# Patient Record
Sex: FEMALE | Race: BLACK OR AFRICAN AMERICAN | NOT HISPANIC OR LATINO
[De-identification: names, ages, dates, MRNs, and addresses within clinical notes are randomized per-mention and may not be internally consistent; named-entity substitution may affect disease eponyms.]

---

## 2017-01-17 ENCOUNTER — MEDICATION RENEWAL (OUTPATIENT)
Age: 82
End: 2017-01-17

## 2017-02-13 ENCOUNTER — RX RENEWAL (OUTPATIENT)
Age: 82
End: 2017-02-13

## 2017-03-24 ENCOUNTER — APPOINTMENT (OUTPATIENT)
Dept: CARDIOLOGY | Facility: CLINIC | Age: 82
End: 2017-03-24

## 2017-03-24 ENCOUNTER — NON-APPOINTMENT (OUTPATIENT)
Age: 82
End: 2017-03-24

## 2017-03-24 VITALS
BODY MASS INDEX: 31.6 KG/M2 | SYSTOLIC BLOOD PRESSURE: 119 MMHG | HEIGHT: 61 IN | HEART RATE: 74 BPM | DIASTOLIC BLOOD PRESSURE: 72 MMHG | OXYGEN SATURATION: 94 % | WEIGHT: 167.38 LBS

## 2017-03-24 DIAGNOSIS — I20.8 OTHER FORMS OF ANGINA PECTORIS: ICD-10-CM

## 2017-03-24 DIAGNOSIS — Z09 ENCOUNTER FOR FOLLOW-UP EXAMINATION AFTER COMPLETED TREATMENT FOR CONDITIONS OTHER THAN MALIGNANT NEOPLASM: ICD-10-CM

## 2017-03-24 DIAGNOSIS — I50.23 ACUTE ON CHRONIC SYSTOLIC (CONGESTIVE) HEART FAILURE: ICD-10-CM

## 2017-03-30 ENCOUNTER — OUTPATIENT (OUTPATIENT)
Dept: OUTPATIENT SERVICES | Facility: HOSPITAL | Age: 82
LOS: 1 days | End: 2017-03-30
Payer: MEDICARE

## 2017-03-30 VITALS
HEART RATE: 92 BPM | WEIGHT: 167.99 LBS | TEMPERATURE: 97 F | SYSTOLIC BLOOD PRESSURE: 157 MMHG | OXYGEN SATURATION: 92 % | HEIGHT: 60 IN | DIASTOLIC BLOOD PRESSURE: 92 MMHG

## 2017-03-30 VITALS
SYSTOLIC BLOOD PRESSURE: 157 MMHG | HEART RATE: 92 BPM | WEIGHT: 167.99 LBS | DIASTOLIC BLOOD PRESSURE: 92 MMHG | OXYGEN SATURATION: 92 % | HEIGHT: 60 IN | RESPIRATION RATE: 18 BRPM | TEMPERATURE: 97 F

## 2017-03-30 DIAGNOSIS — Z01.810 ENCOUNTER FOR PREPROCEDURAL CARDIOVASCULAR EXAMINATION: ICD-10-CM

## 2017-03-30 DIAGNOSIS — I34.0 NONRHEUMATIC MITRAL (VALVE) INSUFFICIENCY: ICD-10-CM

## 2017-03-30 LAB
ANION GAP SERPL CALC-SCNC: 14 MMOL/L — SIGNIFICANT CHANGE UP (ref 5–17)
ANISOCYTOSIS BLD QL: SLIGHT — SIGNIFICANT CHANGE UP
APTT BLD: 28.3 SEC — SIGNIFICANT CHANGE UP (ref 27.5–37.4)
BUN SERPL-MCNC: 21 MG/DL — HIGH (ref 8–20)
CALCIUM SERPL-MCNC: 9.3 MG/DL — SIGNIFICANT CHANGE UP (ref 8.6–10.2)
CHLORIDE SERPL-SCNC: 99 MMOL/L — SIGNIFICANT CHANGE UP (ref 98–107)
CO2 SERPL-SCNC: 33 MMOL/L — HIGH (ref 22–29)
CREAT SERPL-MCNC: 1.28 MG/DL — SIGNIFICANT CHANGE UP (ref 0.5–1.3)
EOSINOPHIL NFR BLD AUTO: 2 % — SIGNIFICANT CHANGE UP (ref 0–5)
GLUCOSE SERPL-MCNC: 96 MG/DL — SIGNIFICANT CHANGE UP (ref 70–115)
HCT VFR BLD CALC: 29.3 % — LOW (ref 37–47)
HGB BLD-MCNC: 9 G/DL — LOW (ref 12–16)
INR BLD: 1.08 RATIO — SIGNIFICANT CHANGE UP (ref 0.88–1.16)
LYMPHOCYTES # BLD AUTO: 23 % — SIGNIFICANT CHANGE UP (ref 20–55)
MACROCYTES BLD QL: SLIGHT — SIGNIFICANT CHANGE UP
MCHC RBC-ENTMCNC: 20.7 PG — LOW (ref 27–31)
MCHC RBC-ENTMCNC: 30.7 G/DL — LOW (ref 32–36)
MCV RBC AUTO: 67.5 FL — LOW (ref 81–99)
MONOCYTES NFR BLD AUTO: 9 % — SIGNIFICANT CHANGE UP (ref 3–10)
NEUTROPHILS NFR BLD AUTO: 60 % — SIGNIFICANT CHANGE UP (ref 37–73)
PLAT MORPH BLD: NORMAL — SIGNIFICANT CHANGE UP
PLATELET # BLD AUTO: 248 K/UL — SIGNIFICANT CHANGE UP (ref 150–400)
POIKILOCYTOSIS BLD QL AUTO: SLIGHT — SIGNIFICANT CHANGE UP
POTASSIUM SERPL-MCNC: 3.7 MMOL/L — SIGNIFICANT CHANGE UP (ref 3.5–5.3)
POTASSIUM SERPL-SCNC: 3.7 MMOL/L — SIGNIFICANT CHANGE UP (ref 3.5–5.3)
PROTHROM AB SERPL-ACNC: 11.9 SEC — SIGNIFICANT CHANGE UP (ref 9.8–12.7)
RBC # BLD: 4.34 M/UL — LOW (ref 4.4–5.2)
RBC # FLD: 19 % — HIGH (ref 11–15.6)
RBC BLD AUTO: ABNORMAL
SODIUM SERPL-SCNC: 146 MMOL/L — HIGH (ref 135–145)
VARIANT LYMPHS # BLD: 6 % — SIGNIFICANT CHANGE UP (ref 0–6)
WBC # BLD: 5.4 K/UL — SIGNIFICANT CHANGE UP (ref 4.8–10.8)
WBC # FLD AUTO: 5.4 K/UL — SIGNIFICANT CHANGE UP (ref 4.8–10.8)

## 2017-03-30 PROCEDURE — 85730 THROMBOPLASTIN TIME PARTIAL: CPT

## 2017-03-30 PROCEDURE — 80048 BASIC METABOLIC PNL TOTAL CA: CPT

## 2017-03-30 PROCEDURE — 93005 ELECTROCARDIOGRAM TRACING: CPT

## 2017-03-30 PROCEDURE — 85610 PROTHROMBIN TIME: CPT

## 2017-03-30 PROCEDURE — 93010 ELECTROCARDIOGRAM REPORT: CPT

## 2017-03-30 PROCEDURE — G0463: CPT

## 2017-03-30 PROCEDURE — 85027 COMPLETE CBC AUTOMATED: CPT

## 2017-03-30 NOTE — ASU PATIENT PROFILE, ADULT - PMH
Acid reflux    CAD (coronary artery disease)    Cardiomyopathy    Dyslipidemia    Heart failure    HTN (hypertension)    Hx of CABG  2008  Iron deficiency anemia    MI, old  6/2008  MR (mitral regurgitation)  severe  Obesity    On home oxygen therapy  not at this time  3-30-17  Osteoarthritis    Tracheal stenosis  bronch done   8-20-13   r/o  out  stenosis

## 2017-03-30 NOTE — H&P PST ADULT - ASSESSMENT
84 year old female with CAD, prior CABG, CHF with recent hospitalization and known MR that has been getting worse. For LENNY to assess mitral valve

## 2017-03-30 NOTE — H&P PST ADULT - HISTORY OF PRESENT ILLNESS
84 year old female with history of CAD s/p CABG 2008, HTN, acute on chronic CHF, episode of mental status changes with intubation and tracheostomy (now reversed) and know MR which has been getting worse.  She is pre procedure for LENNY to assess Mitral valve.

## 2017-03-31 ENCOUNTER — OUTPATIENT (OUTPATIENT)
Dept: OUTPATIENT SERVICES | Facility: HOSPITAL | Age: 82
LOS: 1 days | End: 2017-03-31
Payer: MEDICARE

## 2017-03-31 ENCOUNTER — TRANSCRIPTION ENCOUNTER (OUTPATIENT)
Age: 82
End: 2017-03-31

## 2017-03-31 DIAGNOSIS — I34.0 NONRHEUMATIC MITRAL (VALVE) INSUFFICIENCY: ICD-10-CM

## 2017-03-31 PROCEDURE — 93312 ECHO TRANSESOPHAGEAL: CPT | Mod: 26

## 2017-03-31 PROCEDURE — 93325 DOPPLER ECHO COLOR FLOW MAPG: CPT

## 2017-03-31 PROCEDURE — 93325 DOPPLER ECHO COLOR FLOW MAPG: CPT | Mod: 26

## 2017-03-31 PROCEDURE — 76376 3D RENDER W/INTRP POSTPROCES: CPT | Mod: 26

## 2017-03-31 PROCEDURE — 93320 DOPPLER ECHO COMPLETE: CPT | Mod: 26

## 2017-03-31 PROCEDURE — 93320 DOPPLER ECHO COMPLETE: CPT

## 2017-03-31 PROCEDURE — 93312 ECHO TRANSESOPHAGEAL: CPT

## 2017-03-31 NOTE — DISCHARGE NOTE ADULT - CARE PLAN
Principal Discharge DX:	MR (mitral regurgitation)  Goal:	OPTIMIZE CARDIAC HEALTH  Instructions for follow-up, activity and diet:	DO NOT EAT FOR 2 HOURS   FOLLOW UP WITH DR BERG 1 WEEK

## 2017-03-31 NOTE — DISCHARGE NOTE ADULT - PATIENT PORTAL LINK FT
“You can access the Follow[facility] Patient Portal, offered by [facility], by registering with the following website: [email or web address]”

## 2017-03-31 NOTE — DISCHARGE NOTE ADULT - MEDICATION SUMMARY - MEDICATIONS TO TAKE
I will START or STAY ON the medications listed below when I get home from the hospital:    Imdur  -- 90 milligram(s) by mouth once a day  -- Indication: For angina    nitroglycerin 0.4 mg sublingual tablet  -- 1 tab(s) under tongue every 5 minutes  -- Indication: For angina    Zocor 40 mg oral tablet  -- 1 tab(s) by mouth once a day (at bedtime)  -- Indication: For cholesterol    Plavix 75 mg oral tablet  -- 1 tab(s) by mouth once a day  -- Indication: For antiplatELETS    torsemide 20 mg oral tablet  -- 1 tab(s) by mouth once a day  -- Indication: For WATER PILL    Slow Fe (as elemental iron) 45 mg oral tablet, extended release  -- 1 tab(s) by mouth once a day  -- Indication: For IRON    latanoprost 0.005% ophthalmic solution  -- 2 drop(s) to each affected eye once a day (in the evening)  -- Indication: For GLAUCOMA    Protonix 40 mg oral delayed release tablet  -- 1 tab(s) by mouth once a day  -- Indication: For STOMACH    hydrALAZINE 25 mg oral tablet  -- 1 tab(s) by mouth 2 times a day  -- Indication: For BP    multivitamin  -- 1 tab(s) by mouth once a day  -- Indication: For VIT

## 2017-03-31 NOTE — DISCHARGE NOTE ADULT - CARE PROVIDERS DIRECT ADDRESSES
[email or web address]

## 2017-03-31 NOTE — DISCHARGE NOTE ADULT - CARE PROVIDER_API CALL
Aki Massey), Cardiovascular Disease  [address] [place] [address]  Phone: [phone]  Fax: [phone]

## 2017-03-31 NOTE — DISCHARGE NOTE ADULT - NS AS DC STROKE ED MATERIALS
Risk Factors for Stroke/Prescribed Medications/Stroke Education Booklet/Call 911 for Stroke/Stroke Warning Signs and Symptoms/Need for Followup After Discharge

## 2017-03-31 NOTE — DISCHARGE NOTE ADULT - CONDITIONS AT DISCHARGE
vital signs  stable, lungs  cta  ,  denies  cp  or  sob,   ,  mild sore throatt,  denies dysphagia,  passed  swallow eval  drank  h20 with no problems.   dangled at bedside,  ambulated  slow  with assist  which is her prior function,  all discharge instructions to dtr and pt , taught back with good understanding, s/l removed ascepticaslly  dsd  in place,  no  s/s  infection,  pt  lives  with dtr  ,  dc  home with dtr under care of  md

## 2017-04-04 DIAGNOSIS — I34.0 NONRHEUMATIC MITRAL (VALVE) INSUFFICIENCY: ICD-10-CM

## 2017-04-04 DIAGNOSIS — I25.10 ATHEROSCLEROTIC HEART DISEASE OF NATIVE CORONARY ARTERY WITHOUT ANGINA PECTORIS: ICD-10-CM

## 2017-04-04 DIAGNOSIS — Z01.810 ENCOUNTER FOR PREPROCEDURAL CARDIOVASCULAR EXAMINATION: ICD-10-CM

## 2017-04-20 ENCOUNTER — APPOINTMENT (OUTPATIENT)
Dept: CARDIOTHORACIC SURGERY | Facility: CLINIC | Age: 82
End: 2017-04-20

## 2017-04-20 VITALS
BODY MASS INDEX: 31.53 KG/M2 | RESPIRATION RATE: 16 BRPM | HEART RATE: 76 BPM | WEIGHT: 167 LBS | SYSTOLIC BLOOD PRESSURE: 126 MMHG | OXYGEN SATURATION: 90 % | DIASTOLIC BLOOD PRESSURE: 70 MMHG | HEIGHT: 61 IN

## 2017-05-05 ENCOUNTER — OUTPATIENT (OUTPATIENT)
Dept: OUTPATIENT SERVICES | Facility: HOSPITAL | Age: 82
LOS: 1 days | Discharge: HOME | End: 2017-05-05
Payer: MEDICARE

## 2017-05-05 ENCOUNTER — TRANSCRIPTION ENCOUNTER (OUTPATIENT)
Age: 82
End: 2017-05-05

## 2017-05-05 VITALS — DIASTOLIC BLOOD PRESSURE: 69 MMHG | TEMPERATURE: 98 F | SYSTOLIC BLOOD PRESSURE: 143 MMHG | HEART RATE: 75 BPM

## 2017-05-05 VITALS
DIASTOLIC BLOOD PRESSURE: 68 MMHG | OXYGEN SATURATION: 98 % | RESPIRATION RATE: 16 BRPM | HEART RATE: 74 BPM | SYSTOLIC BLOOD PRESSURE: 122 MMHG

## 2017-05-05 DIAGNOSIS — I34.0 NONRHEUMATIC MITRAL (VALVE) INSUFFICIENCY: ICD-10-CM

## 2017-05-05 LAB
ALBUMIN SERPL ELPH-MCNC: 4.1 G/DL — SIGNIFICANT CHANGE UP (ref 3.3–5.2)
ALP SERPL-CCNC: 68 U/L — SIGNIFICANT CHANGE UP (ref 40–120)
ALT FLD-CCNC: 7 U/L — SIGNIFICANT CHANGE UP
ANION GAP SERPL CALC-SCNC: 12 MMOL/L — SIGNIFICANT CHANGE UP (ref 5–17)
APTT BLD: 28.6 SEC — SIGNIFICANT CHANGE UP (ref 27.5–37.4)
AST SERPL-CCNC: 12 U/L — SIGNIFICANT CHANGE UP
BILIRUB SERPL-MCNC: 0.3 MG/DL — LOW (ref 0.4–2)
BUN SERPL-MCNC: 24 MG/DL — HIGH (ref 8–20)
CALCIUM SERPL-MCNC: 9.5 MG/DL — SIGNIFICANT CHANGE UP (ref 8.6–10.2)
CHLORIDE SERPL-SCNC: 99 MMOL/L — SIGNIFICANT CHANGE UP (ref 98–107)
CO2 SERPL-SCNC: 36 MMOL/L — HIGH (ref 22–29)
CREAT SERPL-MCNC: 1.21 MG/DL — SIGNIFICANT CHANGE UP (ref 0.5–1.3)
GLUCOSE SERPL-MCNC: 95 MG/DL — SIGNIFICANT CHANGE UP (ref 70–115)
HCT VFR BLD CALC: 30.7 % — LOW (ref 37–47)
HGB BLD-MCNC: 9.4 G/DL — LOW (ref 12–16)
INR BLD: 1.14 RATIO — SIGNIFICANT CHANGE UP (ref 0.88–1.16)
MCHC RBC-ENTMCNC: 20.6 PG — LOW (ref 27–31)
MCHC RBC-ENTMCNC: 30.6 G/DL — LOW (ref 32–36)
MCV RBC AUTO: 67.3 FL — LOW (ref 81–99)
PLATELET # BLD AUTO: 245 K/UL — SIGNIFICANT CHANGE UP (ref 150–400)
POTASSIUM SERPL-MCNC: 4.2 MMOL/L — SIGNIFICANT CHANGE UP (ref 3.5–5.3)
POTASSIUM SERPL-SCNC: 4.2 MMOL/L — SIGNIFICANT CHANGE UP (ref 3.5–5.3)
PROT SERPL-MCNC: 7.6 G/DL — SIGNIFICANT CHANGE UP (ref 6.6–8.7)
PROTHROM AB SERPL-ACNC: 12.6 SEC — SIGNIFICANT CHANGE UP (ref 9.8–12.7)
RBC # BLD: 4.56 M/UL — SIGNIFICANT CHANGE UP (ref 4.4–5.2)
RBC # FLD: 19.8 % — HIGH (ref 11–15.6)
SODIUM SERPL-SCNC: 147 MMOL/L — HIGH (ref 135–145)
WBC # BLD: 4.1 K/UL — LOW (ref 4.8–10.8)
WBC # FLD AUTO: 4.1 K/UL — LOW (ref 4.8–10.8)

## 2017-05-05 PROCEDURE — 93010 ELECTROCARDIOGRAM REPORT: CPT

## 2017-05-05 PROCEDURE — C1889: CPT

## 2017-05-05 PROCEDURE — 85027 COMPLETE CBC AUTOMATED: CPT

## 2017-05-05 PROCEDURE — 85610 PROTHROMBIN TIME: CPT

## 2017-05-05 PROCEDURE — 85730 THROMBOPLASTIN TIME PARTIAL: CPT

## 2017-05-05 PROCEDURE — 93005 ELECTROCARDIOGRAM TRACING: CPT

## 2017-05-05 PROCEDURE — 93461 R&L HRT ART/VENTRICLE ANGIO: CPT | Mod: 26

## 2017-05-05 PROCEDURE — 93461 R&L HRT ART/VENTRICLE ANGIO: CPT

## 2017-05-05 PROCEDURE — C1894: CPT

## 2017-05-05 PROCEDURE — 80053 COMPREHEN METABOLIC PANEL: CPT

## 2017-05-05 PROCEDURE — C1887: CPT

## 2017-05-05 RX ORDER — CLOPIDOGREL BISULFATE 75 MG/1
1 TABLET, FILM COATED ORAL
Qty: 0 | Refills: 0 | COMMUNITY

## 2017-05-05 RX ORDER — ACETAMINOPHEN 500 MG
650 TABLET ORAL ONCE
Qty: 0 | Refills: 0 | Status: COMPLETED | OUTPATIENT
Start: 2017-05-05 | End: 2017-05-05

## 2017-05-05 RX ORDER — SODIUM CHLORIDE 9 MG/ML
500 INJECTION INTRAMUSCULAR; INTRAVENOUS; SUBCUTANEOUS
Qty: 0 | Refills: 0 | Status: DISCONTINUED | OUTPATIENT
Start: 2017-05-05 | End: 2017-05-20

## 2017-05-05 RX ORDER — PANTOPRAZOLE SODIUM 20 MG/1
1 TABLET, DELAYED RELEASE ORAL
Qty: 0 | Refills: 0 | COMMUNITY

## 2017-05-05 RX ADMIN — Medication 650 MILLIGRAM(S): at 12:27

## 2017-05-05 NOTE — DISCHARGE NOTE ADULT - NS AS ACTIVITY OBS
Showering allowed/No Heavy lifting/straining/Do not drive or operate machinery/Do not make important decisions/Walking-Outdoors allowed/Walking-Indoors allowed

## 2017-05-05 NOTE — DISCHARGE NOTE ADULT - CARE PLAN
Principal Discharge DX:	MR (mitral regurgitation)  Goal:	optimal cardiac function  Instructions for follow-up, activity and diet:	No heavy lifting, driving, sex, tub baths, swimming, or any activity that submerges the lower half of the body in water for 48 hours.  Limited walking and stairs for 48 hours.    Change the bandaid after 24 hours and every 24 hours after that.  Keep the puncture site dry and covered with a bandaid until a scab forms.    Observe the site frequently.  If bleeding or a large lump (the size of a golf ball or bigger) occurs lie flat, apply continuous direct pressure just above the puncture site for at least 10 minutes, and notify your physician immediately.  If the bleeding cannot be controlled, call 911 immediately for assistance.  Notify your physician of pain, swelling or any drainage.    Notify your physician immediately if coldness, numbness, discoloration or pain in your foot occurs.

## 2017-05-05 NOTE — DISCHARGE NOTE ADULT - PLAN OF CARE
optimal cardiac function No heavy lifting, driving, sex, tub baths, swimming, or any activity that submerges the lower half of the body in water for 48 hours.  Limited walking and stairs for 48 hours.    Change the bandaid after 24 hours and every 24 hours after that.  Keep the puncture site dry and covered with a bandaid until a scab forms.    Observe the site frequently.  If bleeding or a large lump (the size of a golf ball or bigger) occurs lie flat, apply continuous direct pressure just above the puncture site for at least 10 minutes, and notify your physician immediately.  If the bleeding cannot be controlled, call 911 immediately for assistance.  Notify your physician of pain, swelling or any drainage.    Notify your physician immediately if coldness, numbness, discoloration or pain in your foot occurs.

## 2017-05-05 NOTE — DISCHARGE NOTE ADULT - HOSPITAL COURSE
85 y/o female with symptomatic mitral regurgitation being worked up for mitral clip. PMH includes CABG 2008 with post op IWMI (residual memory loss), respiratory failure requiring tracheostomy in 2013 (for suspected tracheal stenosis later found to have short airway - now reversed), CRI, anemia, HTN, HLD.   Pt now s/p LHC to evaluate coronaries...  RFA and RFV sheaths in place    Vital Signs Last 24 Hrs  T(C): 36.5, Max: 36.5 (05-05 @ 07:30)  T(F): 97.7, Max: 97.7 (05-05 @ 07:30)  HR: 75 (75 - 76)  BP: 120/58 (120/58 - 143/69)  BP(mean): --  RR: --  SpO2: --    P: d/c femoral sheaths as per CCL protocol  continue current medications  outpatient follow up with Dr Barney   d/c home today when patient stable 83 y/o female with symptomatic mitral regurgitation being worked up for mitral clip. PMH includes CABG 2008 with post op IWMI (residual memory loss), respiratory failure requiring tracheostomy in 2013 (for suspected tracheal stenosis later found to have short airway - now reversed), CRI, anemia, HTN, HLD.   Pt now s/p LHC to evaluate coronaries; pending official report  RFA and RFV sheaths in place    Vital Signs Last 24 Hrs  T(C): 36.5, Max: 36.5 (05-05 @ 07:30)  T(F): 97.7, Max: 97.7 (05-05 @ 07:30)  HR: 75 (75 - 76)  BP: 120/58 (120/58 - 143/69)  BP(mean): --  RR: --  SpO2: --    P: d/c femoral sheaths as per CCL protocol  continue current medications  outpatient follow up with Dr Barney   d/c home today when patient stable

## 2017-05-05 NOTE — DISCHARGE NOTE ADULT - CARE PROVIDER_API CALL
Brigido Barney (MD), Cardiovascular Disease; Nuclear Cardiology  [address] [place] [id number]  Phone: [phone]  Fax: [phone]

## 2017-05-05 NOTE — DISCHARGE NOTE ADULT - MEDICATION SUMMARY - MEDICATIONS TO TAKE
I will START or STAY ON the medications listed below when I get home from the hospital:    Imdur  -- 90 milligram(s) by mouth once a day  -- Indication: For angina    nitroglycerin 0.4 mg sublingual tablet  -- 1 tab(s) under tongue every 5 minutes  -- Indication: For angina    Zocor 40 mg oral tablet  -- 1 tab(s) by mouth once a day (at bedtime)  -- Indication: For HLD     Plavix 75 mg oral tablet  -- 1 tab(s) by mouth once a day  -- Indication: For CAD    Toprol-XL 50 mg oral tablet, extended release  -- 1 tab(s) by mouth once a day  -- Indication: For CAD    torsemide 20 mg oral tablet  -- 1 tab(s) by mouth once a day  -- Indication: For Heart failure    Slow Fe (as elemental iron) 45 mg oral tablet, extended release  -- 1 tab(s) by mouth once a day  -- Indication: For Supplement    latanoprost 0.005% ophthalmic solution  -- 2 drop(s) to each affected eye once a day (in the evening)  -- Indication: For eye care    Protonix 40 mg oral delayed release tablet  -- 1 tab(s) by mouth once a day  -- Indication: For reflux    hydrALAZINE 25 mg oral tablet  -- 1 tab(s) by mouth 2 times a day  -- Indication: For HTN    multivitamin  -- 1 tab(s) by mouth once a day  -- Indication: For Supplement

## 2017-05-05 NOTE — H&P PST ADULT - HISTORY OF PRESENT ILLNESS
85 y/o female with symptomatic mitral regurgitation being worked up for mitral clip. PMH includes CABG 2008 with post op IWMI (residual memory loss), respiratory failure requiring tracheostomy in 2013 (for suspected tracheal stenosis later found to have short airway - now reversed), CRI, anemia, HTN, HLD.

## 2017-05-12 ENCOUNTER — APPOINTMENT (OUTPATIENT)
Dept: CARDIOLOGY | Facility: CLINIC | Age: 82
End: 2017-05-12

## 2017-05-24 ENCOUNTER — OUTPATIENT (OUTPATIENT)
Dept: OUTPATIENT SERVICES | Facility: HOSPITAL | Age: 82
LOS: 1 days | End: 2017-05-24
Payer: MEDICARE

## 2017-05-24 VITALS
SYSTOLIC BLOOD PRESSURE: 125 MMHG | HEART RATE: 83 BPM | RESPIRATION RATE: 16 BRPM | DIASTOLIC BLOOD PRESSURE: 74 MMHG | TEMPERATURE: 97 F | WEIGHT: 167.55 LBS | HEIGHT: 60 IN

## 2017-05-24 DIAGNOSIS — Z98.890 OTHER SPECIFIED POSTPROCEDURAL STATES: Chronic | ICD-10-CM

## 2017-05-24 DIAGNOSIS — Z01.818 ENCOUNTER FOR OTHER PREPROCEDURAL EXAMINATION: ICD-10-CM

## 2017-05-24 DIAGNOSIS — I34.0 NONRHEUMATIC MITRAL (VALVE) INSUFFICIENCY: ICD-10-CM

## 2017-05-24 LAB
ALBUMIN SERPL ELPH-MCNC: 4 G/DL — SIGNIFICANT CHANGE UP (ref 3.3–5.2)
ALP SERPL-CCNC: 67 U/L — SIGNIFICANT CHANGE UP (ref 40–120)
ALT FLD-CCNC: 9 U/L — SIGNIFICANT CHANGE UP
ANION GAP SERPL CALC-SCNC: 11 MMOL/L — SIGNIFICANT CHANGE UP (ref 5–17)
ANISOCYTOSIS BLD QL: SLIGHT — SIGNIFICANT CHANGE UP
APPEARANCE UR: CLEAR — SIGNIFICANT CHANGE UP
APTT BLD: 29.1 SEC — SIGNIFICANT CHANGE UP (ref 27.5–37.4)
AST SERPL-CCNC: 15 U/L — SIGNIFICANT CHANGE UP
BACTERIA # UR AUTO: ABNORMAL
BASOPHILS NFR BLD AUTO: 2 % — SIGNIFICANT CHANGE UP (ref 0–2)
BILIRUB SERPL-MCNC: 0.5 MG/DL — SIGNIFICANT CHANGE UP (ref 0.4–2)
BILIRUB UR-MCNC: NEGATIVE — SIGNIFICANT CHANGE UP
BLD GP AB SCN SERPL QL: SIGNIFICANT CHANGE UP
BUN SERPL-MCNC: 26 MG/DL — HIGH (ref 8–20)
CALCIUM SERPL-MCNC: 9.5 MG/DL — SIGNIFICANT CHANGE UP (ref 8.6–10.2)
CHLORIDE SERPL-SCNC: 94 MMOL/L — LOW (ref 98–107)
CO2 SERPL-SCNC: 38 MMOL/L — HIGH (ref 22–29)
COLOR SPEC: YELLOW — SIGNIFICANT CHANGE UP
CREAT SERPL-MCNC: 1.18 MG/DL — SIGNIFICANT CHANGE UP (ref 0.5–1.3)
DIFF PNL FLD: NEGATIVE — SIGNIFICANT CHANGE UP
ELLIPTOCYTES BLD QL SMEAR: SLIGHT — SIGNIFICANT CHANGE UP
EOSINOPHIL NFR BLD AUTO: 3 % — SIGNIFICANT CHANGE UP (ref 0–5)
EPI CELLS # UR: SIGNIFICANT CHANGE UP
GLUCOSE SERPL-MCNC: 92 MG/DL — SIGNIFICANT CHANGE UP (ref 70–115)
GLUCOSE UR QL: NEGATIVE MG/DL — SIGNIFICANT CHANGE UP
HBA1C BLD-MCNC: 5.5 % — SIGNIFICANT CHANGE UP (ref 4–5.6)
HCT VFR BLD CALC: 31.6 % — LOW (ref 37–47)
HGB BLD-MCNC: 9.4 G/DL — LOW (ref 12–16)
HYPOCHROMIA BLD QL: SLIGHT — SIGNIFICANT CHANGE UP
INR BLD: 1.19 RATIO — HIGH (ref 0.88–1.16)
KETONES UR-MCNC: NEGATIVE — SIGNIFICANT CHANGE UP
LEUKOCYTE ESTERASE UR-ACNC: NEGATIVE — SIGNIFICANT CHANGE UP
LYMPHOCYTES # BLD AUTO: 29 % — SIGNIFICANT CHANGE UP (ref 20–55)
MACROCYTES BLD QL: SLIGHT — SIGNIFICANT CHANGE UP
MCHC RBC-ENTMCNC: 20.2 PG — LOW (ref 27–31)
MCHC RBC-ENTMCNC: 29.7 G/DL — LOW (ref 32–36)
MCV RBC AUTO: 67.8 FL — LOW (ref 81–99)
MONOCYTES NFR BLD AUTO: 8 % — SIGNIFICANT CHANGE UP (ref 3–10)
MRSA PCR RESULT.: SIGNIFICANT CHANGE UP
NEUTROPHILS NFR BLD AUTO: 58 % — SIGNIFICANT CHANGE UP (ref 37–73)
NITRITE UR-MCNC: NEGATIVE — SIGNIFICANT CHANGE UP
NT-PROBNP SERPL-SCNC: 3010 PG/ML — HIGH (ref 0–300)
OVALOCYTES BLD QL SMEAR: SLIGHT — SIGNIFICANT CHANGE UP
PH UR: 8 — SIGNIFICANT CHANGE UP (ref 5–8)
PLAT MORPH BLD: NORMAL — SIGNIFICANT CHANGE UP
PLATELET # BLD AUTO: 280 K/UL — SIGNIFICANT CHANGE UP (ref 150–400)
POIKILOCYTOSIS BLD QL AUTO: SLIGHT — SIGNIFICANT CHANGE UP
POTASSIUM SERPL-MCNC: 4.1 MMOL/L — SIGNIFICANT CHANGE UP (ref 3.5–5.3)
POTASSIUM SERPL-SCNC: 4.1 MMOL/L — SIGNIFICANT CHANGE UP (ref 3.5–5.3)
PREALB SERPL-MCNC: 18 MG/DL — SIGNIFICANT CHANGE UP (ref 18–38)
PROT SERPL-MCNC: 7.7 G/DL — SIGNIFICANT CHANGE UP (ref 6.6–8.7)
PROT UR-MCNC: 15 MG/DL
PROTHROM AB SERPL-ACNC: 13.1 SEC — HIGH (ref 9.8–12.7)
RBC # BLD: 4.66 M/UL — SIGNIFICANT CHANGE UP (ref 4.4–5.2)
RBC # FLD: 19.6 % — HIGH (ref 11–15.6)
RBC BLD AUTO: ABNORMAL
RBC CASTS # UR COMP ASSIST: SIGNIFICANT CHANGE UP /HPF (ref 0–4)
S AUREUS DNA NOSE QL NAA+PROBE: SIGNIFICANT CHANGE UP
SODIUM SERPL-SCNC: 143 MMOL/L — SIGNIFICANT CHANGE UP (ref 135–145)
SP GR SPEC: 1.01 — SIGNIFICANT CHANGE UP (ref 1.01–1.02)
T3 SERPL-MCNC: 100 NG/DL — SIGNIFICANT CHANGE UP (ref 80–200)
T4 AB SER-ACNC: 7.8 UG/DL — SIGNIFICANT CHANGE UP (ref 4.5–12)
TSH SERPL-MCNC: 2.27 UIU/ML — SIGNIFICANT CHANGE UP (ref 0.27–4.2)
TYPE + AB SCN PNL BLD: SIGNIFICANT CHANGE UP
UROBILINOGEN FLD QL: NEGATIVE MG/DL — SIGNIFICANT CHANGE UP
WBC # BLD: 4.5 K/UL — LOW (ref 4.8–10.8)
WBC # FLD AUTO: 4.5 K/UL — LOW (ref 4.8–10.8)
WBC UR QL: SIGNIFICANT CHANGE UP

## 2017-05-24 PROCEDURE — 93010 ELECTROCARDIOGRAM REPORT: CPT

## 2017-05-24 PROCEDURE — 71020: CPT | Mod: 26

## 2017-05-24 RX ORDER — CEFUROXIME AXETIL 250 MG
1500 TABLET ORAL ONCE
Qty: 0 | Refills: 0 | Status: COMPLETED | OUTPATIENT
Start: 2017-06-07 | End: 2017-06-07

## 2017-05-24 RX ORDER — SODIUM CHLORIDE 9 MG/ML
3 INJECTION INTRAMUSCULAR; INTRAVENOUS; SUBCUTANEOUS EVERY 8 HOURS
Qty: 0 | Refills: 0 | Status: DISCONTINUED | OUTPATIENT
Start: 2017-06-07 | End: 2017-06-09

## 2017-05-24 NOTE — H&P PST ADULT - ASSESSMENT
Pleasant 84 year old female with history of  Dyspnea, HTN, MI, Trach ,  CHF , And mitral regurgitation presents for mitral clip with Dr. Owen.

## 2017-05-24 NOTE — H&P PST ADULT - PMH
Acid reflux    CAD (coronary artery disease)    Cardiomyopathy    Dyslipidemia    Heart failure    HTN (hypertension)    Hx of CABG  2008  Iron deficiency anemia    MI, old  6/2008  MR (mitral regurgitation)  severe  Obesity    On home oxygen therapy  not at this time  3-30-17  Osteoarthritis    Tracheal stenosis  bronch done   8-20-13   r/o  out  stenosis Acid reflux    CAD (coronary artery disease)    Cardiomyopathy    Dyslipidemia    Heart failure    HTN (hypertension)    Hx of CABG  2008  Iron deficiency anemia    MI, old  6/2008  MR (mitral regurgitation)  severe  Obesity    On home oxygen therapy  not at this time  3-30-17  Osteoarthritis    Renal insufficiency    Tracheal stenosis  bronch done   8-20-13   r/o  out  stenosis

## 2017-05-24 NOTE — PATIENT PROFILE ADULT. - LEARNING ASSESSMENT (PATIENT) ADDITIONAL COMMENTS
Instructed pt on pre-op instructions, tips for safer surgery, pain management scale, pre-surgical infection prevention instructions, and MRSA/MSSA instructions given to pt and verbalize understanding of all.  Ebola screening: Negative.

## 2017-05-24 NOTE — H&P PST ADULT - EKG AND INTERPRETATION
Sinus Rhythm 77 with premature atrial complexes with aberrant conduction  possible inferior infarct  ST & T wave abnormality, consider lateral ischemia  pending final interpretation

## 2017-05-24 NOTE — PATIENT PROFILE ADULT. - PMH
Acid reflux    CAD (coronary artery disease)    Cardiomyopathy    Dyslipidemia    Heart failure    HTN (hypertension)    Hx of CABG  2008  Iron deficiency anemia    MI, old  6/2008  MR (mitral regurgitation)  severe  Obesity    On home oxygen therapy  not at this time  3-30-17  Osteoarthritis    Renal insufficiency    Tracheal stenosis  bronch done   8-20-13   r/o  out  stenosis

## 2017-05-24 NOTE — H&P PST ADULT - HISTORY OF PRESENT ILLNESS
84 year old female presents to PST with daughter Mini. History obtained from daughter who is nurse .PT. has several year history of mitral regurgitation  with dyspnea .Ambulates with walker but activity limited due to dyspnea, also c/o occasional chest discomfort. Sleeps with head elevated 4 pillows. MI and  CHF 2/18.  Over past few years dyspnea getting worse . Echo done in March 2017 with Cardiac cath done in may 2017. Now pt is scheduled for mitral clip with DR. Owen.

## 2017-05-24 NOTE — H&P PST ADULT - LAB RESULTS AND INTERPRETATION
5/26/17 1pm L/M for Marquis Flynn regarding Urine culture. 50,000- 99,000 corynebacterium species. K D'Amico NP

## 2017-05-25 LAB
CULTURE RESULTS: SIGNIFICANT CHANGE UP
SPECIMEN SOURCE: SIGNIFICANT CHANGE UP

## 2017-06-06 PROCEDURE — 93005 ELECTROCARDIOGRAM TRACING: CPT

## 2017-06-06 PROCEDURE — 86900 BLOOD TYPING SEROLOGIC ABO: CPT

## 2017-06-06 PROCEDURE — 84480 ASSAY TRIIODOTHYRONINE (T3): CPT

## 2017-06-06 PROCEDURE — 87640 STAPH A DNA AMP PROBE: CPT

## 2017-06-06 PROCEDURE — 85730 THROMBOPLASTIN TIME PARTIAL: CPT

## 2017-06-06 PROCEDURE — 84443 ASSAY THYROID STIM HORMONE: CPT

## 2017-06-06 PROCEDURE — 85027 COMPLETE CBC AUTOMATED: CPT

## 2017-06-06 PROCEDURE — 71046 X-RAY EXAM CHEST 2 VIEWS: CPT

## 2017-06-06 PROCEDURE — 84436 ASSAY OF TOTAL THYROXINE: CPT

## 2017-06-06 PROCEDURE — 86920 COMPATIBILITY TEST SPIN: CPT

## 2017-06-06 PROCEDURE — 81001 URINALYSIS AUTO W/SCOPE: CPT

## 2017-06-06 PROCEDURE — G0463: CPT

## 2017-06-06 PROCEDURE — 86850 RBC ANTIBODY SCREEN: CPT

## 2017-06-06 PROCEDURE — 83880 ASSAY OF NATRIURETIC PEPTIDE: CPT

## 2017-06-06 PROCEDURE — 86901 BLOOD TYPING SEROLOGIC RH(D): CPT

## 2017-06-06 PROCEDURE — 83036 HEMOGLOBIN GLYCOSYLATED A1C: CPT

## 2017-06-06 PROCEDURE — 85610 PROTHROMBIN TIME: CPT

## 2017-06-06 PROCEDURE — 80053 COMPREHEN METABOLIC PANEL: CPT

## 2017-06-06 PROCEDURE — 84134 ASSAY OF PREALBUMIN: CPT

## 2017-06-06 PROCEDURE — 87086 URINE CULTURE/COLONY COUNT: CPT

## 2017-06-06 PROCEDURE — 87641 MR-STAPH DNA AMP PROBE: CPT

## 2017-06-07 ENCOUNTER — APPOINTMENT (OUTPATIENT)
Dept: CARDIOTHORACIC SURGERY | Facility: HOSPITAL | Age: 82
End: 2017-06-07
Payer: MEDICARE

## 2017-06-07 ENCOUNTER — INPATIENT (INPATIENT)
Facility: HOSPITAL | Age: 82
LOS: 1 days | Discharge: HOME | DRG: 229 | End: 2017-06-09
Attending: THORACIC SURGERY (CARDIOTHORACIC VASCULAR SURGERY) | Admitting: THORACIC SURGERY (CARDIOTHORACIC VASCULAR SURGERY)
Payer: MEDICARE

## 2017-06-07 VITALS
OXYGEN SATURATION: 92 % | TEMPERATURE: 98 F | RESPIRATION RATE: 16 BRPM | HEART RATE: 68 BPM | WEIGHT: 166.89 LBS | HEIGHT: 60 IN | SYSTOLIC BLOOD PRESSURE: 119 MMHG | DIASTOLIC BLOOD PRESSURE: 52 MMHG

## 2017-06-07 DIAGNOSIS — I34.0 NONRHEUMATIC MITRAL (VALVE) INSUFFICIENCY: ICD-10-CM

## 2017-06-07 DIAGNOSIS — Z98.890 OTHER SPECIFIED POSTPROCEDURAL STATES: Chronic | ICD-10-CM

## 2017-06-07 LAB
ALBUMIN SERPL ELPH-MCNC: 3.4 G/DL — SIGNIFICANT CHANGE UP (ref 3.3–5.2)
ALP SERPL-CCNC: 77 U/L — SIGNIFICANT CHANGE UP (ref 40–120)
ALT FLD-CCNC: 29 U/L — SIGNIFICANT CHANGE UP
ANION GAP SERPL CALC-SCNC: 13 MMOL/L — SIGNIFICANT CHANGE UP (ref 5–17)
APTT BLD: 25 SEC — LOW (ref 27.5–37.4)
AST SERPL-CCNC: 27 U/L — SIGNIFICANT CHANGE UP
BILIRUB SERPL-MCNC: 0.7 MG/DL — SIGNIFICANT CHANGE UP (ref 0.4–2)
BLD GP AB SCN SERPL QL: SIGNIFICANT CHANGE UP
BUN SERPL-MCNC: 25 MG/DL — HIGH (ref 8–20)
CALCIUM SERPL-MCNC: 8.7 MG/DL — SIGNIFICANT CHANGE UP (ref 8.6–10.2)
CHLORIDE SERPL-SCNC: 102 MMOL/L — SIGNIFICANT CHANGE UP (ref 98–107)
CK SERPL-CCNC: 104 U/L — SIGNIFICANT CHANGE UP (ref 25–170)
CO2 SERPL-SCNC: 30 MMOL/L — HIGH (ref 22–29)
CREAT SERPL-MCNC: 1.11 MG/DL — SIGNIFICANT CHANGE UP (ref 0.5–1.3)
GAS PNL BLDA: SIGNIFICANT CHANGE UP
GLUCOSE SERPL-MCNC: 133 MG/DL — HIGH (ref 70–115)
HCT VFR BLD CALC: 29.1 % — LOW (ref 37–47)
HGB BLD-MCNC: 8.6 G/DL — LOW (ref 12–16)
INR BLD: 1.42 RATIO — HIGH (ref 0.88–1.16)
MAGNESIUM SERPL-MCNC: 2.2 MG/DL — SIGNIFICANT CHANGE UP (ref 1.6–2.6)
MCHC RBC-ENTMCNC: 20.4 PG — LOW (ref 27–31)
MCHC RBC-ENTMCNC: 29.6 G/DL — LOW (ref 32–36)
MCV RBC AUTO: 69 FL — LOW (ref 81–99)
PLATELET # BLD AUTO: 198 K/UL — SIGNIFICANT CHANGE UP (ref 150–400)
POTASSIUM SERPL-MCNC: 3.7 MMOL/L — SIGNIFICANT CHANGE UP (ref 3.5–5.3)
POTASSIUM SERPL-SCNC: 3.7 MMOL/L — SIGNIFICANT CHANGE UP (ref 3.5–5.3)
PROT SERPL-MCNC: 6.3 G/DL — LOW (ref 6.6–8.7)
PROTHROM AB SERPL-ACNC: 15.7 SEC — HIGH (ref 9.8–12.7)
RBC # BLD: 4.22 M/UL — LOW (ref 4.4–5.2)
RBC # FLD: 20.6 % — HIGH (ref 11–15.6)
SODIUM SERPL-SCNC: 145 MMOL/L — SIGNIFICANT CHANGE UP (ref 135–145)
TROPONIN T SERPL-MCNC: 0.14 NG/ML — HIGH (ref 0–0.06)
TYPE + AB SCN PNL BLD: SIGNIFICANT CHANGE UP
WBC # BLD: 6.2 K/UL — SIGNIFICANT CHANGE UP (ref 4.8–10.8)
WBC # FLD AUTO: 6.2 K/UL — SIGNIFICANT CHANGE UP (ref 4.8–10.8)

## 2017-06-07 PROCEDURE — 33419 REPAIR TCAT MITRAL VALVE: CPT | Mod: 62,Q0

## 2017-06-07 PROCEDURE — 93010 ELECTROCARDIOGRAM REPORT: CPT

## 2017-06-07 PROCEDURE — 33419 REPAIR TCAT MITRAL VALVE: CPT | Mod: AS,Q0

## 2017-06-07 PROCEDURE — 33418 REPAIR TCAT MITRAL VALVE: CPT | Mod: AS,Q0

## 2017-06-07 PROCEDURE — 33418 REPAIR TCAT MITRAL VALVE: CPT | Mod: 62,Q0

## 2017-06-07 PROCEDURE — 93312 ECHO TRANSESOPHAGEAL: CPT | Mod: 26

## 2017-06-07 PROCEDURE — 93325 DOPPLER ECHO COLOR FLOW MAPG: CPT | Mod: 26

## 2017-06-07 PROCEDURE — 76376 3D RENDER W/INTRP POSTPROCES: CPT | Mod: 26

## 2017-06-07 PROCEDURE — 71010: CPT | Mod: 26

## 2017-06-07 PROCEDURE — 93320 DOPPLER ECHO COMPLETE: CPT | Mod: 26

## 2017-06-07 RX ORDER — POTASSIUM CHLORIDE 20 MEQ
10 PACKET (EA) ORAL ONCE
Qty: 0 | Refills: 0 | Status: COMPLETED | OUTPATIENT
Start: 2017-06-07 | End: 2017-06-07

## 2017-06-07 RX ORDER — LATANOPROST 0.05 MG/ML
2 SOLUTION/ DROPS OPHTHALMIC; TOPICAL
Qty: 0 | Refills: 0 | COMMUNITY

## 2017-06-07 RX ORDER — DEXMEDETOMIDINE HYDROCHLORIDE IN 0.9% SODIUM CHLORIDE 4 UG/ML
0.2 INJECTION INTRAVENOUS
Qty: 200 | Refills: 0 | Status: DISCONTINUED | OUTPATIENT
Start: 2017-06-07 | End: 2017-06-08

## 2017-06-07 RX ORDER — FENTANYL CITRATE 50 UG/ML
25 INJECTION INTRAVENOUS ONCE
Qty: 0 | Refills: 0 | Status: DISCONTINUED | OUTPATIENT
Start: 2017-06-07 | End: 2017-06-07

## 2017-06-07 RX ORDER — POTASSIUM CHLORIDE 20 MEQ
10 PACKET (EA) ORAL
Qty: 0 | Refills: 0 | Status: DISCONTINUED | OUTPATIENT
Start: 2017-06-07 | End: 2017-06-07

## 2017-06-07 RX ORDER — NOREPINEPHRINE BITARTRATE/D5W 8 MG/250ML
0.02 PLASTIC BAG, INJECTION (ML) INTRAVENOUS
Qty: 8 | Refills: 0 | Status: DISCONTINUED | OUTPATIENT
Start: 2017-06-07 | End: 2017-06-08

## 2017-06-07 RX ORDER — INSULIN HUMAN 100 [IU]/ML
1 INJECTION, SOLUTION SUBCUTANEOUS
Qty: 250 | Refills: 0 | Status: DISCONTINUED | OUTPATIENT
Start: 2017-06-07 | End: 2017-06-07

## 2017-06-07 RX ORDER — DEXAMETHASONE 0.5 MG/5ML
10 ELIXIR ORAL ONCE
Qty: 0 | Refills: 0 | Status: COMPLETED | OUTPATIENT
Start: 2017-06-07 | End: 2017-06-07

## 2017-06-07 RX ORDER — CEFUROXIME AXETIL 250 MG
1500 TABLET ORAL EVERY 12 HOURS
Qty: 0 | Refills: 0 | Status: COMPLETED | OUTPATIENT
Start: 2017-06-08 | End: 2017-06-09

## 2017-06-07 RX ORDER — PANTOPRAZOLE SODIUM 20 MG/1
40 TABLET, DELAYED RELEASE ORAL
Qty: 0 | Refills: 0 | Status: DISCONTINUED | OUTPATIENT
Start: 2017-06-07 | End: 2017-06-09

## 2017-06-07 RX ORDER — CLOPIDOGREL BISULFATE 75 MG/1
75 TABLET, FILM COATED ORAL ONCE
Qty: 0 | Refills: 0 | Status: COMPLETED | OUTPATIENT
Start: 2017-06-07 | End: 2017-06-07

## 2017-06-07 RX ORDER — FENTANYL CITRATE 50 UG/ML
25 INJECTION INTRAVENOUS
Qty: 0 | Refills: 0 | Status: DISCONTINUED | OUTPATIENT
Start: 2017-06-07 | End: 2017-06-07

## 2017-06-07 RX ORDER — LATANOPROST 0.05 MG/ML
2 SOLUTION/ DROPS OPHTHALMIC; TOPICAL AT BEDTIME
Qty: 0 | Refills: 0 | Status: DISCONTINUED | OUTPATIENT
Start: 2017-06-07 | End: 2017-06-09

## 2017-06-07 RX ORDER — PANTOPRAZOLE SODIUM 20 MG/1
1 TABLET, DELAYED RELEASE ORAL
Qty: 0 | Refills: 0 | COMMUNITY

## 2017-06-07 RX ORDER — DOCUSATE SODIUM 100 MG
100 CAPSULE ORAL THREE TIMES A DAY
Qty: 0 | Refills: 0 | Status: DISCONTINUED | OUTPATIENT
Start: 2017-06-07 | End: 2017-06-09

## 2017-06-07 RX ORDER — CLOPIDOGREL BISULFATE 75 MG/1
75 TABLET, FILM COATED ORAL DAILY
Qty: 0 | Refills: 0 | Status: DISCONTINUED | OUTPATIENT
Start: 2017-06-08 | End: 2017-06-09

## 2017-06-07 RX ORDER — PANTOPRAZOLE SODIUM 20 MG/1
40 TABLET, DELAYED RELEASE ORAL DAILY
Qty: 0 | Refills: 0 | Status: DISCONTINUED | OUTPATIENT
Start: 2017-06-07 | End: 2017-06-07

## 2017-06-07 RX ORDER — SODIUM CHLORIDE 9 MG/ML
1000 INJECTION INTRAMUSCULAR; INTRAVENOUS; SUBCUTANEOUS
Qty: 0 | Refills: 0 | Status: DISCONTINUED | OUTPATIENT
Start: 2017-06-07 | End: 2017-06-09

## 2017-06-07 RX ORDER — SIMVASTATIN 20 MG/1
40 TABLET, FILM COATED ORAL AT BEDTIME
Qty: 0 | Refills: 0 | Status: DISCONTINUED | OUTPATIENT
Start: 2017-06-07 | End: 2017-06-09

## 2017-06-07 RX ORDER — FERROUS SULFATE 325(65) MG
1 TABLET ORAL
Qty: 0 | Refills: 0 | COMMUNITY

## 2017-06-07 RX ORDER — CEFUROXIME AXETIL 250 MG
1500 TABLET ORAL EVERY 8 HOURS
Qty: 0 | Refills: 0 | Status: DISCONTINUED | OUTPATIENT
Start: 2017-06-07 | End: 2017-06-07

## 2017-06-07 RX ORDER — DEXAMETHASONE 0.5 MG/5ML
10 ELIXIR ORAL ONCE
Qty: 0 | Refills: 0 | Status: DISCONTINUED | OUTPATIENT
Start: 2017-06-07 | End: 2017-06-07

## 2017-06-07 RX ORDER — DOBUTAMINE HCL 250MG/20ML
5 VIAL (ML) INTRAVENOUS
Qty: 500 | Refills: 0 | Status: DISCONTINUED | OUTPATIENT
Start: 2017-06-07 | End: 2017-06-08

## 2017-06-07 RX ORDER — FUROSEMIDE 40 MG
20 TABLET ORAL ONCE
Qty: 0 | Refills: 0 | Status: COMPLETED | OUTPATIENT
Start: 2017-06-07 | End: 2017-06-07

## 2017-06-07 RX ORDER — SIMVASTATIN 20 MG/1
1 TABLET, FILM COATED ORAL
Qty: 0 | Refills: 0 | COMMUNITY

## 2017-06-07 RX ORDER — POTASSIUM CHLORIDE 20 MEQ
10 PACKET (EA) ORAL ONCE
Qty: 0 | Refills: 0 | Status: DISCONTINUED | OUTPATIENT
Start: 2017-06-07 | End: 2017-06-07

## 2017-06-07 RX ADMIN — FENTANYL CITRATE 25 MICROGRAM(S): 50 INJECTION INTRAVENOUS at 18:30

## 2017-06-07 RX ADMIN — Medication 100 MILLIGRAM(S): at 15:00

## 2017-06-07 RX ADMIN — SODIUM CHLORIDE 3 MILLILITER(S): 9 INJECTION INTRAMUSCULAR; INTRAVENOUS; SUBCUTANEOUS at 21:43

## 2017-06-07 RX ADMIN — FENTANYL CITRATE 25 MICROGRAM(S): 50 INJECTION INTRAVENOUS at 15:52

## 2017-06-07 RX ADMIN — Medication 10 MILLIGRAM(S): at 21:14

## 2017-06-07 RX ADMIN — LATANOPROST 2 DROP(S): 0.05 SOLUTION/ DROPS OPHTHALMIC; TOPICAL at 21:44

## 2017-06-07 RX ADMIN — Medication 50 MILLIEQUIVALENT(S): at 22:13

## 2017-06-07 RX ADMIN — Medication 20 MILLIGRAM(S): at 23:49

## 2017-06-07 RX ADMIN — FENTANYL CITRATE 25 MICROGRAM(S): 50 INJECTION INTRAVENOUS at 18:45

## 2017-06-07 NOTE — BRIEF OPERATIVE NOTE - PROCEDURE
Percutaneous mitral valve repair with leaflet clip implant  06/07/2017  Percutaneous Mitral valve Repair Utilizing the Mitral Clip via Right Femoral Vein (NCT# [id number]) (STS/ACC TVT Registry ID# [id number]  Active  MICHEL

## 2017-06-07 NOTE — BRIEF OPERATIVE NOTE - POST-OP DX
Chronic systolic CHF (congestive heart failure), NYHA class 4  06/07/2017    Active  Hank Salas  Severe mitral regurgitation  06/07/2017    Active  Hank Salas

## 2017-06-07 NOTE — BRIEF OPERATIVE NOTE - COMMENTS
Invasive Lines: Right Internal Jugular Introducer & Left Radial Arterial Line  IV Medication Infusions: Dobutamine, Levophed

## 2017-06-07 NOTE — PROGRESS NOTE ADULT - SUBJECTIVE AND OBJECTIVE BOX
Commercial Percutaneous Mitral Valve Repair Utilizing the Mitral Clip via Right Femoral Vein.  NCT# [id number], STS/ACC TVT Registry ID# [id number].

## 2017-06-08 DIAGNOSIS — I34.0 NONRHEUMATIC MITRAL (VALVE) INSUFFICIENCY: ICD-10-CM

## 2017-06-08 DIAGNOSIS — I50.22 CHRONIC SYSTOLIC (CONGESTIVE) HEART FAILURE: ICD-10-CM

## 2017-06-08 DIAGNOSIS — J41.0 SIMPLE CHRONIC BRONCHITIS: ICD-10-CM

## 2017-06-08 DIAGNOSIS — I25.10 ATHEROSCLEROTIC HEART DISEASE OF NATIVE CORONARY ARTERY WITHOUT ANGINA PECTORIS: ICD-10-CM

## 2017-06-08 DIAGNOSIS — I10 ESSENTIAL (PRIMARY) HYPERTENSION: ICD-10-CM

## 2017-06-08 DIAGNOSIS — K21.9 GASTRO-ESOPHAGEAL REFLUX DISEASE WITHOUT ESOPHAGITIS: ICD-10-CM

## 2017-06-08 LAB
ALBUMIN SERPL ELPH-MCNC: 3.5 G/DL — SIGNIFICANT CHANGE UP (ref 3.3–5.2)
ALP SERPL-CCNC: 74 U/L — SIGNIFICANT CHANGE UP (ref 40–120)
ALT FLD-CCNC: 25 U/L — SIGNIFICANT CHANGE UP
ANION GAP SERPL CALC-SCNC: 14 MMOL/L — SIGNIFICANT CHANGE UP (ref 5–17)
APTT BLD: 24.8 SEC — LOW (ref 27.5–37.4)
AST SERPL-CCNC: 24 U/L — SIGNIFICANT CHANGE UP
BILIRUB SERPL-MCNC: 0.5 MG/DL — SIGNIFICANT CHANGE UP (ref 0.4–2)
BUN SERPL-MCNC: 25 MG/DL — HIGH (ref 8–20)
CALCIUM SERPL-MCNC: 8.6 MG/DL — SIGNIFICANT CHANGE UP (ref 8.6–10.2)
CHLORIDE SERPL-SCNC: 100 MMOL/L — SIGNIFICANT CHANGE UP (ref 98–107)
CK SERPL-CCNC: 100 U/L — SIGNIFICANT CHANGE UP (ref 25–170)
CO2 SERPL-SCNC: 31 MMOL/L — HIGH (ref 22–29)
CREAT SERPL-MCNC: 1.28 MG/DL — SIGNIFICANT CHANGE UP (ref 0.5–1.3)
GAS PNL BLDA: SIGNIFICANT CHANGE UP
GLUCOSE SERPL-MCNC: 139 MG/DL — HIGH (ref 70–115)
HCT VFR BLD CALC: 28.4 % — LOW (ref 37–47)
HGB BLD-MCNC: 8.4 G/DL — LOW (ref 12–16)
INR BLD: 1.27 RATIO — HIGH (ref 0.88–1.16)
MAGNESIUM SERPL-MCNC: 2.2 MG/DL — SIGNIFICANT CHANGE UP (ref 1.6–2.6)
MCHC RBC-ENTMCNC: 20.5 PG — LOW (ref 27–31)
MCHC RBC-ENTMCNC: 29.6 G/DL — LOW (ref 32–36)
MCV RBC AUTO: 69.4 FL — LOW (ref 81–99)
PHOSPHATE SERPL-MCNC: 4.7 MG/DL — SIGNIFICANT CHANGE UP (ref 2.4–4.7)
PLATELET # BLD AUTO: 222 K/UL — SIGNIFICANT CHANGE UP (ref 150–400)
POTASSIUM SERPL-MCNC: 4.2 MMOL/L — SIGNIFICANT CHANGE UP (ref 3.5–5.3)
POTASSIUM SERPL-SCNC: 4.2 MMOL/L — SIGNIFICANT CHANGE UP (ref 3.5–5.3)
PROT SERPL-MCNC: 6.3 G/DL — LOW (ref 6.6–8.7)
PROTHROM AB SERPL-ACNC: 14 SEC — HIGH (ref 9.8–12.7)
RBC # BLD: 4.09 M/UL — LOW (ref 4.4–5.2)
RBC # FLD: 20.9 % — HIGH (ref 11–15.6)
SODIUM SERPL-SCNC: 145 MMOL/L — SIGNIFICANT CHANGE UP (ref 135–145)
TROPONIN T SERPL-MCNC: 0.1 NG/ML — HIGH (ref 0–0.06)
WBC # BLD: 5.7 K/UL — SIGNIFICANT CHANGE UP (ref 4.8–10.8)
WBC # FLD AUTO: 5.7 K/UL — SIGNIFICANT CHANGE UP (ref 4.8–10.8)

## 2017-06-08 PROCEDURE — 93010 ELECTROCARDIOGRAM REPORT: CPT

## 2017-06-08 PROCEDURE — 93306 TTE W/DOPPLER COMPLETE: CPT | Mod: 26

## 2017-06-08 PROCEDURE — 71010: CPT | Mod: 26

## 2017-06-08 RX ORDER — DEXTROSE 50 % IN WATER 50 %
25 SYRINGE (ML) INTRAVENOUS ONCE
Qty: 0 | Refills: 0 | Status: DISCONTINUED | OUTPATIENT
Start: 2017-06-08 | End: 2017-06-09

## 2017-06-08 RX ORDER — ENOXAPARIN SODIUM 100 MG/ML
40 INJECTION SUBCUTANEOUS DAILY
Qty: 0 | Refills: 0 | Status: DISCONTINUED | OUTPATIENT
Start: 2017-06-08 | End: 2017-06-09

## 2017-06-08 RX ORDER — SODIUM CHLORIDE 9 MG/ML
1000 INJECTION, SOLUTION INTRAVENOUS
Qty: 0 | Refills: 0 | Status: DISCONTINUED | OUTPATIENT
Start: 2017-06-08 | End: 2017-06-09

## 2017-06-08 RX ORDER — DEXTROSE 50 % IN WATER 50 %
1 SYRINGE (ML) INTRAVENOUS ONCE
Qty: 0 | Refills: 0 | Status: DISCONTINUED | OUTPATIENT
Start: 2017-06-08 | End: 2017-06-09

## 2017-06-08 RX ORDER — INSULIN LISPRO 100/ML
VIAL (ML) SUBCUTANEOUS
Qty: 0 | Refills: 0 | Status: DISCONTINUED | OUTPATIENT
Start: 2017-06-08 | End: 2017-06-09

## 2017-06-08 RX ORDER — HYDRALAZINE HCL 50 MG
25 TABLET ORAL
Qty: 0 | Refills: 0 | Status: DISCONTINUED | OUTPATIENT
Start: 2017-06-08 | End: 2017-06-09

## 2017-06-08 RX ORDER — GLUCAGON INJECTION, SOLUTION 0.5 MG/.1ML
1 INJECTION, SOLUTION SUBCUTANEOUS ONCE
Qty: 0 | Refills: 0 | Status: DISCONTINUED | OUTPATIENT
Start: 2017-06-08 | End: 2017-06-09

## 2017-06-08 RX ORDER — DEXTROSE 50 % IN WATER 50 %
12.5 SYRINGE (ML) INTRAVENOUS ONCE
Qty: 0 | Refills: 0 | Status: DISCONTINUED | OUTPATIENT
Start: 2017-06-08 | End: 2017-06-09

## 2017-06-08 RX ADMIN — Medication 20 MILLIGRAM(S): at 08:46

## 2017-06-08 RX ADMIN — LATANOPROST 2 DROP(S): 0.05 SOLUTION/ DROPS OPHTHALMIC; TOPICAL at 21:56

## 2017-06-08 RX ADMIN — Medication 1 TABLET(S): at 12:16

## 2017-06-08 RX ADMIN — SODIUM CHLORIDE 3 MILLILITER(S): 9 INJECTION INTRAMUSCULAR; INTRAVENOUS; SUBCUTANEOUS at 21:09

## 2017-06-08 RX ADMIN — ENOXAPARIN SODIUM 40 MILLIGRAM(S): 100 INJECTION SUBCUTANEOUS at 22:16

## 2017-06-08 RX ADMIN — PANTOPRAZOLE SODIUM 40 MILLIGRAM(S): 20 TABLET, DELAYED RELEASE ORAL at 08:46

## 2017-06-08 RX ADMIN — SODIUM CHLORIDE 3 MILLILITER(S): 9 INJECTION INTRAMUSCULAR; INTRAVENOUS; SUBCUTANEOUS at 05:54

## 2017-06-08 RX ADMIN — Medication 100 MILLIGRAM(S): at 03:26

## 2017-06-08 RX ADMIN — Medication 100 MILLIGRAM(S): at 21:56

## 2017-06-08 RX ADMIN — Medication 100 MILLIGRAM(S): at 15:41

## 2017-06-08 RX ADMIN — SIMVASTATIN 40 MILLIGRAM(S): 20 TABLET, FILM COATED ORAL at 21:56

## 2017-06-08 RX ADMIN — CLOPIDOGREL BISULFATE 75 MILLIGRAM(S): 75 TABLET, FILM COATED ORAL at 12:16

## 2017-06-08 RX ADMIN — SODIUM CHLORIDE 3 MILLILITER(S): 9 INJECTION INTRAMUSCULAR; INTRAVENOUS; SUBCUTANEOUS at 14:14

## 2017-06-08 NOTE — PROGRESS NOTE ADULT - ASSESSMENT
Patient is now POD#1 Mitral clip x 2 secondary to mitral regurgitation. Post-operative period significant for patient self-extubation requiring immediate transition to BIPAP with adequate ABGs subsequently. Remains on Dobutamine and Levophed IV gtt. Stable vital signs and appropriate mental status. Plan to wean BIPAP as tolerated. Further discuss plan with Dr. Owen and CTU team.

## 2017-06-08 NOTE — PHYSICAL THERAPY INITIAL EVALUATION ADULT - ADDITIONAL COMMENTS
Pt. unable to report. Information received from PST H&P: Ambulates with walker but activity limited due to dyspnea.     Pt. reports she lives with family in a house with stairs, but is unable to elaborate due to confusion.

## 2017-06-08 NOTE — PHYSICAL THERAPY INITIAL EVALUATION ADULT - CRITERIA FOR SKILLED THERAPEUTIC INTERVENTIONS
functional limitations in following categories/risk reduction/prevention/impairments found/anticipated equipment needs at discharge/anticipated discharge recommendation/rehab potential/therapy frequency/predicted duration of therapy intervention

## 2017-06-08 NOTE — PROGRESS NOTE ADULT - SUBJECTIVE AND OBJECTIVE BOX
Subjective:  84 year old female with PMH CAD s/p CABG x 3L (2008), prior MI, ischemic heart disease, HTN, HLD, asthma, COPD (PaCO2 50s) with prior use of home O2, respiratory failure wit prior tracheostomy (2013 now decannulated), mild-moderate chronic renal insufficiency, GERD, iron deficiency anemia, uterine fibroids, bilateral cataract surgery. Patient is now POD#1 Mitral clip x 2 secondary to mitral regurgitation. Post-operative period significant for patient self-extubation requiring immediate transition to BIPAP with adequate ABGs subsequently. Remains on Dobutamine and Levophed IV gtt. Stable vital signs and appropriate mental status.     Past Medical History:  Nonrheumatic mitral valve regurgitation  H/o or current diagnosis of HF- no contraindication to ACEI/ARBs  H/o or current diagnosis of HF- ACEI/ARB contraindication unknown  Family history of hypertension (Mother)  Family history of heart disease (Mother)  Renal insufficiency  MR (mitral regurgitation)  Mitral valve stenosis, severe  On home oxygen therapy  Obesity  Tracheal stenosis  Cardiomyopathy  Osteoarthritis  Iron deficiency anemia  MI, old  Dyslipidemia  Acid reflux  CAD (coronary artery disease)  Heart failure  HTN (hypertension)  Chronic systolic CHF (congestive heart failure), NYHA class 4  Severe mitral regurgitation  Percutaneous mitral valve repair with leaflet clip implant  H/O tracheostomy  Cataract  S/P CABG x 3      docusate sodium 100milliGRAM(s) Oral three times a day  clopidogrel Tablet 75milliGRAM(s) Oral daily  cefuroxime  IVPB 1500milliGRAM(s) IV Intermittent every 12 hours  DOBUTamine Infusion 5MICROgram(s)/kG/Min IV Continuous <Continuous>  pantoprazole    Tablet 40milliGRAM(s) Oral before breakfast  simvastatin 40milliGRAM(s) Oral at bedtime  latanoprost 0.005% Ophthalmic Solution 2Drop(s) Both EYES at bedtime  multivitamin 1Tablet(s) Oral daily  norepinephrine Infusion 0.02MICROgram(s)/kG/Min IV Continuous <Continuous>      Height (cm): 152.4 (06-07 @ 08:38)  Weight (kg): 75.7 (06-07 @ 08:38)  BMI (kg/m2): 32.6 (06-07 @ 08:38)  BSA (m2): 1.73 (06-07 @ 08:38)Mode: standby,Pt self extubated  Daily Height in cm: 152.4 (07 Jun 2017 08:32)    Daily       ABG - ( 08 Jun 2017 01:06 )  pH: 7.35  /  pCO2: 63    /  pO2: 70    / HCO3: 31    / Base Excess: 7.6   /  SaO2: 94                                  8.6    6.2   )-----------( 198      ( 07 Jun 2017 18:27 )             29.1   06-07    145  |  102  |  25.0<H>  ----------------------------<  133<H>  3.7   |  30.0<H>  |  1.11    Ca    8.7      07 Jun 2017 18:27  Mg     2.2     06-07    TPro  6.3<L>  /  Alb  3.4  /  TBili  0.7  /  DBili  x   /  AST  27  /  ALT  29  /  AlkPhos  77  06-07    CARDIAC MARKERS ( 07 Jun 2017 18:27 )  x     / 0.14 ng/mL / 104 U/L / x     / x        PT/INR - ( 07 Jun 2017 18:27 )   PT: 15.7 sec;   INR: 1.42 ratio         PTT - ( 07 Jun 2017 18:27 )  PTT:25.0 sec      Objective:  T(C): 36.5, Max: 36.7 (06-07 @ 08:32)  HR: 71 (68 - 84)  BP: 119/52 (119/52 - 119/52)  RR: 20 (10 - 30)  SpO2: 96% (92% - 100%)  Wt(kg): --CAPILLARY BLOOD GLUCOSE  142 (08 Jun 2017 01:00)  136 (07 Jun 2017 23:00)  134 (07 Jun 2017 22:00)  134 (07 Jun 2017 21:00)  129 (07 Jun 2017 20:00)  133 (07 Jun 2017 19:00)  I&O's Summary    I & Os for current day (as of 08 Jun 2017 02:09)  =============================================  IN: 256.7 ml / OUT: 495 ml / NET: -238.3 ml      Physical Exam:  Neuro: Awake, alert, oriented x 3  Pulm: wheezing b/l lung bases, no rales  CV: S1S2, no murmurs, NSR  Abd: Soft, NT, ND, normoactive bowel sounds  Ext: +DP pulses b/l  Inc: right groin access site clean, dry, intact, no hematoma or active bleeding

## 2017-06-08 NOTE — PHYSICAL THERAPY INITIAL EVALUATION ADULT - ACTIVE RANGE OF MOTION EXAMINATION, REHAB EVAL
Unable to formally assess: Pt. responding "no" to a number of questions and directions. Appears to be WFL during observation

## 2017-06-08 NOTE — PHYSICAL THERAPY INITIAL EVALUATION ADULT - GENERAL OBSERVATIONS, REHAB EVAL
Pt. greeted resting in bed (+) central line, (+) A line, (+) cardiac monitor,  (+) pulse ox, (+) O2 via NC

## 2017-06-09 VITALS — RESPIRATION RATE: 58 BRPM | HEART RATE: 87 BPM

## 2017-06-09 LAB
ANION GAP SERPL CALC-SCNC: 10 MMOL/L — SIGNIFICANT CHANGE UP (ref 5–17)
ANION GAP SERPL CALC-SCNC: 9 MMOL/L — SIGNIFICANT CHANGE UP (ref 5–17)
BUN SERPL-MCNC: 37 MG/DL — HIGH (ref 8–20)
BUN SERPL-MCNC: 38 MG/DL — HIGH (ref 8–20)
CALCIUM SERPL-MCNC: 8.6 MG/DL — SIGNIFICANT CHANGE UP (ref 8.6–10.2)
CALCIUM SERPL-MCNC: 9 MG/DL — SIGNIFICANT CHANGE UP (ref 8.6–10.2)
CHLORIDE SERPL-SCNC: 98 MMOL/L — SIGNIFICANT CHANGE UP (ref 98–107)
CHLORIDE SERPL-SCNC: 99 MMOL/L — SIGNIFICANT CHANGE UP (ref 98–107)
CO2 SERPL-SCNC: 35 MMOL/L — HIGH (ref 22–29)
CO2 SERPL-SCNC: 35 MMOL/L — HIGH (ref 22–29)
CREAT SERPL-MCNC: 1.5 MG/DL — HIGH (ref 0.5–1.3)
CREAT SERPL-MCNC: 1.69 MG/DL — HIGH (ref 0.5–1.3)
GLUCOSE SERPL-MCNC: 93 MG/DL — SIGNIFICANT CHANGE UP (ref 70–115)
GLUCOSE SERPL-MCNC: 98 MG/DL — SIGNIFICANT CHANGE UP (ref 70–115)
HCT VFR BLD CALC: 27.9 % — LOW (ref 37–47)
HGB BLD-MCNC: 8.2 G/DL — LOW (ref 12–16)
MAGNESIUM SERPL-MCNC: 2.6 MG/DL — SIGNIFICANT CHANGE UP (ref 1.6–2.6)
MAGNESIUM SERPL-MCNC: 2.6 MG/DL — SIGNIFICANT CHANGE UP (ref 1.6–2.6)
MCHC RBC-ENTMCNC: 20.3 PG — LOW (ref 27–31)
MCHC RBC-ENTMCNC: 29.4 G/DL — LOW (ref 32–36)
MCV RBC AUTO: 69.1 FL — LOW (ref 81–99)
PLATELET # BLD AUTO: 209 K/UL — SIGNIFICANT CHANGE UP (ref 150–400)
POTASSIUM SERPL-MCNC: 4.2 MMOL/L — SIGNIFICANT CHANGE UP (ref 3.5–5.3)
POTASSIUM SERPL-MCNC: 4.6 MMOL/L — SIGNIFICANT CHANGE UP (ref 3.5–5.3)
POTASSIUM SERPL-SCNC: 4.2 MMOL/L — SIGNIFICANT CHANGE UP (ref 3.5–5.3)
POTASSIUM SERPL-SCNC: 4.6 MMOL/L — SIGNIFICANT CHANGE UP (ref 3.5–5.3)
RBC # BLD: 4.04 M/UL — LOW (ref 4.4–5.2)
RBC # FLD: 20.9 % — HIGH (ref 11–15.6)
SODIUM SERPL-SCNC: 143 MMOL/L — SIGNIFICANT CHANGE UP (ref 135–145)
SODIUM SERPL-SCNC: 143 MMOL/L — SIGNIFICANT CHANGE UP (ref 135–145)
WBC # BLD: 7.1 K/UL — SIGNIFICANT CHANGE UP (ref 4.8–10.8)
WBC # FLD AUTO: 7.1 K/UL — SIGNIFICANT CHANGE UP (ref 4.8–10.8)

## 2017-06-09 PROCEDURE — 71010: CPT | Mod: 26

## 2017-06-09 RX ORDER — DOCUSATE SODIUM 100 MG
1 CAPSULE ORAL
Qty: 0 | Refills: 0 | COMMUNITY
Start: 2017-06-09

## 2017-06-09 RX ORDER — ISOSORBIDE MONONITRATE 60 MG/1
90 TABLET, EXTENDED RELEASE ORAL
Qty: 0 | Refills: 0 | COMMUNITY

## 2017-06-09 RX ORDER — METOPROLOL TARTRATE 50 MG
1 TABLET ORAL
Qty: 0 | Refills: 0 | COMMUNITY

## 2017-06-09 RX ORDER — NITROGLYCERIN 6.5 MG
1 CAPSULE, EXTENDED RELEASE ORAL
Qty: 0 | Refills: 0 | COMMUNITY

## 2017-06-09 RX ADMIN — Medication 100 MILLIGRAM(S): at 02:00

## 2017-06-09 RX ADMIN — ENOXAPARIN SODIUM 40 MILLIGRAM(S): 100 INJECTION SUBCUTANEOUS at 12:38

## 2017-06-09 RX ADMIN — Medication 1 TABLET(S): at 14:14

## 2017-06-09 RX ADMIN — Medication 100 MILLIGRAM(S): at 06:03

## 2017-06-09 RX ADMIN — PANTOPRAZOLE SODIUM 40 MILLIGRAM(S): 20 TABLET, DELAYED RELEASE ORAL at 06:03

## 2017-06-09 RX ADMIN — SODIUM CHLORIDE 3 MILLILITER(S): 9 INJECTION INTRAMUSCULAR; INTRAVENOUS; SUBCUTANEOUS at 05:21

## 2017-06-09 RX ADMIN — Medication 100 MILLIGRAM(S): at 14:14

## 2017-06-09 RX ADMIN — Medication 100 MILLIGRAM(S): at 12:39

## 2017-06-09 RX ADMIN — Medication 25 MILLIGRAM(S): at 06:03

## 2017-06-09 RX ADMIN — CLOPIDOGREL BISULFATE 75 MILLIGRAM(S): 75 TABLET, FILM COATED ORAL at 12:38

## 2017-06-09 RX ADMIN — SODIUM CHLORIDE 3 MILLILITER(S): 9 INJECTION INTRAMUSCULAR; INTRAVENOUS; SUBCUTANEOUS at 14:15

## 2017-06-09 NOTE — PROGRESS NOTE ADULT - ASSESSMENT
Patient is now POD#2 Mitral clip x 2 secondary to mitral regurgitation. Post-operative period significant for patient self-extubation requiring immediate transition to BIPAP with adequate ABGs subsequently. Patient is on no drips and remains hemodynamically stable. Tolerating nasal cannula with SpO2 >92%. Deintensify today. Further discuss plan with Dr. Owen and CTU team.

## 2017-06-09 NOTE — DISCHARGE NOTE ADULT - ADDITIONAL INSTRUCTIONS
1.) Please follow up with Dr. Owen on 7/5/17 at 1:15 PM.  2.) Please follow up with Cardiology in 1 - 2 weeks, as scheduled by patient  3.) Please follow up with Primary Care, as scheduled by patient

## 2017-06-09 NOTE — DISCHARGE NOTE ADULT - HOSPITAL COURSE
Mitral Clip Performed2 clips deployed,  Primacor bolus in OR and to CTICU on  & Levo;   Patient extubated in ICU and palced on BIPAP; stable ABGs  TTE: Mild-mod MR, no pericardial effusion  Patient noted to have elevated creatinine of 1.69, one day later on  creatinine level is 1.5.  Lab values discussed with Dr. Owen and patient cleared for discharge.    Currently holding imdur and lopressor for relative hypotension, will continue Hydralazine per Dr. Owen

## 2017-06-09 NOTE — DISCHARGE NOTE ADULT - NS AS DC FU [facility] CONTRAINDICATIONS ACEI/ARB MEDS
other reasons documented by MD/NP/PA or Pharmacist/Hypotension/worsening renal function/renal disease/dysfunction

## 2017-06-09 NOTE — DISCHARGE NOTE ADULT - PLAN OF CARE
Recovery Following Mitral Clip Follow up with Dr. Owen on 7/5/17 at 1:15 PM in the Cardiac surgery office.  Please continue a heart healthy Diet.  Please come to ED or Call Cardio thoracic office at [phone] if Chest pain, Shortness of Breath, persistant Nausea & vomiting, oozing from wounds,palpitations or pain not relieved with medication  Weigh yourself daily>notify surgeons office if  2 lb increase in weight in 24 hours. Kidney Recovery Please follow up with your primary care doctor to have important lab work checked for your kidney function.  (as scheduled by patient.)  Please monitor your urine output, and ensure adequate fluid and nutrition intake to avoid dehydration.   Please continue torsemide as written. Freedom from chest pain take nitroglycerine as prescribed for chest pain.  please notify cardiac surgery office of chest pain / return to [facility] ER for cases of chest pain that is not controlled with pain medication. Heart Failure symptom control continue hydralazine and torsemide.  please follow up with cardiologist regarding cardiac medications (currently holding imdur and metoprolol) cholesterol control Continue zocor BP control Your blood pressure is too low to restart all of your home medications, we are holding your metoprolol and imdur because your bloodpressure is too low for them to be restarted.  Per Dr. Owen, please continue your hydralazine. GERD Control Please continue protonix

## 2017-06-09 NOTE — DISCHARGE NOTE ADULT - CARE PLAN
Principal Discharge DX:	Non-rheumatic mitral regurgitation  Goal:	Recovery Following Mitral Clip  Instructions for follow-up, activity and diet:	Follow up with Dr. Owen on 7/5/17 at 1:15 PM in the Cardiac surgery office.  Please continue a heart healthy Diet.  Please come to ED or Call Cardio thoracic office at [phone] if Chest pain, Shortness of Breath, persistant Nausea & vomiting, oozing from wounds,palpitations or pain not relieved with medication  Weigh yourself daily>notify surgeons office if  2 lb increase in weight in 24 hours.  Secondary Diagnosis:	Renal insufficiency  Goal:	Kidney Recovery  Instructions for follow-up, activity and diet:	Please follow up with your primary care doctor to have important lab work checked for your kidney function.  (as scheduled by patient.)  Please monitor your urine output, and ensure adequate fluid and nutrition intake to avoid dehydration.   Please continue torsemide as written.  Secondary Diagnosis:	CAD (coronary artery disease)  Goal:	Freedom from chest pain  Instructions for follow-up, activity and diet:	take nitroglycerine as prescribed for chest pain.  please notify cardiac surgery office of chest pain / return to [facility] ER for cases of chest pain that is not controlled with pain medication.  Secondary Diagnosis:	Chronic systolic heart failure  Goal:	Heart Failure symptom control  Instructions for follow-up, activity and diet:	continue hydralazine and torsemide.  please follow up with cardiologist regarding cardiac medications (currently holding imdur and metoprolol)  Secondary Diagnosis:	Dyslipidemia  Goal:	cholesterol control  Instructions for follow-up, activity and diet:	Continue zocor  Secondary Diagnosis:	Essential hypertension  Goal:	BP control  Instructions for follow-up, activity and diet:	Your blood pressure is too low to restart all of your home medications, we are holding your metoprolol and imdur because your bloodpressure is too low for them to be restarted.  Per Dr. Owen, please continue your hydralazine.  Secondary Diagnosis:	GERD without esophagitis  Goal:	GERD Control  Instructions for follow-up, activity and diet:	Please continue protonix Principal Discharge DX:	Non-rheumatic mitral regurgitation  Goal:	Recovery Following Mitral Clip  Instructions for follow-up, activity and diet:	Follow up with Dr. Owen on 7/5/17 at 1:15 PM in the Cardiac surgery office.  Please continue a heart healthy Diet.  Please come to ED or Call Cardio thoracic office at [phone] if Chest pain, Shortness of Breath, persistant Nausea & vomiting, oozing from wounds,palpitations or pain not relieved with medication  Weigh yourself daily>notify surgeons office if  2 lb increase in weight in 24 hours.  Secondary Diagnosis:	Renal insufficiency  Goal:	Kidney Recovery  Instructions for follow-up, activity and diet:	Please follow up with your primary care doctor to have important lab work checked for your kidney function.  (as scheduled by patient.)  Please monitor your urine output, and ensure adequate fluid and nutrition intake to avoid dehydration.   Please continue torsemide as written.  Secondary Diagnosis:	CAD (coronary artery disease)  Goal:	Freedom from chest pain  Instructions for follow-up, activity and diet:	take nitroglycerine as prescribed for chest pain.  please notify cardiac surgery office of chest pain / return to [facility] ER for cases of chest pain that is not controlled with pain medication.  Secondary Diagnosis:	Chronic systolic heart failure  Goal:	Heart Failure symptom control  Instructions for follow-up, activity and diet:	continue hydralazine and torsemide.  please follow up with cardiologist regarding cardiac medications (currently holding imdur and metoprolol)  Secondary Diagnosis:	Dyslipidemia  Goal:	cholesterol control  Instructions for follow-up, activity and diet:	Continue zocor  Secondary Diagnosis:	Essential hypertension  Goal:	BP control  Instructions for follow-up, activity and diet:	Your blood pressure is too low to restart all of your home medications, we are holding your metoprolol and imdur because your bloodpressure is too low for them to be restarted.  Per Dr. Owen, please continue your hydralazine.  Secondary Diagnosis:	GERD without esophagitis  Goal:	GERD Control  Instructions for follow-up, activity and diet:	Please continue protonix

## 2017-06-09 NOTE — PROGRESS NOTE ADULT - PROBLEM SELECTOR PLAN 5
Carrillo PRN  Transition to nasal cannula
Duonebs PRN  Wean BIPAP as tolerated, PRN ABGs  Transition to nasal cannula

## 2017-06-09 NOTE — DISCHARGE NOTE ADULT - PATIENT PORTAL LINK FT
“You can access the Follow[facility] Patient Portal, offered by [facility] [employer], by registering with the following website: [email or web address]”

## 2017-06-09 NOTE — DISCHARGE NOTE ADULT - MEDICATION SUMMARY - MEDICATIONS TO TAKE
I will START or STAY ON the medications listed below when I get home from the hospital:    nitroglycerin 0.4 mg sublingual tablet  -- 1 tab(s) under tongue every 5 minutes  -- As Needed for Ischemic Chest Pain  -- Indication: For Coronary artery disease involving native coronary artery of native heart without angina pectoris    Zocor 40 mg oral tablet  -- 1 tab(s) by mouth once a day (at bedtime)  -- Indication: For Cholesterol    Plavix 75 mg oral tablet  -- 1 tab(s) by mouth once a day  -- Indication: For Antiplatelet    torsemide 20 mg oral tablet  -- 1 tab(s) by mouth once a day  -- Indication: For Diuretic    Slow Fe (as elemental iron) 45 mg oral tablet, extended release  -- 1 tab(s) by mouth once a day  -- Indication: For Supplement    docusate sodium 100 mg oral capsule  -- 1 cap(s) by mouth 3 times a day  -- Indication: For Stool Softener    latanoprost 0.005% ophthalmic solution  -- 2 drop(s) to each affected eye once a day (in the evening)  -- Indication: For Glaucoma    Protonix 40 mg oral delayed release tablet  -- 1 tab(s) by mouth once a day  -- Indication: For Gastric reflux    hydrALAZINE 25 mg oral tablet  -- 1 tab(s) by mouth 2 times a day  -- Indication: For Blood Pressure Control    multivitamin  -- 1 tab(s) by mouth once a day  -- Indication: For Supplement

## 2017-06-09 NOTE — PROGRESS NOTE ADULT - PROBLEM SELECTOR PLAN 4
Start beta blocker when tolerated
Wean Levophed as tolerated   Titrate for MAP 70-90  Resume beta blocker when tolerated

## 2017-06-09 NOTE — PROGRESS NOTE ADULT - SUBJECTIVE AND OBJECTIVE BOX
CT Surgery Event Note:    Lab results reviewed with Dr. Owen at bedside.  Per Dr. Owen, patient may be discharged home today and may resume torsemide tomrrow.    K 4.2, BUN 37, Creatinine: 1.5

## 2017-06-09 NOTE — DISCHARGE NOTE ADULT - CARE PROVIDERS DIRECT ADDRESSES
[email or web address]

## 2017-06-09 NOTE — DISCHARGE NOTE ADULT - CONDITIONS AT DISCHARGE
Patient alert and orientated to place and situation, patient and daughter discharge instructions given, IJ removed, invasive lines and surgical sites clean dry and intact, meds reviewed with patient and family, safety maintained

## 2017-06-09 NOTE — PROGRESS NOTE ADULT - PROBLEM SELECTOR PLAN 1
POD #2 Mitral Clip x 2  Continue Plavix  hold ASA- patient is allergic to aspirin  predischarge TTE completed  Daily CXR and labs
POD #1 Mitral Clip x 2  Continue Plavix  hold ASA- patient is allergic to aspirin  predischarge TTE ordered for today  Daily CXR and labs

## 2017-06-09 NOTE — DISCHARGE NOTE ADULT - CARE PROVIDER_API CALL
Khai Owen), Surgery; Thoracic and Cardiac Surgery  [address] [place] [address]  Phone: [phone]  Fax: [phone]    Brigido Barney), Cardiovascular Disease; Nuclear Cardiology  [address] [place] [address]  Phone: [phone]  Fax: [phone]

## 2017-06-09 NOTE — DISCHARGE NOTE ADULT - SECONDARY DIAGNOSIS.
Renal insufficiency CAD (coronary artery disease) Chronic systolic heart failure Dyslipidemia Essential hypertension GERD without esophagitis

## 2017-06-09 NOTE — PROGRESS NOTE ADULT - SUBJECTIVE AND OBJECTIVE BOX
Subjective:  84 year old female with PMH CAD s/p CABG x 3L (), prior MI, ischemic heart disease, HTN, HLD, asthma, COPD (PaCO2 50s) with prior use of home O2, respiratory failure wit prior tracheostomy ( now decannulated), mild-moderate chronic renal insufficiency, GERD, iron deficiency anemia, uterine fibroids, bilateral cataract surgery. Patient is now POD#2 Mitral clip x 2 secondary to mitral regurgitation. Post-operative period significant for patient self-extubation requiring immediate transition to BIPAP with adequate ABGs subsequently. Dobutamine weaned to off today, stable vital signs, SpO2 >94% on nasal cannula. Patient ambulated and feels like she did well.       Past Medical History:  Nonrheumatic mitral valve regurgitation  H/o or current diagnosis of HF- no contraindication to ACEI/ARBs  H/o or current diagnosis of HF- ACEI/ARB contraindication unknown  H/o or current diagnosis of HF- no contraindication to ACEI/ARBs  H/o or current diagnosis of HF- Contraindication to ACEI/ARBs  H/o or current diagnosis of HF- ACEI/ARB contraindication unknown  H/o or current diagnosis of HF- ACEI/ARB contraindication unknown  Family history of hypertension (Mother)  Family history of heart disease (Mother)  Handoff  Renal insufficiency  MR (mitral regurgitation)  Mitral valve stenosis, severe  On home oxygen therapy  Obesity  Tracheal stenosis  Cardiomyopathy  Osteoarthritis  Iron deficiency anemia  MI, old  Dyslipidemia  Acid reflux  Hx of CABG  CAD (coronary artery disease)  Heart failure  HTN (hypertension)  Chronic systolic CHF (congestive heart failure), NYHA class 4  Severe mitral regurgitation  Chronic systolic CHF (congestive heart failure), NYHA class 4  Severe mitral regurgitation  GERD without esophagitis  Simple chronic bronchitis  Essential hypertension  Chronic systolic heart failure  Coronary artery disease involving native coronary artery of native heart without angina pectoris  Non-rheumatic mitral regurgitation  MR (mitral regurgitation)  Percutaneous mitral valve repair with leaflet clip implant  H/O tracheostomy  Cataract  S/P CABG x 3  NONRHEUMATIC MITRAL (VALVE) IN        sodium chloride 0.9% lock flush 3milliLiter(s) IV Push every 8 hours  sodium chloride 0.9%. 1000milliLiter(s) IV Continuous <Continuous>  sodium chloride 0.9%. 1000milliLiter(s) IV Continuous <Continuous>  docusate sodium 100milliGRAM(s) Oral three times a day  clopidogrel Tablet 75milliGRAM(s) Oral daily  cefuroxime  IVPB 1500milliGRAM(s) IV Intermittent every 12 hours  pantoprazole    Tablet 40milliGRAM(s) Oral before breakfast  simvastatin 40milliGRAM(s) Oral at bedtime  latanoprost 0.005% Ophthalmic Solution 2Drop(s) Both EYES at bedtime  multivitamin 1Tablet(s) Oral daily  hydrALAZINE 25milliGRAM(s) Oral two times a day  torsemide 20milliGRAM(s) Oral daily  insulin lispro (HumaLOG) corrective regimen sliding scale  SubCutaneous Before meals and at bedtime  dextrose 5%. 1000milliLiter(s) IV Continuous <Continuous>  dextrose Gel 1Dose(s) Oral once PRN  dextrose 50% Injectable 12.5Gram(s) IV Push once  dextrose 50% Injectable 25Gram(s) IV Push once  dextrose 50% Injectable 25Gram(s) IV Push once  glucagon  Injectable 1milliGRAM(s) IntraMuscular once PRN  enoxaparin Injectable 40milliGRAM(s) SubCutaneous daily  MEDICATIONS  (PRN):  dextrose Gel 1Dose(s) Oral once PRN Blood Glucose LESS THAN 70 milliGRAM(s)/deciliter  glucagon  Injectable 1milliGRAM(s) IntraMuscular once PRN Glucose LESS THAN 70 milligrams/deciliter      Daily     Daily Weight in k.1 (2017 07:00)      ABG - ( 2017 12:49 )  pH: 7.30  /  pCO2: 72    /  pO2: 135   / HCO3: 31    / Base Excess: 7.1   /  SaO2: 99                                      8.4    5.7   )-----------( 222      ( 2017 04:21 )             28.4   06-08    145  |  100  |  25.0<H>  ----------------------------<  139<H>  4.2   |  31.0<H>  |  1.28    Ca    8.6      2017 04:21  Phos  4.7     06-08  Mg     2.2     06-08    TPro  6.3<L>  /  Alb  3.5  /  TBili  0.5  /  DBili  x   /  AST  24  /  ALT  25  /  AlkPhos  74  06-08    CARDIAC MARKERS ( 2017 04:21 )  x     / 0.10 ng/mL / 100 U/L / x     / x      CARDIAC MARKERS ( 2017 18:27 )  x     / 0.14 ng/mL / 104 U/L / x     / x        PT/INR - ( 2017 04:21 )   PT: 14.0 sec;   INR: 1.27 ratio         PTT - ( 2017 04:21 )  PTT:24.8 sec      Objective:  T(C): 36.4, Max: 36.9 (-08 @ 07:00)  HR: 68 (58 - 75)  BP: 114/53 (92/54 - 119/55)  RR: 16 (12 - 30)  SpO2: 92% (59% - 100%)  Wt(kg): --CAPILLARY BLOOD GLUCOSE  113 (2017 22:00)  128 (2017 11:00)  139 (2017 06:00)  139 (2017 03:00)  I&O's Summary  I & Os for 24h ending 2017 07:00  =============================================  IN: 465.1 ml / OUT: 1045 ml / NET: -579.9 ml    I & Os for current day (as of 2017 01:39)  =============================================  IN: 580 ml / OUT: 100 ml / NET: 480 ml      Physical Exam:  Neuro: AAO x3  Pulm: CTA b/l, no wheezing or rales  CV: S1S2, no murmurs, no JVD  Abd: Soft, NT, ND, normoactive bowel sounds  Ext: +DP pulses b/l, no pedal edema  Inc: right groin clean, dry, intact, no hematoma, no active bleeding

## 2017-06-09 NOTE — PROGRESS NOTE ADULT - PROBLEM SELECTOR PLAN 2
s/p CABG x 3L  Continue Zocor from home medications
s/p CABG x 3L  Continue Zocor from home medications

## 2017-06-09 NOTE — DISCHARGE NOTE ADULT - MEDICATION SUMMARY - MEDICATIONS TO STOP TAKING
I will STOP taking the medications listed below when I get home from the hospital:    Imdur  -- 90 milligram(s) by mouth once a day    Toprol-XL 50 mg oral tablet, extended release  -- 1 tab(s) by mouth once a day

## 2017-06-10 ENCOUNTER — INPATIENT (INPATIENT)
Facility: HOSPITAL | Age: 82
LOS: 39 days | Discharge: HOME | DRG: 280 | End: 2017-07-20
Attending: FAMILY MEDICINE | Admitting: THORACIC SURGERY (CARDIOTHORACIC VASCULAR SURGERY)
Payer: MEDICARE

## 2017-06-10 VITALS
HEART RATE: 96 BPM | RESPIRATION RATE: 18 BRPM | SYSTOLIC BLOOD PRESSURE: 149 MMHG | OXYGEN SATURATION: 94 % | HEIGHT: 60 IN | DIASTOLIC BLOOD PRESSURE: 50 MMHG | TEMPERATURE: 98 F | WEIGHT: 166.89 LBS

## 2017-06-10 DIAGNOSIS — R10.32 LEFT LOWER QUADRANT PAIN: ICD-10-CM

## 2017-06-10 DIAGNOSIS — G93.40 ENCEPHALOPATHY, UNSPECIFIED: ICD-10-CM

## 2017-06-10 DIAGNOSIS — Z98.890 OTHER SPECIFIED POSTPROCEDURAL STATES: Chronic | ICD-10-CM

## 2017-06-10 DIAGNOSIS — N17.9 ACUTE KIDNEY FAILURE, UNSPECIFIED: ICD-10-CM

## 2017-06-10 DIAGNOSIS — I50.9 HEART FAILURE, UNSPECIFIED: ICD-10-CM

## 2017-06-10 DIAGNOSIS — G47.00 INSOMNIA, UNSPECIFIED: ICD-10-CM

## 2017-06-10 DIAGNOSIS — N18.3 CHRONIC KIDNEY DISEASE, STAGE 3 (MODERATE): ICD-10-CM

## 2017-06-10 DIAGNOSIS — J96.21 ACUTE AND CHRONIC RESPIRATORY FAILURE WITH HYPOXIA: ICD-10-CM

## 2017-06-10 DIAGNOSIS — R10.13 EPIGASTRIC PAIN: ICD-10-CM

## 2017-06-10 DIAGNOSIS — I21.4 NON-ST ELEVATION (NSTEMI) MYOCARDIAL INFARCTION: ICD-10-CM

## 2017-06-10 DIAGNOSIS — Z66 DO NOT RESUSCITATE: ICD-10-CM

## 2017-06-10 DIAGNOSIS — J44.9 CHRONIC OBSTRUCTIVE PULMONARY DISEASE, UNSPECIFIED: ICD-10-CM

## 2017-06-10 DIAGNOSIS — I42.9 CARDIOMYOPATHY, UNSPECIFIED: ICD-10-CM

## 2017-06-10 DIAGNOSIS — I34.0 NONRHEUMATIC MITRAL (VALVE) INSUFFICIENCY: ICD-10-CM

## 2017-06-10 DIAGNOSIS — E66.9 OBESITY, UNSPECIFIED: ICD-10-CM

## 2017-06-10 DIAGNOSIS — E78.5 HYPERLIPIDEMIA, UNSPECIFIED: ICD-10-CM

## 2017-06-10 DIAGNOSIS — I48.0 PAROXYSMAL ATRIAL FIBRILLATION: ICD-10-CM

## 2017-06-10 DIAGNOSIS — K21.9 GASTRO-ESOPHAGEAL REFLUX DISEASE WITHOUT ESOPHAGITIS: ICD-10-CM

## 2017-06-10 DIAGNOSIS — K92.2 GASTROINTESTINAL HEMORRHAGE, UNSPECIFIED: ICD-10-CM

## 2017-06-10 DIAGNOSIS — I13.0 HYPERTENSIVE HEART AND CHRONIC KIDNEY DISEASE WITH HEART FAILURE AND STAGE 1 THROUGH STAGE 4 CHRONIC KIDNEY DISEASE, OR UNSPECIFIED CHRONIC KIDNEY DISEASE: ICD-10-CM

## 2017-06-10 DIAGNOSIS — M19.90 UNSPECIFIED OSTEOARTHRITIS, UNSPECIFIED SITE: ICD-10-CM

## 2017-06-10 DIAGNOSIS — I50.23 ACUTE ON CHRONIC SYSTOLIC (CONGESTIVE) HEART FAILURE: ICD-10-CM

## 2017-06-10 DIAGNOSIS — Z51.5 ENCOUNTER FOR PALLIATIVE CARE: ICD-10-CM

## 2017-06-10 DIAGNOSIS — E87.0 HYPEROSMOLALITY AND HYPERNATREMIA: ICD-10-CM

## 2017-06-10 DIAGNOSIS — Z95.1 PRESENCE OF AORTOCORONARY BYPASS GRAFT: ICD-10-CM

## 2017-06-10 DIAGNOSIS — Z88.6 ALLERGY STATUS TO ANALGESIC AGENT: ICD-10-CM

## 2017-06-10 DIAGNOSIS — I25.10 ATHEROSCLEROTIC HEART DISEASE OF NATIVE CORONARY ARTERY WITHOUT ANGINA PECTORIS: ICD-10-CM

## 2017-06-10 DIAGNOSIS — E87.6 HYPOKALEMIA: ICD-10-CM

## 2017-06-10 DIAGNOSIS — Z82.49 FAMILY HISTORY OF ISCHEMIC HEART DISEASE AND OTHER DISEASES OF THE CIRCULATORY SYSTEM: ICD-10-CM

## 2017-06-10 DIAGNOSIS — I25.2 OLD MYOCARDIAL INFARCTION: ICD-10-CM

## 2017-06-10 DIAGNOSIS — K81.9 CHOLECYSTITIS, UNSPECIFIED: ICD-10-CM

## 2017-06-10 DIAGNOSIS — D62 ACUTE POSTHEMORRHAGIC ANEMIA: ICD-10-CM

## 2017-06-10 DIAGNOSIS — Z99.81 DEPENDENCE ON SUPPLEMENTAL OXYGEN: ICD-10-CM

## 2017-06-10 DIAGNOSIS — H40.9 UNSPECIFIED GLAUCOMA: ICD-10-CM

## 2017-06-10 DIAGNOSIS — J96.22 ACUTE AND CHRONIC RESPIRATORY FAILURE WITH HYPERCAPNIA: ICD-10-CM

## 2017-06-10 DIAGNOSIS — E87.2 ACIDOSIS: ICD-10-CM

## 2017-06-10 LAB
ALBUMIN SERPL ELPH-MCNC: 3.7 G/DL — SIGNIFICANT CHANGE UP (ref 3.3–5.2)
ALP SERPL-CCNC: 105 U/L — SIGNIFICANT CHANGE UP (ref 40–120)
ALT FLD-CCNC: 49 U/L — HIGH
ANION GAP SERPL CALC-SCNC: 13 MMOL/L — SIGNIFICANT CHANGE UP (ref 5–17)
APTT BLD: 27.3 SEC — LOW (ref 27.5–37.4)
AST SERPL-CCNC: 65 U/L — HIGH
BILIRUB SERPL-MCNC: 0.5 MG/DL — SIGNIFICANT CHANGE UP (ref 0.4–2)
BUN SERPL-MCNC: 39 MG/DL — HIGH (ref 8–20)
CALCIUM SERPL-MCNC: 9 MG/DL — SIGNIFICANT CHANGE UP (ref 8.6–10.2)
CHLORIDE SERPL-SCNC: 97 MMOL/L — LOW (ref 98–107)
CO2 SERPL-SCNC: 30 MMOL/L — HIGH (ref 22–29)
CREAT SERPL-MCNC: 1.43 MG/DL — HIGH (ref 0.5–1.3)
GLUCOSE SERPL-MCNC: 106 MG/DL — SIGNIFICANT CHANGE UP (ref 70–115)
HCT VFR BLD CALC: 29.6 % — LOW (ref 37–47)
HGB BLD-MCNC: 8.8 G/DL — LOW (ref 12–16)
INR BLD: 1.18 RATIO — HIGH (ref 0.88–1.16)
LACTATE BLDV-MCNC: 2 MMOL/L — SIGNIFICANT CHANGE UP (ref 0.5–2)
LIDOCAIN IGE QN: 31 U/L — SIGNIFICANT CHANGE UP (ref 22–51)
MCHC RBC-ENTMCNC: 20.1 PG — LOW (ref 27–31)
MCHC RBC-ENTMCNC: 29.7 G/DL — LOW (ref 32–36)
MCV RBC AUTO: 67.6 FL — LOW (ref 81–99)
NT-PROBNP SERPL-SCNC: 9038 PG/ML — HIGH (ref 0–300)
PLATELET # BLD AUTO: 187 K/UL — SIGNIFICANT CHANGE UP (ref 150–400)
POTASSIUM SERPL-MCNC: 5.1 MMOL/L — SIGNIFICANT CHANGE UP (ref 3.5–5.3)
POTASSIUM SERPL-SCNC: 5.1 MMOL/L — SIGNIFICANT CHANGE UP (ref 3.5–5.3)
PROT SERPL-MCNC: 6.8 G/DL — SIGNIFICANT CHANGE UP (ref 6.6–8.7)
PROTHROM AB SERPL-ACNC: 13 SEC — HIGH (ref 9.8–12.7)
RBC # BLD: 4.38 M/UL — LOW (ref 4.4–5.2)
RBC # FLD: 20.8 % — HIGH (ref 11–15.6)
SODIUM SERPL-SCNC: 140 MMOL/L — SIGNIFICANT CHANGE UP (ref 135–145)
TROPONIN T SERPL-MCNC: 0.3 NG/ML — HIGH (ref 0–0.06)
WBC # BLD: 5 K/UL — SIGNIFICANT CHANGE UP (ref 4.8–10.8)
WBC # FLD AUTO: 5 K/UL — SIGNIFICANT CHANGE UP (ref 4.8–10.8)

## 2017-06-10 PROCEDURE — 93010 ELECTROCARDIOGRAM REPORT: CPT

## 2017-06-10 PROCEDURE — 93306 TTE W/DOPPLER COMPLETE: CPT | Mod: 26

## 2017-06-10 PROCEDURE — 71010: CPT | Mod: 26

## 2017-06-10 PROCEDURE — 74177 CT ABD & PELVIS W/CONTRAST: CPT | Mod: 26

## 2017-06-10 PROCEDURE — 99285 EMERGENCY DEPT VISIT HI MDM: CPT

## 2017-06-10 RX ORDER — HYDRALAZINE HCL 50 MG
25 TABLET ORAL
Qty: 0 | Refills: 0 | Status: DISCONTINUED | OUTPATIENT
Start: 2017-06-10 | End: 2017-06-10

## 2017-06-10 RX ORDER — SODIUM CHLORIDE 9 MG/ML
3 INJECTION INTRAMUSCULAR; INTRAVENOUS; SUBCUTANEOUS EVERY 8 HOURS
Qty: 0 | Refills: 0 | Status: DISCONTINUED | OUTPATIENT
Start: 2017-06-10 | End: 2017-07-20

## 2017-06-10 RX ORDER — SODIUM CHLORIDE 9 MG/ML
3 INJECTION INTRAMUSCULAR; INTRAVENOUS; SUBCUTANEOUS EVERY 8 HOURS
Qty: 0 | Refills: 0 | Status: DISCONTINUED | OUTPATIENT
Start: 2017-06-10 | End: 2017-06-20

## 2017-06-10 RX ORDER — DOCUSATE SODIUM 100 MG
100 CAPSULE ORAL THREE TIMES A DAY
Qty: 0 | Refills: 0 | Status: DISCONTINUED | OUTPATIENT
Start: 2017-06-10 | End: 2017-06-14

## 2017-06-10 RX ORDER — AMIODARONE HYDROCHLORIDE 400 MG/1
150 TABLET ORAL ONCE
Qty: 0 | Refills: 0 | Status: COMPLETED | OUTPATIENT
Start: 2017-06-10 | End: 2017-06-10

## 2017-06-10 RX ORDER — BUMETANIDE 0.25 MG/ML
1 INJECTION INTRAMUSCULAR; INTRAVENOUS ONCE
Qty: 0 | Refills: 0 | Status: COMPLETED | OUTPATIENT
Start: 2017-06-10 | End: 2017-06-10

## 2017-06-10 RX ORDER — METOPROLOL TARTRATE 50 MG
2.5 TABLET ORAL ONCE
Qty: 0 | Refills: 0 | Status: COMPLETED | OUTPATIENT
Start: 2017-06-10 | End: 2017-06-10

## 2017-06-10 RX ORDER — SIMVASTATIN 20 MG/1
40 TABLET, FILM COATED ORAL AT BEDTIME
Qty: 0 | Refills: 0 | Status: DISCONTINUED | OUTPATIENT
Start: 2017-06-10 | End: 2017-06-18

## 2017-06-10 RX ORDER — METOPROLOL TARTRATE 50 MG
50 TABLET ORAL DAILY
Qty: 0 | Refills: 0 | Status: DISCONTINUED | OUTPATIENT
Start: 2017-06-11 | End: 2017-06-11

## 2017-06-10 RX ORDER — LATANOPROST 0.05 MG/ML
1 SOLUTION/ DROPS OPHTHALMIC; TOPICAL AT BEDTIME
Qty: 0 | Refills: 0 | Status: DISCONTINUED | OUTPATIENT
Start: 2017-06-10 | End: 2017-07-20

## 2017-06-10 RX ORDER — FERROUS SULFATE 325(65) MG
325 TABLET ORAL DAILY
Qty: 0 | Refills: 0 | Status: DISCONTINUED | OUTPATIENT
Start: 2017-06-10 | End: 2017-06-21

## 2017-06-10 RX ORDER — CLOPIDOGREL BISULFATE 75 MG/1
75 TABLET, FILM COATED ORAL DAILY
Qty: 0 | Refills: 0 | Status: DISCONTINUED | OUTPATIENT
Start: 2017-06-10 | End: 2017-06-17

## 2017-06-10 RX ORDER — PANTOPRAZOLE SODIUM 20 MG/1
40 TABLET, DELAYED RELEASE ORAL
Qty: 0 | Refills: 0 | Status: DISCONTINUED | OUTPATIENT
Start: 2017-06-10 | End: 2017-07-14

## 2017-06-10 RX ADMIN — Medication 2.5 MILLIGRAM(S): at 21:05

## 2017-06-10 RX ADMIN — Medication 25 MILLIGRAM(S): at 18:17

## 2017-06-10 RX ADMIN — SODIUM CHLORIDE 3 MILLILITER(S): 9 INJECTION INTRAMUSCULAR; INTRAVENOUS; SUBCUTANEOUS at 10:37

## 2017-06-10 RX ADMIN — BUMETANIDE 1 MILLIGRAM(S): 0.25 INJECTION INTRAMUSCULAR; INTRAVENOUS at 21:53

## 2017-06-10 RX ADMIN — LATANOPROST 1 DROP(S): 0.05 SOLUTION/ DROPS OPHTHALMIC; TOPICAL at 21:54

## 2017-06-10 RX ADMIN — AMIODARONE HYDROCHLORIDE 618 MILLIGRAM(S): 400 TABLET ORAL at 21:00

## 2017-06-10 RX ADMIN — SODIUM CHLORIDE 3 MILLILITER(S): 9 INJECTION INTRAMUSCULAR; INTRAVENOUS; SUBCUTANEOUS at 21:02

## 2017-06-10 RX ADMIN — Medication 20 MILLIGRAM(S): at 17:22

## 2017-06-10 RX ADMIN — SIMVASTATIN 40 MILLIGRAM(S): 20 TABLET, FILM COATED ORAL at 21:53

## 2017-06-10 NOTE — ED ADULT NURSE REASSESSMENT NOTE - NS ED NURSE REASSESS COMMENT FT1
cardiothoracic team at the bedside assessing patient. pt resting comfortably remains awake and alert with periods of confusion. family at bedside. pt on cardiac monitor.
patient at CT, pending lab redraw, ab pain, moves all extremities. continue to monitor patient

## 2017-06-10 NOTE — H&P ADULT - NSHPREVIEWOFSYSTEMS_GEN_ALL_CORE
N: Hasn't slept since discharge on 6/9, feels lethargic  CV: s/p mitral clipping, has history of CHF, and elevated BNP  R: history of trache in past for failure to wean from vent.  Self extubated in CTICU. Mildly short of breath, did not take diuretic this morning.  GI: Moving her bowels, complains of left lower quadrant pain since last night  : no change in urination  Heme: is on plavix at home  ID: denies fever

## 2017-06-10 NOTE — H&P ADULT - HISTORY OF PRESENT ILLNESS
84 year old with multiple medical problems presents to the ED with complaints of LLQ pain since yesterday. She had 3 BMs today. No fever, vomiting or change in urination. Patient is s/p  mitral valve clipping done on 6/7 with Dr. Owen.  On plavix. They stopped her IMDUR and Beta blocker due to relative hypotension, but continued on hydralazine to reduce afterload. Daughter states she has stage III heart failure. She states her mother is always somewhat short of breath.    Patient was discharged on 6/9 s/p mitral clip.

## 2017-06-10 NOTE — ED ADULT NURSE NOTE - OBJECTIVE STATEMENT
84 yof c/o abd pain since 10 pm 6/9/17 patient daughter states that patient was discharged yesterday from [facility] s/p CT surgical procedure. ( PIC) patient currently baseline mental status alert disoriented and inappropriate speech. abd soft distended periumbillical area denies n/v c/o 3 BM which is more than normal as per daughter. denies numbness tingling. moves all extremities. denies fevers denies blood in stool/ urine.

## 2017-06-10 NOTE — H&P ADULT - ASSESSMENT
Patient is an 84 year old female with CHF copd and history as above, s/p recent mitral clip on 6/7 with Dr. Owen, course complicated by mild, resolving CHRISTOPHER.  Patient presents to ER with abdominal pain and mild shortness of breath, BNP and troponin found to be elevated.  Discussed with Dr. Smith, patient to be admitted to CTICU

## 2017-06-10 NOTE — H&P ADULT - PROBLEM SELECTOR PLAN 3
1. obtain GI consult for colon polyp can likely be followed up as outpatient.   2. continue GI bowel regimen, continue diet.    3. monitor patient with serial abdominal exam.

## 2017-06-10 NOTE — H&P ADULT - NSHPLABSRESULTS_GEN_ALL_CORE
8.8    5.0   )-----------( 187      ( 10 Murphy 2017 13:05 )             29.6         06-10    140  |  97<L>  |  39.0<H>  ----------------------------<  106  5.1   |  30.0<H>  |  1.43<H>    Ca    9.0      10 Murphy 2017 10:13  Mg     2.6     06-09    TPro  6.8  /  Alb  3.7  /  TBili  0.5  /  DBili  x   /  AST  65<H>  /  ALT  49<H>  /  AlkPhos  105  06-10    No acute abdominal pathology.  Suspicion for large ascending colon polyp. Recommend GI consultation.    BNP 9000  Troponin 0.3

## 2017-06-10 NOTE — H&P ADULT - NSHPPHYSICALEXAM_GEN_ALL_CORE
N: awake, alert, states that she wants to be left alone and to sleep  R: CTA bilaterally, breathing mildly labored  Cv: S1 S2 irregularly irregular  Gi: Soft, nontender, nondistended  Gu/Rectal: Patient refused exam  Extremities: 1+ peripheral edema

## 2017-06-10 NOTE — ED PROVIDER NOTE - CHPI ED SYMPTOMS NEG
no hematuria/no burning urination/no abdominal distension/no blood in stool/no nausea/no fever/no vomiting

## 2017-06-10 NOTE — ED ADULT TRIAGE NOTE - CHIEF COMPLAINT QUOTE
pt alert and awake, normal baseline as per daughter, daughter states that pt is having sever abd pain

## 2017-06-10 NOTE — ED PROVIDER NOTE - NS ED ROS FT
Review of Systems:  	•	CONSTITUTIONAL : no fever or weight change  	•	SKIN : no rash  	•	HEMATOLOGIC : no petechia, no bruising  	•	EYES : no eye pain, no blurred vision  	•	ENT : no change in hearing, no sore throat  	•	RESPIRATORY : no cough  	•	CARDIAC : no chest pain but chronically sob  	•	GI : + abdominal pain  	•	MUSCULOSKELETAL : no joint paint, no swelling, no redness  	•	NEUROLOGIC : no weakness, no headache, no anesthesia, no paresthesias

## 2017-06-10 NOTE — ED STATDOCS - PROGRESS NOTE DETAILS
83 y/o F pt w/ PMHx of HTN and CABG presents to the ED c/o diffuse abd pain onset yesterday night at 09:00. Pt was seen at [facility] for a Mitral valve clip, D/C last night, then had gas like pain. Pt is on Plavix. Pt denies N/V/D, fever, chills, or any other complaints. Focused evaluation, orders entered, placed in main for further evaluation by another provider.

## 2017-06-11 DIAGNOSIS — G93.40 ENCEPHALOPATHY, UNSPECIFIED: ICD-10-CM

## 2017-06-11 LAB
ALBUMIN SERPL ELPH-MCNC: 3.7 G/DL — SIGNIFICANT CHANGE UP (ref 3.3–5.2)
ALP SERPL-CCNC: 114 U/L — SIGNIFICANT CHANGE UP (ref 40–120)
ALT FLD-CCNC: 52 U/L — HIGH
ANION GAP SERPL CALC-SCNC: 10 MMOL/L — SIGNIFICANT CHANGE UP (ref 5–17)
AST SERPL-CCNC: 52 U/L — HIGH
BASOPHILS # BLD AUTO: 0 K/UL — SIGNIFICANT CHANGE UP (ref 0–0.2)
BASOPHILS NFR BLD AUTO: 0.2 % — SIGNIFICANT CHANGE UP (ref 0–2)
BILIRUB SERPL-MCNC: 0.4 MG/DL — SIGNIFICANT CHANGE UP (ref 0.4–2)
BUN SERPL-MCNC: 37 MG/DL — HIGH (ref 8–20)
CALCIUM SERPL-MCNC: 8.8 MG/DL — SIGNIFICANT CHANGE UP (ref 8.6–10.2)
CHLORIDE SERPL-SCNC: 96 MMOL/L — LOW (ref 98–107)
CK SERPL-CCNC: 138 U/L — SIGNIFICANT CHANGE UP (ref 25–170)
CO2 SERPL-SCNC: 35 MMOL/L — HIGH (ref 22–29)
CREAT SERPL-MCNC: 1.54 MG/DL — HIGH (ref 0.5–1.3)
EOSINOPHIL # BLD AUTO: 0.1 K/UL — SIGNIFICANT CHANGE UP (ref 0–0.5)
EOSINOPHIL NFR BLD AUTO: 1.3 % — SIGNIFICANT CHANGE UP (ref 0–6)
GLUCOSE SERPL-MCNC: 104 MG/DL — SIGNIFICANT CHANGE UP (ref 70–115)
HCT VFR BLD CALC: 28.4 % — LOW (ref 37–47)
HGB BLD-MCNC: 8.4 G/DL — LOW (ref 12–16)
LYMPHOCYTES # BLD AUTO: 0.9 K/UL — LOW (ref 1–4.8)
LYMPHOCYTES # BLD AUTO: 17.4 % — LOW (ref 20–55)
MAGNESIUM SERPL-MCNC: 2.3 MG/DL — SIGNIFICANT CHANGE UP (ref 1.6–2.6)
MCHC RBC-ENTMCNC: 20.5 PG — LOW (ref 27–31)
MCHC RBC-ENTMCNC: 29.6 G/DL — LOW (ref 32–36)
MCV RBC AUTO: 69.3 FL — LOW (ref 81–99)
MONOCYTES # BLD AUTO: 0.7 K/UL — SIGNIFICANT CHANGE UP (ref 0–0.8)
MONOCYTES NFR BLD AUTO: 13.6 % — HIGH (ref 3–10)
NEUTROPHILS # BLD AUTO: 3.5 K/UL — SIGNIFICANT CHANGE UP (ref 1.8–8)
NEUTROPHILS NFR BLD AUTO: 66.6 % — SIGNIFICANT CHANGE UP (ref 37–73)
NT-PROBNP SERPL-SCNC: HIGH PG/ML (ref 0–300)
PLATELET # BLD AUTO: 199 K/UL — SIGNIFICANT CHANGE UP (ref 150–400)
POTASSIUM SERPL-MCNC: 4.1 MMOL/L — SIGNIFICANT CHANGE UP (ref 3.5–5.3)
POTASSIUM SERPL-SCNC: 4.1 MMOL/L — SIGNIFICANT CHANGE UP (ref 3.5–5.3)
PROT SERPL-MCNC: 6.6 G/DL — SIGNIFICANT CHANGE UP (ref 6.6–8.7)
RBC # BLD: 4.1 M/UL — LOW (ref 4.4–5.2)
RBC # FLD: 21 % — HIGH (ref 11–15.6)
SODIUM SERPL-SCNC: 141 MMOL/L — SIGNIFICANT CHANGE UP (ref 135–145)
TROPONIN T SERPL-MCNC: 0.42 NG/ML — HIGH (ref 0–0.06)
WBC # BLD: 5.3 K/UL — SIGNIFICANT CHANGE UP (ref 4.8–10.8)
WBC # FLD AUTO: 5.3 K/UL — SIGNIFICANT CHANGE UP (ref 4.8–10.8)

## 2017-06-11 PROCEDURE — 71010: CPT | Mod: 26

## 2017-06-11 PROCEDURE — 93010 ELECTROCARDIOGRAM REPORT: CPT

## 2017-06-11 RX ORDER — DOBUTAMINE HCL 250MG/20ML
2.5 VIAL (ML) INTRAVENOUS
Qty: 500 | Refills: 0 | Status: DISCONTINUED | OUTPATIENT
Start: 2017-06-11 | End: 2017-06-12

## 2017-06-11 RX ORDER — AMIODARONE HYDROCHLORIDE 400 MG/1
200 TABLET ORAL DAILY
Qty: 0 | Refills: 0 | Status: DISCONTINUED | OUTPATIENT
Start: 2017-06-16 | End: 2017-06-24

## 2017-06-11 RX ORDER — ISOSORBIDE MONONITRATE 60 MG/1
90 TABLET, EXTENDED RELEASE ORAL DAILY
Qty: 0 | Refills: 0 | Status: DISCONTINUED | OUTPATIENT
Start: 2017-06-11 | End: 2017-06-12

## 2017-06-11 RX ORDER — ENOXAPARIN SODIUM 100 MG/ML
40 INJECTION SUBCUTANEOUS DAILY
Qty: 0 | Refills: 0 | Status: DISCONTINUED | OUTPATIENT
Start: 2017-06-11 | End: 2017-06-17

## 2017-06-11 RX ORDER — AMIODARONE HYDROCHLORIDE 400 MG/1
400 TABLET ORAL EVERY 8 HOURS
Qty: 0 | Refills: 0 | Status: COMPLETED | OUTPATIENT
Start: 2017-06-11 | End: 2017-06-15

## 2017-06-11 RX ORDER — HYDRALAZINE HCL 50 MG
25 TABLET ORAL
Qty: 0 | Refills: 0 | Status: DISCONTINUED | OUTPATIENT
Start: 2017-06-11 | End: 2017-06-12

## 2017-06-11 RX ORDER — AMIODARONE HYDROCHLORIDE 400 MG/1
150 TABLET ORAL ONCE
Qty: 0 | Refills: 0 | Status: COMPLETED | OUTPATIENT
Start: 2017-06-11 | End: 2017-06-11

## 2017-06-11 RX ADMIN — Medication 11.36 MICROGRAM(S)/KG/MIN: at 17:13

## 2017-06-11 RX ADMIN — ISOSORBIDE MONONITRATE 90 MILLIGRAM(S): 60 TABLET, EXTENDED RELEASE ORAL at 11:16

## 2017-06-11 RX ADMIN — Medication 100 MILLIGRAM(S): at 11:17

## 2017-06-11 RX ADMIN — ENOXAPARIN SODIUM 40 MILLIGRAM(S): 100 INJECTION SUBCUTANEOUS at 11:16

## 2017-06-11 RX ADMIN — Medication 20 MILLIGRAM(S): at 05:19

## 2017-06-11 RX ADMIN — SODIUM CHLORIDE 3 MILLILITER(S): 9 INJECTION INTRAMUSCULAR; INTRAVENOUS; SUBCUTANEOUS at 14:36

## 2017-06-11 RX ADMIN — PANTOPRAZOLE SODIUM 40 MILLIGRAM(S): 20 TABLET, DELAYED RELEASE ORAL at 07:09

## 2017-06-11 RX ADMIN — Medication 25 MILLIGRAM(S): at 11:16

## 2017-06-11 RX ADMIN — Medication 50 MILLIGRAM(S): at 05:19

## 2017-06-11 RX ADMIN — Medication 25 MILLIGRAM(S): at 17:13

## 2017-06-11 RX ADMIN — LATANOPROST 1 DROP(S): 0.05 SOLUTION/ DROPS OPHTHALMIC; TOPICAL at 23:33

## 2017-06-11 RX ADMIN — Medication 325 MILLIGRAM(S): at 11:16

## 2017-06-11 RX ADMIN — CLOPIDOGREL BISULFATE 75 MILLIGRAM(S): 75 TABLET, FILM COATED ORAL at 11:16

## 2017-06-11 RX ADMIN — AMIODARONE HYDROCHLORIDE 618 MILLIGRAM(S): 400 TABLET ORAL at 20:40

## 2017-06-11 RX ADMIN — SODIUM CHLORIDE 3 MILLILITER(S): 9 INJECTION INTRAMUSCULAR; INTRAVENOUS; SUBCUTANEOUS at 05:18

## 2017-06-11 RX ADMIN — SODIUM CHLORIDE 3 MILLILITER(S): 9 INJECTION INTRAMUSCULAR; INTRAVENOUS; SUBCUTANEOUS at 22:49

## 2017-06-11 RX ADMIN — Medication 1 TABLET(S): at 11:16

## 2017-06-11 NOTE — PROGRESS NOTE ADULT - SUBJECTIVE AND OBJECTIVE BOX
84y Female s/p return to [facility] 1 day post discharge following mitral clip on 6/7 for abdominal pain     Subjective:  Patient is agitated and asking for her daughter     T(C): 36.8, Max: 37.1 (06-10 @ 13:49)  HR: 83 (77 - 135)  BP: 150/55 (98/63 - 153/73)  RR: 17 (12 - 36)  SpO2: 100% (92% - 100%)      06-10    140  |  97<L>  |  39.0<H>  ----------------------------<  106  5.1   |  30.0<H>  |  1.43<H>    Ca    9.0      10 Murphy 2017 10:13  Mg     2.6     06-09    TPro  6.8  /  Alb  3.7  /  TBili  0.5  /  DBili  x   /  AST  65<H>  /  ALT  49<H>  /  AlkPhos  105  06-10                               8.8    5.0   )-----------( 187      ( 10 Murphy 2017 13:05 )             29.6        PT/INR - ( 10 Murphy 2017 13:04 )   PT: 13.0 sec;   INR: 1.18 ratio         PTT - ( 10 Murphy 2017 13:04 )  PTT:27.3 sec       CARDIAC MARKERS ( 10 Murphy 2017 10:13 )  CKx     / CKMBx     / Troponin T0.30 ng/mL /        CAPILLARY BLOOD GLUCOSE           CXR:  No acute cardiopulmonary disease process. Cardiomegaly.    TTE: discussed with Dr. Hart, EF globally preserved, increased filling pressures c/w CHF and moderate to severe MR.     I&O's Detail    I & Os for current day (as of 11 Jun 2017 03:03)  =============================================  IN:    Total IN: 0 ml  ---------------------------------------------  OUT:    Voided: 400 ml    Total OUT: 400 ml  ---------------------------------------------  Total NET: -400 ml      MEDICATIONS  (STANDING):  sodium chloride 0.9% lock flush 3milliLiter(s) IV Push every 8 hours  simvastatin 40milliGRAM(s) Oral at bedtime  clopidogrel Tablet 75milliGRAM(s) Oral daily  docusate sodium 100milliGRAM(s) Oral three times a day  sodium chloride 0.9% lock flush 3milliLiter(s) IV Push every 8 hours  multivitamin 1Tablet(s) Oral daily  latanoprost 0.005% Ophthalmic Solution 1Drop(s) Both EYES at bedtime  pantoprazole    Tablet 40milliGRAM(s) Oral before breakfast  ferrous    sulfate 325milliGRAM(s) Oral daily  metoprolol succinate ER 50milliGRAM(s) Oral daily  torsemide 20milliGRAM(s) Oral daily  enoxaparin Injectable 40milliGRAM(s) SubCutaneous daily    MEDICATIONS  (PRN):      Physical Exam  Neuro: Awake, agittated at times, attempting to climb out of beds.  Redirectable and answers questions at times, asks to call her daughter  Pulm: CTA, equal bilaterally  CV: RRR, sinus on monitor  Abd: soft, NT, ND  Ext: MARTI x 4, mild pretibial edema  Inc: puncture site (right groin) without appreciable hematoma

## 2017-06-11 NOTE — PROGRESS NOTE ADULT - PROBLEM SELECTOR PLAN 1
1. repeat echocardiogram: EF preserved, increased filling pressure and worse MR c/w CHF exacerbation, treated with IV bumex and restarting home dose of torsemide  2. Continue diuretic  3. Monitor vital signs in CT ICU

## 2017-06-11 NOTE — PATIENT PROFILE ADULT. - VISION (WITH CORRECTIVE LENSES IF THE PATIENT USUALLY WEARS THEM):
Wears reading glasses/Partially impaired: cannot see medication labels or newsprint, but can see obstacles in path, and the surrounding layout; can count fingers at arm's length

## 2017-06-11 NOTE — PROGRESS NOTE ADULT - ASSESSMENT
Patient is a 84y Female s/p return to [facility] 1 day post discharge following mitral clip on 6/7 for abdominal pain.  Breathing mildly labored, and increased troponin and BNP found on routine labs.  Patient Agitated in CT-ICU which daughter attributes to lack of sleep.  Patient is presently without abdominal complaints, is tolerating her diet and passing flatus and is without abdominal tenderness.  Patient was transiently in a-fib, but was treated with amiodarone and lopressor, and is presently in sinus rhythm.

## 2017-06-12 DIAGNOSIS — I21.4 NON-ST ELEVATION (NSTEMI) MYOCARDIAL INFARCTION: ICD-10-CM

## 2017-06-12 DIAGNOSIS — Z51.5 ENCOUNTER FOR PALLIATIVE CARE: ICD-10-CM

## 2017-06-12 DIAGNOSIS — I48.91 UNSPECIFIED ATRIAL FIBRILLATION: ICD-10-CM

## 2017-06-12 DIAGNOSIS — Z29.9 ENCOUNTER FOR PROPHYLACTIC MEASURES, UNSPECIFIED: ICD-10-CM

## 2017-06-12 DIAGNOSIS — Z99.81 DEPENDENCE ON SUPPLEMENTAL OXYGEN: ICD-10-CM

## 2017-06-12 DIAGNOSIS — R41.0 DISORIENTATION, UNSPECIFIED: ICD-10-CM

## 2017-06-12 DIAGNOSIS — J96.12 CHRONIC RESPIRATORY FAILURE WITH HYPERCAPNIA: ICD-10-CM

## 2017-06-12 DIAGNOSIS — D12.2 BENIGN NEOPLASM OF ASCENDING COLON: ICD-10-CM

## 2017-06-12 DIAGNOSIS — N18.3 CHRONIC KIDNEY DISEASE, STAGE 3 (MODERATE): ICD-10-CM

## 2017-06-12 DIAGNOSIS — I34.0 NONRHEUMATIC MITRAL (VALVE) INSUFFICIENCY: ICD-10-CM

## 2017-06-12 DIAGNOSIS — E66.9 OBESITY, UNSPECIFIED: ICD-10-CM

## 2017-06-12 DIAGNOSIS — D50.9 IRON DEFICIENCY ANEMIA, UNSPECIFIED: ICD-10-CM

## 2017-06-12 LAB
ANION GAP SERPL CALC-SCNC: 9 MMOL/L — SIGNIFICANT CHANGE UP (ref 5–17)
ANION GAP SERPL CALC-SCNC: 9 MMOL/L — SIGNIFICANT CHANGE UP (ref 5–17)
BLD GP AB SCN SERPL QL: SIGNIFICANT CHANGE UP
BUN SERPL-MCNC: 36 MG/DL — HIGH (ref 8–20)
BUN SERPL-MCNC: 36 MG/DL — HIGH (ref 8–20)
CALCIUM SERPL-MCNC: 8.5 MG/DL — LOW (ref 8.6–10.2)
CALCIUM SERPL-MCNC: 9 MG/DL — SIGNIFICANT CHANGE UP (ref 8.6–10.2)
CHLORIDE SERPL-SCNC: 96 MMOL/L — LOW (ref 98–107)
CHLORIDE SERPL-SCNC: 99 MMOL/L — SIGNIFICANT CHANGE UP (ref 98–107)
CO2 SERPL-SCNC: 37 MMOL/L — HIGH (ref 22–29)
CO2 SERPL-SCNC: 38 MMOL/L — HIGH (ref 22–29)
CREAT SERPL-MCNC: 1.36 MG/DL — HIGH (ref 0.5–1.3)
CREAT SERPL-MCNC: 1.5 MG/DL — HIGH (ref 0.5–1.3)
GAS PNL BLDA: SIGNIFICANT CHANGE UP
GLUCOSE SERPL-MCNC: 101 MG/DL — SIGNIFICANT CHANGE UP (ref 70–115)
GLUCOSE SERPL-MCNC: 116 MG/DL — HIGH (ref 70–115)
HCT VFR BLD CALC: 26.3 % — LOW (ref 37–47)
HCT VFR BLD CALC: 27.2 % — LOW (ref 37–47)
HGB BLD-MCNC: 7.7 G/DL — LOW (ref 12–16)
HGB BLD-MCNC: 8 G/DL — LOW (ref 12–16)
INR BLD: 1.25 RATIO — HIGH (ref 0.88–1.16)
MAGNESIUM SERPL-MCNC: 2.2 MG/DL — SIGNIFICANT CHANGE UP (ref 1.6–2.6)
MAGNESIUM SERPL-MCNC: 2.2 MG/DL — SIGNIFICANT CHANGE UP (ref 1.6–2.6)
MCHC RBC-ENTMCNC: 20.4 PG — LOW (ref 27–31)
MCHC RBC-ENTMCNC: 20.5 PG — LOW (ref 27–31)
MCHC RBC-ENTMCNC: 29.3 G/DL — LOW (ref 32–36)
MCHC RBC-ENTMCNC: 29.4 G/DL — LOW (ref 32–36)
MCV RBC AUTO: 69.6 FL — LOW (ref 81–99)
MCV RBC AUTO: 69.8 FL — LOW (ref 81–99)
PHOSPHATE SERPL-MCNC: 4.1 MG/DL — SIGNIFICANT CHANGE UP (ref 2.4–4.7)
PLATELET # BLD AUTO: 149 K/UL — LOW (ref 150–400)
PLATELET # BLD AUTO: 164 K/UL — SIGNIFICANT CHANGE UP (ref 150–400)
POTASSIUM SERPL-MCNC: 4.3 MMOL/L — SIGNIFICANT CHANGE UP (ref 3.5–5.3)
POTASSIUM SERPL-MCNC: 4.6 MMOL/L — SIGNIFICANT CHANGE UP (ref 3.5–5.3)
POTASSIUM SERPL-SCNC: 4.3 MMOL/L — SIGNIFICANT CHANGE UP (ref 3.5–5.3)
POTASSIUM SERPL-SCNC: 4.6 MMOL/L — SIGNIFICANT CHANGE UP (ref 3.5–5.3)
PROTHROM AB SERPL-ACNC: 13.8 SEC — HIGH (ref 9.8–12.7)
RBC # BLD: 3.77 M/UL — LOW (ref 4.4–5.2)
RBC # BLD: 3.91 M/UL — LOW (ref 4.4–5.2)
RBC # FLD: 20.7 % — HIGH (ref 11–15.6)
RBC # FLD: 20.9 % — HIGH (ref 11–15.6)
SODIUM SERPL-SCNC: 143 MMOL/L — SIGNIFICANT CHANGE UP (ref 135–145)
SODIUM SERPL-SCNC: 145 MMOL/L — SIGNIFICANT CHANGE UP (ref 135–145)
TYPE + AB SCN PNL BLD: SIGNIFICANT CHANGE UP
WBC # BLD: 4 K/UL — LOW (ref 4.8–10.8)
WBC # BLD: 4.6 K/UL — LOW (ref 4.8–10.8)
WBC # FLD AUTO: 4 K/UL — LOW (ref 4.8–10.8)
WBC # FLD AUTO: 4.6 K/UL — LOW (ref 4.8–10.8)

## 2017-06-12 PROCEDURE — 71010: CPT | Mod: 26

## 2017-06-12 PROCEDURE — 99233 SBSQ HOSP IP/OBS HIGH 50: CPT

## 2017-06-12 RX ORDER — METOPROLOL TARTRATE 50 MG
50 TABLET ORAL DAILY
Qty: 0 | Refills: 0 | Status: DISCONTINUED | OUTPATIENT
Start: 2017-06-12 | End: 2017-07-20

## 2017-06-12 RX ORDER — HYDRALAZINE HCL 50 MG
25 TABLET ORAL
Qty: 0 | Refills: 0 | Status: DISCONTINUED | OUTPATIENT
Start: 2017-06-13 | End: 2017-06-16

## 2017-06-12 RX ORDER — ISOSORBIDE MONONITRATE 60 MG/1
30 TABLET, EXTENDED RELEASE ORAL DAILY
Qty: 0 | Refills: 0 | Status: DISCONTINUED | OUTPATIENT
Start: 2017-06-13 | End: 2017-06-16

## 2017-06-12 RX ADMIN — LATANOPROST 1 DROP(S): 0.05 SOLUTION/ DROPS OPHTHALMIC; TOPICAL at 22:16

## 2017-06-12 RX ADMIN — Medication 100 MILLIGRAM(S): at 22:19

## 2017-06-12 RX ADMIN — SODIUM CHLORIDE 3 MILLILITER(S): 9 INJECTION INTRAMUSCULAR; INTRAVENOUS; SUBCUTANEOUS at 22:31

## 2017-06-12 RX ADMIN — CLOPIDOGREL BISULFATE 75 MILLIGRAM(S): 75 TABLET, FILM COATED ORAL at 12:05

## 2017-06-12 RX ADMIN — ISOSORBIDE MONONITRATE 90 MILLIGRAM(S): 60 TABLET, EXTENDED RELEASE ORAL at 12:04

## 2017-06-12 RX ADMIN — Medication 20 MILLIGRAM(S): at 05:11

## 2017-06-12 RX ADMIN — ENOXAPARIN SODIUM 40 MILLIGRAM(S): 100 INJECTION SUBCUTANEOUS at 12:04

## 2017-06-12 RX ADMIN — Medication 325 MILLIGRAM(S): at 12:04

## 2017-06-12 RX ADMIN — Medication 25 MILLIGRAM(S): at 05:11

## 2017-06-12 RX ADMIN — PANTOPRAZOLE SODIUM 40 MILLIGRAM(S): 20 TABLET, DELAYED RELEASE ORAL at 05:11

## 2017-06-12 RX ADMIN — Medication 1 TABLET(S): at 12:04

## 2017-06-12 RX ADMIN — AMIODARONE HYDROCHLORIDE 400 MILLIGRAM(S): 400 TABLET ORAL at 22:19

## 2017-06-12 RX ADMIN — SODIUM CHLORIDE 3 MILLILITER(S): 9 INJECTION INTRAMUSCULAR; INTRAVENOUS; SUBCUTANEOUS at 11:51

## 2017-06-12 RX ADMIN — SODIUM CHLORIDE 3 MILLILITER(S): 9 INJECTION INTRAMUSCULAR; INTRAVENOUS; SUBCUTANEOUS at 05:08

## 2017-06-12 RX ADMIN — AMIODARONE HYDROCHLORIDE 400 MILLIGRAM(S): 400 TABLET ORAL at 05:07

## 2017-06-12 RX ADMIN — Medication 100 MILLIGRAM(S): at 05:11

## 2017-06-12 RX ADMIN — Medication 50 MILLIGRAM(S): at 12:05

## 2017-06-12 RX ADMIN — SIMVASTATIN 40 MILLIGRAM(S): 20 TABLET, FILM COATED ORAL at 22:19

## 2017-06-12 RX ADMIN — Medication 100 MILLIGRAM(S): at 12:05

## 2017-06-12 RX ADMIN — AMIODARONE HYDROCHLORIDE 400 MILLIGRAM(S): 400 TABLET ORAL at 12:05

## 2017-06-12 NOTE — PROGRESS NOTE ADULT - SUBJECTIVE AND OBJECTIVE BOX
Subjective: patient disoriented and refusing to answer questions    T(C): 36.8, Max: 36.9 (06-11 @ 07:00)  HR: 91 (68 - 129), a-fib with sinus beats intersperced  BP: 132/58 (93/54 - 132/58)  RR: 17 (12 - 25)  SpO2: 97% (93% - 100%) on 3L      I&O's Detail  I & Os for current day (as of 12 Jun 2017 04:53)  =============================================  IN:    Oral Fluid: 720 ml    DOBUTamine Infusion: 176.7 ml    Total IN: 896.7 ml  ---------------------------------------------  OUT:    Total OUT: incontinent  ---------------------------------------------  Total NET: unable to assess to due urinary incontinence        06-12    145  |  99  |  36.0<H>  ----------------------------<  101  4.6   |  37.0<H>  |  1.50<H>    Ca    8.5<L>      12 Jun 2017 03:57  Mg     2.2     06-12                          7.7    4.6   )-----------( 149      ( 12 Jun 2017 03:57 )             26.3     PT/INR - ( 12 Jun 2017 03:57 )   PT: 13.8 sec;   INR: 1.25 ratio        CARDIAC MARKERS ( 11 Jun 2017 08:08 )   U/L / CKMBx     / Troponin T0.42 ng/mL /    6/12/17 cardiac enzymes pending      6/11/17: IMPRESSION: Tiny right-sided pleural effusion. Cardiomegaly.  6/12/17 CXR: pending    EXAM:  CT ABDOMEN AND PELVIS OC IC                        PROCEDURE DATE:  06/10/2017    FINDINGS:  LOWER CHEST: Cardiomegaly. Sternotomy. Right pleural effusion. Extensive native coronary artery calcification.  LIVER: Within normal limits.  SPLEEN: Within normal limits.  PANCREAS: Within normal limits.  GALLBLADDER: Within normal limits.  BILE DUCTS: Normal caliber.  ADRENALS: Within normal limits.  KIDNEYS/URETERS: Bilateral renal cysts. No hydronephrosis.  RETROPERITONEUM: No lymphadenopathy.    VESSELS:  Severe diffuse atherosclerotic calcification of the aorta and major branch vessels. Normal caliber aorta.  BOWEL: No bowel obstruction.Appendix is normal. 2.6 cm intraluminal polypoid density in the ascending colon, just proximal to the ileocecal junction.  PERITONEUM: Trace ascites. No free intraperitoneal air.  REPRODUCTIVE ORGANS: Multiple uterine fibroids.  BLADDER: Withinnormal limits.  ABDOMINAL WALL: Small amount of hemorrhage in the right groin. Please correlate for recent procedure at this site. Tiny fat-containing umbilical hernia.  BONES: No acute bony abnormality.  IMPRESSION:   No acute abdominal pathology.  Suspicion for large ascending colon polyp. Recommend GI consultation.      EXAM:  ECHO TRANSTHORACIC COMP W DOPP    PROCEDURE DATE:  Murphy 10 2017   Summary:  1. Technically limited study.  2. Left ventricular ejection fraction, by visual estimation, is 40 to 45%.  3. Moderately decreased global left ventricular systolic function.  4. Moderately reduced RV systolic function.  5. S/P Mitral Clips, with severe mitral regurgitation.  6. Severe tricuspid regurgitation.  7. Estimated pulmonary artery systolic pressure is 68.3 mmHg assuming a right atrial pressure of 15 mmHg, which is consistent with severe pulmonary hypertension.  8. There is no evidence of pericardial effusion.  9. ** A prior echo done on 6/8/2017 is available for comparison purposes. Mitral regurgitation is now severe and central venous pressure is now elevated. Findings discussed with CTS ICU PA soon after study was completed on 6/10/17.        MEDICATIONS  (STANDING):  sodium chloride 0.9% lock flush 3milliLiter(s) IV Push every 8 hours  simvastatin 40milliGRAM(s) Oral at bedtime  clopidogrel Tablet 75milliGRAM(s) Oral daily  docusate sodium 100milliGRAM(s) Oral three times a day  sodium chloride 0.9% lock flush 3milliLiter(s) IV Push every 8 hours  multivitamin 1Tablet(s) Oral daily  latanoprost 0.005% Ophthalmic Solution 1Drop(s) Both EYES at bedtime  pantoprazole    Tablet 40milliGRAM(s) Oral before breakfast  ferrous    sulfate 325milliGRAM(s) Oral daily  torsemide 20milliGRAM(s) Oral daily  enoxaparin Injectable 40milliGRAM(s) SubCutaneous daily  isosorbide   mononitrate ER Tablet (IMDUR) 90milliGRAM(s) Oral daily  hydrALAZINE 25milliGRAM(s) Oral two times a day  DOBUTamine Infusion 2.5MICROgram(s)/kG/Min IV Continuous   amiodarone    Tablet 400milliGRAM(s) Oral every 8 hours        Physical Exam  Neuro: awake and alert but disoriented, following some commands but largely refusing to speak with medical team (when daughter is present pt is significantly more oriented and cooperative)  Pulm: bibasilar rales otherwise CTA, equal bilaterally  CV: irregularly irregular, +S1S2  Abd: soft, NT, ND, +BS, obese  Ext: MARTI x 4, trace edema

## 2017-06-12 NOTE — PROGRESS NOTE ADULT - ASSESSMENT
Patient is an 84 year old female with PMH s/p mitral clip x 2 on 6/7/17 with residual severe MR, CAD, s/p CABG, cardiomyopathy, chronic HFrEF, HLD, HTN, BRIJESH, MI, obesity, OA, CRI, s/p trach, GERD. 6/10 readmitted 1 day post discharge d/t c/o abdominal pain. Breathing mildly labored, and increased troponin and BNP found on routine labs.  Patient Agitated in CT-ICU which daughter attributes to lack of sleep. A/w acute on chronic systolic heart failure and type 2 MI. Current hospitalization complicated by PAF and delirium.

## 2017-06-12 NOTE — PROGRESS NOTE ADULT - PROBLEM SELECTOR PLAN 2
paroxsymal  amiodarone bolus IV x 1 and PO load started last night  no beta-blockers while on dobutamine  will discuss with CT surgery team and cardiology but suspect pt is too high risk for anticoagulation  monitor electrolytes (keep potassium >4 and magnesium >2)

## 2017-06-12 NOTE — PROGRESS NOTE ADULT - PROBLEM SELECTOR PLAN 1
dobutamine started yesterday at 5 mcg/kg/min but decreased to 2.5 mcg/kg/min last night due to a-fib with RVR, may benefit from home  infusion as discussed with Dr Barney  continue home meds as discussed with Dr Barney including torsemide, hydralazine and imdur  no ACEI or ARB with hx of renal insufficiency

## 2017-06-12 NOTE — CONSULT NOTE ADULT - SUBJECTIVE AND OBJECTIVE BOX
PULMONARY CONSULT NOTE      ANN FRANCESLEE [id number]    Patient is a 84y old  Female who presents with a chief complaint of Abdominal pain, insomnia, and increased shortness of breath (10 Murphy 2017 16:40)  84 year old with multiple medical problems presents to the ED with complaints of LLQ pain since yesterday. She had 3 BMs today. No fever, vomiting or change in urination. Patient is s/p  mitral valve clipping done on  with Dr. Owen.  On plavix. They stopped her IMDUR and Beta blocker due to relative hypotension, but continued on hydralazine to reduce afterload. Daughter states she has stage III heart failure. She states her mother is always somewhat short of breath.    Patient was discharged on  s/p mitral clip.   abg with hypercapnia, pulmonary consult called      HISTORY OF PRESENT ILLNESS:  sitting in chair, NAD  denies any smoking hx, no hx asthma  had trach in past, saw t surgery [facility] per hx, told not sig stenosis  MEDICATIONS  (STANDING):  sodium chloride 0.9% lock flush 3milliLiter(s) IV Push every 8 hours  simvastatin 40milliGRAM(s) Oral at bedtime  clopidogrel Tablet 75milliGRAM(s) Oral daily  docusate sodium 100milliGRAM(s) Oral three times a day  sodium chloride 0.9% lock flush 3milliLiter(s) IV Push every 8 hours  multivitamin 1Tablet(s) Oral daily  latanoprost 0.005% Ophthalmic Solution 1Drop(s) Both EYES at bedtime  pantoprazole    Tablet 40milliGRAM(s) Oral before breakfast  ferrous    sulfate 325milliGRAM(s) Oral daily  torsemide 20milliGRAM(s) Oral daily  enoxaparin Injectable 40milliGRAM(s) SubCutaneous daily  isosorbide   mononitrate ER Tablet (IMDUR) 90milliGRAM(s) Oral daily  hydrALAZINE 25milliGRAM(s) Oral two times a day  amiodarone    Tablet 400milliGRAM(s) Oral every 8 hours  metoprolol succinate ER 50milliGRAM(s) Oral daily      MEDICATIONS  (PRN):      Allergies    aspirin (Anaphylaxis)  NSAIDs (Other)    Intolerances    Lasix (Other)  [facility] PT (Unknown)      PAST MEDICAL & SURGICAL HISTORY:  Renal insufficiency  MR (mitral regurgitation): severe  Mitral valve stenosis, severe  On home oxygen therapy: not at this time  3-30-17  Obesity  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   r/o  out  stenosis  Cardiomyopathy  Osteoarthritis  Iron deficiency anemia  MI, old: 2008  Dyslipidemia  Acid reflux  Hx of CAB  CAD (coronary artery disease)  Heart failure  HTN (hypertension)  H/O tracheostomy: 2013 may  Cataract: b/l  2013  S/P CABG x 3:   [place]      FAMILY HISTORY:  Family history of hypertension  Family history of heart disease      SOCIAL HISTORY  Smoking History:     REVIEW OF SYSTEMS:    CONSTITUTIONAL:  No fevers, chills, sweats    HEENT:  Eyes:  No diplopia or blurred vision. ENT:  No earache, sore throat or runny nose.    CARDIOVASCULAR:  No pressure, squeezing, tightness, or heaviness about the chest; no palpitations.    RESPIRATORY: see hpi    GASTROINTESTINAL:  No abdominal pain, nausea, vomiting or diarrhea.    GENITOURINARY:  No dysuria, frequency or urgency.    NEUROLOGIC:  No paresthesias, fasciculations, seizures or weakness.    PSYCHIATRIC:  No disorder of thought or mood.    Vital Signs Last 24 Hrs  T(C): 36.4, Max: 36.8 ( @ 20:00)  T(F): 97.5, Max: 98.3 ( @ 06:00)  HR: 69 (64 - 129)  BP: 92/54 (83/765 - 132/58)  BP(mean): 68 (68 - 83)  RR: 20 (12 - 35)  SpO2: 97% (75% - 100%)    PHYSICAL EXAMINATION:    GENERAL: The patient is a well-developed, well-nourished in no apparent distress.     HEENT: Head is normocephalic and atraumatic. Extraocular muscles are intact. Mucous membranes are moist.     NECK: Supple.     LUNGS: moderate air entry bilat. Respirations unlabored    HEART: Regular rate and rhythm without murmur.    ABDOMEN: Soft, nontender, and nondistended.    EXTREMITIES: Without any cyanosis, clubbing, rash, lesions or edema.    NEUROLOGIC: Grossly intact.      LABS:                        8.0    4.0   )-----------( 164      ( 2017 13:56 )             27.2     06-12    143  |  96<L>  |  36.0<H>  ----------------------------<  116<H>  4.3   |  38.0<H>  |  1.36<H>    Ca    9.0      2017 13:56  Phos  4.1       Mg     2.2         TPro  6.6  /  Alb  3.7  /  TBili  0.4  /  DBili  x   /  AST  52<H>  /  ALT  52<H>  /  AlkPhos  114      PT/INR - ( 2017 03:57 )   PT: 13.8 sec;   INR: 1.25 ratio             ABG - ( 2017 10:20 )  pH: 7.29  /  pCO2: 85    /  pO2: 91    / HCO3: 36    / Base Excess: 11.9  /  SaO2: 99                CARDIAC MARKERS ( 2017 08:08 )  x     / 0.42 ng/mL / 138 U/L / x     / x            Serum Pro-Brain Natriuretic Peptide: 79136 pg/mL ( @ 08:08)  Serum Pro-Brain Natriuretic Peptide: 9038 pg/mL (06-10 @ 13:04)          MICROBIOLOGY:    RADIOLOGY & ADDITIONAL STUDIES:  CXR and previous CT reviewed

## 2017-06-12 NOTE — PROGRESS NOTE ADULT - PROBLEM SELECTOR PLAN 10
discussed with pt's daughter Mini recommendation to have goals of care discussion regarding mom's wishes with her mom and family  also recommended speaking with palliative care team to discuss treatment options including symptom management and possibly avoiding hospitalization if that is in line with patient wishes

## 2017-06-12 NOTE — CONSULT NOTE ADULT - ASSESSMENT
Acute on chronic hypercapnia  post mitral clip  chronic CHF, obesity, abd distension, all contributing  previous T surg eval no significant tracheal stenosis  TSH  normal 5/17  given co morbidity consider short stay in rehab  nocturnal NIV, bipap/trilogy and prn  diuresis  prn nebs  pulmonary follow up as out pt  prognosis guarded  discussed with CT team and family  critical Acute on chronic hypercapnia  post mitral clip  chronic CHF, obesity, abd distension, diuresis, all contributing  previous T surg eval no significant tracheal stenosis  TSH  normal 5/17  given co morbidity consider short stay in rehab  nocturnal NIV, bipap/trilogy and prn  diuresis  prn nebs  pulmonary follow up as out pt  prognosis guarded  discussed with CT team and family  critical

## 2017-06-13 DIAGNOSIS — J96.22 ACUTE AND CHRONIC RESPIRATORY FAILURE WITH HYPERCAPNIA: ICD-10-CM

## 2017-06-13 DIAGNOSIS — I25.10 ATHEROSCLEROTIC HEART DISEASE OF NATIVE CORONARY ARTERY WITHOUT ANGINA PECTORIS: ICD-10-CM

## 2017-06-13 LAB
ANION GAP SERPL CALC-SCNC: 11 MMOL/L — SIGNIFICANT CHANGE UP (ref 5–17)
BUN SERPL-MCNC: 37 MG/DL — HIGH (ref 8–20)
CALCIUM SERPL-MCNC: 9 MG/DL — SIGNIFICANT CHANGE UP (ref 8.6–10.2)
CHLORIDE SERPL-SCNC: 94 MMOL/L — LOW (ref 98–107)
CO2 SERPL-SCNC: 36 MMOL/L — HIGH (ref 22–29)
CREAT SERPL-MCNC: 1.58 MG/DL — HIGH (ref 0.5–1.3)
GAS PNL BLDA: SIGNIFICANT CHANGE UP
GLUCOSE SERPL-MCNC: 102 MG/DL — SIGNIFICANT CHANGE UP (ref 70–115)
HCT VFR BLD CALC: 30.3 % — LOW (ref 37–47)
HGB BLD-MCNC: 8.9 G/DL — LOW (ref 12–16)
MAGNESIUM SERPL-MCNC: 2.3 MG/DL — SIGNIFICANT CHANGE UP (ref 1.6–2.6)
MCHC RBC-ENTMCNC: 20.3 PG — LOW (ref 27–31)
MCHC RBC-ENTMCNC: 29.4 G/DL — LOW (ref 32–36)
MCV RBC AUTO: 69 FL — LOW (ref 81–99)
PHOSPHATE SERPL-MCNC: 4.3 MG/DL — SIGNIFICANT CHANGE UP (ref 2.4–4.7)
PLATELET # BLD AUTO: 184 K/UL — SIGNIFICANT CHANGE UP (ref 150–400)
POTASSIUM SERPL-MCNC: 4.2 MMOL/L — SIGNIFICANT CHANGE UP (ref 3.5–5.3)
POTASSIUM SERPL-SCNC: 4.2 MMOL/L — SIGNIFICANT CHANGE UP (ref 3.5–5.3)
RBC # BLD: 4.39 M/UL — LOW (ref 4.4–5.2)
RBC # FLD: 20.7 % — HIGH (ref 11–15.6)
SODIUM SERPL-SCNC: 141 MMOL/L — SIGNIFICANT CHANGE UP (ref 135–145)
WBC # BLD: 3.6 K/UL — LOW (ref 4.8–10.8)
WBC # FLD AUTO: 3.6 K/UL — LOW (ref 4.8–10.8)

## 2017-06-13 PROCEDURE — 71010: CPT | Mod: 26

## 2017-06-13 RX ORDER — DOCUSATE SODIUM 100 MG
100 CAPSULE ORAL THREE TIMES A DAY
Qty: 0 | Refills: 0 | Status: DISCONTINUED | OUTPATIENT
Start: 2017-06-13 | End: 2017-06-20

## 2017-06-13 RX ORDER — SODIUM CHLORIDE 9 MG/ML
3 INJECTION INTRAMUSCULAR; INTRAVENOUS; SUBCUTANEOUS EVERY 8 HOURS
Qty: 0 | Refills: 0 | Status: DISCONTINUED | OUTPATIENT
Start: 2017-06-13 | End: 2017-06-16

## 2017-06-13 RX ADMIN — ISOSORBIDE MONONITRATE 30 MILLIGRAM(S): 60 TABLET, EXTENDED RELEASE ORAL at 12:17

## 2017-06-13 RX ADMIN — PANTOPRAZOLE SODIUM 40 MILLIGRAM(S): 20 TABLET, DELAYED RELEASE ORAL at 06:11

## 2017-06-13 RX ADMIN — ENOXAPARIN SODIUM 40 MILLIGRAM(S): 100 INJECTION SUBCUTANEOUS at 12:17

## 2017-06-13 RX ADMIN — AMIODARONE HYDROCHLORIDE 400 MILLIGRAM(S): 400 TABLET ORAL at 14:10

## 2017-06-13 RX ADMIN — SIMVASTATIN 40 MILLIGRAM(S): 20 TABLET, FILM COATED ORAL at 21:49

## 2017-06-13 RX ADMIN — SODIUM CHLORIDE 3 MILLILITER(S): 9 INJECTION INTRAMUSCULAR; INTRAVENOUS; SUBCUTANEOUS at 21:56

## 2017-06-13 RX ADMIN — Medication 1 TABLET(S): at 12:18

## 2017-06-13 RX ADMIN — AMIODARONE HYDROCHLORIDE 400 MILLIGRAM(S): 400 TABLET ORAL at 21:55

## 2017-06-13 RX ADMIN — SODIUM CHLORIDE 3 MILLILITER(S): 9 INJECTION INTRAMUSCULAR; INTRAVENOUS; SUBCUTANEOUS at 06:17

## 2017-06-13 RX ADMIN — Medication 100 MILLIGRAM(S): at 06:11

## 2017-06-13 RX ADMIN — SODIUM CHLORIDE 3 MILLILITER(S): 9 INJECTION INTRAMUSCULAR; INTRAVENOUS; SUBCUTANEOUS at 13:11

## 2017-06-13 RX ADMIN — Medication 325 MILLIGRAM(S): at 12:17

## 2017-06-13 RX ADMIN — LATANOPROST 1 DROP(S): 0.05 SOLUTION/ DROPS OPHTHALMIC; TOPICAL at 21:56

## 2017-06-13 RX ADMIN — Medication 100 MILLIGRAM(S): at 14:10

## 2017-06-13 RX ADMIN — CLOPIDOGREL BISULFATE 75 MILLIGRAM(S): 75 TABLET, FILM COATED ORAL at 12:17

## 2017-06-13 RX ADMIN — Medication 20 MILLIGRAM(S): at 06:11

## 2017-06-13 RX ADMIN — Medication 25 MILLIGRAM(S): at 18:15

## 2017-06-13 RX ADMIN — AMIODARONE HYDROCHLORIDE 400 MILLIGRAM(S): 400 TABLET ORAL at 06:11

## 2017-06-13 NOTE — PROGRESS NOTE ADULT - SUBJECTIVE AND OBJECTIVE BOX
CC: without acute complaints overnight     Subjective:  84F w/ hx MR s/p mitral clip () CAD s/p CABG, CHF, HTN, HLD, anemia, GERD, CHF, obesity, OA, CRI, s/p trach, cardiomypathy presenting to ED with abdominal pain, CT abdomen pelvis without acute pathology, +sigmoid polyps, TTE w/ CHF elevated filling pressures, moderate to severe MR, admitted for management      afib w/ RVR  decreased imdur for hypotension decreased output    hypercapnia --> CPAP     T(C): 36.5, Max: 36.6 (- @ 05:00)  HR: 65 (63 - 75)  BP: 110/57 (83/765 - 119/56)  RR: 25 (15 - 35)  SpO2: 99% (75% - 100%)     Physical exanimation  Neuro: alert, without focal defects, intermittent confusion throughout the night   Pulm: coarse throughout   CV: +S1S2   Abd: soft nontender without guarding   Extrem/MS: +pulses, +edema           141  |  94<L>  |  37.0<H>  ----------------------------<  102  4.2   |  36.0<H>  |  1.58<H>    Ca    9.0      2017 04:10  Phos  4.3     06-13  Mg     2.3     -13                          8.9    3.6   )-----------( 184      ( 2017 04:00 )             30.3     PT/INR - ( 2017 03:57 )   PT: 13.8 sec;   INR: 1.25 ratio      CAPILLARY BLOOD GLUCOSE            ABG - ( 2017 06:04 )  pH: 7.36  /  pCO2: 69    /  pO2: 122   / HCO3: 36    / Base Excess: 11.7  /  SaO2: 99        CXR:    Single frontal view of the chest demonstrates mild CHF, unchanged.   Mediastinal sternotomy wires. The cardiomediastinal silhouette is   enlarged. No acute osseous abnormalities. Overlying EKG leads and wires   are noted.    IMPRESSION: No interval change.    I&O's Detail    I & Os for current day (as of 2017 08:29)  =============================================  IN:    Oral Fluid: 720 ml    Total IN: 720 ml  ---------------------------------------------  OUT:    Total OUT: 0 ml  ---------------------------------------------  Total NET: 720 ml    Drug Dosing Weight  Height (cm): 152.4 (10 Murphy 2017 08:55)  Weight (kg): 75.7 (10 Murphy 2017 08:55)  BMI (kg/m2): 32.6 (10 Murphy 2017 08:55)  BSA (m2): 1.73 (10 Murphy 2017 08:55)    MEDICATIONS  (STANDING):  sodium chloride 0.9% lock flush 3milliLiter(s) IV Push every 8 hours  simvastatin 40milliGRAM(s) Oral at bedtime  clopidogrel Tablet 75milliGRAM(s) Oral daily  docusate sodium 100milliGRAM(s) Oral three times a day  sodium chloride 0.9% lock flush 3milliLiter(s) IV Push every 8 hours  multivitamin 1Tablet(s) Oral daily  latanoprost 0.005% Ophthalmic Solution 1Drop(s) Both EYES at bedtime  pantoprazole    Tablet 40milliGRAM(s) Oral before breakfast  ferrous    sulfate 325milliGRAM(s) Oral daily  torsemide 20milliGRAM(s) Oral daily  enoxaparin Injectable 40milliGRAM(s) SubCutaneous daily  amiodarone    Tablet 400milliGRAM(s) Oral every 8 hours  metoprolol succinate ER 50milliGRAM(s) Oral daily  isosorbide   mononitrate ER Tablet (IMDUR) 30milliGRAM(s) Oral daily  hydrALAZINE 25milliGRAM(s) Oral two times a day    MEDICATIONS  (PRN):        Assessment:  84yFemale with PmHx:    Renal insufficiency  MR (mitral regurgitation): severe  Mitral valve stenosis, severe  On home oxygen therapy: not at this time  3-30-17  Obesity  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    p/w   brittany s/p   post op course complicated by   brittany stable on       Plan:  -ambulate as tolerated, PT consult pending, pain control PRN   -continue cardiac monitoring, continue ASA, Lipitor & Lorpessor,   -wean NC as tolerated, encourage deep breathing, coughign & deep breathing   -diet as toelrated, continue GI proph, continue FS ACHS with cliding coverage   -maintain atkinson in the citically ill patinet, monitor BUN/Crt, & lytes, replete as necc   -maintain SCD boots at this time, ambualtion as permited,   -monitor CBC & coags   -continue post op ABX  maintain tubes/lines & drains at this time   -follow up   -continue ICU care at this time   -d/w team in AM

## 2017-06-13 NOTE — PROGRESS NOTE ADULT - ASSESSMENT
84 year old female with PMH s/p mitral clip x 2 on 6/7/17, CAD, s/p CABG, cardiomyopathy, acute on chronic heart failure, HLD, HTN, MI, obesity, OA, CRI, s/p trach, GERD. 6/10 readmitted 1 day post discharge c/o abdominal pain. Breathing mildly labored, and increased troponin and BNP found on routine labs, current hospitalization significant for acute on chronic heart failure, and hypercapnia

## 2017-06-13 NOTE — PROGRESS NOTE ADULT - PROBLEM SELECTOR PLAN 1
Pulm consult appreciated  nocturnal Bipap 12/6 30% Fi02 last night; gas this am significant for compensated metabolic acidosis; correct metabolic components to decrease respiratory compromise   continue diuresis   home 02   case management   patient and family education

## 2017-06-13 NOTE — PROGRESS NOTE ADULT - SUBJECTIVE AND OBJECTIVE BOX
PULMONARY PROGRESS NOTE      ANN FRANCESLEE ANN-[id number]    Patient is a 84y old  Female who presents with a chief complaint of Abdominal pain, insomnia, and increased shortness of breath (10 Murphy 2017 16:40)      INTERVAL HPI/OVERNIGHT EVENTS: She says she is feeling better. Awake and alert. No resp distress. Used BPAP last pm. On NCO2.     MEDICATIONS  (STANDING):  sodium chloride 0.9% lock flush 3milliLiter(s) IV Push every 8 hours  simvastatin 40milliGRAM(s) Oral at bedtime  clopidogrel Tablet 75milliGRAM(s) Oral daily  docusate sodium 100milliGRAM(s) Oral three times a day  sodium chloride 0.9% lock flush 3milliLiter(s) IV Push every 8 hours  multivitamin 1Tablet(s) Oral daily  latanoprost 0.005% Ophthalmic Solution 1Drop(s) Both EYES at bedtime  pantoprazole    Tablet 40milliGRAM(s) Oral before breakfast  ferrous    sulfate 325milliGRAM(s) Oral daily  torsemide 20milliGRAM(s) Oral daily  enoxaparin Injectable 40milliGRAM(s) SubCutaneous daily  amiodarone    Tablet 400milliGRAM(s) Oral every 8 hours  metoprolol succinate ER 50milliGRAM(s) Oral daily  isosorbide   mononitrate ER Tablet (IMDUR) 30milliGRAM(s) Oral daily  hydrALAZINE 25milliGRAM(s) Oral two times a day      MEDICATIONS  (PRN):      Allergies    aspirin (Anaphylaxis)  NSAIDs (Other)    Intolerances    Lasix (Other)  [facility] PT (Unknown)      PAST MEDICAL & SURGICAL HISTORY:  Renal insufficiency  MR (mitral regurgitation): severe  Mitral valve stenosis, severe  On home oxygen therapy: not at this time  3-30-17  Obesity  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     HEENT: Head is normocephalic and atraumatic. Extraocular muscles are intact. Mucous membranes are moist.    NECK: Supple.    LUNGS: decreased at bases b/l, crackles, respirations unlabored    HEART: Regular rate and rhythm without murmur.    ABDOMEN: Soft, nontender, obese      EXTREMITIES: Without any cyanosis, clubbing, rash, lesions      NEUROLOGIC: Grossly intact.    LABS:                        8.9    3.6   )-----------( 184      ( 2017 04:00 )             30.3     06-13    141  |  94<L>  |  37.0<H>  ----------------------------<  102  4.2   |  36.0<H>  |  1.58<H>    Ca    9.0      2017 04:10  Phos  4.3     -  Mg     2.3           PT/INR - ( 2017 03:57 )   PT: 13.8 sec;   INR: 1.25 ratio             ABG - ( 2017 06:04 )  pH: 7.36  /  pCO2: 69    /  pO2: 122   / HCO3: 36    / Base Excess: 11.7  /  SaO2: 99              Serum Pro-Brain Natriuretic Peptide: 26363 pg/mL ( @ 08:08)         RADIOLOGY & ADDITIONAL STUDIES:     EXAM:  CHEST SINGLE VIEW FRONTAL                          PROCEDURE DATE:  2017        INTERPRETATION:  TECHNIQUE: Single portable view of the chest.    COMPARISON: 2017    CLINICAL HISTORY: Mitral valve clip.     FINDINGS:    Single frontal view of the chest demonstrates mild CHF. Status post CABG   procedure. The cardiomediastinal silhouette is enlarged. No acute osseous   abnormalities. Overlying EKG leads and wires are noted.    IMPRESSION: Mild CHF, unchanged.                HOSSEIN MANCIA M.D., ATTENDING RADIOLOGIST

## 2017-06-13 NOTE — PROGRESS NOTE ADULT - ASSESSMENT
-Hypercarbic resp failure, acute on chronic  -Hypoxic resp failure  -MO  -CHF, still with some pulm edema    RECC:  Continue noct bpap. O2. Diurese as able. Nebs. Mgmt per CTS.

## 2017-06-14 LAB
ANION GAP SERPL CALC-SCNC: 12 MMOL/L — SIGNIFICANT CHANGE UP (ref 5–17)
ANISOCYTOSIS BLD QL: SLIGHT — SIGNIFICANT CHANGE UP
BASOPHILS # BLD AUTO: 0 K/UL — SIGNIFICANT CHANGE UP (ref 0–0.2)
BASOPHILS NFR BLD AUTO: 0.3 % — SIGNIFICANT CHANGE UP (ref 0–2)
BUN SERPL-MCNC: 47 MG/DL — HIGH (ref 8–20)
CALCIUM SERPL-MCNC: 9.2 MG/DL — SIGNIFICANT CHANGE UP (ref 8.6–10.2)
CHLORIDE SERPL-SCNC: 96 MMOL/L — LOW (ref 98–107)
CO2 SERPL-SCNC: 35 MMOL/L — HIGH (ref 22–29)
CREAT SERPL-MCNC: 1.59 MG/DL — HIGH (ref 0.5–1.3)
EOSINOPHIL # BLD AUTO: 0.1 K/UL — SIGNIFICANT CHANGE UP (ref 0–0.5)
EOSINOPHIL NFR BLD AUTO: 3.6 % — SIGNIFICANT CHANGE UP (ref 0–6)
GLUCOSE SERPL-MCNC: 108 MG/DL — SIGNIFICANT CHANGE UP (ref 70–115)
HCT VFR BLD CALC: 27.2 % — LOW (ref 37–47)
HGB BLD-MCNC: 8.2 G/DL — LOW (ref 12–16)
HYPOCHROMIA BLD QL: SLIGHT — SIGNIFICANT CHANGE UP
LYMPHOCYTES # BLD AUTO: 0.9 K/UL — LOW (ref 1–4.8)
LYMPHOCYTES # BLD AUTO: 23.9 % — SIGNIFICANT CHANGE UP (ref 20–55)
MACROCYTES BLD QL: SLIGHT — SIGNIFICANT CHANGE UP
MCHC RBC-ENTMCNC: 20.7 PG — LOW (ref 27–31)
MCHC RBC-ENTMCNC: 30.1 G/DL — LOW (ref 32–36)
MCV RBC AUTO: 68.7 FL — LOW (ref 81–99)
MICROCYTES BLD QL: SLIGHT — SIGNIFICANT CHANGE UP
MONOCYTES # BLD AUTO: 0.6 K/UL — SIGNIFICANT CHANGE UP (ref 0–0.8)
MONOCYTES NFR BLD AUTO: 18.1 % — HIGH (ref 3–10)
NEUTROPHILS # BLD AUTO: 1.9 K/UL — SIGNIFICANT CHANGE UP (ref 1.8–8)
NEUTROPHILS NFR BLD AUTO: 53.5 % — SIGNIFICANT CHANGE UP (ref 37–73)
OVALOCYTES BLD QL SMEAR: SLIGHT — SIGNIFICANT CHANGE UP
PLAT MORPH BLD: NORMAL — SIGNIFICANT CHANGE UP
PLATELET # BLD AUTO: 190 K/UL — SIGNIFICANT CHANGE UP (ref 150–400)
POIKILOCYTOSIS BLD QL AUTO: SLIGHT — SIGNIFICANT CHANGE UP
POTASSIUM SERPL-MCNC: 4.1 MMOL/L — SIGNIFICANT CHANGE UP (ref 3.5–5.3)
POTASSIUM SERPL-SCNC: 4.1 MMOL/L — SIGNIFICANT CHANGE UP (ref 3.5–5.3)
RBC # BLD: 3.96 M/UL — LOW (ref 4.4–5.2)
RBC # FLD: 20.6 % — HIGH (ref 11–15.6)
RBC BLD AUTO: ABNORMAL
SCHISTOCYTES BLD QL AUTO: SLIGHT — SIGNIFICANT CHANGE UP
SODIUM SERPL-SCNC: 143 MMOL/L — SIGNIFICANT CHANGE UP (ref 135–145)
SPHEROCYTES BLD QL SMEAR: SLIGHT — SIGNIFICANT CHANGE UP
TARGETS BLD QL SMEAR: SLIGHT — SIGNIFICANT CHANGE UP
WBC # BLD: 3.6 K/UL — LOW (ref 4.8–10.8)
WBC # FLD AUTO: 3.6 K/UL — LOW (ref 4.8–10.8)

## 2017-06-14 PROCEDURE — 93010 ELECTROCARDIOGRAM REPORT: CPT

## 2017-06-14 RX ORDER — ACETAMINOPHEN 500 MG
650 TABLET ORAL EVERY 6 HOURS
Qty: 0 | Refills: 0 | Status: DISCONTINUED | OUTPATIENT
Start: 2017-06-14 | End: 2017-06-22

## 2017-06-14 RX ADMIN — Medication 50 MILLIGRAM(S): at 06:59

## 2017-06-14 RX ADMIN — PANTOPRAZOLE SODIUM 40 MILLIGRAM(S): 20 TABLET, DELAYED RELEASE ORAL at 05:46

## 2017-06-14 RX ADMIN — SODIUM CHLORIDE 3 MILLILITER(S): 9 INJECTION INTRAMUSCULAR; INTRAVENOUS; SUBCUTANEOUS at 05:42

## 2017-06-14 RX ADMIN — CLOPIDOGREL BISULFATE 75 MILLIGRAM(S): 75 TABLET, FILM COATED ORAL at 11:17

## 2017-06-14 RX ADMIN — Medication 650 MILLIGRAM(S): at 14:17

## 2017-06-14 RX ADMIN — Medication 650 MILLIGRAM(S): at 23:33

## 2017-06-14 RX ADMIN — Medication 650 MILLIGRAM(S): at 22:33

## 2017-06-14 RX ADMIN — AMIODARONE HYDROCHLORIDE 400 MILLIGRAM(S): 400 TABLET ORAL at 13:18

## 2017-06-14 RX ADMIN — ISOSORBIDE MONONITRATE 30 MILLIGRAM(S): 60 TABLET, EXTENDED RELEASE ORAL at 11:18

## 2017-06-14 RX ADMIN — SODIUM CHLORIDE 3 MILLILITER(S): 9 INJECTION INTRAMUSCULAR; INTRAVENOUS; SUBCUTANEOUS at 21:17

## 2017-06-14 RX ADMIN — ENOXAPARIN SODIUM 40 MILLIGRAM(S): 100 INJECTION SUBCUTANEOUS at 11:17

## 2017-06-14 RX ADMIN — Medication 100 MILLIGRAM(S): at 13:18

## 2017-06-14 RX ADMIN — Medication 650 MILLIGRAM(S): at 13:17

## 2017-06-14 RX ADMIN — Medication 325 MILLIGRAM(S): at 11:17

## 2017-06-14 RX ADMIN — SIMVASTATIN 40 MILLIGRAM(S): 20 TABLET, FILM COATED ORAL at 22:34

## 2017-06-14 RX ADMIN — AMIODARONE HYDROCHLORIDE 400 MILLIGRAM(S): 400 TABLET ORAL at 22:34

## 2017-06-14 RX ADMIN — LATANOPROST 1 DROP(S): 0.05 SOLUTION/ DROPS OPHTHALMIC; TOPICAL at 22:33

## 2017-06-14 RX ADMIN — SODIUM CHLORIDE 3 MILLILITER(S): 9 INJECTION INTRAMUSCULAR; INTRAVENOUS; SUBCUTANEOUS at 13:09

## 2017-06-14 RX ADMIN — AMIODARONE HYDROCHLORIDE 400 MILLIGRAM(S): 400 TABLET ORAL at 05:46

## 2017-06-14 RX ADMIN — Medication 1 TABLET(S): at 11:17

## 2017-06-14 RX ADMIN — Medication 100 MILLIGRAM(S): at 22:34

## 2017-06-14 RX ADMIN — Medication 20 MILLIGRAM(S): at 05:46

## 2017-06-14 NOTE — PROGRESS NOTE ADULT - PROBLEM SELECTOR PLAN 8
continue protonix and colace  encourage coughing and deep breathing   encourage ambulation   continue lovenox  Acute rehab consult Pending

## 2017-06-14 NOTE — PROGRESS NOTE ADULT - SUBJECTIVE AND OBJECTIVE BOX
PULMONARY PROGRESS NOTE      ANN FRANCESLEE [id number]    Patient is a 84y old  Female who presents with a chief complaint of Abdominal pain, insomnia, and increased shortness of breath (10 Murphy 2017 16:40)      INTERVAL HPI/OVERNIGHT EVENTS:  Used  BIPAP  C/o RUQ pain    MEDICATIONS  (STANDING):  sodium chloride 0.9% lock flush 3milliLiter(s) IV Push every 8 hours  simvastatin 40milliGRAM(s) Oral at bedtime  clopidogrel Tablet 75milliGRAM(s) Oral daily  sodium chloride 0.9% lock flush 3milliLiter(s) IV Push every 8 hours  multivitamin 1Tablet(s) Oral daily  latanoprost 0.005% Ophthalmic Solution 1Drop(s) Both EYES at bedtime  pantoprazole    Tablet 40milliGRAM(s) Oral before breakfast  ferrous    sulfate 325milliGRAM(s) Oral daily  torsemide 20milliGRAM(s) Oral daily  enoxaparin Injectable 40milliGRAM(s) SubCutaneous daily  amiodarone    Tablet 400milliGRAM(s) Oral every 8 hours  metoprolol succinate ER 50milliGRAM(s) Oral daily  isosorbide   mononitrate ER Tablet (IMDUR) 30milliGRAM(s) Oral daily  hydrALAZINE 25milliGRAM(s) Oral two times a day  sodium chloride 0.9% lock flush 3milliLiter(s) IV Push every 8 hours  docusate sodium 100milliGRAM(s) Oral three times a day      MEDICATIONS  (PRN):  acetaminophen   Tablet. 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 ENT:  No earache, sore throat or runny nose.    CARDIOVASCULAR:  No pressure, squeezing, tightness, heaviness or aching about the chest; no palpitations.    RESPIRATORY:  Mild dyspnea    GASTROINTESTINAL: Per HPI    GENITOURINARY:  No dysuria, frequency or urgency.    MUSCULOSKELETAL:  No joint pain    SKIN:  No new lesions.    NEUROLOGIC:  No paresthesias, fasciculations, seizures or weakness.    PSYCHIATRIC:  No disorder of thought or mood.    ENDOCRINE:  No heat or cold intolerance, polyuria or polydipsia.    HEMATOLOGICAL:  No easy bruising or bleeding.     Vital Signs Last 24 Hrs  T(C): 36.4, Max: 36.4 ( @ 21:23)  T(F): 97.5, Max: 97.6 ( @ 05:31)  HR: 63 (63 - 127)  BP: 100/58 (100/42 - 128/54)  BP(mean): --  RR: 16 (16 - 26)  SpO2: 100% (93% - 100%)    PHYSICAL EXAMINATION:    GENERAL: The patient is awake and alert in no apparent distress.     HEENT: Head is normocephalic and atraumatic. Extraocular muscles are intact. Mucous membranes are moist.    NECK: Supple.    LUNGS: Clear to auscultation without wheezing, rales or rhonchi; respirations unlabored    HEART: Regular rate and rhythm without murmur.    ABDOMEN: Soft, nontender, and nondistended. No guarding nor rebound     EXTREMITIES: Without any cyanosis, clubbing, rash, lesions or edema.    NEUROLOGIC: Grossly intact.    SKIN: No ulceration or induration present.      LABS:                        8.2    3.6   )-----------( 190      ( 2017 05:45 )             27.2     06-14    143  |  96<L>  |  47.0<H>  ----------------------------<  108  4.1   |  35.0<H>  |  1.59<H>    Ca    9.2      2017 05:45  Phos  4.3     -  Mg     2.3     -          ABG - ( 2017 06:04 )  pH: 7.36  /  pCO2: 69    /  pO2: 122   / HCO3: 36    / Base Excess: 11.7  /  SaO2: 99                              MICROBIOLOGY:    RADIOLOGY & ADDITIONAL STUDIES:

## 2017-06-14 NOTE — PROGRESS NOTE ADULT - SUBJECTIVE AND OBJECTIVE BOX
Subjective: "  I feel OK.  My stomach still hurts"  Pt in bed NAD    VITAL SIGNS  T(C): 36.4, Max: 36.4 (06-13 @ 21:23)  HR: 63 (63 - 127)  BP: 100/58 (100/42 - 128/54)  RR: 16 (16 - 26)  SpO2: 100% (93% - 100%) RA           Daily     Daily Weight in k.8 (2017 02:21)  Admit Wt: Drug Dosing Weight  Height (cm): 152.4 (10 Murphy 2017 08:55)  Weight (kg): 75.7 (10 Murphy 2017 08:55)    Telemetry: SR 1st degree   LVEF:  25%    MEDICATIONS  sodium chloride 0.9% lock flush 3milliLiter(s) IV Push every 8 hours  simvastatin 40milliGRAM(s) Oral at bedtime  clopidogrel Tablet 75milliGRAM(s) Oral daily  sodium chloride 0.9% lock flush 3milliLiter(s) IV Push every 8 hours  multivitamin 1Tablet(s) Oral daily  latanoprost 0.005% Ophthalmic Solution 1Drop(s) Both EYES at bedtime  pantoprazole    Tablet 40milliGRAM(s) Oral before breakfast  ferrous    sulfate 325milliGRAM(s) Oral daily  torsemide 20milliGRAM(s) Oral daily  enoxaparin Injectable 40milliGRAM(s) SubCutaneous daily  amiodarone    Tablet 400milliGRAM(s) Oral every 8 hours  metoprolol succinate ER 50milliGRAM(s) Oral daily  isosorbide   mononitrate ER Tablet (IMDUR) 30milliGRAM(s) Oral daily  hydrALAZINE 25milliGRAM(s) Oral two times a day  sodium chloride 0.9% lock flush 3milliLiter(s) IV Push every 8 hours  docusate sodium 100milliGRAM(s) Oral three times a day  acetaminophen   Tablet. 650milliGRAM(s) Oral every 6 hours PRN    MEDICATIONS  (PRN):  acetaminophen   Tablet. 650milliGRAM(s) Oral every 6 hours PRN Moderate Pain (4 - 6)        PHYSICAL EXAM  General: alert awake   Respiratory:  clear b/l decreased BS @ base   CV: RRR S1 S2   Abdomen: soft mild tenderness  ND + BS + BM today   Extremities: trace edema b/l LE           I&O's Detail  I & Os for 24h ending 2017 07:00  =============================================  IN:    Oral Fluid: 180 ml    Total IN: 180 ml  ---------------------------------------------  OUT:    Voided: 200 ml    Total OUT: 200 ml  ---------------------------------------------  Total NET: -20 ml    I & Os for current day (as of 2017 14:44)  =============================================  IN:    Oral Fluid: 360 ml    Total IN: 360 ml  ---------------------------------------------  OUT:    Voided: 300 ml    Total OUT: 300 ml  ---------------------------------------------  Total NET: 60 ml      LABS      143  |  96<L>  |  47.0<H>  ----------------------------<  108  4.1   |  35.0<H>  |  1.59<H>    Ca    9.2      2017 05:45  Phos  4.3     06-13  Mg     2.3     -                                   8.2    3.6   )-----------( 190      ( 2017 05:45 )             27.2                     CAPILLARY BLOOD GLUCOSE           Today's CXR: clear b/l     PAST MEDICAL & SURGICAL HISTORY:  Renal insufficiency  MR (mitral regurgitation): severe  Mitral valve stenosis, severe  On home oxygen therapy: not at this time  3-30-17  Obesity  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

## 2017-06-14 NOTE — PROGRESS NOTE ADULT - PROBLEM SELECTOR PLAN 1
Pulm consult appreciated  nocturnal Bipap 12/6 30% Fi02 last night;   continue diuresis   home 02   case management   patient and family education

## 2017-06-14 NOTE — PROGRESS NOTE ADULT - ASSESSMENT
Acute on chronic respiratory failure  Obesity  Abdominal pain without physical findings    Plan:  Continue nocturnal BIPAP for now  Will need to reevaluate ABG tomorrow.

## 2017-06-14 NOTE — CONSULT NOTE ADULT - SUBJECTIVE AND OBJECTIVE BOX
Patient is a 84y old  Female who presents for rehab evaluation s/p MV clip    HPI:  84 year old with multiple medical problems admitted 6/10 after presenting to the ED with LLQ pain. She is s/p Mitral clip x 2 on 6/7/17 by Dr. Owen and discharge home on 6/09/17. Asked to see patient for rehab recommendations.      REVIEW OF SYSTEMS  Constitutional - No fever, No weight loss, No fatigue  HEENT - No eye pain, No visual disturbances, No difficulty hearing, No tinnitus, No vertigo, No neck pain  Respiratory - No cough, No wheezing, No shortness of breath  Cardiovascular - No chest pain, No palpitations  Gastrointestinal - No abdominal pain, No nausea, No vomiting, No diarrhea, No constipation  Genitourinary - No dysuria, No frequency, No hematuria, No incontinence  Neurological - No headaches, No memory loss, No loss of strength, No numbness, No tremors  Skin - No itching, No rashes, No lesions   Endocrine - No temperature intolerance  Musculoskeletal - No joint pain, No joint swelling, No muscle pain  Psychiatric - No depression, No anxiety    PAST MEDICAL & SURGICAL HISTORY  Renal insufficiency  MR (mitral regurgitation)  Mitral valve stenosis, severe  On home oxygen therapy  Obesity  Tracheal stenosis  Cardiomyopathy  Osteoarthritis  Iron deficiency anemia  MI, old  Dyslipidemia  Acid reflux  Hx of CABG  CAD (coronary artery disease)  Heart failure  HTN (hypertension)  H/O tracheostomy  Cataract  S/P CABG x 3      SOCIAL HISTORY  Smoking - Denied  EtOH - Denied   Drugs - Denied    FUNCTIONAL HISTORY  Lives in a house with daughter and her family. There are 3 steps to enter with a HR. Per daughter; someone is always home with her. Pt prefers a cane.      CURRENT FUNCTIONAL STATUS PT Eval 6/13  Therapeutic Interventions    Sit-Stand Transfer Training  Sit-to-Stand Transfer Training Rehab Potential : good, to achieve stated therapy goals  Sit-Stand Transfer Training  Transfer Training Sit-to-Stand Transfer : minimum assist (75% patient effort);  1 person assist;  rolling walker  Sit-Stand Transfer Training  Transfer Training Stand-to-Sit Transfer : minimum assist (75% patient effort);  1 person assist;  rolling walker  Sit-Stand Transfer Training  Sit-to-Stand Transfer Training Transfer Safety Analysis : decreased weight-shifting ability;  decreased strength;  impaired balance  Gait Training  Gait Training Rehab Potential : good, to achieve stated therapy goals  Gait Training   Gait Training : minimum assist (75% patient effort);  1 person assist;  weight-bearing as tolerated   rolling walker;  65ft  Gait Training   Gait Analysis : decreased stride length;  decreased step length;  decreased strength;  impaired balance      FAMILY HISTORY   Family history of hypertension  Family history of heart disease      RECENT LABS/IMAGING  CBC Full  -  ( 14 Jun 2017 05:45 )  WBC Count : 3.6 K/uL  Hemoglobin : 8.2 g/dL  Hematocrit : 27.2 %  Platelet Count - Automated : 190 K/uL  Mean Cell Volume : 68.7 fl  Mean Cell Hemoglobin : 20.7 pg  Mean Cell Hemoglobin Concentration : 30.1 g/dL  Auto Neutrophil # : 1.9 K/uL  Auto Lymphocyte # : 0.9 K/uL  Auto Monocyte # : 0.6 K/uL  Auto Eosinophil # : 0.1 K/uL  Auto Basophil # : 0.0 K/uL  Auto Neutrophil % : 53.5 %  Auto Lymphocyte % : 23.9 %  Auto Monocyte % : 18.1 %  Auto Eosinophil % : 3.6 %  Auto Basophil % : 0.3 %    06-14    143  |  96<L>  |  47.0<H>  ----------------------------<  108  4.1   |  35.0<H>  |  1.59<H>    Ca    9.2      14 Jun 2017 05:45  Phos  4.3     06-13  Mg     2.3     06-13      ALLERGIES  aspirin (Anaphylaxis)  Lasix (Other)  NSAIDs (Other)  [facility] PT (Unknown)      MEDICATIONS   sodium chloride 0.9% lock flush 3milliLiter(s) IV Push every 8 hours  simvastatin 40milliGRAM(s) Oral at bedtime  clopidogrel Tablet 75milliGRAM(s) Oral daily  sodium chloride 0.9% lock flush 3milliLiter(s) IV Push every 8 hours  multivitamin 1Tablet(s) Oral daily  latanoprost 0.005% Ophthalmic Solution 1Drop(s) Both EYES at bedtime  pantoprazole    Tablet 40milliGRAM(s) Oral before breakfast  ferrous    sulfate 325milliGRAM(s) Oral daily  torsemide 20milliGRAM(s) Oral daily  enoxaparin Injectable 40milliGRAM(s) SubCutaneous daily  amiodarone    Tablet 400milliGRAM(s) Oral every 8 hours  metoprolol succinate ER 50milliGRAM(s) Oral daily  isosorbide   mononitrate ER Tablet (IMDUR) 30milliGRAM(s) Oral daily  hydrALAZINE 25milliGRAM(s) Oral two times a day  sodium chloride 0.9% lock flush 3milliLiter(s) IV Push every 8 hours  docusate sodium 100milliGRAM(s) Oral three times a day  acetaminophen   Tablet. 650milliGRAM(s) Oral every 6 hours PRN    VITALS  T(C): 36.4, Max: 36.4 (06-13 @ 21:23)  HR: 63 (63 - 127)  BP: 100/58 (100/42 - 128/54)  RR: 16 (16 - 26)  SpO2: 100% (93% - 100%) Patient is a 84y old  Female who presents for rehab evaluation s/p MV clip    HPI:  84 year old with multiple medical problems admitted 6/10 after presenting to the ED with LLQ pain. She is s/p Mitral clip x 2 on 6/7/17 by Dr. Owen and discharge home on 6/09/17. Found to have acute on chronic CHF and respiratory hypercapnia requiring BiPAP. Asked to see patient for rehab recommendations.      REVIEW OF SYSTEMS  Constitutional - No fever, No weight loss, No fatigue  HEENT - No eye pain, No visual disturbances, No difficulty hearing, No tinnitus, No vertigo, No neck pain  Respiratory - No cough, No wheezing, + shortness of breath  Cardiovascular - No chest pain, No palpitations  Gastrointestinal - No abdominal pain, No nausea, No vomiting, No diarrhea, No constipation  Genitourinary - No dysuria, No frequency, No hematuria, No incontinence  Neurological - No headaches, No memory loss, No loss of strength, No numbness, No tremors  Skin - No itching, No rashes, No lesions   Endocrine - No temperature intolerance  Musculoskeletal - No joint pain, No joint swelling, No muscle pain  Psychiatric - No depression, No anxiety    PAST MEDICAL & SURGICAL HISTORY  Renal insufficiency  MR (mitral regurgitation)  Mitral valve stenosis, severe  On home oxygen therapy  Obesity  Tracheal stenosis  Cardiomyopathy  Osteoarthritis  Iron deficiency anemia  MI, old  Dyslipidemia  Acid reflux  Hx of CABG  CAD (coronary artery disease)  Heart failure  HTN (hypertension)  H/O tracheostomy  Cataract  S/P CABG x 3      SOCIAL HISTORY  Smoking - Denied  EtOH - Denied   Drugs - Denied    FUNCTIONAL HISTORY  Lives in a house with daughter and her family. There are 3 steps to enter with a HR. Per daughter; someone is always home with her. Pt prefers a cane.      CURRENT FUNCTIONAL STATUS PT Eval 6/13  Therapeutic Interventions    Sit-Stand Transfer Training  Sit-to-Stand Transfer Training Rehab Potential : good, to achieve stated therapy goals  Sit-Stand Transfer Training  Transfer Training Sit-to-Stand Transfer : minimum assist (75% patient effort);  1 person assist;  rolling walker  Sit-Stand Transfer Training  Transfer Training Stand-to-Sit Transfer : minimum assist (75% patient effort);  1 person assist;  rolling walker  Sit-Stand Transfer Training  Sit-to-Stand Transfer Training Transfer Safety Analysis : decreased weight-shifting ability;  decreased strength;  impaired balance  Gait Training  Gait Training Rehab Potential : good, to achieve stated therapy goals  Gait Training   Gait Training : minimum assist (75% patient effort);  1 person assist;  weight-bearing as tolerated   rolling walker;  65ft  Gait Training   Gait Analysis : decreased stride length;  decreased step length;  decreased strength;  impaired balance      FAMILY HISTORY   Family history of hypertension  Family history of heart disease      RECENT LABS/IMAGING  CBC Full  -  ( 14 Jun 2017 05:45 )  WBC Count : 3.6 K/uL  Hemoglobin : 8.2 g/dL  Hematocrit : 27.2 %  Platelet Count - Automated : 190 K/uL  Mean Cell Volume : 68.7 fl  Mean Cell Hemoglobin : 20.7 pg  Mean Cell Hemoglobin Concentration : 30.1 g/dL  Auto Neutrophil # : 1.9 K/uL  Auto Lymphocyte # : 0.9 K/uL  Auto Monocyte # : 0.6 K/uL  Auto Eosinophil # : 0.1 K/uL  Auto Basophil # : 0.0 K/uL  Auto Neutrophil % : 53.5 %  Auto Lymphocyte % : 23.9 %  Auto Monocyte % : 18.1 %  Auto Eosinophil % : 3.6 %  Auto Basophil % : 0.3 %    06-14    143  |  96<L>  |  47.0<H>  ----------------------------<  108  4.1   |  35.0<H>  |  1.59<H>    Ca    9.2      14 Jun 2017 05:45  Phos  4.3     06-13  Mg     2.3     06-13      ALLERGIES  aspirin (Anaphylaxis)  Lasix (Other)  NSAIDs (Other)  [facility] PT (Unknown)      MEDICATIONS   sodium chloride 0.9% lock flush 3milliLiter(s) IV Push every 8 hours  simvastatin 40milliGRAM(s) Oral at bedtime  clopidogrel Tablet 75milliGRAM(s) Oral daily  sodium chloride 0.9% lock flush 3milliLiter(s) IV Push every 8 hours  multivitamin 1Tablet(s) Oral daily  latanoprost 0.005% Ophthalmic Solution 1Drop(s) Both EYES at bedtime  pantoprazole    Tablet 40milliGRAM(s) Oral before breakfast  ferrous    sulfate 325milliGRAM(s) Oral daily  torsemide 20milliGRAM(s) Oral daily  enoxaparin Injectable 40milliGRAM(s) SubCutaneous daily  amiodarone    Tablet 400milliGRAM(s) Oral every 8 hours  metoprolol succinate ER 50milliGRAM(s) Oral daily  isosorbide   mononitrate ER Tablet (IMDUR) 30milliGRAM(s) Oral daily  hydrALAZINE 25milliGRAM(s) Oral two times a day  sodium chloride 0.9% lock flush 3milliLiter(s) IV Push every 8 hours  docusate sodium 100milliGRAM(s) Oral three times a day  acetaminophen   Tablet. 650milliGRAM(s) Oral every 6 hours PRN    VITALS  T(C): 36.4, Max: 36.4 (06-13 @ 21:23)  HR: 63 (63 - 127)  BP: 100/58 (100/42 - 128/54)  RR: 16 (16 - 26)  SpO2: 100% (93% - 100%) Patient is a 84y old  Female who presents for rehab evaluation s/p MV clip    HPI:  84 year old with multiple medical problems admitted 6/10 after presenting to the ED with LLQ pain. She is s/p Mitral clip x 2 on 6/7/17 by Dr. Owen and discharge home on 6/09/17. Found to have acute on chronic CHF and respiratory hypercapnia requiring BiPAP. Asked to see patient for rehab recommendations.      REVIEW OF SYSTEMS  Constitutional - No fever, No weight loss, No fatigue  HEENT - No eye pain, No visual disturbances, No difficulty hearing, No tinnitus, No vertigo, No neck pain  Respiratory - No cough, No wheezing, + shortness of breath  Cardiovascular - No chest pain, No palpitations  Gastrointestinal - + abdominal pain, No nausea, No vomiting, No diarrhea, No constipation  Genitourinary - No dysuria, No frequency, No hematuria, No incontinence  Neurological - No headaches, No memory loss, No loss of strength, No numbness, No tremors  Skin - No itching, No rashes, No lesions   Endocrine - No temperature intolerance  Musculoskeletal - No joint pain, No joint swelling, No muscle pain  Psychiatric - No depression, No anxiety    PAST MEDICAL & SURGICAL HISTORY  Renal insufficiency  MR (mitral regurgitation)  Mitral valve stenosis, severe  On home oxygen therapy  Obesity  Tracheal stenosis  Cardiomyopathy  Osteoarthritis  Iron deficiency anemia  MI, old  Dyslipidemia  Acid reflux  Hx of CABG  CAD (coronary artery disease)  Heart failure  HTN (hypertension)  H/O tracheostomy  Cataract  S/P CABG x 3      SOCIAL HISTORY  Smoking - Denied  EtOH - Denied   Drugs - Denied    FUNCTIONAL HISTORY  Lives in a house with daughter and her family. There are 3 steps to enter with a HR. Per daughter; someone is always home with her. Pt prefers a cane.      CURRENT FUNCTIONAL STATUS PT Eval 6/13  Therapeutic Interventions    Sit-Stand Transfer Training  Sit-to-Stand Transfer Training Rehab Potential : good, to achieve stated therapy goals  Sit-Stand Transfer Training  Transfer Training Sit-to-Stand Transfer : minimum assist (75% patient effort);  1 person assist;  rolling walker  Sit-Stand Transfer Training  Transfer Training Stand-to-Sit Transfer : minimum assist (75% patient effort);  1 person assist;  rolling walker  Sit-Stand Transfer Training  Sit-to-Stand Transfer Training Transfer Safety Analysis : decreased weight-shifting ability;  decreased strength;  impaired balance  Gait Training  Gait Training Rehab Potential : good, to achieve stated therapy goals  Gait Training   Gait Training : minimum assist (75% patient effort);  1 person assist;  weight-bearing as tolerated   rolling walker;  65ft  Gait Training   Gait Analysis : decreased stride length;  decreased step length;  decreased strength;  impaired balance      FAMILY HISTORY   Family history of hypertension  Family history of heart disease      RECENT LABS/IMAGING  CBC Full  -  ( 14 Jun 2017 05:45 )  WBC Count : 3.6 K/uL  Hemoglobin : 8.2 g/dL  Hematocrit : 27.2 %  Platelet Count - Automated : 190 K/uL  Mean Cell Volume : 68.7 fl  Mean Cell Hemoglobin : 20.7 pg  Mean Cell Hemoglobin Concentration : 30.1 g/dL  Auto Neutrophil # : 1.9 K/uL  Auto Lymphocyte # : 0.9 K/uL  Auto Monocyte # : 0.6 K/uL  Auto Eosinophil # : 0.1 K/uL  Auto Basophil # : 0.0 K/uL  Auto Neutrophil % : 53.5 %  Auto Lymphocyte % : 23.9 %  Auto Monocyte % : 18.1 %  Auto Eosinophil % : 3.6 %  Auto Basophil % : 0.3 %    06-14    143  |  96<L>  |  47.0<H>  ----------------------------<  108  4.1   |  35.0<H>  |  1.59<H>    Ca    9.2      14 Jun 2017 05:45  Phos  4.3     06-13  Mg     2.3     06-13      ALLERGIES  aspirin (Anaphylaxis)  Lasix (Other)  NSAIDs (Other)  [facility] PT (Unknown)      MEDICATIONS   sodium chloride 0.9% lock flush 3milliLiter(s) IV Push every 8 hours  simvastatin 40milliGRAM(s) Oral at bedtime  clopidogrel Tablet 75milliGRAM(s) Oral daily  sodium chloride 0.9% lock flush 3milliLiter(s) IV Push every 8 hours  multivitamin 1Tablet(s) Oral daily  latanoprost 0.005% Ophthalmic Solution 1Drop(s) Both EYES at bedtime  pantoprazole    Tablet 40milliGRAM(s) Oral before breakfast  ferrous    sulfate 325milliGRAM(s) Oral daily  torsemide 20milliGRAM(s) Oral daily  enoxaparin Injectable 40milliGRAM(s) SubCutaneous daily  amiodarone    Tablet 400milliGRAM(s) Oral every 8 hours  metoprolol succinate ER 50milliGRAM(s) Oral daily  isosorbide   mononitrate ER Tablet (IMDUR) 30milliGRAM(s) Oral daily  hydrALAZINE 25milliGRAM(s) Oral two times a day  sodium chloride 0.9% lock flush 3milliLiter(s) IV Push every 8 hours  docusate sodium 100milliGRAM(s) Oral three times a day  acetaminophen   Tablet. 650milliGRAM(s) Oral every 6 hours PRN    VITALS  T(C): 36.4, Max: 36.4 (06-13 @ 21:23)  HR: 63 (63 - 127)  BP: 100/58 (100/42 - 128/54)  RR: 16 (16 - 26)  SpO2: 100% (93% - 100%)  ----------------------------------------------------------------------------------------  PHYSICAL EXAM  Constitutional - NAD, Comfortable  HEENT - NCAT, EOMI  Neck - Supple, No limited ROM  Chest - diminished breath sounds throughout, on O2 NC  Cardiovascular - RRR, S1S2, No murmurs  Abdomen - BS+, Soft, +tenderness LLQ , mildly distended  Extremities - No C/C/E, No calf tenderness   Neurologic Exam -                    Cognitive - Awake, Alert,Confused and oriented to person and hospital     Communication - Fluent, No dysarthria     Motor - Moves all extremities with at least 4/5 strength     Sensory - Intact to LT  Psychiatric - Mood stable, Affect WNL  ----------------------------------------------------------------------------------------  ASSESSMENT/PLAN    85 yo female with recent MV clipping , readmitted with acute on chronic CHF and respiratory distress now with persistent functional deficits.    DVT PPx- Lovenox  Precautions- Cardiac, Fall    - Recommend LISSY, patient DOES NOT meet acute inpatient rehabilitation criteria Patient is a 84y old  Female who presents for rehab evaluation s/p recent MV clip and admission for abdominal pain and increased shortness of breath    HPI:  84 year old with multiple medical problems admitted 6/10 after presenting to the ED with LLQ pain. She is s/p Mitral clip x 2 on 6/7/17 by Dr. Owen and discharge home on 6/09/17. Found to have acute on chronic CHF and respiratory hypercapnia requiring BiPAP. Asked to see patient for rehab recommendations.      REVIEW OF SYSTEMS  Constitutional - No fever,   HEENT - No eye pain,   Respiratory -  + shortness of breath  Cardiovascular - No chest pain, No palpitations  Gastrointestinal - + abdominal pain, No nausea, No vomiting, No diarrhea, No constipation  Genitourinary - No dysuria,   Neurological - No headaches,   Skin - No itching,   Musculoskeletal - No joint pain,  Psychiatric - No depression, No anxiety    PAST MEDICAL & SURGICAL HISTORY  Renal insufficiency  MR (mitral regurgitation)  Mitral valve stenosis, severe  On home oxygen therapy  Obesity  Tracheal stenosis  Cardiomyopathy  Osteoarthritis  Iron deficiency anemia  MI, old  Dyslipidemia  Acid reflux  Hx of CABG  CAD (coronary artery disease)  Heart failure  HTN (hypertension)  H/O tracheostomy  Cataract  S/P CABG x 3      SOCIAL HISTORY  Smoking - Denied  EtOH - Denied   Drugs - Denied    FUNCTIONAL HISTORY  Lives in a house with daughter and her family. There are 3 steps to enter with a HR. Per daughter; someone is always home with her. Pt prefers a cane.      CURRENT FUNCTIONAL STATUS PT Eval 6/13  Therapeutic Interventions    Sit-Stand Transfer Training  Transfer Training Sit-to-Stand Transfer : minimum assist (75% patient effort);  1 person assist;  rolling walker  Sit-Stand Transfer Training  Transfer Training Stand-to-Sit Transfer : minimum assist (75% patient effort);  1 person assist;  rolling walker  Gait Training   Gait Training : minimum assist (75% patient effort);  1 person assist;  weight-bearing as tolerated   rolling walker;  65ft  Gait Training   Gait Analysis : decreased stride length;  decreased step length;  decreased strength;  impaired balance      FAMILY HISTORY   Family history of hypertension  Family history of heart disease      RECENT LABS/IMAGING  CBC Full  -  ( 14 Jun 2017 05:45 )  WBC Count : 3.6 K/uL  Hemoglobin : 8.2 g/dL  Hematocrit : 27.2 %  Platelet Count - Automated : 190 K/uL  Mean Cell Volume : 68.7 fl  Mean Cell Hemoglobin : 20.7 pg  Mean Cell Hemoglobin Concentration : 30.1 g/dL  Auto Neutrophil # : 1.9 K/uL  Auto Lymphocyte # : 0.9 K/uL  Auto Monocyte # : 0.6 K/uL  Auto Eosinophil # : 0.1 K/uL  Auto Basophil # : 0.0 K/uL  Auto Neutrophil % : 53.5 %  Auto Lymphocyte % : 23.9 %  Auto Monocyte % : 18.1 %  Auto Eosinophil % : 3.6 %  Auto Basophil % : 0.3 %    06-14    143  |  96<L>  |  47.0<H>  ----------------------------<  108  4.1   |  35.0<H>  |  1.59<H>    Ca    9.2      14 Jun 2017 05:45  Phos  4.3     06-13  Mg     2.3     06-13      ALLERGIES  aspirin (Anaphylaxis)  Lasix (Other)  NSAIDs (Other)  [facility] PT (Unknown)      MEDICATIONS   sodium chloride 0.9% lock flush 3milliLiter(s) IV Push every 8 hours  simvastatin 40milliGRAM(s) Oral at bedtime  clopidogrel Tablet 75milliGRAM(s) Oral daily  sodium chloride 0.9% lock flush 3milliLiter(s) IV Push every 8 hours  multivitamin 1Tablet(s) Oral daily  latanoprost 0.005% Ophthalmic Solution 1Drop(s) Both EYES at bedtime  pantoprazole    Tablet 40milliGRAM(s) Oral before breakfast  ferrous    sulfate 325milliGRAM(s) Oral daily  torsemide 20milliGRAM(s) Oral daily  enoxaparin Injectable 40milliGRAM(s) SubCutaneous daily  amiodarone    Tablet 400milliGRAM(s) Oral every 8 hours  metoprolol succinate ER 50milliGRAM(s) Oral daily  isosorbide   mononitrate ER Tablet (IMDUR) 30milliGRAM(s) Oral daily  hydrALAZINE 25milliGRAM(s) Oral two times a day  sodium chloride 0.9% lock flush 3milliLiter(s) IV Push every 8 hours  docusate sodium 100milliGRAM(s) Oral three times a day  acetaminophen   Tablet. 650milliGRAM(s) Oral every 6 hours PRN    VITALS  T(C): 36.4, Max: 36.4 (06-13 @ 21:23)  HR: 63 (63 - 127)  BP: 100/58 (100/42 - 128/54)  RR: 16 (16 - 26)  SpO2: 100% (93% - 100%)  ----------------------------------------------------------------------------------------  PHYSICAL EXAM  Constitutional - NAD,  HEENT - NCAT, EOMI  Chest - diminished breath sounds throughout, on O2 NC  Cardiovascular - RRR, S1S2, No murmurs  Abdomen - BS+, Soft, +tenderness LLQ , mildly distended, obese  Extremities - No C/C/E, No calf tenderness   Neurologic Exam -                    Cognitive - Awake, Alert,Confused and oriented to person and hospital     Communication - Fluent, No dysarthria     Motor - Moves all extremities with at least 4/5 strength     Sensory - Intact to LT  Psychiatric - Mood stable, Affect WNL  ----------------------------------------------------------------------------------------  ASSESSMENT/PLAN    85 yo female with recent MV clipping , admitted with acute on chronic CHF and respiratory distress now with persistent functional deficits.    DVT PPx- Lovenox  Precautions- Cardiac, Fall    - Recommend LISSY, patient DOES NOT meet acute inpatient rehabilitation criteria

## 2017-06-15 LAB
ALBUMIN SERPL ELPH-MCNC: 3.5 G/DL — SIGNIFICANT CHANGE UP (ref 3.3–5.2)
ALP SERPL-CCNC: 104 U/L — SIGNIFICANT CHANGE UP (ref 40–120)
ALT FLD-CCNC: 40 U/L — HIGH
ANION GAP SERPL CALC-SCNC: 12 MMOL/L — SIGNIFICANT CHANGE UP (ref 5–17)
AST SERPL-CCNC: 27 U/L — SIGNIFICANT CHANGE UP
BASE EXCESS BLDA CALC-SCNC: 12.9 MMOL/L — HIGH (ref -3–3)
BILIRUB DIRECT SERPL-MCNC: 0.1 MG/DL — SIGNIFICANT CHANGE UP (ref 0–0.3)
BILIRUB INDIRECT FLD-MCNC: 0.3 MG/DL — SIGNIFICANT CHANGE UP (ref 0.2–1)
BILIRUB SERPL-MCNC: 0.4 MG/DL — SIGNIFICANT CHANGE UP (ref 0.4–2)
BLOOD GAS COMMENTS ARTERIAL: SIGNIFICANT CHANGE UP
BUN SERPL-MCNC: 56 MG/DL — HIGH (ref 8–20)
CALCIUM SERPL-MCNC: 9.3 MG/DL — SIGNIFICANT CHANGE UP (ref 8.6–10.2)
CHLORIDE SERPL-SCNC: 95 MMOL/L — LOW (ref 98–107)
CO2 SERPL-SCNC: 37 MMOL/L — HIGH (ref 22–29)
CREAT SERPL-MCNC: 1.73 MG/DL — HIGH (ref 0.5–1.3)
GAS PNL BLDA: SIGNIFICANT CHANGE UP
GLUCOSE SERPL-MCNC: 105 MG/DL — SIGNIFICANT CHANGE UP (ref 70–115)
HCO3 BLDA-SCNC: 37 MMOL/L — HIGH (ref 20–26)
HCT VFR BLD CALC: 26.9 % — LOW (ref 37–47)
HGB BLD-MCNC: 8 G/DL — LOW (ref 12–16)
HOROWITZ INDEX BLDA+IHG-RTO: SIGNIFICANT CHANGE UP
MAGNESIUM SERPL-MCNC: 2.3 MG/DL — SIGNIFICANT CHANGE UP (ref 1.8–2.6)
MCHC RBC-ENTMCNC: 20.5 PG — LOW (ref 27–31)
MCHC RBC-ENTMCNC: 29.7 G/DL — LOW (ref 32–36)
MCV RBC AUTO: 68.8 FL — LOW (ref 81–99)
PCO2 BLDA: 71 MMHG — CRITICAL HIGH (ref 35–45)
PH BLDA: 7.36 — SIGNIFICANT CHANGE UP (ref 7.35–7.45)
PHOSPHATE SERPL-MCNC: 3.6 MG/DL — SIGNIFICANT CHANGE UP (ref 2.4–4.7)
PLATELET # BLD AUTO: 204 K/UL — SIGNIFICANT CHANGE UP (ref 150–400)
PO2 BLDA: 92 MMHG — SIGNIFICANT CHANGE UP (ref 83–108)
POTASSIUM SERPL-MCNC: 4.2 MMOL/L — SIGNIFICANT CHANGE UP (ref 3.5–5.3)
POTASSIUM SERPL-SCNC: 4.2 MMOL/L — SIGNIFICANT CHANGE UP (ref 3.5–5.3)
PROT SERPL-MCNC: 6.3 G/DL — LOW (ref 6.6–8.7)
RBC # BLD: 3.91 M/UL — LOW (ref 4.4–5.2)
RBC # FLD: 20.7 % — HIGH (ref 11–15.6)
SAO2 % BLDA: 97 % — SIGNIFICANT CHANGE UP (ref 95–99)
SODIUM SERPL-SCNC: 144 MMOL/L — SIGNIFICANT CHANGE UP (ref 135–145)
WBC # BLD: 3.8 K/UL — LOW (ref 4.8–10.8)
WBC # FLD AUTO: 3.8 K/UL — LOW (ref 4.8–10.8)

## 2017-06-15 PROCEDURE — 71010: CPT | Mod: 26

## 2017-06-15 PROCEDURE — 99222 1ST HOSP IP/OBS MODERATE 55: CPT | Mod: GC

## 2017-06-15 PROCEDURE — 93010 ELECTROCARDIOGRAM REPORT: CPT

## 2017-06-15 RX ORDER — BUDESONIDE, MICRONIZED 100 %
0.5 POWDER (GRAM) MISCELLANEOUS
Qty: 0 | Refills: 0 | Status: DISCONTINUED | OUTPATIENT
Start: 2017-06-15 | End: 2017-07-20

## 2017-06-15 RX ORDER — IPRATROPIUM/ALBUTEROL SULFATE 18-103MCG
3 AEROSOL WITH ADAPTER (GRAM) INHALATION EVERY 6 HOURS
Qty: 0 | Refills: 0 | Status: DISCONTINUED | OUTPATIENT
Start: 2017-06-15 | End: 2017-07-05

## 2017-06-15 RX ADMIN — ISOSORBIDE MONONITRATE 30 MILLIGRAM(S): 60 TABLET, EXTENDED RELEASE ORAL at 11:34

## 2017-06-15 RX ADMIN — Medication 650 MILLIGRAM(S): at 22:03

## 2017-06-15 RX ADMIN — SODIUM CHLORIDE 3 MILLILITER(S): 9 INJECTION INTRAMUSCULAR; INTRAVENOUS; SUBCUTANEOUS at 13:24

## 2017-06-15 RX ADMIN — Medication 3 MILLILITER(S): at 16:06

## 2017-06-15 RX ADMIN — ENOXAPARIN SODIUM 40 MILLIGRAM(S): 100 INJECTION SUBCUTANEOUS at 11:34

## 2017-06-15 RX ADMIN — Medication 650 MILLIGRAM(S): at 14:25

## 2017-06-15 RX ADMIN — SIMVASTATIN 40 MILLIGRAM(S): 20 TABLET, FILM COATED ORAL at 21:01

## 2017-06-15 RX ADMIN — Medication 1 TABLET(S): at 11:33

## 2017-06-15 RX ADMIN — Medication 100 MILLIGRAM(S): at 13:30

## 2017-06-15 RX ADMIN — AMIODARONE HYDROCHLORIDE 400 MILLIGRAM(S): 400 TABLET ORAL at 21:01

## 2017-06-15 RX ADMIN — CLOPIDOGREL BISULFATE 75 MILLIGRAM(S): 75 TABLET, FILM COATED ORAL at 11:34

## 2017-06-15 RX ADMIN — Medication 100 MILLIGRAM(S): at 21:01

## 2017-06-15 RX ADMIN — Medication 20 MILLIGRAM(S): at 05:51

## 2017-06-15 RX ADMIN — Medication 50 MILLIGRAM(S): at 05:51

## 2017-06-15 RX ADMIN — Medication 25 MILLIGRAM(S): at 05:50

## 2017-06-15 RX ADMIN — Medication 325 MILLIGRAM(S): at 11:33

## 2017-06-15 RX ADMIN — Medication 0.5 MILLIGRAM(S): at 20:43

## 2017-06-15 RX ADMIN — Medication 650 MILLIGRAM(S): at 21:03

## 2017-06-15 RX ADMIN — LATANOPROST 1 DROP(S): 0.05 SOLUTION/ DROPS OPHTHALMIC; TOPICAL at 21:02

## 2017-06-15 RX ADMIN — Medication 100 MILLIGRAM(S): at 05:51

## 2017-06-15 RX ADMIN — SODIUM CHLORIDE 3 MILLILITER(S): 9 INJECTION INTRAMUSCULAR; INTRAVENOUS; SUBCUTANEOUS at 05:04

## 2017-06-15 RX ADMIN — SODIUM CHLORIDE 3 MILLILITER(S): 9 INJECTION INTRAMUSCULAR; INTRAVENOUS; SUBCUTANEOUS at 22:46

## 2017-06-15 RX ADMIN — PANTOPRAZOLE SODIUM 40 MILLIGRAM(S): 20 TABLET, DELAYED RELEASE ORAL at 05:51

## 2017-06-15 RX ADMIN — Medication 650 MILLIGRAM(S): at 13:25

## 2017-06-15 RX ADMIN — AMIODARONE HYDROCHLORIDE 400 MILLIGRAM(S): 400 TABLET ORAL at 13:30

## 2017-06-15 RX ADMIN — Medication 3 MILLILITER(S): at 20:43

## 2017-06-15 RX ADMIN — AMIODARONE HYDROCHLORIDE 400 MILLIGRAM(S): 400 TABLET ORAL at 05:51

## 2017-06-15 NOTE — PROGRESS NOTE ADULT - SUBJECTIVE AND OBJECTIVE BOX
PULMONARY PROGRESS NOTE      ANN FRANCESLEE ANN-[id number]    Patient is a 84y old  Female who presents with a chief complaint of Abdominal pain, insomnia, and increased shortness of breath (10 Murphy 2017 16:40)      INTERVAL HPI/OVERNIGHT EVENTS:  No significant change  ABG done>remains with marked hypercarbia but compensated    MEDICATIONS  (STANDING):  sodium chloride 0.9% lock flush 3milliLiter(s) IV Push every 8 hours  simvastatin 40milliGRAM(s) Oral at bedtime  clopidogrel Tablet 75milliGRAM(s) Oral daily  sodium chloride 0.9% lock flush 3milliLiter(s) IV Push every 8 hours  multivitamin 1Tablet(s) Oral daily  latanoprost 0.005% Ophthalmic Solution 1Drop(s) Both EYES at bedtime  pantoprazole    Tablet 40milliGRAM(s) Oral before breakfast  ferrous    sulfate 325milliGRAM(s) Oral daily  torsemide 20milliGRAM(s) Oral daily  enoxaparin Injectable 40milliGRAM(s) SubCutaneous daily  amiodarone    Tablet 400milliGRAM(s) Oral every 8 hours  metoprolol succinate ER 50milliGRAM(s) Oral daily  isosorbide   mononitrate ER Tablet (IMDUR) 30milliGRAM(s) Oral daily  hydrALAZINE 25milliGRAM(s) Oral two times a day  sodium chloride 0.9% lock flush 3milliLiter(s) IV Push every 8 hours  docusate sodium 100milliGRAM(s) Oral three times a day  ALBUTerol/ipratropium for Nebulization 3milliLiter(s) Nebulizer every 6 hours      MEDICATIONS  (PRN):  acetaminophen   Tablet. 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 ENT:  No earache, sore throat or runny nose.    CARDIOVASCULAR:  No pressure, squeezing, tightness, heaviness or aching about the chest; no palpitations.    RESPIRATORY:  + dyspnea    GASTROINTESTINAL:  C/O pain    GENITOURINARY:  No dysuria, frequency or urgency.    MUSCULOSKELETAL:  No joint pain    SKIN:  No new lesions.    NEUROLOGIC:  No paresthesias, fasciculations, seizures or weakness.    PSYCHIATRIC:  No disorder of thought or mood.    ENDOCRINE:  No heat or cold intolerance, polyuria or polydipsia.    HEMATOLOGICAL:  No easy bruising or bleeding.     Vital Signs Last 24 Hrs  T(C): 36.5, Max: 37.1 ( @ 21:46)  T(F): 97.7, Max: 98.7 ( @ 21:46)  HR: 59 (59 - 64)  BP: 112/58 (98/54 - 122/68)  BP(mean): --  RR: 16 (16 - 18)  SpO2: 100% (98% - 100%)    PHYSICAL EXAMINATION:    GENERAL: The patient is awake and alert in no apparent distress.     HEENT: Head is normocephalic and atraumatic. Extraocular muscles are intact. Mucous membranes are moist.    NECK: Supple.    LUNGS: Diminished breath sounds but poor inspiratory force/effort; suggestion of increased E/I    HEART: Regular rate and rhythm without murmur.    ABDOMEN: Soft, nontender, and nondistended.      EXTREMITIES: Without any cyanosis, clubbing, rash, lesions or edema.    NEUROLOGIC: Grossly intact.    SKIN: No ulceration or induration present.      LABS:                        8.0    3.8   )-----------( 204      ( 15 Murphy 2017 05:46 )             26.9     06-15    144  |  95<L>  |  56.0<H>  ----------------------------<  105  4.2   |  37.0<H>  |  1.73<H>    Ca    9.3      15 Murphy 2017 05:46  Phos  3.6     06-15  Mg     2.3     06-15    TPro  6.3<L>  /  Alb  3.5  /  TBili  0.4  /  DBili  0.1  /  AST  27  /  ALT  40<H>  /  AlkPhos  104  06-15        ABG - ( 15 Murphy 2017 09:04 )  pH: 7.36  /  pCO2: 71    /  pO2: 92    / HCO3: 37    / Base Excess: 12.9  /  SaO2: 97                              MICROBIOLOGY:    RADIOLOGY & ADDITIONAL STUDIES:

## 2017-06-15 NOTE — PROGRESS NOTE ADULT - ASSESSMENT
84 year old female with PMH s/p mitral clip x 2 on 6/7/17, CAD, s/p CABG, cardiomyopathy, acute on chronic heart failure, HLD, HTN, MI, obesity, OA, CRI, s/p trach, GERD. 6/10 readmitted 1 day post discharge c/o abdominal pain. Breathing mildly labored, and increased troponin and BNP found on routine labs, current hospitalization significant for acute on chronic heart failure, AF with RVR (currently NSR) and hypercapnia, started on nocturnal bipap.    Plan  Acute rehab consult - denied. Accepted to [facility] for today.  D/W Dr Owen in AM rounds and at the bedside.

## 2017-06-15 NOTE — PROGRESS NOTE ADULT - PROBLEM SELECTOR PLAN 8
continue protonix and colace  encourage coughing and deep breathing   encourage ambulation   continue lovenox

## 2017-06-15 NOTE — PROGRESS NOTE ADULT - SUBJECTIVE AND OBJECTIVE BOX
Subjective: "I'm ok" pt oob to chair, nad    VITAL SIGNS  Vital Signs Last 24 Hrs  T(C): 36.4, Max: 37.1 ( @ 21:46)  T(F): 97.6, Max: 98.7 ( @ 21:46)  HR: 64 (61 - 64)  BP: 122/68 (98/54 - 122/68)  RR: 18 (16 - 18)  SpO2: 99% (98% - 100%) RA            Telemetry/Alarms:  SR 60s  LVEF: 25%    MEDICATIONS  sodium chloride 0.9% lock flush 3milliLiter(s) IV Push every 8 hours  simvastatin 40milliGRAM(s) Oral at bedtime  clopidogrel Tablet 75milliGRAM(s) Oral daily  sodium chloride 0.9% lock flush 3milliLiter(s) IV Push every 8 hours  multivitamin 1Tablet(s) Oral daily  latanoprost 0.005% Ophthalmic Solution 1Drop(s) Both EYES at bedtime  pantoprazole    Tablet 40milliGRAM(s) Oral before breakfast  ferrous    sulfate 325milliGRAM(s) Oral daily  torsemide 20milliGRAM(s) Oral daily  enoxaparin Injectable 40milliGRAM(s) SubCutaneous daily  amiodarone    Tablet 400milliGRAM(s) Oral every 8 hours  metoprolol succinate ER 50milliGRAM(s) Oral daily  isosorbide   mononitrate ER Tablet (IMDUR) 30milliGRAM(s) Oral daily  hydrALAZINE 25milliGRAM(s) Oral two times a day  sodium chloride 0.9% lock flush 3milliLiter(s) IV Push every 8 hours  docusate sodium 100milliGRAM(s) Oral three times a day  acetaminophen   Tablet. 650milliGRAM(s) Oral every 6 hours PRN      PHYSICAL EXAM  General: well nourished, well developed, no acute distress  Neurology: alert and oriented x 3, nonfocal, no gross deficits  Respiratory: clear to auscultation bilaterally  CV: regular rate and rhythm, normal S1, S2  Abdomen: soft, nontender, nondistended, positive bowel sounds, last bowel movement 17  Extremities: warm, well perfused. tr edema. + DP pulses      I & Os for current day (as of 06-15 @ 10:34)  =============================================  IN: 600 ml / OUT: 555 ml / NET: 45 ml      Weights:  Daily     Daily Weight in k (2017 23:37)  Admit Wt: Drug Dosing Weight  Height (cm): 152.4 (10 Murphy 2017 08:55)  Weight (kg): 75.7 (10 Murphy 2017 08:55)  BMI (kg/m2): 32.6 (10 Murphy 2017 08:55)  BSA (m2): 1.73 (10 Murphy 2017 08:55)    LABS  06-15    144  |  95<L>  |  56.0<H>  ----------------------------<  105  4.2   |  37.0<H>  |  1.73<H>    Ca    9.3      15 Murphy 2017 05:46  Phos  3.6     06-15  Mg     2.3     06-15    TPro  6.3<L>  /  Alb  3.5  /  TBili  0.4  /  DBili  0.1  /  AST  27  /  ALT  40<H>  /  AlkPhos  104  06-15                                 8.0    3.8   )-----------( 204      ( 15 Murphy 2017 05:46 )             26.9            Bilirubin Total, Serum: 0.4 mg/dL (06-15 @ 05:46)  Bilirubin Direct, Serum: 0.1 mg/dL (06-15 @ 05:46)          Today's CXR: rotated NAD    PAST MEDICAL & SURGICAL HISTORY:  Renal insufficiency  MR (mitral regurgitation): severe  Mitral valve stenosis, severe  On home oxygen therapy: not at this time  3-30-17  Obesity  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

## 2017-06-15 NOTE — PROGRESS NOTE ADULT - ASSESSMENT
Chronic hypercarbic respiratory failure.  Obesity, element of abdominal pain with hypoventilation.  On nocturnal BIPAP.  ?element of obstructive airways disease>not on any bronchodilators.  Chest xray suggests some element of hyperinflation.     Plan:  1.Discussed with CT surgery>will add bronchodilators inhaled steroids  2.Mobilazation important  3.Continue nocturnal BIPAP

## 2017-06-15 NOTE — PROGRESS NOTE ADULT - PROBLEM SELECTOR PLAN 1
Pulm consult appreciated  nocturnal Bipap 12/6 30% Fi02 nightly  continue diuresis   home 02   case management   patient and family education

## 2017-06-16 LAB
ALBUMIN SERPL ELPH-MCNC: 3.7 G/DL — SIGNIFICANT CHANGE UP (ref 3.3–5.2)
ALP SERPL-CCNC: 97 U/L — SIGNIFICANT CHANGE UP (ref 40–120)
ALT FLD-CCNC: 35 U/L — HIGH
AMMONIA BLD-MCNC: 58 UMOL/L — HIGH (ref 11–55)
ANION GAP SERPL CALC-SCNC: 8 MMOL/L — SIGNIFICANT CHANGE UP (ref 5–17)
ANION GAP SERPL CALC-SCNC: 9 MMOL/L — SIGNIFICANT CHANGE UP (ref 5–17)
APPEARANCE UR: CLEAR — SIGNIFICANT CHANGE UP
AST SERPL-CCNC: 19 U/L — SIGNIFICANT CHANGE UP
BASE EXCESS BLDA CALC-SCNC: 16 MMOL/L — HIGH (ref -3–3)
BILIRUB SERPL-MCNC: 0.4 MG/DL — SIGNIFICANT CHANGE UP (ref 0.4–2)
BILIRUB UR-MCNC: NEGATIVE — SIGNIFICANT CHANGE UP
BLD GP AB SCN SERPL QL: SIGNIFICANT CHANGE UP
BLOOD GAS COMMENTS ARTERIAL: SIGNIFICANT CHANGE UP
BUN SERPL-MCNC: 59 MG/DL — HIGH (ref 8–20)
BUN SERPL-MCNC: 60 MG/DL — HIGH (ref 8–20)
CALCIUM SERPL-MCNC: 9.4 MG/DL — SIGNIFICANT CHANGE UP (ref 8.6–10.2)
CALCIUM SERPL-MCNC: 9.4 MG/DL — SIGNIFICANT CHANGE UP (ref 8.6–10.2)
CHLORIDE SERPL-SCNC: 95 MMOL/L — LOW (ref 98–107)
CHLORIDE SERPL-SCNC: 95 MMOL/L — LOW (ref 98–107)
CO2 SERPL-SCNC: 40 MMOL/L — HIGH (ref 22–29)
CO2 SERPL-SCNC: 42 MMOL/L — HIGH (ref 22–29)
COLOR SPEC: YELLOW — SIGNIFICANT CHANGE UP
CREAT SERPL-MCNC: 1.63 MG/DL — HIGH (ref 0.5–1.3)
CREAT SERPL-MCNC: 1.65 MG/DL — HIGH (ref 0.5–1.3)
DIFF PNL FLD: NEGATIVE — SIGNIFICANT CHANGE UP
EPI CELLS # UR: SIGNIFICANT CHANGE UP
GAS PNL BLDA: SIGNIFICANT CHANGE UP
GLUCOSE SERPL-MCNC: 106 MG/DL — SIGNIFICANT CHANGE UP (ref 70–115)
GLUCOSE SERPL-MCNC: 107 MG/DL — SIGNIFICANT CHANGE UP (ref 70–115)
GLUCOSE UR QL: NEGATIVE MG/DL — SIGNIFICANT CHANGE UP
HCO3 BLDA-SCNC: 40 MMOL/L — HIGH (ref 20–26)
HCT VFR BLD CALC: 26.6 % — LOW (ref 37–47)
HCT VFR BLD CALC: 27.9 % — LOW (ref 37–47)
HGB BLD-MCNC: 7.9 G/DL — LOW (ref 12–16)
HGB BLD-MCNC: 8.3 G/DL — LOW (ref 12–16)
HOROWITZ INDEX BLDA+IHG-RTO: SIGNIFICANT CHANGE UP
KETONES UR-MCNC: NEGATIVE — SIGNIFICANT CHANGE UP
LEUKOCYTE ESTERASE UR-ACNC: NEGATIVE — SIGNIFICANT CHANGE UP
MAGNESIUM SERPL-MCNC: 2.6 MG/DL — SIGNIFICANT CHANGE UP (ref 1.6–2.6)
MCHC RBC-ENTMCNC: 20.4 PG — LOW (ref 27–31)
MCHC RBC-ENTMCNC: 20.6 PG — LOW (ref 27–31)
MCHC RBC-ENTMCNC: 29.7 G/DL — LOW (ref 32–36)
MCHC RBC-ENTMCNC: 29.7 G/DL — LOW (ref 32–36)
MCV RBC AUTO: 68.6 FL — LOW (ref 81–99)
MCV RBC AUTO: 69.3 FL — LOW (ref 81–99)
NITRITE UR-MCNC: NEGATIVE — SIGNIFICANT CHANGE UP
NT-PROBNP SERPL-SCNC: 4121 PG/ML — HIGH (ref 0–300)
OSMOLALITY UR: 347 MOSM/KG — SIGNIFICANT CHANGE UP (ref 300–1000)
PCO2 BLDA: 58 MMHG — HIGH (ref 35–45)
PH BLDA: 7.47 — HIGH (ref 7.35–7.45)
PH UR: 6.5 — SIGNIFICANT CHANGE UP (ref 5–8)
PLATELET # BLD AUTO: 210 K/UL — SIGNIFICANT CHANGE UP (ref 150–400)
PLATELET # BLD AUTO: 213 K/UL — SIGNIFICANT CHANGE UP (ref 150–400)
PO2 BLDA: 109 MMHG — HIGH (ref 83–108)
POTASSIUM SERPL-MCNC: 4.4 MMOL/L — SIGNIFICANT CHANGE UP (ref 3.5–5.3)
POTASSIUM SERPL-MCNC: 4.6 MMOL/L — SIGNIFICANT CHANGE UP (ref 3.5–5.3)
POTASSIUM SERPL-SCNC: 4.4 MMOL/L — SIGNIFICANT CHANGE UP (ref 3.5–5.3)
POTASSIUM SERPL-SCNC: 4.6 MMOL/L — SIGNIFICANT CHANGE UP (ref 3.5–5.3)
POTASSIUM UR-SCNC: 31 MMOL/L — SIGNIFICANT CHANGE UP
PROT SERPL-MCNC: 6.5 G/DL — LOW (ref 6.6–8.7)
PROT UR-MCNC: 15 MG/DL
RBC # BLD: 3.84 M/UL — LOW (ref 4.4–5.2)
RBC # BLD: 4.07 M/UL — LOW (ref 4.4–5.2)
RBC # FLD: 20.8 % — HIGH (ref 11–15.6)
RBC # FLD: 20.8 % — HIGH (ref 11–15.6)
SAO2 % BLDA: 100 % — HIGH (ref 95–99)
SODIUM SERPL-SCNC: 144 MMOL/L — SIGNIFICANT CHANGE UP (ref 135–145)
SODIUM SERPL-SCNC: 145 MMOL/L — SIGNIFICANT CHANGE UP (ref 135–145)
SODIUM UR-SCNC: 40 MMOL/L — SIGNIFICANT CHANGE UP
SP GR SPEC: 1.01 — SIGNIFICANT CHANGE UP (ref 1.01–1.02)
TYPE + AB SCN PNL BLD: SIGNIFICANT CHANGE UP
UROBILINOGEN FLD QL: NEGATIVE MG/DL — SIGNIFICANT CHANGE UP
WBC # BLD: 3.3 K/UL — LOW (ref 4.8–10.8)
WBC # BLD: 3.8 K/UL — LOW (ref 4.8–10.8)
WBC # FLD AUTO: 3.3 K/UL — LOW (ref 4.8–10.8)
WBC # FLD AUTO: 3.8 K/UL — LOW (ref 4.8–10.8)
WBC UR QL: SIGNIFICANT CHANGE UP

## 2017-06-16 PROCEDURE — 71010: CPT | Mod: 26

## 2017-06-16 PROCEDURE — 70450 CT HEAD/BRAIN W/O DYE: CPT | Mod: 26

## 2017-06-16 PROCEDURE — 93010 ELECTROCARDIOGRAM REPORT: CPT

## 2017-06-16 PROCEDURE — 76700 US EXAM ABDOM COMPLETE: CPT | Mod: 26

## 2017-06-16 RX ORDER — MIDODRINE HYDROCHLORIDE 2.5 MG/1
10 TABLET ORAL THREE TIMES A DAY
Qty: 0 | Refills: 0 | Status: DISCONTINUED | OUTPATIENT
Start: 2017-06-16 | End: 2017-06-24

## 2017-06-16 RX ORDER — NOREPINEPHRINE BITARTRATE/D5W 8 MG/250ML
0.07 PLASTIC BAG, INJECTION (ML) INTRAVENOUS
Qty: 8 | Refills: 0 | Status: DISCONTINUED | OUTPATIENT
Start: 2017-06-16 | End: 2017-06-16

## 2017-06-16 RX ORDER — CALCIUM GLUCONATE 100 MG/ML
2 VIAL (ML) INTRAVENOUS ONCE
Qty: 0 | Refills: 0 | Status: DISCONTINUED | OUTPATIENT
Start: 2017-06-16 | End: 2017-06-16

## 2017-06-16 RX ORDER — LACTULOSE 10 G/15ML
20 SOLUTION ORAL ONCE
Qty: 0 | Refills: 0 | Status: COMPLETED | OUTPATIENT
Start: 2017-06-16 | End: 2017-06-16

## 2017-06-16 RX ORDER — LACTULOSE 10 G/15ML
2 SOLUTION ORAL
Qty: 0 | Refills: 0 | Status: DISCONTINUED | OUTPATIENT
Start: 2017-06-16 | End: 2017-06-16

## 2017-06-16 RX ORDER — PHENYLEPHRINE HYDROCHLORIDE 10 MG/ML
0.35 INJECTION INTRAVENOUS
Qty: 80 | Refills: 0 | Status: DISCONTINUED | OUTPATIENT
Start: 2017-06-16 | End: 2017-06-16

## 2017-06-16 RX ORDER — ONDANSETRON 8 MG/1
4 TABLET, FILM COATED ORAL ONCE
Qty: 0 | Refills: 0 | Status: COMPLETED | OUTPATIENT
Start: 2017-06-16 | End: 2017-06-16

## 2017-06-16 RX ORDER — SODIUM CHLORIDE 9 MG/ML
1000 INJECTION, SOLUTION INTRAVENOUS
Qty: 0 | Refills: 0 | Status: DISCONTINUED | OUTPATIENT
Start: 2017-06-16 | End: 2017-06-16

## 2017-06-16 RX ORDER — SODIUM CHLORIDE 9 MG/ML
1000 INJECTION, SOLUTION INTRAVENOUS
Qty: 0 | Refills: 0 | Status: COMPLETED | OUTPATIENT
Start: 2017-06-16 | End: 2017-06-16

## 2017-06-16 RX ADMIN — PANTOPRAZOLE SODIUM 40 MILLIGRAM(S): 20 TABLET, DELAYED RELEASE ORAL at 05:56

## 2017-06-16 RX ADMIN — CLOPIDOGREL BISULFATE 75 MILLIGRAM(S): 75 TABLET, FILM COATED ORAL at 16:44

## 2017-06-16 RX ADMIN — SODIUM CHLORIDE 3 MILLILITER(S): 9 INJECTION INTRAMUSCULAR; INTRAVENOUS; SUBCUTANEOUS at 22:01

## 2017-06-16 RX ADMIN — Medication 100 MILLIGRAM(S): at 05:55

## 2017-06-16 RX ADMIN — ENOXAPARIN SODIUM 40 MILLIGRAM(S): 100 INJECTION SUBCUTANEOUS at 16:41

## 2017-06-16 RX ADMIN — MIDODRINE HYDROCHLORIDE 10 MILLIGRAM(S): 2.5 TABLET ORAL at 13:09

## 2017-06-16 RX ADMIN — Medication 3 MILLILITER(S): at 15:24

## 2017-06-16 RX ADMIN — Medication 3 MILLILITER(S): at 09:51

## 2017-06-16 RX ADMIN — LACTULOSE 20 GRAM(S): 10 SOLUTION ORAL at 16:34

## 2017-06-16 RX ADMIN — SODIUM CHLORIDE 3 MILLILITER(S): 9 INJECTION INTRAMUSCULAR; INTRAVENOUS; SUBCUTANEOUS at 06:00

## 2017-06-16 RX ADMIN — MIDODRINE HYDROCHLORIDE 10 MILLIGRAM(S): 2.5 TABLET ORAL at 16:12

## 2017-06-16 RX ADMIN — SIMVASTATIN 40 MILLIGRAM(S): 20 TABLET, FILM COATED ORAL at 21:37

## 2017-06-16 RX ADMIN — LATANOPROST 1 DROP(S): 0.05 SOLUTION/ DROPS OPHTHALMIC; TOPICAL at 21:37

## 2017-06-16 RX ADMIN — Medication 0.5 MILLIGRAM(S): at 20:15

## 2017-06-16 RX ADMIN — Medication 3 MILLILITER(S): at 03:13

## 2017-06-16 RX ADMIN — Medication 3 MILLILITER(S): at 20:15

## 2017-06-16 RX ADMIN — Medication 25 MILLIGRAM(S): at 05:55

## 2017-06-16 RX ADMIN — ONDANSETRON 4 MILLIGRAM(S): 8 TABLET, FILM COATED ORAL at 22:31

## 2017-06-16 RX ADMIN — Medication 100 MILLIGRAM(S): at 16:44

## 2017-06-16 RX ADMIN — SODIUM CHLORIDE 3 MILLILITER(S): 9 INJECTION INTRAMUSCULAR; INTRAVENOUS; SUBCUTANEOUS at 16:35

## 2017-06-16 RX ADMIN — Medication 325 MILLIGRAM(S): at 16:44

## 2017-06-16 RX ADMIN — SODIUM CHLORIDE 50 MILLILITER(S): 9 INJECTION, SOLUTION INTRAVENOUS at 16:47

## 2017-06-16 RX ADMIN — AMIODARONE HYDROCHLORIDE 200 MILLIGRAM(S): 400 TABLET ORAL at 05:55

## 2017-06-16 RX ADMIN — Medication 50 MILLIGRAM(S): at 05:56

## 2017-06-16 RX ADMIN — SODIUM CHLORIDE 3 MILLILITER(S): 9 INJECTION INTRAMUSCULAR; INTRAVENOUS; SUBCUTANEOUS at 13:06

## 2017-06-16 RX ADMIN — Medication 20 MILLIGRAM(S): at 05:55

## 2017-06-16 RX ADMIN — MIDODRINE HYDROCHLORIDE 10 MILLIGRAM(S): 2.5 TABLET ORAL at 21:37

## 2017-06-16 RX ADMIN — Medication 0.5 MILLIGRAM(S): at 09:51

## 2017-06-16 RX ADMIN — Medication 100 MILLIGRAM(S): at 21:37

## 2017-06-16 NOTE — PROGRESS NOTE ADULT - SUBJECTIVE AND OBJECTIVE BOX
PULMONARY PROGRESS NOTE      ANN FRANCESLEE ANN-[id number]    Patient is a 84y old  Female who presents with a chief complaint of Abdominal pain, insomnia, and increased shortness of breath (10 Murphy 2017 16:40)      INTERVAL HPI/OVERNIGHT EVENTS:Events of this AM noted.  Pt arousable on NCO2.  Transient AMS.  No resp distress.    MEDICATIONS  (STANDING):  sodium chloride 0.9% lock flush 3milliLiter(s) IV Push every 8 hours  simvastatin 40milliGRAM(s) Oral at bedtime  clopidogrel Tablet 75milliGRAM(s) Oral daily  sodium chloride 0.9% lock flush 3milliLiter(s) IV Push every 8 hours  multivitamin 1Tablet(s) Oral daily  latanoprost 0.005% Ophthalmic Solution 1Drop(s) Both EYES at bedtime  pantoprazole    Tablet 40milliGRAM(s) Oral before breakfast  ferrous    sulfate 325milliGRAM(s) Oral daily  enoxaparin Injectable 40milliGRAM(s) SubCutaneous daily  amiodarone    Tablet 200milliGRAM(s) Oral daily  metoprolol succinate ER 50milliGRAM(s) Oral daily  sodium chloride 0.9% lock flush 3milliLiter(s) IV Push every 8 hours  docusate sodium 100milliGRAM(s) Oral three times a day  ALBUTerol/ipratropium for Nebulization 3milliLiter(s) Nebulizer every 6 hours  buDESOnide   0.5 milliGRAM(s) Respule 0.5milliGRAM(s) Inhalation two times a day  calcium gluconate IVPB 2Gram(s) IV Intermittent once  phenylephrine    Infusion 0.352MICROgram(s)/kG/Min IV Continuous <Continuous>  midodrine 10milliGRAM(s) Oral three times a day  dextrose 5% + sodium chloride 0.45%. 1000milliLiter(s) IV Continuous <Continuous>      MEDICATIONS  (PRN):  acetaminophen   Tablet. 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   r/o  out  stenosis  Cardiomyopathy  Osteoarthritis  Iron deficiency anemia  MI, old: 2008  Dyslipidemia  Acid reflux  Hx of CAB  CAD (coronary artery disease)  Heart failure  HTN (hypertension)  H/O tracheostomy: 2013 may  Cataract: b/l  2013  S/P CABG x 3:   [place]      SOCIAL HISTORY  Smoking History:       REVIEW OF SYSTEMS:    unable  Vital Signs Last 24 Hrs  T(C): 37.2, Max: 37.2 ( @ 09:00)  T(F): 98.9, Max: 98.9 ( @ 09:00)  HR: 53 (53 - 63)  BP: 95/52 (95/52 - 124/72)  BP(mean): 71 (71 - 74)  RR: 20 (16 - 22)  SpO2: 99% (97% - 100%)    PHYSICAL EXAMINATION:    GENERAL: The patient is arousable    HEENT: Head is normocephalic and atraumatic. Extraocular muscles are intact. Mucous membranes are moist.    NECK: Supple.    LUNGS: Clear to auscultation without wheezing, rales or rhonchi; respirations unlabored    HEART: Regular rate and rhythm without murmur.    ABDOMEN: Soft, nontender, and nondistended.      EXTREMITIES: Without any cyanosis, clubbing, rash, lesions or edema.    NEUROLOGIC: Grossly intact.    SKIN: No ulceration or induration present.      LABS:                        7.9    3.3   )-----------( 213      ( 2017 07:51 )             26.6     06-16    144  |  95<L>  |  60.0<H>  ----------------------------<  107  4.4   |  40.0<H>  |  1.65<H>    Ca    9.4      2017 07:51  Phos  3.6     06-15  Mg     2.6     06-16    TPro  6.3<L>  /  Alb  3.5  /  TBili  0.4  /  DBili  0.1  /  AST  27  /  ALT  40<H>  /  AlkPhos  104  06-15        ABG - ( 2017 10:36 )  pH: 7.43  /  pCO2: 66    /  pO2: 119   / HCO3: 41    / Base Excess: 16.8  /  SaO2: 99                              MICROBIOLOGY:    RADIOLOGY & ADDITIONAL STUDIES:   EXAM:  CT BRAIN                          PROCEDURE DATE:  2017        INTERPRETATION:  CT BRAIN WITHOUT CONTRAST:    History: Obtunded and aphasic.. Code stroke.    Date and time of exam: 2017 9:22 AM.    Comparison exam: 10/22/2013.    Technique: Sections were obtained through the brain without IV contrast.   Somewhat limited examination secondary to motion artifact.    Findings: The ventricles are of normal size and contour.  No masses are   identified.  There is no shift of midline structures.  No abnormal   calcifications  are seen.  There is no evidence of intracerebral or extra   axial bleed. The calvarium demonstrates no abnormality.  The paranasal   sinuses and mastoid air cells are unremarkable. Involutional changes.   Evidence of diffuse microvascular ischemic disease.    Impression: No evidence of acute intracranial pathology. Allowing for   motion artifact, no significant change since 10/22/2013.    These critical values were discussed by Dr. Lucía Ulloa withDr. Jason Weiland on 2017 9:35 AM with read back..                  LUCÍA ULLOA M.D., ATTENDING RADIOLOGIST  This document has been electronically signed. 2017  9:40AM

## 2017-06-16 NOTE — CONSULT NOTE ADULT - SUBJECTIVE AND OBJECTIVE BOX
Patient is a 84y old  Female who presents with a chief complaint of Abdominal pain, insomnia, and increased shortness of breath (10 Murphy 2017 16:40)       HPI:  84 year old with multiple medical problems presents to the ED with complaints of LLQ pain since yesterday. She had 3 BMs today. No fever, vomiting or change in urination. Patient is s/p  mitral valve clipping done on  with Dr. Owen.  On plavix. They stopped her IMDUR and Beta blocker due to relative hypotension, but continued on hydralazine to reduce afterload. Daughter states she has stage III heart failure. She states her mother is always somewhat short of breath.    Hx of elevated creatinine in past as per daughter, no work up, no Hx stones.    Patient was discharged on  s/p mitral clip. (10 Murphy 2017 16:40)   .    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  Cardiomyopathy  Osteoarthritis  Iron deficiency anemia  MI, old: 2008  Dyslipidemia  Acid reflux  Hx of CAB  CAD (coronary artery disease)  Heart failure  HTN (hypertension)  H/O tracheostomy: 2013 may  Cataract: b/l  2013  S/P CABG x 3:   [place]       FAMILY HISTORY:  Family history of hypertension  Family history of heart disease  [place]    Social History:Non smoker    MEDICATIONS  (STANDING):  sodium chloride 0.9% lock flush 3milliLiter(s) IV Push every 8 hours  simvastatin 40milliGRAM(s) Oral at bedtime  clopidogrel Tablet 75milliGRAM(s) Oral daily  sodium chloride 0.9% lock flush 3milliLiter(s) IV Push every 8 hours  multivitamin 1Tablet(s) Oral daily  latanoprost 0.005% Ophthalmic Solution 1Drop(s) Both EYES at bedtime  pantoprazole    Tablet 40milliGRAM(s) Oral before breakfast  ferrous    sulfate 325milliGRAM(s) Oral daily  enoxaparin Injectable 40milliGRAM(s) SubCutaneous daily  amiodarone    Tablet 200milliGRAM(s) Oral daily  metoprolol succinate ER 50milliGRAM(s) Oral daily  sodium chloride 0.9% lock flush 3milliLiter(s) IV Push every 8 hours  docusate sodium 100milliGRAM(s) Oral three times a day  ALBUTerol/ipratropium for Nebulization 3milliLiter(s) Nebulizer every 6 hours  buDESOnide   0.5 milliGRAM(s) Respule 0.5milliGRAM(s) Inhalation two times a day  calcium gluconate IVPB 2Gram(s) IV Intermittent once  dextrose 5% + sodium chloride 0.45%. 1000milliLiter(s) IV Continuous <Continuous>  phenylephrine    Infusion 0.352MICROgram(s)/kG/Min IV Continuous <Continuous>    MEDICATIONS  (PRN):  acetaminophen   Tablet. 650milliGRAM(s) Oral every 6 hours PRN Moderate Pain (4 - 6)   Meds reviewed    Allergies    aspirin (Anaphylaxis)  NSAIDs (Other)    Intolerances    Lasix (Other)  [facility] PT (Unknown)       REVIEW OF SYSTEMS:    CONSTITUTIONAL:  sob, weight gain, feels weak  EYES: No eye pain, visual disturbances, or discharge  ENMT:  No difficulty hearing, tinnitus, vertigo; No sinus or throat pain  NECK: No pain or stiffness  BREASTS: No pain, masses, or nipple discharge  RESPIRATORY: sob  CARDIOVASCULAR: No chest pain, palpitations, dizziness,   GASTROINTESTINAL:  abdominal  pain. No nausea, vomiting, or hematemesis;    GENITOURINARY: No dysuria, frequency, hematuria, or incontinence  NEUROLOGICAL: No headaches, memory loss, loss of strength, numbness, or tremors  SKIN: neg  LYMPH NODES: No enlarged glands  ENDOCRINE: No heat or cold intolerance; No hair loss  MUSCULOSKELETAL: neg  PSYCHIATRIC: No depression, anxiety, mood swings, or difficulty sleeping  HEME/LYMPH: No easy bruising, or bleeding gums  ALLERY AND IMMUNOLOGIC: No hives or eczema      Vital Signs Last 24 Hrs  T(C): 37.2, Max: 37.2 ( @ 09:00)  T(F): 98.9, Max: 98.9 ( @ 09:00)  HR: 53 (53 - 63)  BP: 95/52 (95/52 - 124/72)  BP(mean): 71 (71 - 74)  RR: 20 (16 - 22)  SpO2: 99% (97% - 100%)  Daily     Daily Weight in k (2017 05:00)    PHYSICAL EXAM:    GENERAL: appears chronically ill, lethargic  HEAD:  Atraumatic, Normocephalic  EYES: EOMI,  conjunctiva and sclera clear  ENMT: No tonsillar erythema, exudates, or enlargement;   NECK: Supple, neck  veins full bilaterally  NERVOUS SYSTEM:  lethargic  CHEST/LUNG: poor air motion, no wheezes  HEART: Regular rate and rhythm; Clear tones, sternal scar  ABDOMEN: Soft, Nontender, Nondistended; Bowel sounds present,  EXTREMITIES:  trace leg edema   LYMPH: No lymphadenopathy noted  SKIN: No rashes or lesions, pale      LABS:                        7.9    3.3   )-----------( 213      ( 2017 07:51 )             26.6     06-16    144  |  95<L>  |  60.0<H>  ----------------------------<  107  4.4   |  40.0<H>  |  1.65<H>    Ca    9.4      2017 07:51  Phos  3.6     06-15  Mg     2.6     16    TPro  6.3<L>  /  Alb  3.5  /  TBili  0.4  /  DBili  0.1  /  AST  27  /  ALT  40<H>  /  AlkPhos  104  06-15        Magnesium, Serum: 2.6 mg/dL ( @ 07:51)    ABG - ( 2017 10:36 )  pH: 7.43  /  pCO2: 66    /  pO2: 119   / HCO3: 41    / Base Excess: 16.8  /  SaO2: 99                    RADIOLOGY & ADDITIONAL TESTS:

## 2017-06-16 NOTE — CHART NOTE - NSCHARTNOTEFT_GEN_A_CORE
CT PA Note     Asked to see pt by SOUMYA Camara.  Pt was noted to be lethargic and unresponsive. BIPAP was discontinued this am and pt was sleeping comfortably.  RN noted after her assessment there was an acute change in the pt mental status.  At the bedside,    Pt noted to have Rt side gaze. Pt was responsive to sternal rub, but was unable to respond to person or place.    VSS  100/50  SB 50s   glucose 115  ABG  7.47/58/109/40 . CXR noted to have widened mediastinum in comparison to 6/13. ECHO ordered.   Pt was placed back on BIPAP initially with pt hypercapnic history.  CTICU PAs  were called and came to bedside.  Decision was made to transfer the pt to CTICU for further evaluation. Called Dr Owen without response. ( In OR)  Dr Smith made aware

## 2017-06-16 NOTE — CONSULT NOTE ADULT - ASSESSMENT
Possible underlying CRF with prerenal component; Underlying Chronic Lung disease  with CO2 retention; Pulmonary Hypertension  with recent  Mitral surgery, Anemia some appears to be chronic; lethargy  - ? metabolic.

## 2017-06-16 NOTE — PROGRESS NOTE ADULT - ASSESSMENT
Imp--compensated CO2 retention.  Obesity, s/p Mitral clip.  AMS not on resp basis.  Plan--noct BiPAP, ultimately f/u our office.  Neuro eval.

## 2017-06-16 NOTE — CHART NOTE - NSCHARTNOTEFT_GEN_A_CORE
Pt seen and exmained,  chart reviewd, asked to evaluate pt on 4CTU after found unarousable layin in bed  No sedatives or narcotics given within last 48hrs  Pt with SInus imani on monitor and on EKG,  /80, 99% on 30% BIPAP 16/6 afebrile     At the time pt was only arousable with sternal rub.  non focal,  pupils responsive, equal bilaterally  STAT ABG drawn given pt hypercapninc history which was at the time not elevated.  During our assessment, pt bp dropped to 85/50 so elected to bring pt down to ICU for stablization.  while in ICU  STAT CXR with rotation however concern for widen mediastnum, STAT echo performed without any pericardial effusion, EF roughly 40%.  Pt started to become more arousable however still not appropriate, no responding to questions.  code stroke called.   Pt taken for stat CT head which was negative for stroke  Once in the ICU pt still leathargic, however more appropriate with daughter in the room.   CBC CMP UA, amonia level sent.    Decision to start rodney to keep MAP higher for elevated perfusion pressurs seemed to help improve pt mental status. Pt mental status is currently back to baseline,  will FU labs    Dr. harper aware

## 2017-06-17 DIAGNOSIS — I63.8 OTHER CEREBRAL INFARCTION: ICD-10-CM

## 2017-06-17 DIAGNOSIS — I50.23 ACUTE ON CHRONIC SYSTOLIC (CONGESTIVE) HEART FAILURE: ICD-10-CM

## 2017-06-17 DIAGNOSIS — K81.9 CHOLECYSTITIS, UNSPECIFIED: ICD-10-CM

## 2017-06-17 LAB
ALBUMIN SERPL ELPH-MCNC: 3.7 G/DL — SIGNIFICANT CHANGE UP (ref 3.3–5.2)
ALP SERPL-CCNC: 126 U/L — HIGH (ref 40–120)
ALT FLD-CCNC: 49 U/L — HIGH
AMMONIA BLD-MCNC: 48 UMOL/L — SIGNIFICANT CHANGE UP (ref 11–55)
ANION GAP SERPL CALC-SCNC: 9 MMOL/L — SIGNIFICANT CHANGE UP (ref 5–17)
APPEARANCE UR: CLEAR — SIGNIFICANT CHANGE UP
APTT BLD: 25.2 SEC — LOW (ref 27.5–37.4)
AST SERPL-CCNC: 38 U/L — HIGH
BACTERIA # UR AUTO: ABNORMAL
BILIRUB SERPL-MCNC: 0.5 MG/DL — SIGNIFICANT CHANGE UP (ref 0.4–2)
BILIRUB UR-MCNC: NEGATIVE — SIGNIFICANT CHANGE UP
BUN SERPL-MCNC: 54 MG/DL — HIGH (ref 8–20)
CALCIUM SERPL-MCNC: 9.3 MG/DL — SIGNIFICANT CHANGE UP (ref 8.6–10.2)
CHLORIDE SERPL-SCNC: 96 MMOL/L — LOW (ref 98–107)
CO2 SERPL-SCNC: 40 MMOL/L — HIGH (ref 22–29)
COLOR SPEC: YELLOW — SIGNIFICANT CHANGE UP
CORTIS AM PEAK SERPL-MCNC: 18.9 UG/DL — HIGH (ref 6–18.4)
CREAT SERPL-MCNC: 1.6 MG/DL — HIGH (ref 0.5–1.3)
DIFF PNL FLD: ABNORMAL
EPI CELLS # UR: SIGNIFICANT CHANGE UP
GAS PNL BLDA: SIGNIFICANT CHANGE UP
GGT SERPL-CCNC: 184 U/L — HIGH (ref 5–36)
GLUCOSE SERPL-MCNC: 110 MG/DL — SIGNIFICANT CHANGE UP (ref 70–115)
GLUCOSE UR QL: NEGATIVE MG/DL — SIGNIFICANT CHANGE UP
HCT VFR BLD CALC: 30.4 % — LOW (ref 37–47)
HGB BLD-MCNC: 9 G/DL — LOW (ref 12–16)
INR BLD: 1.2 RATIO — HIGH (ref 0.88–1.16)
KETONES UR-MCNC: NEGATIVE — SIGNIFICANT CHANGE UP
LACTATE SERPL-SCNC: 0.8 MMOL/L — SIGNIFICANT CHANGE UP (ref 0.5–2)
LEUKOCYTE ESTERASE UR-ACNC: ABNORMAL
MAGNESIUM SERPL-MCNC: 2.6 MG/DL — SIGNIFICANT CHANGE UP (ref 1.6–2.6)
MCHC RBC-ENTMCNC: 21.3 PG — LOW (ref 27–31)
MCHC RBC-ENTMCNC: 29.6 G/DL — LOW (ref 32–36)
MCV RBC AUTO: 72 FL — LOW (ref 81–99)
NITRITE UR-MCNC: NEGATIVE — SIGNIFICANT CHANGE UP
PH UR: 6 — SIGNIFICANT CHANGE UP (ref 5–8)
PHOSPHATE SERPL-MCNC: 5.2 MG/DL — HIGH (ref 2.4–4.7)
PLATELET # BLD AUTO: 197 K/UL — SIGNIFICANT CHANGE UP (ref 150–400)
POTASSIUM SERPL-MCNC: 4.6 MMOL/L — SIGNIFICANT CHANGE UP (ref 3.5–5.3)
POTASSIUM SERPL-SCNC: 4.6 MMOL/L — SIGNIFICANT CHANGE UP (ref 3.5–5.3)
PROT SERPL-MCNC: 6.4 G/DL — LOW (ref 6.6–8.7)
PROT UR-MCNC: 100 MG/DL
PROTHROM AB SERPL-ACNC: 13.2 SEC — HIGH (ref 9.8–12.7)
RBC # BLD: 4.22 M/UL — LOW (ref 4.4–5.2)
RBC # FLD: 22.2 % — HIGH (ref 11–15.6)
RBC CASTS # UR COMP ASSIST: ABNORMAL /HPF (ref 0–4)
SODIUM SERPL-SCNC: 145 MMOL/L — SIGNIFICANT CHANGE UP (ref 135–145)
SP GR SPEC: 1.01 — SIGNIFICANT CHANGE UP (ref 1.01–1.02)
T3 SERPL-MCNC: 42 NG/DL — LOW (ref 80–200)
T4 AB SER-ACNC: 7.4 UG/DL — SIGNIFICANT CHANGE UP (ref 4.5–12)
TSH SERPL-MCNC: 3.56 UIU/ML — SIGNIFICANT CHANGE UP (ref 0.27–4.2)
UROBILINOGEN FLD QL: NEGATIVE MG/DL — SIGNIFICANT CHANGE UP
WBC # BLD: 4.4 K/UL — LOW (ref 4.8–10.8)
WBC # FLD AUTO: 4.4 K/UL — LOW (ref 4.8–10.8)
WBC UR QL: ABNORMAL

## 2017-06-17 PROCEDURE — 71010: CPT | Mod: 26

## 2017-06-17 PROCEDURE — 99223 1ST HOSP IP/OBS HIGH 75: CPT

## 2017-06-17 RX ORDER — PIPERACILLIN AND TAZOBACTAM 4; .5 G/20ML; G/20ML
2.25 INJECTION, POWDER, LYOPHILIZED, FOR SOLUTION INTRAVENOUS EVERY 6 HOURS
Qty: 0 | Refills: 0 | Status: DISCONTINUED | OUTPATIENT
Start: 2017-06-17 | End: 2017-06-17

## 2017-06-17 RX ORDER — PIPERACILLIN AND TAZOBACTAM 4; .5 G/20ML; G/20ML
2.25 INJECTION, POWDER, LYOPHILIZED, FOR SOLUTION INTRAVENOUS EVERY 6 HOURS
Qty: 0 | Refills: 0 | Status: DISCONTINUED | OUTPATIENT
Start: 2017-06-17 | End: 2017-06-24

## 2017-06-17 RX ORDER — ONDANSETRON 8 MG/1
4 TABLET, FILM COATED ORAL ONCE
Qty: 0 | Refills: 0 | Status: COMPLETED | OUTPATIENT
Start: 2017-06-17 | End: 2017-06-17

## 2017-06-17 RX ORDER — CEFTRIAXONE 500 MG/1
1 INJECTION, POWDER, FOR SOLUTION INTRAMUSCULAR; INTRAVENOUS EVERY 24 HOURS
Qty: 0 | Refills: 0 | Status: DISCONTINUED | OUTPATIENT
Start: 2017-06-17 | End: 2017-06-17

## 2017-06-17 RX ORDER — PIPERACILLIN AND TAZOBACTAM 4; .5 G/20ML; G/20ML
3.38 INJECTION, POWDER, LYOPHILIZED, FOR SOLUTION INTRAVENOUS EVERY 6 HOURS
Qty: 0 | Refills: 0 | Status: DISCONTINUED | OUTPATIENT
Start: 2017-06-17 | End: 2017-06-17

## 2017-06-17 RX ORDER — PIPERACILLIN AND TAZOBACTAM 4; .5 G/20ML; G/20ML
2.25 INJECTION, POWDER, LYOPHILIZED, FOR SOLUTION INTRAVENOUS ONCE
Qty: 0 | Refills: 0 | Status: DISCONTINUED | OUTPATIENT
Start: 2017-06-17 | End: 2017-06-17

## 2017-06-17 RX ADMIN — CLOPIDOGREL BISULFATE 75 MILLIGRAM(S): 75 TABLET, FILM COATED ORAL at 17:33

## 2017-06-17 RX ADMIN — ENOXAPARIN SODIUM 40 MILLIGRAM(S): 100 INJECTION SUBCUTANEOUS at 13:27

## 2017-06-17 RX ADMIN — LATANOPROST 1 DROP(S): 0.05 SOLUTION/ DROPS OPHTHALMIC; TOPICAL at 21:35

## 2017-06-17 RX ADMIN — SODIUM CHLORIDE 3 MILLILITER(S): 9 INJECTION INTRAMUSCULAR; INTRAVENOUS; SUBCUTANEOUS at 05:00

## 2017-06-17 RX ADMIN — SODIUM CHLORIDE 3 MILLILITER(S): 9 INJECTION INTRAMUSCULAR; INTRAVENOUS; SUBCUTANEOUS at 14:11

## 2017-06-17 RX ADMIN — SODIUM CHLORIDE 3 MILLILITER(S): 9 INJECTION INTRAMUSCULAR; INTRAVENOUS; SUBCUTANEOUS at 00:07

## 2017-06-17 RX ADMIN — Medication 0.5 MILLIGRAM(S): at 08:48

## 2017-06-17 RX ADMIN — Medication 3 MILLILITER(S): at 20:52

## 2017-06-17 RX ADMIN — Medication 1 TABLET(S): at 17:33

## 2017-06-17 RX ADMIN — Medication 100 MILLIGRAM(S): at 17:33

## 2017-06-17 RX ADMIN — PANTOPRAZOLE SODIUM 40 MILLIGRAM(S): 20 TABLET, DELAYED RELEASE ORAL at 05:21

## 2017-06-17 RX ADMIN — SODIUM CHLORIDE 3 MILLILITER(S): 9 INJECTION INTRAMUSCULAR; INTRAVENOUS; SUBCUTANEOUS at 21:32

## 2017-06-17 RX ADMIN — Medication 0.5 MILLIGRAM(S): at 20:52

## 2017-06-17 RX ADMIN — Medication 3 MILLILITER(S): at 15:30

## 2017-06-17 RX ADMIN — ONDANSETRON 4 MILLIGRAM(S): 8 TABLET, FILM COATED ORAL at 11:15

## 2017-06-17 RX ADMIN — SIMVASTATIN 40 MILLIGRAM(S): 20 TABLET, FILM COATED ORAL at 21:35

## 2017-06-17 RX ADMIN — Medication 325 MILLIGRAM(S): at 17:32

## 2017-06-17 RX ADMIN — AMIODARONE HYDROCHLORIDE 200 MILLIGRAM(S): 400 TABLET ORAL at 05:21

## 2017-06-17 RX ADMIN — Medication 3 MILLILITER(S): at 02:49

## 2017-06-17 RX ADMIN — MIDODRINE HYDROCHLORIDE 10 MILLIGRAM(S): 2.5 TABLET ORAL at 17:33

## 2017-06-17 RX ADMIN — Medication 3 MILLILITER(S): at 08:48

## 2017-06-17 RX ADMIN — CEFTRIAXONE 100 GRAM(S): 500 INJECTION, POWDER, FOR SOLUTION INTRAMUSCULAR; INTRAVENOUS at 08:42

## 2017-06-17 RX ADMIN — Medication 100 MILLIGRAM(S): at 21:35

## 2017-06-17 RX ADMIN — SODIUM CHLORIDE 3 MILLILITER(S): 9 INJECTION INTRAMUSCULAR; INTRAVENOUS; SUBCUTANEOUS at 14:00

## 2017-06-17 RX ADMIN — PIPERACILLIN AND TAZOBACTAM 200 GRAM(S): 4; .5 INJECTION, POWDER, LYOPHILIZED, FOR SOLUTION INTRAVENOUS at 18:02

## 2017-06-17 RX ADMIN — Medication 100 MILLIGRAM(S): at 05:21

## 2017-06-17 RX ADMIN — MIDODRINE HYDROCHLORIDE 10 MILLIGRAM(S): 2.5 TABLET ORAL at 05:21

## 2017-06-17 RX ADMIN — MIDODRINE HYDROCHLORIDE 10 MILLIGRAM(S): 2.5 TABLET ORAL at 21:35

## 2017-06-17 NOTE — PROGRESS NOTE ADULT - SUBJECTIVE AND OBJECTIVE BOX
NEPHROLOGY INTERVAL HPI/OVERNIGHT EVENTS:    Examined earlier  sitting in chair  Looks comfortable    MEDICATIONS  (STANDING):  sodium chloride 0.9% lock flush 3milliLiter(s) IV Push every 8 hours  simvastatin 40milliGRAM(s) Oral at bedtime  clopidogrel Tablet 75milliGRAM(s) Oral daily  sodium chloride 0.9% lock flush 3milliLiter(s) IV Push every 8 hours  multivitamin 1Tablet(s) Oral daily  latanoprost 0.005% Ophthalmic Solution 1Drop(s) Both EYES at bedtime  pantoprazole    Tablet 40milliGRAM(s) Oral before breakfast  ferrous    sulfate 325milliGRAM(s) Oral daily  enoxaparin Injectable 40milliGRAM(s) SubCutaneous daily  amiodarone    Tablet 200milliGRAM(s) Oral daily  metoprolol succinate ER 50milliGRAM(s) Oral daily  docusate sodium 100milliGRAM(s) Oral three times a day  ALBUTerol/ipratropium for Nebulization 3milliLiter(s) Nebulizer every 6 hours  buDESOnide   0.5 milliGRAM(s) Respule 0.5milliGRAM(s) Inhalation two times a day  midodrine 10milliGRAM(s) Oral three times a day  cefTRIAXone   IVPB 1Gram(s) IV Intermittent every 24 hours  ondansetron Injectable 4milliGRAM(s) IV Push once    MEDICATIONS  (PRN):  acetaminophen   Tablet. 650milliGRAM(s) Oral every 6 hours PRN Moderate Pain (4 - 6)      Allergies    aspirin (Anaphylaxis)  NSAIDs (Other)    Intolerances    Lasix (Other)  OHS PT (Unknown)      Vital Signs Last 24 Hrs  T(C): 36.3, Max: 36.9 (06-16 @ 20:00)  T(F): 97.3, Max: 98.4 (06-16 @ 20:00)  HR: 58 (51 - 59)  BP: 114/58 (114/58 - 114/58)  BP(mean): 84 (84 - 84)  RR: 20 (15 - 34)  SpO2: 99% (96% - 100%)  Daily     Daily Weight in k.3 (2017 05:00)    PHYSICAL EXAM:  Neuro: AxO x3  Pulm: CTA b/l no wheezing  CV: S1S2, no wheezing, no rales  Abd: Soft, NT, ND, normoactive bowel sounds  Ext: +DP pulses    LABS:                        9.0    4.4   )-----------( 197      ( 2017 03:56 )             30.4         145  |  96<L>  |  54.0<H>  ----------------------------<  110  4.6   |  40.0<H>  |  1.60<H>    Ca    9.3      2017 03:56  Phos  5.2       Mg     2.6         TPro  6.4<L>  /  Alb  3.7  /  TBili  0.5  /  DBili  x   /  AST  38<H>  /  ALT  49<H>  /  AlkPhos  126<H>      PT/INR - ( 2017 03:56 )   PT: 13.2 sec;   INR: 1.20 ratio         PTT - ( 2017 03:56 )  PTT:25.2 sec  Urinalysis Basic - ( 2017 03:56 )    Color: Yellow / Appearance: Clear / S.015 / pH: x  Gluc: x / Ketone: Negative  / Bili: Negative / Urobili: Negative mg/dL   Blood: x / Protein: 100 mg/dL / Nitrite: Negative   Leuk Esterase: Moderate / RBC: 25-50 /HPF / WBC 26-50   Sq Epi: x / Non Sq Epi: Occasional / Bacteria: Many      Magnesium, Serum: 2.6 mg/dL ( @ 03:56)  Phosphorus Level, Serum: 5.2 mg/dL ( @ 03:56)    ABG - ( 2017 05:30 )  pH: 7.32  /  pCO2: 88    /  pO2: 134   / HCO3: 40    / Base Excess: 16.1  /  SaO2: 100                   RADIOLOGY & ADDITIONAL TESTS:

## 2017-06-17 NOTE — CONSULT NOTE ADULT - SUBJECTIVE AND OBJECTIVE BOX
Acute Care Surgery Consult Note    HPI:  83yo female with complex cardiopulmonary and renal hx readmitted to CT ICU surgery with abdominal pain after previous Mitral Clipping 17.  Denies f/c/n/v.  Passing flatus and BMs.    Reports RUQ and periumbilical pain at this time.      PAST MEDICAL & SURGICAL HISTORY:  Renal insufficiency  MR (mitral regurgitation): severe  Mitral valve stenosis, severe  On home oxygen therapy: not at this time  3-30-17  Obesity  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   r/o  out  stenosis  Cardiomyopathy  Osteoarthritis  Iron deficiency anemia  MI, old: 2008  Dyslipidemia  Acid reflux  Hx of CAB  CAD (coronary artery disease)  Heart failure  HTN (hypertension)  H/O tracheostomy: 2013 may  Cataract: b/l  2013  S/P CABG x 3:   [place]    ROS negative except as per HPI	    MEDICATIONS  (STANDING):  sodium chloride 0.9% lock flush 3milliLiter(s) IV Push every 8 hours  simvastatin 40milliGRAM(s) Oral at bedtime  clopidogrel Tablet 75milliGRAM(s) Oral daily  sodium chloride 0.9% lock flush 3milliLiter(s) IV Push every 8 hours  multivitamin 1Tablet(s) Oral daily  latanoprost 0.005% Ophthalmic Solution 1Drop(s) Both EYES at bedtime  pantoprazole    Tablet 40milliGRAM(s) Oral before breakfast  ferrous    sulfate 325milliGRAM(s) Oral daily  enoxaparin Injectable 40milliGRAM(s) SubCutaneous daily  amiodarone    Tablet 200milliGRAM(s) Oral daily  metoprolol succinate ER 50milliGRAM(s) Oral daily  docusate sodium 100milliGRAM(s) Oral three times a day  ALBUTerol/ipratropium for Nebulization 3milliLiter(s) Nebulizer every 6 hours  buDESOnide   0.5 milliGRAM(s) Respule 0.5milliGRAM(s) Inhalation two times a day  midodrine 10milliGRAM(s) Oral three times a day  cefTRIAXone   IVPB 1Gram(s) IV Intermittent every 24 hours    MEDICATIONS  (PRN):  acetaminophen   Tablet. 650milliGRAM(s) Oral every 6 hours PRN Moderate Pain (4 - 6)      Allergies  aspirin (Anaphylaxis)  NSAIDs (Other)    Intolerances  Lasix (Other)  OHS PT (Unknown)    FAMILY HISTORY:  Family history of hypertension  Family history of heart disease      Vital Signs Last 24 Hrs  T(C): 36.5, Max: 36.9 ( @ 20:00)  T(F): 97.7, Max: 98.4 ( @ 20:00)  HR: 57 (51 - 59)  BP: 114/58 (114/58 - 114/58)  BP(mean): 84 (84 - 84)  RR: 24 (15 - 34)  SpO2: 99% (96% - 100%)    PHYSICAL EXAM:    Constitutional: NAD, Somnolent though arousable    Eyes: EOMI, PERRL, no scleral icterus    Respiratory: Breathing on CPAP, maintaining saturations in mid to low 90s    Cardiovascular: RRR    Gastrointestinal: +BS, soft, ND, TTP in RUQ with positive wade's    Extremities: 1+ pitting edema of bilateral lower extremities      LABS:                        9.0    4.4   )-----------( 197      ( 2017 03:56 )             30.4         145  |  96<L>  |  54.0<H>  ----------------------------<  110  4.6   |  40.0<H>  |  1.60<H>    Ca    9.3      2017 03:56  Phos  5.2       Mg     2.6         TPro  6.4<L>  /  Alb  3.7  /  TBili  0.5  /  DBili  x   /  AST  38<H>  /  ALT  49<H>  /  AlkPhos  126<H>      PT/INR - ( 2017 03:56 )   PT: 13.2 sec;   INR: 1.20 ratio         PTT - ( 2017 03:56 )  PTT:25.2 sec  Urinalysis Basic - ( 2017 03:56 )    Color: Yellow / Appearance: Clear / S.015 / pH: x  Gluc: x / Ketone: Negative  / Bili: Negative / Urobili: Negative mg/dL   Blood: x / Protein: 100 mg/dL / Nitrite: Negative   Leuk Esterase: Moderate / RBC: 25-50 /HPF / WBC 26-50   Sq Epi: x / Non Sq Epi: Occasional / Bacteria: Many        RADIOLOGY & ADDITIONAL STUDIES:  RUQ ultrasound with evidence of contract gallbladder, thickened wall and pericholecystic fluid.  No stones or sludge.

## 2017-06-17 NOTE — CONSULT NOTE ADULT - ASSESSMENT
85yo female with complex medical hx, CPAP dependent and active CHRISTOPHER in the setting of CKD.  Now with H&P as well as imaging consistent with acalculous cholecystitis.  - Recommend IR consult for percutaneous cholecystostomy tube placement.   - Patient poor candidate for surgical intervention and would recommend against cholecystectomy.    Discussed and seen with attending physician, Dr. Lemus whom agrees.

## 2017-06-17 NOTE — CONSULT NOTE ADULT - SUBJECTIVE AND OBJECTIVE BOX
84 year old with multiple medical problems presents to the ED with complaints of LLQ pain. No reported nausea, vomiting, diarrhea, constipation, melena.  Patient is s/p  mitral valve clipping done on  with Dr. Owen.  On plavix.     Gi consult called to evaluate for abd pain, abd sono noting possible acalculous cholecystitis.       PAST MEDICAL & SURGICAL HISTORY:  Renal insufficiency  MR (mitral regurgitation): severe  Mitral valve stenosis, severe  On home oxygen therapy: not at this time  3-30-17  Obesity  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   r/o  out  stenosis  Cardiomyopathy  Osteoarthritis  Iron deficiency anemia  MI, old: 2008  Dyslipidemia  Acid reflux  Hx of CAB  CAD (coronary artery disease)  Heart failure  HTN (hypertension)  H/O tracheostomy: 2013 may  Cataract: b/l  2013  S/P CABG x 3:   [place]      REVIEW OF SYSTEMS    General: unremarkable, no fever    Skin/Breast: unremarkable  	  Ophthalmologic: unremarkable  	  ENMT:	unremarkable    Respiratory and Thorax: dyspneic  	  Cardiovascular:	unremarkable    Gastrointestinal:	 as above    Genitourinary:	unremarkable    Musculoskeletal:	unremarkable    Neurological:	unremarkable    Psychiatric:	unremarkable    Hematology/Lymphatics:	unremarkable    Endocrine:	unremarkable    Allergic/Immunologic:	unremarkable      MEDICATIONS  (STANDING):  sodium chloride 0.9% lock flush 3milliLiter(s) IV Push every 8 hours  simvastatin 40milliGRAM(s) Oral at bedtime  clopidogrel Tablet 75milliGRAM(s) Oral daily  sodium chloride 0.9% lock flush 3milliLiter(s) IV Push every 8 hours  multivitamin 1Tablet(s) Oral daily  latanoprost 0.005% Ophthalmic Solution 1Drop(s) Both EYES at bedtime  pantoprazole    Tablet 40milliGRAM(s) Oral before breakfast  ferrous    sulfate 325milliGRAM(s) Oral daily  enoxaparin Injectable 40milliGRAM(s) SubCutaneous daily  amiodarone    Tablet 200milliGRAM(s) Oral daily  metoprolol succinate ER 50milliGRAM(s) Oral daily  docusate sodium 100milliGRAM(s) Oral three times a day  ALBUTerol/ipratropium for Nebulization 3milliLiter(s) Nebulizer every 6 hours  buDESOnide   0.5 milliGRAM(s) Respule 0.5milliGRAM(s) Inhalation two times a day  midodrine 10milliGRAM(s) Oral three times a day  cefTRIAXone   IVPB 1Gram(s) IV Intermittent every 24 hours    MEDICATIONS  (PRN):  acetaminophen   Tablet. 650milliGRAM(s) Oral every 6 hours PRN Moderate Pain (4 - 6)      Allergies    aspirin (Anaphylaxis)  NSAIDs (Other)    Intolerances    Lasix (Other)  OHS PT (Unknown)      SOCIAL HISTORY:    FAMILY HISTORY:  Family history of hypertension  Family history of heart disease      Vital Signs Last 24 Hrs  T(C): 36.3, Max: 36.9 ( @ 20:00)  T(F): 97.3, Max: 98.4 (16 @ 20:00)  HR: 54 (51 - 59)  BP: 114/58 (114/58 - 114/58)  BP(mean): 84 (84 - 84)  RR: 25 (15 - 34)  SpO2: 96% (96% - 100%)      PHYSICAL EXAM:    Constitutional: no fever, hemodynamically stable    Eyes: unremarkable    ENMT: unremarkable    Neck: unremarkable    Breasts: deferred    Back: unremarkable    Respiratory: tachypneic    Cardiovascular: unremarkable    Gastrointestinal: bowel sounds present, soft, epigastric/RUQ ttp, mild guarding, no organomegaly    Genitourinary: deferred    Rectal: deferred    Extremities: unremarkable    Vascular: unremarkable    Neurological: Awake, alert, oriented x3, no focal neurological deficit    Skin: unremarkable    Lymph Nodes: deferred    Musculoskeletal: unremarkable    Psychiatric: unremarkable    LABS:                        9.0    4.4   )-----------( 197      ( 2017 03:56 )             30.4     -    145  |  96<L>  |  54.0<H>  ----------------------------<  110  4.6   |  40.0<H>  |  1.60<H>    Ca    9.3      2017 03:56  Phos  5.2       Mg     2.6         TPro  6.4<L>  /  Alb  3.7  /  TBili  0.5  /  DBili  x   /  AST  38<H>  /  ALT  49<H>  /  AlkPhos  126<H>      PT/INR - ( 2017 03:56 )   PT: 13.2 sec;   INR: 1.20 ratio         PTT - ( 2017 03:56 )  PTT:25.2 sec  Urinalysis Basic - ( 2017 03:56 )    Color: Yellow / Appearance: Clear / S.015 / pH: x  Gluc: x / Ketone: Negative  / Bili: Negative / Urobili: Negative mg/dL   Blood: x / Protein: 100 mg/dL / Nitrite: Negative   Leuk Esterase: Moderate / RBC: 25-50 /HPF / WBC 26-50   Sq Epi: x / Non Sq Epi: Occasional / Bacteria: Many        Abd sono:    thickened edematous gallbladder wall without stones.   Pericholecystic fluid. Positive Loya sign. Findings may indicate   acalculous cholecystitis.         Enlarged liver without focal mass. No ascites.  No hydronephrosis or mass.  Pancreas not visualized.  Small right effusion.

## 2017-06-17 NOTE — PROGRESS NOTE ADULT - ASSESSMENT
Respiratory acidosis compensated CO2 retention obesity on nocturnal bipap  Underlying Chronic Lung disease Pulm HTN recent mitral surgery,  Metabolic acidosis compensation vs 2/2 diuretics will check Urine CL  CHRISTOPHER on CKD ? no hydro on renal sono   Anemia will check iron studies

## 2017-06-17 NOTE — PROGRESS NOTE ADULT - PROBLEM SELECTOR PLAN 10
S/P Stroke code activation  Work up negative  elevated Ammonia, will get lactulose  Monitor Neuro status closely

## 2017-06-17 NOTE — PROGRESS NOTE ADULT - ASSESSMENT
84 year old Female POD #9 mitral clip, discharged home and returned to [facility] on 6/10 for abdominal pain. Transferred to CTICU from 4CTU for change in mental status (see previous chart note), code stroke activated, CT head negative, currently stable with baseline mental status. Work up for change in mental status revealed ammonia level elevated, will get lactulose. Discuss plan with CT surgeon in AM.

## 2017-06-17 NOTE — CONSULT NOTE ADULT - ASSESSMENT
A/P: abd pain, mildly elevated LFTs, abd sono noting possible acalculous cholecystitis in setting of recent cardiac surgery, AFib, hypercapneic resp failure, and CHF decompensation    - surgery consult  - consider percutaneous cholecystotomy vs cholecystectomy, will defer to surgery  - follow LFTs  - NPO  - c/w abx  - no plan for endoscopic intervention at this time    Thank you for this consult

## 2017-06-17 NOTE — PROGRESS NOTE ADULT - PROBLEM SELECTOR PROBLEM 9
Prophylactic measure
Acalculous cholecystitis
Left lower quadrant pain
Left lower quadrant pain
Prophylactic measure

## 2017-06-17 NOTE — PROGRESS NOTE ADULT - SUBJECTIVE AND OBJECTIVE BOX
Subjective:  84 year old Female POD #9 mitral clip, discharged home and returned to [facility] on 6/10 for abdominal pain. Overnight, patient on nocturnal BIPAP, no acute events.     Past Medical History:  Heart failure  H/o or current diagnosis of HF- Contraindication to ACEI/ARBs  H/o or current diagnosis of HF- no contraindication to ACEI/ARBs  H/o or current diagnosis of HF- ACEI/ARB contraindication unknown  H/o or current diagnosis of HF- no contraindication to ACEI/ARBs  H/o or current diagnosis of HF- Contraindication to ACEI/ARBs  H/o or current diagnosis of HF- ACEI/ARB contraindication unknown  H/o or current diagnosis of HF- ACEI/ARB contraindication unknown  Family history of hypertension  Family history of heart disease  Handoff  MEWS Score  Renal insufficiency  MR (mitral regurgitation)  Mitral valve stenosis, severe  On home oxygen therapy  Obesity  Tracheal stenosis  Cardiomyopathy  Osteoarthritis  Iron deficiency anemia  MI, old  Dyslipidemia  Acid reflux  Hx of CABG  CAD (coronary artery disease)  Heart failure  HTN (hypertension)  Congestive heart failure  Coronary artery disease involving native coronary artery of native heart without angina pectoris  Acute on chronic respiratory failure with hypercapnia  Obesity, unspecified obesity severity, unspecified obesity type  Hypercapnic respiratory failure, chronic  On home oxygen therapy  Palliative care encounter  Prophylactic measure  Polyp of ascending colon, unspecified type  Delirium  Iron deficiency anemia, unspecified iron deficiency anemia type  CKD (chronic kidney disease) stage 3, GFR 30-59 ml/min  Non-rheumatic mitral regurgitation  Non-ST elevation (NSTEMI) myocardial infarction  Atrial fibrillation with RVR  Encephalopathy, unspecified  Left lower quadrant pain  Chronic obstructive pulmonary disease, unspecified COPD type  Acute on chronic systolic congestive heart failure  H/O tracheostomy  Cataract  S/P CABG x 3  SEVERE ABD PAIN  0        sodium chloride 0.9% lock flush 3milliLiter(s) IV Push every 8 hours  simvastatin 40milliGRAM(s) Oral at bedtime  clopidogrel Tablet 75milliGRAM(s) Oral daily  sodium chloride 0.9% lock flush 3milliLiter(s) IV Push every 8 hours  multivitamin 1Tablet(s) Oral daily  latanoprost 0.005% Ophthalmic Solution 1Drop(s) Both EYES at bedtime  pantoprazole    Tablet 40milliGRAM(s) Oral before breakfast  ferrous    sulfate 325milliGRAM(s) Oral daily  enoxaparin Injectable 40milliGRAM(s) SubCutaneous daily  amiodarone    Tablet 200milliGRAM(s) Oral daily  metoprolol succinate ER 50milliGRAM(s) Oral daily  docusate sodium 100milliGRAM(s) Oral three times a day  acetaminophen   Tablet. 650milliGRAM(s) Oral every 6 hours PRN  ALBUTerol/ipratropium for Nebulization 3milliLiter(s) Nebulizer every 6 hours  buDESOnide   0.5 milliGRAM(s) Respule 0.5milliGRAM(s) Inhalation two times a day  midodrine 10milliGRAM(s) Oral three times a day  MEDICATIONS  (PRN):  acetaminophen   Tablet. 650milliGRAM(s) Oral every 6 hours PRN Moderate Pain (4 - 6)      Daily     Daily Weight in k (2017 05:00)      ABG - ( 2017 10:36 )  pH: 7.43  /  pCO2: 66    /  pO2: 119   / HCO3: 41    / Base Excess: 16.8  /  SaO2: 99                                      8.3    3.8   )-----------( 210      ( 2017 12:26 )             27.9   06-16    145  |  95<L>  |  59.0<H>  ----------------------------<  106  4.6   |  42.0<H>  |  1.63<H>    Ca    9.4      2017 13:39  Phos  3.6     06-15  Mg     2.6     -16    TPro  6.5<L>  /  Alb  3.7  /  TBili  0.4  /  DBili  x   /  AST  19  /  ALT  35<H>  /  AlkPhos  97  06-16            Objective:  T(C): 36.3, Max: 37.2 ( @ 09:00)  HR: 54 (51 - 63)  BP: 114/58 (95/52 - 114/58)  RR: 15 (15 - 34)  SpO2: 98% (96% - 100%)  Wt(kg): --CAPILLARY BLOOD GLUCOSE  110 (2017 09:56)  112 (2017 08:22)  I&O's Summary  I & Os for 24h ending 2017 07:00  =============================================  IN: 720 ml / OUT: 400 ml / NET: 320 ml    I & Os for current day (as of 2017 02:56)  =============================================  IN: 831 ml / OUT: 650 ml / NET: 181 ml      Physical Exam:  Neuro:  Pulm:  CV  Abd:  Ext:  Inc:      Assessment/Plan: Subjective:  84 year old Female POD #9 mitral clip, discharged home and returned to [facility] on 6/10 for abdominal pain. Overnight, patient on nocturnal BIPAP, no acute events.     Past Medical History:  Heart failure  H/o or current diagnosis of HF- Contraindication to ACEI/ARBs  H/o or current diagnosis of HF- no contraindication to ACEI/ARBs  H/o or current diagnosis of HF- ACEI/ARB contraindication unknown  H/o or current diagnosis of HF- no contraindication to ACEI/ARBs  H/o or current diagnosis of HF- Contraindication to ACEI/ARBs  H/o or current diagnosis of HF- ACEI/ARB contraindication unknown  H/o or current diagnosis of HF- ACEI/ARB contraindication unknown  Family history of hypertension  Family history of heart disease  Handoff  MEWS Score  Renal insufficiency  MR (mitral regurgitation)  Mitral valve stenosis, severe  On home oxygen therapy  Obesity  Tracheal stenosis  Cardiomyopathy  Osteoarthritis  Iron deficiency anemia  MI, old  Dyslipidemia  Acid reflux  Hx of CABG  CAD (coronary artery disease)  Heart failure  HTN (hypertension)  Congestive heart failure  Coronary artery disease involving native coronary artery of native heart without angina pectoris  Acute on chronic respiratory failure with hypercapnia  Obesity, unspecified obesity severity, unspecified obesity type  Hypercapnic respiratory failure, chronic  On home oxygen therapy  Palliative care encounter  Prophylactic measure  Polyp of ascending colon, unspecified type  Delirium  Iron deficiency anemia, unspecified iron deficiency anemia type  CKD (chronic kidney disease) stage 3, GFR 30-59 ml/min  Non-rheumatic mitral regurgitation  Non-ST elevation (NSTEMI) myocardial infarction  Atrial fibrillation with RVR  Encephalopathy, unspecified  Left lower quadrant pain  Chronic obstructive pulmonary disease, unspecified COPD type  Acute on chronic systolic congestive heart failure  H/O tracheostomy  Cataract  S/P CABG x 3  SEVERE ABD PAIN  0        sodium chloride 0.9% lock flush 3milliLiter(s) IV Push every 8 hours  simvastatin 40milliGRAM(s) Oral at bedtime  clopidogrel Tablet 75milliGRAM(s) Oral daily  sodium chloride 0.9% lock flush 3milliLiter(s) IV Push every 8 hours  multivitamin 1Tablet(s) Oral daily  latanoprost 0.005% Ophthalmic Solution 1Drop(s) Both EYES at bedtime  pantoprazole    Tablet 40milliGRAM(s) Oral before breakfast  ferrous    sulfate 325milliGRAM(s) Oral daily  enoxaparin Injectable 40milliGRAM(s) SubCutaneous daily  amiodarone    Tablet 200milliGRAM(s) Oral daily  metoprolol succinate ER 50milliGRAM(s) Oral daily  docusate sodium 100milliGRAM(s) Oral three times a day  acetaminophen   Tablet. 650milliGRAM(s) Oral every 6 hours PRN  ALBUTerol/ipratropium for Nebulization 3milliLiter(s) Nebulizer every 6 hours  buDESOnide   0.5 milliGRAM(s) Respule 0.5milliGRAM(s) Inhalation two times a day  midodrine 10milliGRAM(s) Oral three times a day  MEDICATIONS  (PRN):  acetaminophen   Tablet. 650milliGRAM(s) Oral every 6 hours PRN Moderate Pain (4 - 6)      Daily     Daily Weight in k (2017 05:00)      ABG - ( 2017 10:36 )  pH: 7.43  /  pCO2: 66    /  pO2: 119   / HCO3: 41    / Base Excess: 16.8  /  SaO2: 99                                      8.3    3.8   )-----------( 210      ( 2017 12:26 )             27.9   06-16    145  |  95<L>  |  59.0<H>  ----------------------------<  106  4.6   |  42.0<H>  |  1.63<H>    Ca    9.4      2017 13:39  Phos  3.6     06-15  Mg     2.6     -16    TPro  6.5<L>  /  Alb  3.7  /  TBili  0.4  /  DBili  x   /  AST  19  /  ALT  35<H>  /  AlkPhos  97  06-16            Objective:  T(C): 36.3, Max: 37.2 ( @ 09:00)  HR: 54 (51 - 63)  BP: 114/58 (95/52 - 114/58)  RR: 15 (15 - 34)  SpO2: 98% (96% - 100%)  Wt(kg): --CAPILLARY BLOOD GLUCOSE  110 (2017 09:56)  112 (2017 08:22)  I&O's Summary  I & Os for 24h ending 2017 07:00  =============================================  IN: 720 ml / OUT: 400 ml / NET: 320 ml    I & Os for current day (as of 2017 02:56)  =============================================  IN: 831 ml / OUT: 650 ml / NET: 181 ml      Physical Exam:  Neuro: AxO x3  Pulm: CTA b/l no wheezing  CV: S1S2, no wheezing, no rales  Abd: Soft, NT, ND, normoactive bowel sounds  Ext: +DP pulses

## 2017-06-17 NOTE — PROGRESS NOTE ADULT - PROBLEM SELECTOR PLAN 9
continue protonix and colace  encourage coughing and deep breathing   encourage ambulation   continue lovenox
Abdominal ultrasound from 6/16 showed acalculous cholecystitis, consider GI consult
no acute pathology on CT scan  + sigmoid polyp   will follow up as out pt
no acute pathology on CT scan  + sigmoid polyp   will follow up as out pt
continue lovenox for DVT prophylaxis  continue protonix for GI prophylaxis/hx of GERD

## 2017-06-18 LAB
ALBUMIN SERPL ELPH-MCNC: 3.6 G/DL — SIGNIFICANT CHANGE UP (ref 3.3–5.2)
ALP SERPL-CCNC: 113 U/L — SIGNIFICANT CHANGE UP (ref 40–120)
ALT FLD-CCNC: 38 U/L — HIGH
ANION GAP SERPL CALC-SCNC: 16 MMOL/L — SIGNIFICANT CHANGE UP (ref 5–17)
ANION GAP SERPL CALC-SCNC: 16 MMOL/L — SIGNIFICANT CHANGE UP (ref 5–17)
APTT BLD: 27.9 SEC — SIGNIFICANT CHANGE UP (ref 27.5–37.4)
AST SERPL-CCNC: 22 U/L — SIGNIFICANT CHANGE UP
BILIRUB SERPL-MCNC: 0.5 MG/DL — SIGNIFICANT CHANGE UP (ref 0.4–2)
BUN SERPL-MCNC: 46 MG/DL — HIGH (ref 8–20)
BUN SERPL-MCNC: 46 MG/DL — HIGH (ref 8–20)
CALCIUM SERPL-MCNC: 9.1 MG/DL — SIGNIFICANT CHANGE UP (ref 8.6–10.2)
CALCIUM SERPL-MCNC: 9.1 MG/DL — SIGNIFICANT CHANGE UP (ref 8.6–10.2)
CHLORIDE SERPL-SCNC: 96 MMOL/L — LOW (ref 98–107)
CHLORIDE SERPL-SCNC: 96 MMOL/L — LOW (ref 98–107)
CO2 SERPL-SCNC: 34 MMOL/L — HIGH (ref 22–29)
CO2 SERPL-SCNC: 34 MMOL/L — HIGH (ref 22–29)
CREAT SERPL-MCNC: 1.7 MG/DL — HIGH (ref 0.5–1.3)
CREAT SERPL-MCNC: 1.7 MG/DL — HIGH (ref 0.5–1.3)
GAS PNL BLDA: SIGNIFICANT CHANGE UP
GAS PNL BLDA: SIGNIFICANT CHANGE UP
GLUCOSE SERPL-MCNC: 98 MG/DL — SIGNIFICANT CHANGE UP (ref 70–115)
GLUCOSE SERPL-MCNC: 98 MG/DL — SIGNIFICANT CHANGE UP (ref 70–115)
HCT VFR BLD CALC: 29.2 % — LOW (ref 37–47)
HGB BLD-MCNC: 8.8 G/DL — LOW (ref 12–16)
INR BLD: 1.22 RATIO — HIGH (ref 0.88–1.16)
IRON SATN MFR SERPL: 11 % — LOW (ref 14–50)
IRON SATN MFR SERPL: 34 UG/DL — LOW (ref 37–145)
MAGNESIUM SERPL-MCNC: 2.8 MG/DL — HIGH (ref 1.6–2.6)
MCHC RBC-ENTMCNC: 21.5 PG — LOW (ref 27–31)
MCHC RBC-ENTMCNC: 30.1 G/DL — LOW (ref 32–36)
MCV RBC AUTO: 71.2 FL — LOW (ref 81–99)
PHOSPHATE SERPL-MCNC: 4.2 MG/DL — SIGNIFICANT CHANGE UP (ref 2.4–4.7)
PLATELET # BLD AUTO: 216 K/UL — SIGNIFICANT CHANGE UP (ref 150–400)
POTASSIUM SERPL-MCNC: 4.6 MMOL/L — SIGNIFICANT CHANGE UP (ref 3.5–5.3)
POTASSIUM SERPL-MCNC: 4.6 MMOL/L — SIGNIFICANT CHANGE UP (ref 3.5–5.3)
POTASSIUM SERPL-SCNC: 4.6 MMOL/L — SIGNIFICANT CHANGE UP (ref 3.5–5.3)
POTASSIUM SERPL-SCNC: 4.6 MMOL/L — SIGNIFICANT CHANGE UP (ref 3.5–5.3)
PROCALCITONIN SERPL-MCNC: 0.05 NG/ML — HIGH (ref 0–0.04)
PROT SERPL-MCNC: 6.3 G/DL — LOW (ref 6.6–8.7)
PROTHROM AB SERPL-ACNC: 13.5 SEC — HIGH (ref 9.8–12.7)
RBC # BLD: 4.1 M/UL — LOW (ref 4.4–5.2)
RBC # FLD: 22.9 % — HIGH (ref 11–15.6)
SODIUM SERPL-SCNC: 146 MMOL/L — HIGH (ref 135–145)
SODIUM SERPL-SCNC: 146 MMOL/L — HIGH (ref 135–145)
TIBC SERPL-MCNC: 313 UG/DL — SIGNIFICANT CHANGE UP (ref 220–430)
WBC # BLD: 4 K/UL — LOW (ref 4.8–10.8)
WBC # FLD AUTO: 4 K/UL — LOW (ref 4.8–10.8)

## 2017-06-18 PROCEDURE — 99223 1ST HOSP IP/OBS HIGH 75: CPT

## 2017-06-18 PROCEDURE — 71010: CPT | Mod: 26

## 2017-06-18 RX ORDER — CLOPIDOGREL BISULFATE 75 MG/1
75 TABLET, FILM COATED ORAL DAILY
Qty: 0 | Refills: 0 | Status: DISCONTINUED | OUTPATIENT
Start: 2017-06-19 | End: 2017-07-14

## 2017-06-18 RX ADMIN — Medication 100 MILLIGRAM(S): at 22:10

## 2017-06-18 RX ADMIN — SODIUM CHLORIDE 3 MILLILITER(S): 9 INJECTION INTRAMUSCULAR; INTRAVENOUS; SUBCUTANEOUS at 15:27

## 2017-06-18 RX ADMIN — Medication 3 MILLILITER(S): at 20:32

## 2017-06-18 RX ADMIN — Medication 3 MILLILITER(S): at 15:04

## 2017-06-18 RX ADMIN — PIPERACILLIN AND TAZOBACTAM 200 GRAM(S): 4; .5 INJECTION, POWDER, LYOPHILIZED, FOR SOLUTION INTRAVENOUS at 06:36

## 2017-06-18 RX ADMIN — SODIUM CHLORIDE 3 MILLILITER(S): 9 INJECTION INTRAMUSCULAR; INTRAVENOUS; SUBCUTANEOUS at 06:36

## 2017-06-18 RX ADMIN — PIPERACILLIN AND TAZOBACTAM 200 GRAM(S): 4; .5 INJECTION, POWDER, LYOPHILIZED, FOR SOLUTION INTRAVENOUS at 01:51

## 2017-06-18 RX ADMIN — Medication 650 MILLIGRAM(S): at 02:15

## 2017-06-18 RX ADMIN — Medication 100 MILLIGRAM(S): at 06:36

## 2017-06-18 RX ADMIN — Medication 650 MILLIGRAM(S): at 03:15

## 2017-06-18 RX ADMIN — SODIUM CHLORIDE 3 MILLILITER(S): 9 INJECTION INTRAMUSCULAR; INTRAVENOUS; SUBCUTANEOUS at 21:08

## 2017-06-18 RX ADMIN — MIDODRINE HYDROCHLORIDE 10 MILLIGRAM(S): 2.5 TABLET ORAL at 16:55

## 2017-06-18 RX ADMIN — Medication 0.5 MILLIGRAM(S): at 20:33

## 2017-06-18 RX ADMIN — Medication 3 MILLILITER(S): at 08:01

## 2017-06-18 RX ADMIN — Medication 3 MILLILITER(S): at 03:20

## 2017-06-18 RX ADMIN — Medication 325 MILLIGRAM(S): at 15:33

## 2017-06-18 RX ADMIN — Medication 1 TABLET(S): at 13:30

## 2017-06-18 RX ADMIN — Medication 0.5 MILLIGRAM(S): at 08:01

## 2017-06-18 RX ADMIN — AMIODARONE HYDROCHLORIDE 200 MILLIGRAM(S): 400 TABLET ORAL at 06:37

## 2017-06-18 RX ADMIN — PIPERACILLIN AND TAZOBACTAM 200 GRAM(S): 4; .5 INJECTION, POWDER, LYOPHILIZED, FOR SOLUTION INTRAVENOUS at 17:52

## 2017-06-18 RX ADMIN — LATANOPROST 1 DROP(S): 0.05 SOLUTION/ DROPS OPHTHALMIC; TOPICAL at 22:10

## 2017-06-18 RX ADMIN — MIDODRINE HYDROCHLORIDE 10 MILLIGRAM(S): 2.5 TABLET ORAL at 06:37

## 2017-06-18 RX ADMIN — PANTOPRAZOLE SODIUM 40 MILLIGRAM(S): 20 TABLET, DELAYED RELEASE ORAL at 06:37

## 2017-06-18 RX ADMIN — MIDODRINE HYDROCHLORIDE 10 MILLIGRAM(S): 2.5 TABLET ORAL at 22:10

## 2017-06-18 RX ADMIN — PIPERACILLIN AND TAZOBACTAM 200 GRAM(S): 4; .5 INJECTION, POWDER, LYOPHILIZED, FOR SOLUTION INTRAVENOUS at 14:33

## 2017-06-18 RX ADMIN — Medication 100 MILLIGRAM(S): at 15:33

## 2017-06-18 NOTE — PROGRESS NOTE ADULT - SUBJECTIVE AND OBJECTIVE BOX
INTERVAL HPI/OVERNIGHT EVENTS: No acute events overnight.  Persistent RUQ pain, though afebrile.  Weaned from CPAP successfully.  Currently undergoing treatment for urinary tract infection.    MEDICATIONS  (STANDING):  sodium chloride 0.9% lock flush 3milliLiter(s) IV Push every 8 hours  sodium chloride 0.9% lock flush 3milliLiter(s) IV Push every 8 hours  multivitamin 1Tablet(s) Oral daily  latanoprost 0.005% Ophthalmic Solution 1Drop(s) Both EYES at bedtime  pantoprazole    Tablet 40milliGRAM(s) Oral before breakfast  ferrous    sulfate 325milliGRAM(s) Oral daily  amiodarone    Tablet 200milliGRAM(s) Oral daily  metoprolol succinate ER 50milliGRAM(s) Oral daily  docusate sodium 100milliGRAM(s) Oral three times a day  ALBUTerol/ipratropium for Nebulization 3milliLiter(s) Nebulizer every 6 hours  buDESOnide   0.5 milliGRAM(s) Respule 0.5milliGRAM(s) Inhalation two times a day  midodrine 10milliGRAM(s) Oral three times a day  piperacillin/tazobactam IVPB. 2.25Gram(s) IV Intermittent every 6 hours    MEDICATIONS  (PRN):  acetaminophen   Tablet. 650milliGRAM(s) Oral every 6 hours PRN Moderate Pain (4 - 6)      Vital Signs Last 24 Hrs  T(C): 36.4, Max: 37 (06-17 @ 19:00)  T(F): 97.6, Max: 98.6 (06-17 @ 19:00)  HR: 63 (50 - 67)  BP: --  BP(mean): --  RR: 25 (14 - 48)  SpO2: 100% (94% - 100%)    Constitutional: NAD, well-groomed, well-developed  HEENT: PERRLA, EOMI  Respiratory: Breathing comfortably  Cardiovascular: Regular rate & rhythm, normal S1, S2; no murmurs, gallops or rubs; no S3, S4  Gastrointestinal: Soft, no hepatosplenomegaly, normal bowel sounds      I&O's Detail  I & Os for 24h ending 2017 07:00  =============================================  IN:    Solution: 200 ml    Solution: 50 ml    Total IN: 250 ml  ---------------------------------------------  OUT:    Indwelling Catheter - Urethral: 1085 ml    Total OUT: 1085 ml  ---------------------------------------------  Total NET: -835 ml    I & Os for current day (as of 2017 15:59)  =============================================  IN:    Total IN: 0 ml  ---------------------------------------------  OUT:    Indwelling Catheter - Urethral: 350 ml    Total OUT: 350 ml  ---------------------------------------------  Total NET: -350 ml      LABS:                        8.8    4.0   )-----------( 216      ( 2017 04:49 )             29.2         146<H>  |  96<L>  |  46.0<H>  ----------------------------<  98  4.6   |  34.0<H>  |  1.70<H>    Ca    9.1      2017 04:49  Phos  4.2     06-18  Mg     2.8         TPro  6.3<L>  /  Alb  3.6  /  TBili  0.5  /  DBili  x   /  AST  22  /  ALT  38<H>  /  AlkPhos  113      PT/INR - ( 2017 04:49 )   PT: 13.5 sec;   INR: 1.22 ratio         PTT - ( 2017 04:49 )  PTT:27.9 sec  Urinalysis Basic - ( 2017 03:56 )    Color: Yellow / Appearance: Clear / S.015 / pH: x  Gluc: x / Ketone: Negative  / Bili: Negative / Urobili: Negative mg/dL   Blood: x / Protein: 100 mg/dL / Nitrite: Negative   Leuk Esterase: Moderate / RBC: 25-50 /HPF / WBC 26-50   Sq Epi: x / Non Sq Epi: Occasional / Bacteria: Many INTERVAL HPI/OVERNIGHT EVENTS: No acute events overnight.  Without RUQ pain currently.  Afebrile.  Weaned from CPAP successfully.  Currently undergoing treatment for urinary tract infection.    MEDICATIONS  (STANDING):  sodium chloride 0.9% lock flush 3milliLiter(s) IV Push every 8 hours  sodium chloride 0.9% lock flush 3milliLiter(s) IV Push every 8 hours  multivitamin 1Tablet(s) Oral daily  latanoprost 0.005% Ophthalmic Solution 1Drop(s) Both EYES at bedtime  pantoprazole    Tablet 40milliGRAM(s) Oral before breakfast  ferrous    sulfate 325milliGRAM(s) Oral daily  amiodarone    Tablet 200milliGRAM(s) Oral daily  metoprolol succinate ER 50milliGRAM(s) Oral daily  docusate sodium 100milliGRAM(s) Oral three times a day  ALBUTerol/ipratropium for Nebulization 3milliLiter(s) Nebulizer every 6 hours  buDESOnide   0.5 milliGRAM(s) Respule 0.5milliGRAM(s) Inhalation two times a day  midodrine 10milliGRAM(s) Oral three times a day  piperacillin/tazobactam IVPB. 2.25Gram(s) IV Intermittent every 6 hours    MEDICATIONS  (PRN):  acetaminophen   Tablet. 650milliGRAM(s) Oral every 6 hours PRN Moderate Pain (4 - 6)      Vital Signs Last 24 Hrs  T(C): 36.4, Max: 37 (06-17 @ 19:00)  T(F): 97.6, Max: 98.6 (06-17 @ 19:00)  HR: 63 (50 - 67)  BP: --  BP(mean): --  RR: 25 (14 - 48)  SpO2: 100% (94% - 100%)    Constitutional: NAD, well-groomed, well-developed  HEENT: PERRLA, EOMI  Respiratory: Breathing comfortably  Cardiovascular: Regular rate & rhythm, normal S1, S2; no murmurs, gallops or rubs; no S3, S4  Gastrointestinal: Soft, no hepatosplenomegaly, normal bowel sounds      I&O's Detail  I & Os for 24h ending 2017 07:00  =============================================  IN:    Solution: 200 ml    Solution: 50 ml    Total IN: 250 ml  ---------------------------------------------  OUT:    Indwelling Catheter - Urethral: 1085 ml    Total OUT: 1085 ml  ---------------------------------------------  Total NET: -835 ml    I & Os for current day (as of 2017 15:59)  =============================================  IN:    Total IN: 0 ml  ---------------------------------------------  OUT:    Indwelling Catheter - Urethral: 350 ml    Total OUT: 350 ml  ---------------------------------------------  Total NET: -350 ml      LABS:                        8.8    4.0   )-----------( 216      ( 2017 04:49 )             29.2         146<H>  |  96<L>  |  46.0<H>  ----------------------------<  98  4.6   |  34.0<H>  |  1.70<H>    Ca    9.1      2017 04:49  Phos  4.2     06-18  Mg     2.8         TPro  6.3<L>  /  Alb  3.6  /  TBili  0.5  /  DBili  x   /  AST  22  /  ALT  38<H>  /  AlkPhos  113      PT/INR - ( 2017 04:49 )   PT: 13.5 sec;   INR: 1.22 ratio         PTT - ( 2017 04:49 )  PTT:27.9 sec  Urinalysis Basic - ( 2017 03:56 )    Color: Yellow / Appearance: Clear / S.015 / pH: x  Gluc: x / Ketone: Negative  / Bili: Negative / Urobili: Negative mg/dL   Blood: x / Protein: 100 mg/dL / Nitrite: Negative   Leuk Esterase: Moderate / RBC: 25-50 /HPF / WBC 26-50   Sq Epi: x / Non Sq Epi: Occasional / Bacteria: Many

## 2017-06-18 NOTE — PROGRESS NOTE ADULT - ASSESSMENT
83yo female with contracted gallbladder with thickened wall concerning for acalculous cholecystitis.    Attending physician Dr. Curtis discussed case at length with IR Attending Dr. Hilton.  Due to complex nature of percutaneous cholecystostomy and patient's current stability with defer tube at this time.    Surgery Will follow. 83yo female with contracted gallbladder with thickened wall concerning for acalculous cholecystitis.    Attending physician Dr. Curtis discussed case at length with IR Attending Dr. Hilton.  Patient w/o pain at this time.  Due to complex nature of percutaneous cholecystostomy and patient's current stability with defer tube at this time.    Surgery Will follow.

## 2017-06-18 NOTE — PROGRESS NOTE ADULT - ASSESSMENT
Respiratory acidosis hypercapnia /obesity on nocturnal bipap  Underlying Chronic Lung disease Pulm HTN recent mitral surgery,  Metabolic acidosis compensation vs 2/2 diuretics will check Urine CL  CHRISTOPHER on CKD ? no hydro on renal sono   Anemia iron def would give IV venofer when infectious process resolves

## 2017-06-18 NOTE — CHART NOTE - NSCHARTNOTEFT_GEN_A_CORE
Extensive multidisciplinary team conversation between interventional radiology (Dr. Beckford),  Infectious disease (Dr. Saha) and General Surgery (Dr. Curtis) regarding ultimate plan regarding the approach to mrs. shaws acalculous cholecystitis. Given the anatomy on CT scan and abd sono (thicken non distended gall bladder)  and  pts improving  clinical status, with benign abdominal  exam  it was been decided at this time team  to treat mrs chin with antibiotics.  Will cont zosyn for now was per ID.

## 2017-06-18 NOTE — PROGRESS NOTE ADULT - SUBJECTIVE AND OBJECTIVE BOX
called for patient with presumed acalculous cholecystitis.  this is based on conflicting radiological reports. sono shows thick walled nondistended gb, no stone, mild pericholecystic fluid. wbc's 4.4 lft's wnl except mildly elveated alk phos. vss. previous wade's improved by report today. HIDA ordered to justify requested high risk procedure, but pt apparently will not tolerate it. on Plavix following mitral clipping.

## 2017-06-18 NOTE — PROGRESS NOTE ADULT - PROBLEM SELECTOR PLAN 1
follow labs, clinical scenario and non-invasive imaging on abx regimen. if the risk/benefit profile of a percutaneous procedure shifts in favor of proceeding, I will gladly reconsider

## 2017-06-18 NOTE — PROGRESS NOTE ADULT - SUBJECTIVE AND OBJECTIVE BOX
Patient seen and examined.  Nima CP, SOB, N/V.    T(C): 36.6  T(F): 97.9  HR: 54  BP: --  BP(mean): --  ABP: 109/39  ABP(mean): 64  RR: 21  SpO2: 96%  Wt(kg): --  CVP(mm Hg): --  CI: --  PA: --  PA(mean): --  PA(direct): --  SVRI: --      Physical Exam:  Gen: A&Ox2  Pulm:  diminished b/l bases. bipap in place  CV:  S1S2, bradycardic no m/r/g  Abd: +BS, soft, +Loya's sign, +epigastric RUQ pain, no rebound, +guarding  Ext:  +DP b/l, 3+edema b/l      I&O's Detail  I & Os for 24h ending 17 Jun 2017 07:00  =============================================  IN:    Oral Fluid: 300 ml    Packed Red Blood Cells: 281 ml    dextrose 5% + sodium chloride 0.45%.: 250 ml    Total IN: 831 ml  ---------------------------------------------  OUT:    Indwelling Catheter - Urethral: 830 ml    Total OUT: 830 ml  ---------------------------------------------  Total NET: 1 ml    I & Os for current day (as of 18 Jun 2017 02:04)  =============================================  IN:    Solution: 100 ml    Solution: 50 ml    Total IN: 150 ml  ---------------------------------------------  OUT:    Indwelling Catheter - Urethral: 795 ml    Total OUT: 795 ml  ---------------------------------------------  Total NET: -645 ml                          9.0    4.4   )-----------( 197      ( 17 Jun 2017 03:56 )             30.4   06-17    145  |  96<L>  |  54.0<H>  ----------------------------<  110  4.6   |  40.0<H>  |  1.60<H>    Ca    9.3      17 Jun 2017 03:56  Phos  5.2     06-17  Mg     2.6     06-17    TPro  6.4<L>  /  Alb  3.7  /  TBili  0.5  /  DBili  x   /  AST  38<H>  /  ALT  49<H>  /  AlkPhos  126<H>  06-17  aPTT: 25.2 sec; PT: 13.2 sec; INR: 1.20 ratio  06-17-17 @ 03:56       ABG - ( 17 Jun 2017 23:20 )  pH: 7.38  /  pCO2: 71    /  pO2: 105   / HCO3: 38    / Base Excess: 14.3  /  SaO2: 98   /  Lactate: x        CAPILLARY BLOOD GLUCOSE  110 (16 Jun 2017 09:56)        Medications:  sodium chloride 0.9% lock flush 3milliLiter(s) IV Push every 8 hours  simvastatin 40milliGRAM(s) Oral at bedtime  sodium chloride 0.9% lock flush 3milliLiter(s) IV Push every 8 hours  multivitamin 1Tablet(s) Oral daily  latanoprost 0.005% Ophthalmic Solution 1Drop(s) Both EYES at bedtime  pantoprazole    Tablet 40milliGRAM(s) Oral before breakfast  ferrous    sulfate 325milliGRAM(s) Oral daily  amiodarone    Tablet 200milliGRAM(s) Oral daily  metoprolol succinate ER 50milliGRAM(s) Oral daily  docusate sodium 100milliGRAM(s) Oral three times a day  acetaminophen   Tablet. 650milliGRAM(s) Oral every 6 hours PRN  ALBUTerol/ipratropium for Nebulization 3milliLiter(s) Nebulizer every 6 hours  buDESOnide   0.5 milliGRAM(s) Respule 0.5milliGRAM(s) Inhalation two times a day  midodrine 10milliGRAM(s) Oral three times a day  piperacillin/tazobactam IVPB. 2.25Gram(s) IV Intermittent every 6 hours      CXR:    Assessment:  Pt is a 84y year old Female  s/p   MV clip readmitted for abdominal pain, developed worsening confusion, neutropenia, hypotension.  RUQ sono concerning for  cholecystitis, GI, Surgery, IR, Infectious Disease consults obtained.  Reviewed previous CT scan with Dr. Lara believed GB on CT scan wall is thickened and unable to clearly see borders, suspicious for cholecystitis.  Spoke with Dr. Nova who believes procedure needs to be done urgently, but not emergently so long as she is on appropriate antibiotic coverage.  Per Gen surgery note, patient is not a surgical candidate.  Spoke to Dr. Hilton, will plan for perc maricel tube today in AM.  Patent currently NAD.    Plan:  -NPO  -hold plavix  -perc maricel in Am  -continue zosyn  -continue bipap support for hypercarbic respiratory insufficiency  -continue midodrine for hypotension  -continue to monitor  -D/W dr Owen in Am rounds

## 2017-06-18 NOTE — CONSULT NOTE ADULT - ASSESSMENT
ACUTE CHOLECYSTITIS PER EXAM AND SONO  DOESNT APPEAR SEPTIC AT THIS POINT  IF UNABLE TO DO CHOLECYSTOSTOMY TUBE PER RADIOLOGY - WOULD OBSERVE ON ZOSYN  CHK PCL,AMYLASE/LIPASE    OBSERVE IN ICU SETTINGS  IF ANY SIGNIF CHANGE MAY NEED URGENT INTERVENTION - STABLE NOW  D/W CTS AND SURGERY - ALL CONCUR

## 2017-06-18 NOTE — PROGRESS NOTE ADULT - ASSESSMENT
? acalculous maricel. I am not convinced of this diagnosis based on imaging findings. pt is currently stable with improving symptoms unable to tolerate further non-invasive workup. case was discussed with acute surgery attending and multiple PA's. A perc maricel would be high risk due to the collapsed thick walled gallbladder and Plavix on board. In the absence of more definitive findings for maricel a decompression procedure for an apparently decompressed viscus in a frail pt on plavix seems to be of more risk than benefit.

## 2017-06-18 NOTE — CONSULT NOTE ADULT - SUBJECTIVE AND OBJECTIVE BOX
[facility] GIA MARTINS MD FACP   NAUN CAMPOS MD  Diplomates American Board of Internal Medicine and Infectious Diseases      MRN-[id number]  ANN FRANCES is a 84y  Female     CC:  POST OP MV CLIPPING - ASX  SIGNIF CO MORBIDIITES  RT HOSP ACUTE RUQ PAIN - ALTHOUGH SAID LLQ ON ADMIT  NO FEVER/CHILLS- NL CBC  DETERIORATED PAST 24-48 HRS WITH LETHARGY  FINDINGS RUQ PAIN AND SONO WITH ABNL GB - NO STONES BUT THICKENED WALL    Past Medical & Surgical Hx:  PAST MEDICAL & SURGICAL HISTORY:  Renal insufficiency  MR (mitral regurgitation): severe  Mitral valve stenosis, severe  On home oxygen therapy: not at this time  3-30-17  Obesity  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   r/o  out  stenosis  Cardiomyopathy  Osteoarthritis  Iron deficiency anemia  MI, old: 2008  Dyslipidemia  Acid reflux  Hx of CAB  CAD (coronary artery disease)  Heart failure  HTN (hypertension)  H/O tracheostomy: 2013 may  Cataract: b/l  2013  S/P CABG x 3:   [place]      Problem List:  HEALTH ISSUES - PROBLEM Dx:  Cerebrovascular accident (CVA) due to other mechanism: Cerebrovascular accident (CVA) due to other mechanism  Acalculous cholecystitis: Acalculous cholecystitis  Acute on chronic systolic heart failure: Acute on chronic systolic heart failure  Coronary artery disease involving native coronary artery of native heart without angina pectoris: Coronary artery disease involving native coronary artery of native heart without angina pectoris  Acute on chronic respiratory failure with hypercapnia: Acute on chronic respiratory failure with hypercapnia  Obesity, unspecified obesity severity, unspecified obesity type: Obesity, unspecified obesity severity, unspecified obesity type  Hypercapnic respiratory failure, chronic: Hypercapnic respiratory failure, chronic  On home oxygen therapy: On home oxygen therapy  Palliative care encounter: Palliative care encounter  Prophylactic measure: Prophylactic measure  Polyp of ascending colon, unspecified type: Polyp of ascending colon, unspecified type  Delirium: Delirium  Iron deficiency anemia, unspecified iron deficiency anemia type: Iron deficiency anemia, unspecified iron deficiency anemia type  CKD (chronic kidney disease) stage 3, GFR 30-59 ml/min: CKD (chronic kidney disease) stage 3, GFR 30-59 ml/min  Non-rheumatic mitral regurgitation: Non-rheumatic mitral regurgitation  Non-ST elevation (NSTEMI) myocardial infarction: Non-ST elevation (NSTEMI) myocardial infarction  Atrial fibrillation with RVR: Atrial fibrillation with RVR  Encephalopathy, unspecified: Encephalopathy, unspecified  Left lower quadrant pain: Left lower quadrant pain  Chronic obstructive pulmonary disease, unspecified COPD type: Chronic obstructive pulmonary disease, unspecified COPD type  Acute on chronic systolic congestive heart failure: Acute on chronic systolic congestive heart failure            Allergies    aspirin (Anaphylaxis)  NSAIDs (Other)    Intolerances    Lasix (Other)  OHS PT (Unknown)        ANTIBIOTICS:   piperacillin/tazobactam IVPB. 2.25Gram(s) IV Intermittent every 6 hours       Review of Systems: - Negative except as mentioned below  SL PAIN RUQ      Physical Exam:    Vital Signs Last 24 Hrs  T(C): 36.6, Max: 37 (-17 @ 19:00)  T(F): 97.9, Max: 98.6 ( @ 19:00)  HR: 59 (50 - 67)  BP: --  BP(mean): --  RR: 40 (14 - 48)  SpO2: 98% (94% - 100%)        GEN: NAD, pleasantTACHYPNEIC  HEENT: normocephalic and atraumatic. EOMI. ÓSCAR. Moist mucosa. Clear Posterior pharynx.  NECK: Supple. No carotid bruits.  No lymphadenopathy or thyromegaly.  LUNGS: Clear to auscultation.  HEART: Regular rate and rhythm without murmur.  ABDOMEN: Soft, nontender, and nondistended.  Positive bowel sounds.  MIN TENDERNESS DEEP PALPATION RUQ  EXTREMITIES: Without any cyanosis, clubbing, rash, lesions or edema.  NEUROLOGIC: Cranial nerves II through XII are grossly intact.  MUSCULOSKELETAL:  SKIN: No ulceration or induration present.      Labs:                        8.8    4.0   )-----------( 216      ( 2017 04:49 )             29.2     06-18    146<H>  |  96<L>  |  46.0<H>  ----------------------------<  98  4.6   |  34.0<H>  |  1.70<H>    Ca    9.1      2017 04:49  Phos  4.2     06-18  Mg     2.8     06-18    TPro  6.3<L>  /  Alb  3.6  /  TBili  0.5  /  DBili  x   /  AST  22  /  ALT  38<H>  /  AlkPhos  113  -18    PT/INR - ( 2017 04:49 )   PT: 13.5 sec;   INR: 1.22 ratio         PTT - ( 2017 04:49 )  PTT:27.9 sec  Urinalysis Basic - ( 2017 03:56 )    Color: Yellow / Appearance: Clear / S.015 / pH: x  Gluc: x / Ketone: Negative  / Bili: Negative / Urobili: Negative mg/dL   Blood: x / Protein: 100 mg/dL / Nitrite: Negative   Leuk Esterase: Moderate / RBC: 25-50 /HPF / WBC 26-50   Sq Epi: x / Non Sq Epi: Occasional / Bacteria: Many        Hematocrit: 29.2 % ( @ 04:49)  Hemoglobin: 8.8 g/dL ( @ 04:49)  WBC Count: 4.0 K/uL ( @ 04:49)  Platelet Count - Automated: 216 K/uL ( @ 04:49)  Hemoglobin: 9.0 g/dL ( @ 03:56)  WBC Count: 4.4 K/uL ( @ 03:56)  Hematocrit: 30.4 % ( @ 03:56)  Platelet Count - Automated: 197 K/uL ( @ 03:56)  Hematocrit: 27.9 % ( @ 12:26)  Platelet Count - Automated: 210 K/uL ( @ 12:26)  Hemoglobin: 8.3 g/dL ( @ 12:26)  WBC Count: 3.8 K/uL ( @ 12:26)    Sodium, Serum: 146 mmol/L <H> ( @ 04:49)  Potassium, Serum: 4.6 mmol/L ( @ 04:49)  Blood Urea Nitrogen, Serum: 46.0 mg/dL <H> ( @ 04:49)  Sodium, Serum: 146 mmol/L <H> ( @ 04:49)  Potassium, Serum: 4.6 mmol/L ( @ 04:49)  Blood Urea Nitrogen, Serum: 46.0 mg/dL <H> ( @ 04:49)  Potassium, Serum: 4.6 mmol/L ( @ 03:56)  Blood Urea Nitrogen, Serum: 54.0 mg/dL <H> ( @ 03:56)  Sodium, Serum: 145 mmol/L ( @ 03:56)  Sodium, Serum: 145 mmol/L ( @ 13:39)  Potassium, Serum: 4.6 mmol/L ( @ 13:39)  Blood Urea Nitrogen, Serum: 59.0 mg/dL <H> ( @ 13:39)          MICROBIOLOGY        RADIOLOGY  MPRESSION:   A thickened edematous gallbladder wall without stones.   Pericholecystic fluid. Positive Loya sign. Findings may indicate   acalculous cholecystitis.         Enlarged liver without focal mass. No ascites.  No hydronephrosis or mass.  Pancreas not visualized.  Small right effusion.            ANKUR MARTINS MD FACP

## 2017-06-18 NOTE — PROGRESS NOTE ADULT - SUBJECTIVE AND OBJECTIVE BOX
NEPHROLOGY INTERVAL HPI/OVERNIGHT EVENTS:    Examined earlier  Sitting up in chair   comfortable    MEDICATIONS  (STANDING):  sodium chloride 0.9% lock flush 3milliLiter(s) IV Push every 8 hours  sodium chloride 0.9% lock flush 3milliLiter(s) IV Push every 8 hours  multivitamin 1Tablet(s) Oral daily  latanoprost 0.005% Ophthalmic Solution 1Drop(s) Both EYES at bedtime  pantoprazole    Tablet 40milliGRAM(s) Oral before breakfast  ferrous    sulfate 325milliGRAM(s) Oral daily  amiodarone    Tablet 200milliGRAM(s) Oral daily  metoprolol succinate ER 50milliGRAM(s) Oral daily  docusate sodium 100milliGRAM(s) Oral three times a day  ALBUTerol/ipratropium for Nebulization 3milliLiter(s) Nebulizer every 6 hours  buDESOnide   0.5 milliGRAM(s) Respule 0.5milliGRAM(s) Inhalation two times a day  midodrine 10milliGRAM(s) Oral three times a day  piperacillin/tazobactam IVPB. 2.25Gram(s) IV Intermittent every 6 hours    MEDICATIONS  (PRN):  acetaminophen   Tablet. 650milliGRAM(s) Oral every 6 hours PRN Moderate Pain (4 - 6)      Allergies    aspirin (Anaphylaxis)  NSAIDs (Other)    Intolerances    Lasix (Other)  OHS PT (Unknown)      Vital Signs Last 24 Hrs  T(C): 36.6, Max: 37 (06-17 @ 19:00)  T(F): 97.9, Max: 98.6 (06-17 @ 19:00)  HR: 60 (50 - 67)  BP: --  BP(mean): --  RR: 38 (14 - 48)  SpO2: 95% (94% - 100%)  Daily     Daily     PHYSICAL EXAM:  Neuro: AxO x3  Pulm: CTA b/l no wheezing  CV: S1S2, no wheezing, no rales  Abd: Soft, NT, ND, normoactive bowel sounds  Ext: +DP pulses      LABS:                        8.8    4.0   )-----------( 216      ( 2017 04:49 )             29.2     06-18    146<H>  |  96<L>  |  46.0<H>  ----------------------------<  98  4.6   |  34.0<H>  |  1.70<H>    Ca    9.1      2017 04:49  Phos  4.2       Mg     2.8         TPro  6.3<L>  /  Alb  3.6  /  TBili  0.5  /  DBili  x   /  AST  22  /  ALT  38<H>  /  AlkPhos  113      PT/INR - ( 2017 04:49 )   PT: 13.5 sec;   INR: 1.22 ratio         PTT - ( 2017 04:49 )  PTT:27.9 sec  Urinalysis Basic - ( 2017 03:56 )    Color: Yellow / Appearance: Clear / S.015 / pH: x  Gluc: x / Ketone: Negative  / Bili: Negative / Urobili: Negative mg/dL   Blood: x / Protein: 100 mg/dL / Nitrite: Negative   Leuk Esterase: Moderate / RBC: 25-50 /HPF / WBC 26-50   Sq Epi: x / Non Sq Epi: Occasional / Bacteria: Many      Magnesium, Serum: 2.8 mg/dL ( @ 04:49)  Phosphorus Level, Serum: 4.2 mg/dL ( @ 04:49)    ABG - ( 2017 04:19 )  pH: 7.58  /  pCO2: 40    /  pO2: 81    / HCO3: 38    / Base Excess: 14.5  /  SaO2: 98                    RADIOLOGY & ADDITIONAL TESTS:

## 2017-06-19 DIAGNOSIS — E78.5 HYPERLIPIDEMIA, UNSPECIFIED: ICD-10-CM

## 2017-06-19 LAB
ALBUMIN SERPL ELPH-MCNC: 3.7 G/DL — SIGNIFICANT CHANGE UP (ref 3.3–5.2)
ALP SERPL-CCNC: 104 U/L — SIGNIFICANT CHANGE UP (ref 40–120)
ALT FLD-CCNC: 28 U/L — SIGNIFICANT CHANGE UP
AMMONIA BLD-MCNC: 33 UMOL/L — SIGNIFICANT CHANGE UP (ref 11–55)
AMYLASE P1 CFR SERPL: 37 U/L — SIGNIFICANT CHANGE UP (ref 36–128)
ANION GAP SERPL CALC-SCNC: 10 MMOL/L — SIGNIFICANT CHANGE UP (ref 5–17)
AST SERPL-CCNC: 17 U/L — SIGNIFICANT CHANGE UP
BILIRUB SERPL-MCNC: 0.4 MG/DL — SIGNIFICANT CHANGE UP (ref 0.4–2)
BUN SERPL-MCNC: 36 MG/DL — HIGH (ref 8–20)
CALCIUM SERPL-MCNC: 9 MG/DL — SIGNIFICANT CHANGE UP (ref 8.6–10.2)
CHLORIDE SERPL-SCNC: 97 MMOL/L — LOW (ref 98–107)
CHLORIDE UR-SCNC: <27 MMOL/L — SIGNIFICANT CHANGE UP
CO2 SERPL-SCNC: 39 MMOL/L — HIGH (ref 22–29)
CREAT SERPL-MCNC: 1.53 MG/DL — HIGH (ref 0.5–1.3)
CULTURE RESULTS: NO GROWTH — SIGNIFICANT CHANGE UP
GAS PNL BLDA: SIGNIFICANT CHANGE UP
GLUCOSE SERPL-MCNC: 107 MG/DL — SIGNIFICANT CHANGE UP (ref 70–115)
HCT VFR BLD CALC: 29 % — LOW (ref 37–47)
HGB BLD-MCNC: 8.6 G/DL — LOW (ref 12–16)
LIDOCAIN IGE QN: 21 U/L — LOW (ref 22–51)
MCHC RBC-ENTMCNC: 21.5 PG — LOW (ref 27–31)
MCHC RBC-ENTMCNC: 29.7 G/DL — LOW (ref 32–36)
MCV RBC AUTO: 72.5 FL — LOW (ref 81–99)
PLATELET # BLD AUTO: 215 K/UL — SIGNIFICANT CHANGE UP (ref 150–400)
POTASSIUM SERPL-MCNC: 4.4 MMOL/L — SIGNIFICANT CHANGE UP (ref 3.5–5.3)
POTASSIUM SERPL-SCNC: 4.4 MMOL/L — SIGNIFICANT CHANGE UP (ref 3.5–5.3)
PREALB SERPL-MCNC: 18 MG/DL — SIGNIFICANT CHANGE UP (ref 18–38)
PROT SERPL-MCNC: 6.3 G/DL — LOW (ref 6.6–8.7)
RBC # BLD: 4 M/UL — LOW (ref 4.4–5.2)
RBC # FLD: 23.2 % — HIGH (ref 11–15.6)
SODIUM SERPL-SCNC: 146 MMOL/L — HIGH (ref 135–145)
SPECIMEN SOURCE: SIGNIFICANT CHANGE UP
VIT B12 SERPL-MCNC: 1084 PG/ML — HIGH (ref 180–914)
WBC # BLD: 4.8 K/UL — SIGNIFICANT CHANGE UP (ref 4.8–10.8)
WBC # FLD AUTO: 4.8 K/UL — SIGNIFICANT CHANGE UP (ref 4.8–10.8)

## 2017-06-19 PROCEDURE — 71010: CPT | Mod: 26

## 2017-06-19 PROCEDURE — 93010 ELECTROCARDIOGRAM REPORT: CPT

## 2017-06-19 PROCEDURE — 99233 SBSQ HOSP IP/OBS HIGH 50: CPT

## 2017-06-19 RX ORDER — ENOXAPARIN SODIUM 100 MG/ML
40 INJECTION SUBCUTANEOUS DAILY
Qty: 0 | Refills: 0 | Status: DISCONTINUED | OUTPATIENT
Start: 2017-06-19 | End: 2017-06-19

## 2017-06-19 RX ORDER — LACTOBACILLUS ACIDOPHILUS 100MM CELL
1 CAPSULE ORAL
Qty: 0 | Refills: 0 | Status: DISCONTINUED | OUTPATIENT
Start: 2017-06-19 | End: 2017-07-09

## 2017-06-19 RX ORDER — FOLIC ACID 0.8 MG
1 TABLET ORAL DAILY
Qty: 0 | Refills: 0 | Status: DISCONTINUED | OUTPATIENT
Start: 2017-06-19 | End: 2017-07-20

## 2017-06-19 RX ORDER — PSYLLIUM SEED (WITH DEXTROSE)
1 POWDER (GRAM) ORAL
Qty: 0 | Refills: 0 | Status: DISCONTINUED | OUTPATIENT
Start: 2017-06-19 | End: 2017-07-09

## 2017-06-19 RX ORDER — ASCORBIC ACID 60 MG
250 TABLET,CHEWABLE ORAL DAILY
Qty: 0 | Refills: 0 | Status: DISCONTINUED | OUTPATIENT
Start: 2017-06-19 | End: 2017-07-20

## 2017-06-19 RX ORDER — ENOXAPARIN SODIUM 100 MG/ML
30 INJECTION SUBCUTANEOUS DAILY
Qty: 0 | Refills: 0 | Status: DISCONTINUED | OUTPATIENT
Start: 2017-06-19 | End: 2017-06-23

## 2017-06-19 RX ORDER — ONDANSETRON 8 MG/1
4 TABLET, FILM COATED ORAL ONCE
Qty: 0 | Refills: 0 | Status: COMPLETED | OUTPATIENT
Start: 2017-06-19 | End: 2017-06-19

## 2017-06-19 RX ADMIN — Medication 0.5 MILLIGRAM(S): at 09:00

## 2017-06-19 RX ADMIN — CLOPIDOGREL BISULFATE 75 MILLIGRAM(S): 75 TABLET, FILM COATED ORAL at 12:12

## 2017-06-19 RX ADMIN — Medication 3 MILLILITER(S): at 15:49

## 2017-06-19 RX ADMIN — Medication 100 MILLIGRAM(S): at 13:11

## 2017-06-19 RX ADMIN — Medication 50 MILLIGRAM(S): at 06:40

## 2017-06-19 RX ADMIN — Medication 0.5 MILLIGRAM(S): at 21:41

## 2017-06-19 RX ADMIN — MIDODRINE HYDROCHLORIDE 10 MILLIGRAM(S): 2.5 TABLET ORAL at 21:50

## 2017-06-19 RX ADMIN — MIDODRINE HYDROCHLORIDE 10 MILLIGRAM(S): 2.5 TABLET ORAL at 13:26

## 2017-06-19 RX ADMIN — Medication 1 MILLIGRAM(S): at 12:12

## 2017-06-19 RX ADMIN — SODIUM CHLORIDE 3 MILLILITER(S): 9 INJECTION INTRAMUSCULAR; INTRAVENOUS; SUBCUTANEOUS at 23:06

## 2017-06-19 RX ADMIN — SODIUM CHLORIDE 3 MILLILITER(S): 9 INJECTION INTRAMUSCULAR; INTRAVENOUS; SUBCUTANEOUS at 06:28

## 2017-06-19 RX ADMIN — SODIUM CHLORIDE 3 MILLILITER(S): 9 INJECTION INTRAMUSCULAR; INTRAVENOUS; SUBCUTANEOUS at 13:25

## 2017-06-19 RX ADMIN — PIPERACILLIN AND TAZOBACTAM 200 GRAM(S): 4; .5 INJECTION, POWDER, LYOPHILIZED, FOR SOLUTION INTRAVENOUS at 00:30

## 2017-06-19 RX ADMIN — Medication 100 MILLIGRAM(S): at 06:40

## 2017-06-19 RX ADMIN — Medication 1 PACKET(S): at 17:13

## 2017-06-19 RX ADMIN — Medication 3 MILLILITER(S): at 09:00

## 2017-06-19 RX ADMIN — Medication 1 TABLET(S): at 17:13

## 2017-06-19 RX ADMIN — ONDANSETRON 4 MILLIGRAM(S): 8 TABLET, FILM COATED ORAL at 23:05

## 2017-06-19 RX ADMIN — ENOXAPARIN SODIUM 30 MILLIGRAM(S): 100 INJECTION SUBCUTANEOUS at 12:12

## 2017-06-19 RX ADMIN — PIPERACILLIN AND TAZOBACTAM 200 GRAM(S): 4; .5 INJECTION, POWDER, LYOPHILIZED, FOR SOLUTION INTRAVENOUS at 17:13

## 2017-06-19 RX ADMIN — Medication 1 TABLET(S): at 12:12

## 2017-06-19 RX ADMIN — PIPERACILLIN AND TAZOBACTAM 200 GRAM(S): 4; .5 INJECTION, POWDER, LYOPHILIZED, FOR SOLUTION INTRAVENOUS at 13:26

## 2017-06-19 RX ADMIN — Medication 650 MILLIGRAM(S): at 10:30

## 2017-06-19 RX ADMIN — PIPERACILLIN AND TAZOBACTAM 200 GRAM(S): 4; .5 INJECTION, POWDER, LYOPHILIZED, FOR SOLUTION INTRAVENOUS at 06:41

## 2017-06-19 RX ADMIN — Medication 325 MILLIGRAM(S): at 12:12

## 2017-06-19 RX ADMIN — AMIODARONE HYDROCHLORIDE 200 MILLIGRAM(S): 400 TABLET ORAL at 06:40

## 2017-06-19 RX ADMIN — Medication 250 MILLIGRAM(S): at 12:12

## 2017-06-19 RX ADMIN — PANTOPRAZOLE SODIUM 40 MILLIGRAM(S): 20 TABLET, DELAYED RELEASE ORAL at 06:40

## 2017-06-19 RX ADMIN — Medication 650 MILLIGRAM(S): at 11:30

## 2017-06-19 RX ADMIN — Medication 3 MILLILITER(S): at 21:42

## 2017-06-19 RX ADMIN — MIDODRINE HYDROCHLORIDE 10 MILLIGRAM(S): 2.5 TABLET ORAL at 06:41

## 2017-06-19 RX ADMIN — PIPERACILLIN AND TAZOBACTAM 200 GRAM(S): 4; .5 INJECTION, POWDER, LYOPHILIZED, FOR SOLUTION INTRAVENOUS at 23:41

## 2017-06-19 NOTE — PROGRESS NOTE ADULT - SUBJECTIVE AND OBJECTIVE BOX
NEPHROLOGY INTERVAL HPI/OVERNIGHT EVENTS:    Examined earlier  Sitting in chair  Sugical eval for cholecystitis noted  "I feel allright"  UOP 1195cc / 24hrs    MEDICATIONS  (STANDING):  sodium chloride 0.9% lock flush 3milliLiter(s) IV Push every 8 hours  sodium chloride 0.9% lock flush 3milliLiter(s) IV Push every 8 hours  multivitamin 1Tablet(s) Oral daily  latanoprost 0.005% Ophthalmic Solution 1Drop(s) Both EYES at bedtime  pantoprazole    Tablet 40milliGRAM(s) Oral before breakfast  ferrous    sulfate 325milliGRAM(s) Oral daily  amiodarone    Tablet 200milliGRAM(s) Oral daily  metoprolol succinate ER 50milliGRAM(s) Oral daily  docusate sodium 100milliGRAM(s) Oral three times a day  ALBUTerol/ipratropium for Nebulization 3milliLiter(s) Nebulizer every 6 hours  buDESOnide   0.5 milliGRAM(s) Respule 0.5milliGRAM(s) Inhalation two times a day  midodrine 10milliGRAM(s) Oral three times a day  piperacillin/tazobactam IVPB. 2.25Gram(s) IV Intermittent every 6 hours  clopidogrel Tablet 75milliGRAM(s) Oral daily  enoxaparin Injectable 30milliGRAM(s) SubCutaneous daily  folic acid 1milliGRAM(s) Oral daily  ascorbic acid 250milliGRAM(s) Oral daily  lactobacillus acidophilus 1Tablet(s) Oral two times a day with meals  psyllium Powder 1Packet(s) Oral two times a day    MEDICATIONS  (PRN):  acetaminophen   Tablet. 650milliGRAM(s) Oral every 6 hours PRN Moderate Pain (4 - 6)      Allergies    aspirin (Anaphylaxis)  NSAIDs (Other)    Intolerances    Lasix (Other)  OHS PT (Unknown)      Vital Signs Last 24 Hrs  T(C): 36.9, Max: 36.9 (06-19 @ 07:00)  T(F): 98.5, Max: 98.5 (06-19 @ 07:00)  HR: 61 (58 - 70)  BP: --  BP(mean): --  RR: 21 (12 - 38)  SpO2: 100% (95% - 100%)  Daily     Daily     PHYSICAL EXAM:  Neuro: AxO x3  Pulm: CTA b/l no wheezing  CV: S1S2, no wheezing, no rales  Abd: Soft, NT, ND, normoactive bowel sounds  Ext: +DP pulses      LABS:                        8.6    4.8   )-----------( 215      ( 19 Jun 2017 03:11 )             29.0     06-19    146<H>  |  97<L>  |  36.0<H>  ----------------------------<  107  4.4   |  39.0<H>  |  1.53<H>    Ca    9.0      19 Jun 2017 03:09  Phos  4.2     06-18  Mg     2.8     06-18    TPro  6.3<L>  /  Alb  3.7  /  TBili  0.4  /  DBili  x   /  AST  17  /  ALT  28  /  AlkPhos  104  06-19    PT/INR - ( 18 Jun 2017 04:49 )   PT: 13.5 sec;   INR: 1.22 ratio         PTT - ( 18 Jun 2017 04:49 )  PTT:27.9 sec      ABG - ( 18 Jun 2017 19:49 )  pH: 7.36  /  pCO2: 75    /  pO2: 106   / HCO3: 38    / Base Excess: 14.2  /  SaO2: 100                   RADIOLOGY & ADDITIONAL TESTS:

## 2017-06-19 NOTE — PROGRESS NOTE ADULT - SUBJECTIVE AND OBJECTIVE BOX
ANN FRANCES is a 84y Female with HPI:     POST OP MV CLIPPING - ASX  SIGNIF CO MORBIDIITES  RT HOSP ACUTE RUQ PAIN - ALTHOUGH SAID LLQ ON ADMIT  NO FEVER/CHILLS- NL CBC  DETERIORATED PAST 24-48 HRS WITH LETHARGY  FINDINGS RUQ PAIN AND SONO WITH ABNL GB - NO STONES BUT THICKENED WALL      Allergies:  aspirin (Anaphylaxis)  Lasix (Other)  NSAIDs (Other)  OHS PT (Unknown)      Medications:  sodium chloride 0.9% lock flush 3milliLiter(s) IV Push every 8 hours  sodium chloride 0.9% lock flush 3milliLiter(s) IV Push every 8 hours  multivitamin 1Tablet(s) Oral daily  latanoprost 0.005% Ophthalmic Solution 1Drop(s) Both EYES at bedtime  pantoprazole    Tablet 40milliGRAM(s) Oral before breakfast  ferrous    sulfate 325milliGRAM(s) Oral daily  amiodarone    Tablet 200milliGRAM(s) Oral daily  metoprolol succinate ER 50milliGRAM(s) Oral daily  docusate sodium 100milliGRAM(s) Oral three times a day  acetaminophen   Tablet. 650milliGRAM(s) Oral every 6 hours PRN  ALBUTerol/ipratropium for Nebulization 3milliLiter(s) Nebulizer every 6 hours  buDESOnide   0.5 milliGRAM(s) Respule 0.5milliGRAM(s) Inhalation two times a day  midodrine 10milliGRAM(s) Oral three times a day  piperacillin/tazobactam IVPB. 2.25Gram(s) IV Intermittent every 6 hours  clopidogrel Tablet 75milliGRAM(s) Oral daily  enoxaparin Injectable 30milliGRAM(s) SubCutaneous daily  folic acid 1milliGRAM(s) Oral daily  ascorbic acid 250milliGRAM(s) Oral daily  lactobacillus acidophilus 1Tablet(s) Oral two times a day with meals  psyllium Powder 1Packet(s) Oral two times a day      ANTIBIOTICS:  ZOSYN        Review of Systems: - Negative except as mentioned above     Physical Exam:  ICU Vital Signs Last 24 Hrs  T(C): 36.9, Max: 36.9 (06-19 @ 07:00)  T(F): 98.5, Max: 98.5 (06-19 @ 07:00)  HR: 69 (58 - 70)  BP: --  BP(mean): --  ABP: 120/55 (105/33 - 150/66)  ABP(mean): 67 (56 - 96)  RR: 24 (12 - 37)  SpO2: 100% (97% - 100%)    GEN: NAD, pleasant  HEENT: normocephalic and atraumatic. EOMI. ÓSCAR...  NECK: Supple. No carotid bruits.  No lymphadenopathy or thyromegaly.  LUNGS: Clear to auscultation.  HEART: Regular rate and rhythm without murmur.  ABDOMEN: Soft, nontender, and nondistended.  Positive bowel sounds.  No hepatosplenomegaly was noted.  NO REBOUND NO GUARDING  EXTREMITIES: Without any cyanosis, clubbing, rash, lesions or edema.  NEUROLOGIC: Cranial nerves II through XII are grossly intact.    SKIN: No ulceration or induration present.      Labs:    06-19    146<H>  |  97<L>  |  36.0<H>  ----------------------------<  107  4.4   |  39.0<H>  |  1.53<H>    Ca    9.0      19 Jun 2017 03:09  Phos  4.2     06-18  Mg     2.8     06-18    TPro  6.3<L>  /  Alb  3.7  /  TBili  0.4  /  DBili  x   /  AST  17  /  ALT  28  /  AlkPhos  104  06-19                          8.6    4.8   )-----------( 215      ( 19 Jun 2017 03:11 )             29.0       PT/INR - ( 18 Jun 2017 04:49 )   PT: 13.5 sec;   INR: 1.22 ratio         PTT - ( 18 Jun 2017 04:49 )  PTT:27.9 sec    LIVER FUNCTIONS - ( 19 Jun 2017 03:09 )  Alb: 3.7 g/dL / Pro: 6.3 g/dL / ALK PHOS: 104 U/L / ALT: 28 U/L / AST: 17 U/L / GGT: x               CAPILLARY BLOOD GLUCOSE    ABG - ( 18 Jun 2017 19:49 )  pH: 7.36  /  pCO2: 75    /  pO2: 106   / HCO3: 38    / Base Excess: 14.2  /  SaO2: 100 ANN FRANCES is a 84y Female with HPI:     POST OP MV CLIPPING - ASX  SIGNIF CO MORBIDIITES  RT HOSP ACUTE RUQ PAIN - ALTHOUGH SAID LLQ ON ADMIT  NO FEVER/CHILLS- NL CBC  DETERIORATED PAST 24-48 HRS WITH LETHARGY  FINDINGS RUQ PAIN AND SONO WITH ABNL GB - NO STONES BUT THICKENED WALL      Allergies:  aspirin (Anaphylaxis)  Lasix (Other)  NSAIDs (Other)  OHS PT (Unknown)      Medications:  sodium chloride 0.9% lock flush 3milliLiter(s) IV Push every 8 hours  sodium chloride 0.9% lock flush 3milliLiter(s) IV Push every 8 hours  multivitamin 1Tablet(s) Oral daily  latanoprost 0.005% Ophthalmic Solution 1Drop(s) Both EYES at bedtime  pantoprazole    Tablet 40milliGRAM(s) Oral before breakfast  ferrous    sulfate 325milliGRAM(s) Oral daily  amiodarone    Tablet 200milliGRAM(s) Oral daily  metoprolol succinate ER 50milliGRAM(s) Oral daily  docusate sodium 100milliGRAM(s) Oral three times a day  acetaminophen   Tablet. 650milliGRAM(s) Oral every 6 hours PRN  ALBUTerol/ipratropium for Nebulization 3milliLiter(s) Nebulizer every 6 hours  buDESOnide   0.5 milliGRAM(s) Respule 0.5milliGRAM(s) Inhalation two times a day  midodrine 10milliGRAM(s) Oral three times a day  piperacillin/tazobactam IVPB. 2.25Gram(s) IV Intermittent every 6 hours  clopidogrel Tablet 75milliGRAM(s) Oral daily  enoxaparin Injectable 30milliGRAM(s) SubCutaneous daily  folic acid 1milliGRAM(s) Oral daily  ascorbic acid 250milliGRAM(s) Oral daily  lactobacillus acidophilus 1Tablet(s) Oral two times a day with meals  psyllium Powder 1Packet(s) Oral two times a day      ANTIBIOTICS:  ZOSYN        Review of Systems: - Negative except as mentioned above     Physical Exam:  ICU Vital Signs Last 24 Hrs  T(C): 36.9, Max: 36.9 (06-19 @ 07:00)  T(F): 98.5, Max: 98.5 (06-19 @ 07:00)  HR: 69 (58 - 70)  BP: --  BP(mean): --  ABP: 120/55 (105/33 - 150/66)  ABP(mean): 67 (56 - 96)  RR: 24 (12 - 37)  SpO2: 100% (97% - 100%)    GEN: NAD,  confused oob to chair  HEENT: normocephalic and atraumatic. EOMI. ÓSCAR...  NECK: Supple. No carotid bruits.  No lymphadenopathy or thyromegaly.  LUNGS: Clear to auscultation.  HEART: Regular rate and rhythm without murmur.  ABDOMEN: Soft, nontender, and nondistended.  Positive bowel sounds.  No hepatosplenomegaly was noted.  NO REBOUND NO GUARDING  EXTREMITIES: Without any cyanosis, clubbing, rash, lesions or edema.  NEUROLOGIC: awake confused    SKIN: No ulceration or induration present.      Labs:    06-19    146<H>  |  97<L>  |  36.0<H>  ----------------------------<  107  4.4   |  39.0<H>  |  1.53<H>    Ca    9.0      19 Jun 2017 03:09  Phos  4.2     06-18  Mg     2.8     06-18    TPro  6.3<L>  /  Alb  3.7  /  TBili  0.4  /  DBili  x   /  AST  17  /  ALT  28  /  AlkPhos  104  06-19                          8.6    4.8   )-----------( 215      ( 19 Jun 2017 03:11 )             29.0       PT/INR - ( 18 Jun 2017 04:49 )   PT: 13.5 sec;   INR: 1.22 ratio         PTT - ( 18 Jun 2017 04:49 )  PTT:27.9 sec    LIVER FUNCTIONS - ( 19 Jun 2017 03:09 )  Alb: 3.7 g/dL / Pro: 6.3 g/dL / ALK PHOS: 104 U/L / ALT: 28 U/L / AST: 17 U/L / GGT: x               CAPILLARY BLOOD GLUCOSE    ABG - ( 18 Jun 2017 19:49 )  pH: 7.36  /  pCO2: 75    /  pO2: 106   / HCO3: 38    / Base Excess: 14.2  /  SaO2: 100

## 2017-06-19 NOTE — PROGRESS NOTE ADULT - ASSESSMENT
Respiratory acidosis hypercapnia /obesity on nocturnal bipap  Underlying Chronic Lung disease Pulm HTN recent mitral surgery,  Metabolic acidosis compensation vs 2/2 diuretics will check Urine CL  CHRISTOPHER on CKD ? no hydro on renal sono cr better today 1.5 has good UOP  Anemia iron def would give IV venofer when infectious process resolves

## 2017-06-19 NOTE — PROGRESS NOTE ADULT - SUBJECTIVE AND OBJECTIVE BOX
Subjective:  without acute distress, multiple alarms for pulling off BiPap mask     T(C): 36.8, Max: 36.8 (-19 @ 00:00)  HR: 60 (50 - 70)  ABP: 120/45 (105/33 - 137/52)  ABP(mean): 71 (56 - 87)  RR: 20 (13 - 40)  SpO2: 97% (95% - 100%)    Physical Examination   Neuro: alert, follows commands, intermittently confused, resolves with redirection   Pulm: decreased at bases   CV: +S1S2 RRR without rub   GI: soft nontender without rebound or guarding   Ext: +edema to b/l lower ext     18    146<H>  |  96<L>  |  46.0<H>  ----------------------------<  98  4.6   |  34.0<H>  |  1.70<H>    Ca    9.1      2017 04:49  Phos  4.2       Mg     2.8     18    TPro  6.3<L>  /  Alb  3.6  /  TBili  0.5  /  DBili  x   /  AST  22  /  ALT  38<H>  /  AlkPhos  113  18                        8.8    4.0   )-----------( 216      ( 2017 04:49 )             29.2     PT/INR - ( 2017 04:49 )   PT: 13.5 sec;   INR: 1.22 ratio    PTT - ( 2017 04:49 )  PTT:27.9 sec      CAPILLARY BLOOD GLUCOSE        ABG - ( 2017 19:49 )  pH: 7.36  /  pCO2: 75    /  pO2: 106   / HCO3: 38    / Base Excess: 14.2  /  SaO2: 100            US    The liver shows normal parenchymal echogenicity.    Liver enlarged measuring 18.4 cm in length. No focal lesion seen.    Hepatic and portal veins appear patent and are not displaced.  No   intrahepatic or ductal dilatation is found.  The common duct  measures 0.24 cm.       There is diffuse thickening of the gallbladder wall without intraluminal   stones or sludge. There is pericholecystic edema.  Pain elicited during compression with transducer consistent with positive   Loya's sign.      The pancreas is obscured by bowel gas.   The spleen size is normal.    The right kidney measures 8.6 cm in length.  It demonstrates no mass,   calculus or hydronephrosis.    Mid pole lateral cyst measures 1.2 cm. Septated adjacent cyst measures   1.4 cm.  The left kidney measures 8.66 cm in length.  It demonstrates no mass,   calculus or hydronephrosis.  Lateral midpole cystmeasures 3.1 x 4.2 x 3.3 cm. Upper pole cyst   measures 1.4 cm.  Visualized segments of abdominal aorta are within normal limits by   sonographic criteria. IVC is unremarkable. No retroperitoneal mass seen.  There is a small right pleural effusion.      IMPRESSION:   A thickened edematous gallbladder wall without stones.   Pericholecystic fluid. Positive Loya sign. Findings may indicate   acalculous cholecystitis.         Enlarged liver without focal mass. No ascites.  No hydronephrosis or mass.  Pancreas not visualized.  Small right effusion.      I&O's Detail  I & Os for 24h ending 2017 07:00  =============================================  IN:    Solution: 200 ml    Solution: 50 ml    Total IN: 250 ml  ---------------------------------------------  OUT:    Indwelling Catheter - Urethral: 1085 ml    Total OUT: 1085 ml  ---------------------------------------------  Total NET: -835 ml    I & Os for current day (as of 2017 02:36)  =============================================  IN:    Solution: 200 ml    Total IN: 200 ml  ---------------------------------------------  OUT:    Indwelling Catheter - Urethral: 860 ml    Total OUT: 860 ml  ---------------------------------------------  Total NET: -660 ml    Drug Dosing Weight  Height (cm): 152.4 (10 Murphy 2017 08:55)  Weight (kg): 75.7 (10 Murphy 2017 08:55)  BMI (kg/m2): 32.6 (10 Murphy 2017 08:55)  BSA (m2): 1.73 (10 Murphy 2017 08:55)    MEDICATIONS  (STANDING):  sodium chloride 0.9% lock flush 3milliLiter(s) IV Push every 8 hours  sodium chloride 0.9% lock flush 3milliLiter(s) IV Push every 8 hours  multivitamin 1Tablet(s) Oral daily  latanoprost 0.005% Ophthalmic Solution 1Drop(s) Both EYES at bedtime  pantoprazole    Tablet 40milliGRAM(s) Oral before breakfast  ferrous    sulfate 325milliGRAM(s) Oral daily  amiodarone    Tablet 200milliGRAM(s) Oral daily  metoprolol succinate ER 50milliGRAM(s) Oral daily  docusate sodium 100milliGRAM(s) Oral three times a day  ALBUTerol/ipratropium for Nebulization 3milliLiter(s) Nebulizer every 6 hours  buDESOnide   0.5 milliGRAM(s) Respule 0.5milliGRAM(s) Inhalation two times a day  midodrine 10milliGRAM(s) Oral three times a day  piperacillin/tazobactam IVPB. 2.25Gram(s) IV Intermittent every 6 hours  clopidogrel Tablet 75milliGRAM(s) Oral daily  enoxaparin Injectable 30milliGRAM(s) SubCutaneous daily  folic acid 1milliGRAM(s) Oral daily  ascorbic acid 250milliGRAM(s) Oral daily    MEDICATIONS  (PRN):  acetaminophen   Tablet. 650milliGRAM(s) Oral every 6 hours PRN Moderate Pain (4 - 6)      Assessment:  84yFemale with PmHx  Renal insufficiency  MR (mitral regurgitation): severe  Mitral valve stenosis, severe  On home oxygen therapy: not at this time  3-30-17  Obesity  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   r/o  out  stenosis  Cardiomyopathy  Osteoarthritis  Iron deficiency anemia  MI, old: 2008  Dyslipidemia  Acid reflux  Hx of CAB  CAD (coronary artery disease)  Heart failure  HTN (hypertension)  H/O tracheostomy: 2013 may  Cataract: b/l  2013  S/P CABG x 3:

## 2017-06-19 NOTE — PROGRESS NOTE ADULT - ASSESSMENT
85YO FEMALE POST OP MV CLIPPING -     RETURN TO HOSPITAL WITH RUQ PAIN    ALTHOUGH SAID LLQ ON ADMIT  NO FEVER/CHILLS- NL CBC   FINDINGS RUQ PAIN AND SONO WITH ABNL GB - NO STONES BUT THICKENED WALL AS PER CHART NO IR INTERVENTION AT THIS TIME  WOULD  CONTINUE CURRENT IV ABX  D/W ICU PA

## 2017-06-19 NOTE — PROGRESS NOTE ADULT - SUBJECTIVE AND OBJECTIVE BOX
PULMONARY PROGRESS NOTE      ANN FRANCESLEE [id number]    Patient is a 84y old  Female who presents with a chief complaint of Abdominal pain, insomnia, and increased shortness of breath (10 Murphy 2017 16:40)      INTERVAL HPI/OVERNIGHT EVENTS:  resting comfortably in chair, NAD  using bipap nocturnal, 02 2-3 L day time  no acute complaints  MEDICATIONS  (STANDING):  sodium chloride 0.9% lock flush 3milliLiter(s) IV Push every 8 hours  sodium chloride 0.9% lock flush 3milliLiter(s) IV Push every 8 hours  multivitamin 1Tablet(s) Oral daily  latanoprost 0.005% Ophthalmic Solution 1Drop(s) Both EYES at bedtime  pantoprazole    Tablet 40milliGRAM(s) Oral before breakfast  ferrous    sulfate 325milliGRAM(s) Oral daily  amiodarone    Tablet 200milliGRAM(s) Oral daily  metoprolol succinate ER 50milliGRAM(s) Oral daily  docusate sodium 100milliGRAM(s) Oral three times a day  ALBUTerol/ipratropium for Nebulization 3milliLiter(s) Nebulizer every 6 hours  buDESOnide   0.5 milliGRAM(s) Respule 0.5milliGRAM(s) Inhalation two times a day  midodrine 10milliGRAM(s) Oral three times a day  piperacillin/tazobactam IVPB. 2.25Gram(s) IV Intermittent every 6 hours  clopidogrel Tablet 75milliGRAM(s) Oral daily  enoxaparin Injectable 30milliGRAM(s) SubCutaneous daily  folic acid 1milliGRAM(s) Oral daily  ascorbic acid 250milliGRAM(s) Oral daily  lactobacillus acidophilus 1Tablet(s) Oral two times a day with meals  psyllium Powder 1Packet(s) Oral two times a day      MEDICATIONS  (PRN):  acetaminophen   Tablet. 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   r/o  out  stenosis  Cardiomyopathy  Osteoarthritis  Iron deficiency anemia  MI, old: 2008  Dyslipidemia  Acid reflux  Hx of CAB  CAD (coronary artery disease)  Heart failure  HTN (hypertension)  H/O tracheostomy: 2013 may  Cataract: b/l  2013  S/P CABG x 3:   Brenda      SOCIAL HISTORY  Smoking History:       REVIEW OF SYSTEMS:    CONSTITUTIONAL:  No distress    HEENT:  Eyes:  No diplopia or blurred vision. ENT:  No earache, sore throat or runny nose.    CARDIOVASCULAR:  No pressure, squeezing, tightness, heaviness or aching about the chest; no palpitations.    RESPIRATORY:  No cough, shortness of breath, PND or orthopnea. Mild SOBOE    GASTROINTESTINAL:  No nausea, vomiting or diarrhea.    GENITOURINARY:  No dysuria, frequency or urgency.    NEUROLOGIC:  No paresthesias, fasciculations, seizures or weakness.    PSYCHIATRIC:  No disorder of thought or mood.    Vital Signs Last 24 Hrs  T(C): 36.9, Max: 36.9 ( @ 07:00)  T(F): 98.5, Max: 98.5 ( @ 07:00)  HR: 64 (58 - 70)  BP: --  BP(mean): --  RR: 26 (12 - 38)  SpO2: 100% (95% - 100%)    PHYSICAL EXAMINATION:    GENERAL: The patient is awake and alert in no apparent distress.     HEENT: Head is normocephalic and atraumatic. Extraocular muscles are intact. Mucous membranes are moist.    NECK: Supple.    LUNGS: moderate air entry bilaterally with crackles L base    HEART: Regular rate and rhythm without murmur.    ABDOMEN: Soft, nontender,       EXTREMITIES: Without any cyanosis, clubbing, rash, lesions or edema.    NEUROLOGIC: Grossly intact.    LABS:                        8.6    4.8   )-----------( 215      ( 2017 03:11 )             29.0     06-19    146<H>  |  97<L>  |  36.0<H>  ----------------------------<  107  4.4   |  39.0<H>  |  1.53<H>    Ca    9.0      2017 03:09  Phos  4.2     06-18  Mg     2.8     06-18    TPro  6.3<L>  /  Alb  3.7  /  TBili  0.4  /  DBili  x   /  AST  17  /  ALT  28  /  AlkPhos  104  -    PT/INR - ( 2017 04:49 )   PT: 13.5 sec;   INR: 1.22 ratio         PTT - ( 2017 04:49 )  PTT:27.9 sec    ABG - ( 2017 19:49 )  pH: 7.36  /  pCO2: 75    /  pO2: 106   / HCO3: 38    / Base Excess: 14.2  /  SaO2: 100                     Serum Pro-Brain Natriuretic Peptide: 4121 pg/mL ( @ 12:26)      Procalcitonin, Serum: 0.05 ng/mL ( @ 17:18)      MICROBIOLOGY:    RADIOLOGY & ADDITIONAL STUDIES:  [id number]

## 2017-06-19 NOTE — PROGRESS NOTE ADULT - ASSESSMENT
chronic hypercapnic respiratory failure  post mitral clip  chronic CHF, obesity, obesity, all contributing  nocturnal NIV, bipap/trilogy   prn nebs  pulmonary follow up as out pt  prognosis guarded  discussed with CT team and family  critical chronic hypercapnic respiratory failure  post mitral clip  chronic CHF, obesity, obesity, all contributing  nocturnal NIV, bipap/trilogy   prn nebs, on abx for acalculous cholecystitis  pulmonary follow up as out pt  prognosis guarded  discussed with CT team and family  critical chronic hypercapnic respiratory failure  post mitral clip  chronic CHF, obesity, obesity, all contributing  nocturnal NIV, bipap/trilogy   prn nebs, on abx for acalculous cholecystitis  pulmonary follow up as out pt  prognosis guarded  discussed with CT team

## 2017-06-19 NOTE — PROGRESS NOTE ADULT - PROBLEM SELECTOR PLAN 1
s/p mitral clip   continue midodrine, lopressor and amio  continue cardiac monitoring   d/w team in am   continue cardiac monitoring

## 2017-06-19 NOTE — PROGRESS NOTE ADULT - ASSESSMENT
84y year old Female  s/p   MV clip readmitted for abdominal pain, developed worsening confusion, neutropenia, hypotension.  RUQ sono concerning for  cholecystitis, GI, Surgery, IR, Infectious Disease consults obtained.  Reviewed previous CT scan with Dr. Lara believed GB on CT scan wall is thickened and unable to clearly see borders, suspicious for cholecystitis currently being treated with abx as per multidisciplinary team

## 2017-06-20 LAB
ALBUMIN SERPL ELPH-MCNC: 3.5 G/DL — SIGNIFICANT CHANGE UP (ref 3.3–5.2)
ALP SERPL-CCNC: 94 U/L — SIGNIFICANT CHANGE UP (ref 40–120)
ALT FLD-CCNC: 24 U/L — SIGNIFICANT CHANGE UP
AMYLASE P1 CFR SERPL: 36 U/L — SIGNIFICANT CHANGE UP (ref 36–128)
ANION GAP SERPL CALC-SCNC: 7 MMOL/L — SIGNIFICANT CHANGE UP (ref 5–17)
AST SERPL-CCNC: 14 U/L — SIGNIFICANT CHANGE UP
BILIRUB SERPL-MCNC: 0.3 MG/DL — LOW (ref 0.4–2)
BUN SERPL-MCNC: 27 MG/DL — HIGH (ref 8–20)
C DIFF BY PCR RESULT: SIGNIFICANT CHANGE UP
C DIFF TOX GENS STL QL NAA+PROBE: SIGNIFICANT CHANGE UP
CALCIUM SERPL-MCNC: 9.1 MG/DL — SIGNIFICANT CHANGE UP (ref 8.6–10.2)
CHLORIDE SERPL-SCNC: 99 MMOL/L — SIGNIFICANT CHANGE UP (ref 98–107)
CO2 SERPL-SCNC: 41 MMOL/L — HIGH (ref 22–29)
CREAT SERPL-MCNC: 1.49 MG/DL — HIGH (ref 0.5–1.3)
GAS PNL BLDA: SIGNIFICANT CHANGE UP
GAS PNL BLDA: SIGNIFICANT CHANGE UP
GLUCOSE SERPL-MCNC: 113 MG/DL — SIGNIFICANT CHANGE UP (ref 70–115)
HCT VFR BLD CALC: 27.9 % — LOW (ref 37–47)
HGB BLD-MCNC: 8.3 G/DL — LOW (ref 12–16)
LIDOCAIN IGE QN: 24 U/L — SIGNIFICANT CHANGE UP (ref 22–51)
MAGNESIUM SERPL-MCNC: 2.9 MG/DL — HIGH (ref 1.6–2.6)
MCHC RBC-ENTMCNC: 21.8 PG — LOW (ref 27–31)
MCHC RBC-ENTMCNC: 29.7 G/DL — LOW (ref 32–36)
MCV RBC AUTO: 73.4 FL — LOW (ref 81–99)
PLATELET # BLD AUTO: 209 K/UL — SIGNIFICANT CHANGE UP (ref 150–400)
POTASSIUM SERPL-MCNC: 4.1 MMOL/L — SIGNIFICANT CHANGE UP (ref 3.5–5.3)
POTASSIUM SERPL-SCNC: 4.1 MMOL/L — SIGNIFICANT CHANGE UP (ref 3.5–5.3)
PROT SERPL-MCNC: 6.2 G/DL — LOW (ref 6.6–8.7)
RBC # BLD: 3.8 M/UL — LOW (ref 4.4–5.2)
RBC # FLD: 24.3 % — HIGH (ref 11–15.6)
SODIUM SERPL-SCNC: 147 MMOL/L — HIGH (ref 135–145)
WBC # BLD: 5.4 K/UL — SIGNIFICANT CHANGE UP (ref 4.8–10.8)
WBC # FLD AUTO: 5.4 K/UL — SIGNIFICANT CHANGE UP (ref 4.8–10.8)

## 2017-06-20 RX ADMIN — Medication 3 MILLILITER(S): at 03:31

## 2017-06-20 RX ADMIN — Medication 1 TABLET(S): at 07:26

## 2017-06-20 RX ADMIN — PANTOPRAZOLE SODIUM 40 MILLIGRAM(S): 20 TABLET, DELAYED RELEASE ORAL at 07:26

## 2017-06-20 RX ADMIN — MIDODRINE HYDROCHLORIDE 10 MILLIGRAM(S): 2.5 TABLET ORAL at 12:17

## 2017-06-20 RX ADMIN — Medication 3 MILLILITER(S): at 08:31

## 2017-06-20 RX ADMIN — PIPERACILLIN AND TAZOBACTAM 200 GRAM(S): 4; .5 INJECTION, POWDER, LYOPHILIZED, FOR SOLUTION INTRAVENOUS at 17:19

## 2017-06-20 RX ADMIN — SODIUM CHLORIDE 3 MILLILITER(S): 9 INJECTION INTRAMUSCULAR; INTRAVENOUS; SUBCUTANEOUS at 05:09

## 2017-06-20 RX ADMIN — PIPERACILLIN AND TAZOBACTAM 200 GRAM(S): 4; .5 INJECTION, POWDER, LYOPHILIZED, FOR SOLUTION INTRAVENOUS at 06:36

## 2017-06-20 RX ADMIN — Medication 1 MILLIGRAM(S): at 12:17

## 2017-06-20 RX ADMIN — CLOPIDOGREL BISULFATE 75 MILLIGRAM(S): 75 TABLET, FILM COATED ORAL at 12:17

## 2017-06-20 RX ADMIN — Medication 0.5 MILLIGRAM(S): at 19:38

## 2017-06-20 RX ADMIN — Medication 1 TABLET(S): at 17:19

## 2017-06-20 RX ADMIN — SODIUM CHLORIDE 3 MILLILITER(S): 9 INJECTION INTRAMUSCULAR; INTRAVENOUS; SUBCUTANEOUS at 21:19

## 2017-06-20 RX ADMIN — Medication 50 MILLIGRAM(S): at 07:26

## 2017-06-20 RX ADMIN — ENOXAPARIN SODIUM 30 MILLIGRAM(S): 100 INJECTION SUBCUTANEOUS at 12:17

## 2017-06-20 RX ADMIN — Medication 1 TABLET(S): at 12:17

## 2017-06-20 RX ADMIN — MIDODRINE HYDROCHLORIDE 10 MILLIGRAM(S): 2.5 TABLET ORAL at 07:26

## 2017-06-20 RX ADMIN — MIDODRINE HYDROCHLORIDE 10 MILLIGRAM(S): 2.5 TABLET ORAL at 21:24

## 2017-06-20 RX ADMIN — LATANOPROST 1 DROP(S): 0.05 SOLUTION/ DROPS OPHTHALMIC; TOPICAL at 21:24

## 2017-06-20 RX ADMIN — Medication 250 MILLIGRAM(S): at 12:17

## 2017-06-20 RX ADMIN — PIPERACILLIN AND TAZOBACTAM 200 GRAM(S): 4; .5 INJECTION, POWDER, LYOPHILIZED, FOR SOLUTION INTRAVENOUS at 12:17

## 2017-06-20 RX ADMIN — AMIODARONE HYDROCHLORIDE 200 MILLIGRAM(S): 400 TABLET ORAL at 06:40

## 2017-06-20 RX ADMIN — Medication 325 MILLIGRAM(S): at 12:17

## 2017-06-20 RX ADMIN — SODIUM CHLORIDE 3 MILLILITER(S): 9 INJECTION INTRAMUSCULAR; INTRAVENOUS; SUBCUTANEOUS at 12:26

## 2017-06-20 RX ADMIN — Medication 3 MILLILITER(S): at 19:38

## 2017-06-20 RX ADMIN — Medication 0.5 MILLIGRAM(S): at 08:30

## 2017-06-20 RX ADMIN — Medication 3 MILLILITER(S): at 14:25

## 2017-06-20 NOTE — PROGRESS NOTE ADULT - ASSESSMENT
85YO FEMALE POST OP MV CLIPPING -     RETURN TO HOSPITAL WITH RUQ PAIN    ALTHOUGH SAID LLQ ON ADMIT  NO FEVER/CHILLS- NL CBC   FINDINGS RUQ PAIN AND SONO WITH ABNL GB - NO STONES BUT THICKENED WALL AS PER CHART NO IR INTERVENTION AT THIS TIME  WOULD  CONTINUE CURRENT IV ABX WOULD TX 5-7 DAYS  WILL FOLLOW UP  SLOW IMPROVMENT

## 2017-06-20 NOTE — PROGRESS NOTE ADULT - SUBJECTIVE AND OBJECTIVE BOX
NEPHROLOGY INTERVAL HPI/OVERNIGHT EVENTS:    Comfortable   OOB to chair   Torsemide held   I/O 5500/4800    MEDICATIONS  (STANDING):  sodium chloride 0.9% lock flush 3milliLiter(s) IV Push every 8 hours  multivitamin 1Tablet(s) Oral daily  latanoprost 0.005% Ophthalmic Solution 1Drop(s) Both EYES at bedtime  pantoprazole    Tablet 40milliGRAM(s) Oral before breakfast  ferrous    sulfate 325milliGRAM(s) Oral daily  amiodarone    Tablet 200milliGRAM(s) Oral daily  metoprolol succinate ER 50milliGRAM(s) Oral daily  ALBUTerol/ipratropium for Nebulization 3milliLiter(s) Nebulizer every 6 hours  buDESOnide   0.5 milliGRAM(s) Respule 0.5milliGRAM(s) Inhalation two times a day  midodrine 10milliGRAM(s) Oral three times a day  piperacillin/tazobactam IVPB. 2.25Gram(s) IV Intermittent every 6 hours  clopidogrel Tablet 75milliGRAM(s) Oral daily  enoxaparin Injectable 30milliGRAM(s) SubCutaneous daily  folic acid 1milliGRAM(s) Oral daily  ascorbic acid 250milliGRAM(s) Oral daily  lactobacillus acidophilus 1Tablet(s) Oral two times a day with meals  psyllium Powder 1Packet(s) Oral two times a day    MEDICATIONS  (PRN):  acetaminophen   Tablet. 650milliGRAM(s) Oral every 6 hours PRN Moderate Pain (4 - 6)      Allergies    aspirin (Anaphylaxis)  NSAIDs (Other)    Intolerances    Lasix (Other)  [facility]HS PT (Unknown)      Vital Signs Last 24 Hrs  T(C): 36.9, Max: 36.9 (06-20 @ 07:00)  T(F): 98.4, Max: 98.4 (06-20 @ 07:00)  HR: 65 (59 - 99)  BP: --  BP(mean): --  RR: 17 (13 - 27)  SpO2: 100% (92% - 100%)  Daily     Daily   I&O's Detail  I & Os for 24h ending 20 Jun 2017 07:00  =============================================  IN:    Oral Fluid: 720 ml    Solution: 300 ml    Total IN: 1020 ml  ---------------------------------------------  OUT:    Indwelling Catheter - Urethral: 185 ml    Total OUT: 185 ml  ---------------------------------------------  Total NET: 835 ml    I & Os for current day (as of 20 Jun 2017 10:18)  =============================================  IN:    Oral Fluid: 240 ml    Total IN: 240 ml  ---------------------------------------------  OUT:    Total OUT: 0 ml  ---------------------------------------------  Total NET: 240 ml    I&O's Summary  I & Os for 24h ending 20 Jun 2017 07:00  =============================================  IN: 1020 ml / OUT: 185 ml / NET: 835 ml    I & Os for current day (as of 20 Jun 2017 10:18)  =============================================  IN: 240 ml / OUT: 0 ml / NET: 240 ml      PHYSICAL EXAM:    GENERAL: NAD, OOB to chair   HEAD:  No edema   NECK: Supple, No JVD,   CHEST/LUNG: EAE , Few basilar crackles , no wheeze   HEART: Regular rate and rhythm; No rub   ABDOMEN: Soft, Nontender,   EXTREMITIES:  min edema     LABS:                        8.3    5.4   )-----------( 209      ( 20 Jun 2017 04:17 )             27.9     06-20    147<H>  |  99  |  27.0<H>  ----------------------------<  113  4.1   |  41.0<H>  |  1.49<H>    Ca    9.1      20 Jun 2017 04:17  Mg     2.9     06-20    TPro  6.2<L>  /  Alb  3.5  /  TBili  0.3<L>  /  DBili  x   /  AST  14  /  ALT  24  /  AlkPhos  94  06-20        Magnesium, Serum: 2.9 mg/dL (06-20 @ 04:17)    ABG - ( 19 Jun 2017 23:22 )  pH: 7.30  /  pCO2: 87    /  pO2: 71    / HCO3: 37    / Base Excess: 13.6  /  SaO2: 94                    RADIOLOGY & ADDITIONAL TESTS:

## 2017-06-20 NOTE — PROGRESS NOTE ADULT - ASSESSMENT
85yo female with contracted gallbladder with thickened wall concerning for acalculous cholecystitis.    Currently benign abdomen w/ no complaints from the patient    Plan: Care as per primary team  Serial abdominal exams  Recall surgery as needed.

## 2017-06-20 NOTE — PROGRESS NOTE ADULT - SUBJECTIVE AND OBJECTIVE BOX
ANN FRANCES is a 84y Female with HPI:   PT IN CICU OOB TO CHAIR MORE AWAKE TODAY  POST OP MV CLIPPING - ASX  SIGNIF CO MORBIDIITES  RT HOSP ACUTE RUQ PAIN - ALTHOUGH SAID LLQ ON ADMIT  NO FEVER/CHILLS- NL CBC  DETERIORATED PAST 24-48 HRS WITH LETHARGY  FINDINGS RUQ PAIN AND SONO WITH ABNL GB - NO STONES BUT THICKENED WALL      Allergies:  aspirin (Anaphylaxis)  Lasix (Other)  NSAIDs (Other)  [facility] PT (Unknown)      Medications:  sodium chloride 0.9% lock flush 3milliLiter(s) IV Push every 8 hours  sodium chloride 0.9% lock flush 3milliLiter(s) IV Push every 8 hours  multivitamin 1Tablet(s) Oral daily  latanoprost 0.005% Ophthalmic Solution 1Drop(s) Both EYES at bedtime  pantoprazole    Tablet 40milliGRAM(s) Oral before breakfast  ferrous    sulfate 325milliGRAM(s) Oral daily  amiodarone    Tablet 200milliGRAM(s) Oral daily  metoprolol succinate ER 50milliGRAM(s) Oral daily  docusate sodium 100milliGRAM(s) Oral three times a day  acetaminophen   Tablet. 650milliGRAM(s) Oral every 6 hours PRN  ALBUTerol/ipratropium for Nebulization 3milliLiter(s) Nebulizer every 6 hours  buDESOnide   0.5 milliGRAM(s) Respule 0.5milliGRAM(s) Inhalation two times a day  midodrine 10milliGRAM(s) Oral three times a day  piperacillin/tazobactam IVPB. 2.25Gram(s) IV Intermittent every 6 hours  clopidogrel Tablet 75milliGRAM(s) Oral daily  enoxaparin Injectable 30milliGRAM(s) SubCutaneous daily  folic acid 1milliGRAM(s) Oral daily  ascorbic acid 250milliGRAM(s) Oral daily  lactobacillus acidophilus 1Tablet(s) Oral two times a day with meals  psyllium Powder 1Packet(s) Oral two times a day      ANTIBIOTICS:  ZOSYN        Review of Systems: - Negative except as mentioned above     Physical Exam:  IVital Signs Last 24 Hrs  T(C): 36.9, Max: 36.9 (06-20 @ 07:00)  T(F): 98.4, Max: 98.4 (06-20 @ 07:00)  HR: 72 (59 - 99)  BP: --  BP(mean): --  RR: 23 (13 - 27)  SpO2: 98% (92% - 100%)      GEN: NAD,  confused oob to chair  HEENT: normocephalic and atraumatic. EOMI. ÓSCAR...  NECK: Supple. No carotid bruits.  No lymphadenopathy or thyromegaly.  LUNGS: Clear to auscultation.  HEART: Regular rate and rhythm without murmur.  ABDOMEN: Soft, nontender, and nondistended.  Positive bowel sounds.  No hepatosplenomegaly was noted.  NO REBOUND NO GUARDING  EXTREMITIES: Without any cyanosis, clubbing, rash, lesions or edema.  NEUROLOGIC: awake ANSWERS QUESTIONS    SKIN: No ulceration or induration present.      Labs:                          8.3    5.4   )-----------( 209      ( 20 Jun 2017 04:17 )             27.9              8.6    4.8   )-----------( 215      ( 19 Jun 2017 03:11 )             29.0          06-20    147<H>  |  99  |  27.0<H>  ----------------------------<  113  4.1   |  41.0<H>  |  1.49<H>    Ca    9.1      20 Jun 2017 04:17  Mg     2.9     06-20    TPro  6.2<L>  /  Alb  3.5  /  TBili  0.3<L>  /  DBili  x   /  AST  14  /  ALT  24  /  AlkPhos  94  06-20

## 2017-06-20 NOTE — PROGRESS NOTE ADULT - SUBJECTIVE AND OBJECTIVE BOX
INTERVAL HPI/OVERNIGHT EVENTS: Surgery was called to reassess patient after c/o nausea and abdominal pain. Patient see stefanie evaluated at bedside. Pt on BiPAP resting comfortably in no distress. Pt said she vomited once but is now feeling better. Currently denies nausea or vomiting or abdominal pain.       MEDICATIONS  (STANDING):  sodium chloride 0.9% lock flush 3milliLiter(s) IV Push every 8 hours  sodium chloride 0.9% lock flush 3milliLiter(s) IV Push every 8 hours  multivitamin 1Tablet(s) Oral daily  latanoprost 0.005% Ophthalmic Solution 1Drop(s) Both EYES at bedtime  pantoprazole    Tablet 40milliGRAM(s) Oral before breakfast  ferrous    sulfate 325milliGRAM(s) Oral daily  amiodarone    Tablet 200milliGRAM(s) Oral daily  metoprolol succinate ER 50milliGRAM(s) Oral daily  docusate sodium 100milliGRAM(s) Oral three times a day  ALBUTerol/ipratropium for Nebulization 3milliLiter(s) Nebulizer every 6 hours  buDESOnide   0.5 milliGRAM(s) Respule 0.5milliGRAM(s) Inhalation two times a day  midodrine 10milliGRAM(s) Oral three times a day  piperacillin/tazobactam IVPB. 2.25Gram(s) IV Intermittent every 6 hours  clopidogrel Tablet 75milliGRAM(s) Oral daily  enoxaparin Injectable 30milliGRAM(s) SubCutaneous daily  folic acid 1milliGRAM(s) Oral daily  ascorbic acid 250milliGRAM(s) Oral daily  lactobacillus acidophilus 1Tablet(s) Oral two times a day with meals  psyllium Powder 1Packet(s) Oral two times a day    MEDICATIONS  (PRN):  acetaminophen   Tablet. 650milliGRAM(s) Oral every 6 hours PRN Moderate Pain (4 - 6)      Vital Signs Last 24 Hrs  T(C): 36.4, Max: 36.9 (06-19 @ 07:00)  T(F): 97.6, Max: 98.5 (06-19 @ 07:00)  HR: 65 (59 - 99)  BP: --  BP(mean): --  RR: 19 (12 - 27)  SpO2: 97% (95% - 100%)    Physical Exam:  General: NAD, Resting comfortably on BiPAP  Abd: Soft, nondistended. No rebound tenderness or guarding. No rigidity noted. No RUQ tenderness to palpation.         I&O's Detail  I & Os for 24h ending 19 Jun 2017 07:00  =============================================  IN:    Solution: 300 ml    Total IN: 300 ml  ---------------------------------------------  OUT:    Indwelling Catheter - Urethral: 1185 ml    Total OUT: 1185 ml  ---------------------------------------------  Total NET: -885 ml    I & Os for current day (as of 20 Jun 2017 02:25)  =============================================  IN:    Oral Fluid: 720 ml    Solution: 300 ml    Total IN: 1020 ml  ---------------------------------------------  OUT:    Indwelling Catheter - Urethral: 185 ml    Total OUT: 185 ml  ---------------------------------------------  Total NET: 835 ml      LABS:                        8.6    4.8   )-----------( 215      ( 19 Jun 2017 03:11 )             29.0     06-19    146<H>  |  97<L>  |  36.0<H>  ----------------------------<  107  4.4   |  39.0<H>  |  1.53<H>    Ca    9.0      19 Jun 2017 03:09  Phos  4.2     06-18  Mg     2.8     06-18    TPro  6.3<L>  /  Alb  3.7  /  TBili  0.4  /  DBili  x   /  AST  17  /  ALT  28  /  AlkPhos  104  06-19    PT/INR - ( 18 Jun 2017 04:49 )   PT: 13.5 sec;   INR: 1.22 ratio         PTT - ( 18 Jun 2017 04:49 )  PTT:27.9 sec      RADIOLOGY & ADDITIONAL STUDIES:

## 2017-06-20 NOTE — PROGRESS NOTE ADULT - ASSESSMENT
Improved CHRISTOPHER on CKD   No hydro on renal sono   S/P recent MV clipping   Loop diuretic held today     Suspect cholecystitis   Abx as per ID   Surgery follow up noted

## 2017-06-20 NOTE — PROGRESS NOTE ADULT - PROBLEM SELECTOR PLAN 1
s/p mitral clip   continue midodrine, lopressor and amio  continue cardiac monitoring  resume diuresis per nephrology/cardiology

## 2017-06-20 NOTE — PROGRESS NOTE ADULT - SUBJECTIVE AND OBJECTIVE BOX
Subjective: c/o abd pain and nausea/vomiting late last night but since resolved. denies c/o at this time. Denies CP, SOB, palpitations, dizziness, N/V, other c/o.    T(C): 36.4, Max: 36.9 (06-19 @ 07:00)  HR: 64 (59 - 99), NSR  ABP: 130/40 (113/48 - 144/53)  ABP(mean): 67 (65 - 84)  RR: 29 (12 - 29)  SpO2: 97% (95% - 100%) on BiPAP 14/6      I&O's Detail  I & Os for 24h ending 19 Jun 2017 07:00  =============================================  IN:    Solution: 300 ml    Total IN: 300 ml  ---------------------------------------------  OUT:    Indwelling Catheter - Urethral: 1185 ml    Total OUT: 1185 ml  ---------------------------------------------  Total NET: -885 ml    I & Os for current day (as of 20 Jun 2017 04:47)  =============================================  IN:    Oral Fluid: 720 ml    Solution: 300 ml    Total IN: 1020 ml  ---------------------------------------------  OUT:    Indwelling Catheter - Urethral: 185 ml    Total OUT: 185 ml  ---------------------------------------------  Total NET: 835 ml        06-20    147<H>  |  99  |  27.0<H>  ----------------------------<  113  4.1   |  41.0<H>  |  1.49<H>    Ca    9.1      20 Jun 2017 04:17  Phos  4.2     06-18  Mg     2.9     06-20    TPro  6.2<L>  /  Alb  3.5  /  TBili  0.3<L>  /  DBili  x   /  AST  14  /  ALT  24  /  AlkPhos  94  06-20                          8.3    5.4   )-----------( 209      ( 20 Jun 2017 04:17 )             27.9     ABG - ( 19 Jun 2017 23:22 )  pH: 7.30  /  pCO2: 87    /  pO2: 71    / HCO3: 37    / Base Excess: 13.6  /  SaO2: 94          6/19/17 CXR: Impression: Evidence of congestive heart failure. Stable exam without significant change since the previous study. .      MEDICATIONS  (STANDING):  sodium chloride 0.9% lock flush 3milliLiter(s) IV Push every 8 hours  multivitamin 1Tablet(s) Oral daily  latanoprost 0.005% Ophthalmic Solution 1Drop(s) Both EYES at bedtime  pantoprazole    Tablet 40milliGRAM(s) Oral before breakfast  ferrous    sulfate 325milliGRAM(s) Oral daily  amiodarone    Tablet 200milliGRAM(s) Oral daily  metoprolol succinate ER 50milliGRAM(s) Oral daily  docusate sodium 100milliGRAM(s) Oral three times a day  ALBUTerol/ipratropium for Nebulization 3milliLiter(s) Nebulizer every 6 hours  buDESOnide   0.5 milliGRAM(s) Respule 0.5milliGRAM(s) Inhalation two times a day  midodrine 10milliGRAM(s) Oral three times a day  piperacillin/tazobactam IVPB. 2.25Gram(s) IV Intermittent every 6 hours  clopidogrel Tablet 75milliGRAM(s) Oral daily  enoxaparin Injectable 30milliGRAM(s) SubCutaneous daily  folic acid 1milliGRAM(s) Oral daily  ascorbic acid 250milliGRAM(s) Oral daily  lactobacillus acidophilus 1Tablet(s) Oral two times a day with meals  psyllium Powder 1Packet(s) Oral two times a day    MEDICATIONS  (PRN):  acetaminophen   Tablet. 650milliGRAM(s) Oral every 6 hours PRN Moderate Pain (4 - 6)      Physical Exam  Neuro: A+O x 1-2, following commands  Pulm: diminished at bases  CV: RRR, +S1S2  Abd: soft, NT, ND, +BS  Ext: MARTI x 4, BLE edema

## 2017-06-21 LAB
ALBUMIN SERPL ELPH-MCNC: 3.4 G/DL — SIGNIFICANT CHANGE UP (ref 3.3–5.2)
ALP SERPL-CCNC: 88 U/L — SIGNIFICANT CHANGE UP (ref 40–120)
ALT FLD-CCNC: 20 U/L — SIGNIFICANT CHANGE UP
ANION GAP SERPL CALC-SCNC: 9 MMOL/L — SIGNIFICANT CHANGE UP (ref 5–17)
AST SERPL-CCNC: 13 U/L — SIGNIFICANT CHANGE UP
BILIRUB DIRECT SERPL-MCNC: 0 MG/DL — SIGNIFICANT CHANGE UP (ref 0–0.3)
BILIRUB INDIRECT FLD-MCNC: 0.3 MG/DL — SIGNIFICANT CHANGE UP (ref 0.2–1)
BILIRUB SERPL-MCNC: 0.3 MG/DL — LOW (ref 0.4–2)
BUN SERPL-MCNC: 26 MG/DL — HIGH (ref 8–20)
CALCIUM SERPL-MCNC: 8.9 MG/DL — SIGNIFICANT CHANGE UP (ref 8.6–10.2)
CHLORIDE SERPL-SCNC: 99 MMOL/L — SIGNIFICANT CHANGE UP (ref 98–107)
CO2 SERPL-SCNC: 38 MMOL/L — HIGH (ref 22–29)
CREAT SERPL-MCNC: 1.13 MG/DL — SIGNIFICANT CHANGE UP (ref 0.5–1.3)
FERRITIN SERPL-MCNC: 65.2 NG/ML — SIGNIFICANT CHANGE UP (ref 11–306)
GAS PNL BLDA: SIGNIFICANT CHANGE UP
GLUCOSE SERPL-MCNC: 111 MG/DL — SIGNIFICANT CHANGE UP (ref 70–115)
HCT VFR BLD CALC: 27.2 % — LOW (ref 37–47)
HGB BLD-MCNC: 7.8 G/DL — LOW (ref 12–16)
IRON SATN MFR SERPL: 24 UG/DL — LOW (ref 37–145)
IRON SATN MFR SERPL: 8 % — LOW (ref 14–50)
MAGNESIUM SERPL-MCNC: 3.1 MG/DL — HIGH (ref 1.6–2.6)
MCHC RBC-ENTMCNC: 21.1 PG — LOW (ref 27–31)
MCHC RBC-ENTMCNC: 28.7 G/DL — LOW (ref 32–36)
MCV RBC AUTO: 73.7 FL — LOW (ref 81–99)
PHOSPHATE SERPL-MCNC: 2.5 MG/DL — SIGNIFICANT CHANGE UP (ref 2.4–4.7)
PLATELET # BLD AUTO: 194 K/UL — SIGNIFICANT CHANGE UP (ref 150–400)
POTASSIUM SERPL-MCNC: 4.2 MMOL/L — SIGNIFICANT CHANGE UP (ref 3.5–5.3)
POTASSIUM SERPL-SCNC: 4.2 MMOL/L — SIGNIFICANT CHANGE UP (ref 3.5–5.3)
PROT SERPL-MCNC: 6 G/DL — LOW (ref 6.6–8.7)
RBC # BLD: 3.69 M/UL — LOW (ref 4.4–5.2)
RBC # FLD: 23.7 % — HIGH (ref 11–15.6)
SODIUM SERPL-SCNC: 146 MMOL/L — HIGH (ref 135–145)
TIBC SERPL-MCNC: 283 UG/DL — SIGNIFICANT CHANGE UP (ref 220–430)
TRANSFERRIN SERPL-MCNC: 198 MG/DL — SIGNIFICANT CHANGE UP (ref 192–382)
WBC # BLD: 5.1 K/UL — SIGNIFICANT CHANGE UP (ref 4.8–10.8)
WBC # FLD AUTO: 5.1 K/UL — SIGNIFICANT CHANGE UP (ref 4.8–10.8)

## 2017-06-21 PROCEDURE — 71010: CPT | Mod: 26

## 2017-06-21 RX ORDER — IRON SUCROSE 20 MG/ML
200 INJECTION, SOLUTION INTRAVENOUS EVERY 24 HOURS
Qty: 0 | Refills: 0 | Status: COMPLETED | OUTPATIENT
Start: 2017-06-21 | End: 2017-06-24

## 2017-06-21 RX ORDER — LOPERAMIDE HCL 2 MG
2 TABLET ORAL EVERY 4 HOURS
Qty: 0 | Refills: 0 | Status: DISCONTINUED | OUTPATIENT
Start: 2017-06-21 | End: 2017-07-09

## 2017-06-21 RX ORDER — OLANZAPINE 15 MG/1
2.5 TABLET, FILM COATED ORAL
Qty: 0 | Refills: 0 | Status: DISCONTINUED | OUTPATIENT
Start: 2017-06-21 | End: 2017-06-21

## 2017-06-21 RX ADMIN — AMIODARONE HYDROCHLORIDE 200 MILLIGRAM(S): 400 TABLET ORAL at 05:58

## 2017-06-21 RX ADMIN — MIDODRINE HYDROCHLORIDE 10 MILLIGRAM(S): 2.5 TABLET ORAL at 22:15

## 2017-06-21 RX ADMIN — PIPERACILLIN AND TAZOBACTAM 200 GRAM(S): 4; .5 INJECTION, POWDER, LYOPHILIZED, FOR SOLUTION INTRAVENOUS at 12:28

## 2017-06-21 RX ADMIN — IRON SUCROSE 110 MILLIGRAM(S): 20 INJECTION, SOLUTION INTRAVENOUS at 14:01

## 2017-06-21 RX ADMIN — PIPERACILLIN AND TAZOBACTAM 200 GRAM(S): 4; .5 INJECTION, POWDER, LYOPHILIZED, FOR SOLUTION INTRAVENOUS at 00:00

## 2017-06-21 RX ADMIN — PIPERACILLIN AND TAZOBACTAM 200 GRAM(S): 4; .5 INJECTION, POWDER, LYOPHILIZED, FOR SOLUTION INTRAVENOUS at 18:33

## 2017-06-21 RX ADMIN — PIPERACILLIN AND TAZOBACTAM 200 GRAM(S): 4; .5 INJECTION, POWDER, LYOPHILIZED, FOR SOLUTION INTRAVENOUS at 05:59

## 2017-06-21 RX ADMIN — Medication 3 MILLILITER(S): at 20:15

## 2017-06-21 RX ADMIN — MIDODRINE HYDROCHLORIDE 10 MILLIGRAM(S): 2.5 TABLET ORAL at 14:02

## 2017-06-21 RX ADMIN — PIPERACILLIN AND TAZOBACTAM 200 GRAM(S): 4; .5 INJECTION, POWDER, LYOPHILIZED, FOR SOLUTION INTRAVENOUS at 23:58

## 2017-06-21 RX ADMIN — SODIUM CHLORIDE 3 MILLILITER(S): 9 INJECTION INTRAMUSCULAR; INTRAVENOUS; SUBCUTANEOUS at 05:44

## 2017-06-21 RX ADMIN — ENOXAPARIN SODIUM 30 MILLIGRAM(S): 100 INJECTION SUBCUTANEOUS at 12:29

## 2017-06-21 RX ADMIN — SODIUM CHLORIDE 3 MILLILITER(S): 9 INJECTION INTRAMUSCULAR; INTRAVENOUS; SUBCUTANEOUS at 13:11

## 2017-06-21 RX ADMIN — LATANOPROST 1 DROP(S): 0.05 SOLUTION/ DROPS OPHTHALMIC; TOPICAL at 22:15

## 2017-06-21 RX ADMIN — MIDODRINE HYDROCHLORIDE 10 MILLIGRAM(S): 2.5 TABLET ORAL at 05:58

## 2017-06-21 RX ADMIN — Medication 325 MILLIGRAM(S): at 12:29

## 2017-06-21 RX ADMIN — Medication 1 TABLET(S): at 18:12

## 2017-06-21 RX ADMIN — CLOPIDOGREL BISULFATE 75 MILLIGRAM(S): 75 TABLET, FILM COATED ORAL at 12:29

## 2017-06-21 RX ADMIN — Medication 1 TABLET(S): at 08:47

## 2017-06-21 RX ADMIN — Medication 3 MILLILITER(S): at 15:25

## 2017-06-21 RX ADMIN — Medication 3 MILLILITER(S): at 03:51

## 2017-06-21 RX ADMIN — Medication 50 MILLIGRAM(S): at 05:58

## 2017-06-21 RX ADMIN — Medication 0.5 MILLIGRAM(S): at 20:15

## 2017-06-21 RX ADMIN — Medication 1 MILLIGRAM(S): at 12:29

## 2017-06-21 RX ADMIN — Medication 3 MILLILITER(S): at 08:26

## 2017-06-21 RX ADMIN — Medication 0.5 MILLIGRAM(S): at 08:26

## 2017-06-21 RX ADMIN — Medication 5 MILLIGRAM(S): at 14:01

## 2017-06-21 RX ADMIN — PANTOPRAZOLE SODIUM 40 MILLIGRAM(S): 20 TABLET, DELAYED RELEASE ORAL at 08:47

## 2017-06-21 RX ADMIN — Medication 250 MILLIGRAM(S): at 12:29

## 2017-06-21 RX ADMIN — Medication 1 TABLET(S): at 12:30

## 2017-06-21 NOTE — PROGRESS NOTE ADULT - ASSESSMENT
84y year old Female  s/p   MV clip readmitted for abdominal pain, developed worsening confusion, neutropenia, hypotension.  RUQ sono concerning for  acalculous cholecystitis, GI, Surgery, IR, Infectious Disease consults obtained.  Reviewed previous CT scan with Dr. Lara believed GB on CT scan wall is thickened and unable to clearly see borders, suspicious for acalculous cholecystitis currently being treated with abx as per multidisciplinary team

## 2017-06-21 NOTE — PROGRESS NOTE ADULT - SUBJECTIVE AND OBJECTIVE BOX
PULMONARY PROGRESS NOTE      ANN FRANCESLEE [id number]    Patient is a 84y old  Female who presents with a chief complaint of Abdominal pain, insomnia, and increased shortness of breath (10 Murphy 2017 16:40)      INTERVAL HPI/OVERNIGHT EVENTS:  No significant change  Using BIPAP  Reviewed renal notes/cardiac notes  Requires O2    MEDICATIONS  (STANDING):  sodium chloride 0.9% lock flush 3milliLiter(s) IV Push every 8 hours  multivitamin 1Tablet(s) Oral daily  latanoprost 0.005% Ophthalmic Solution 1Drop(s) Both EYES at bedtime  pantoprazole    Tablet 40milliGRAM(s) Oral before breakfast  ferrous    sulfate 325milliGRAM(s) Oral daily  amiodarone    Tablet 200milliGRAM(s) Oral daily  metoprolol succinate ER 50milliGRAM(s) Oral daily  ALBUTerol/ipratropium for Nebulization 3milliLiter(s) Nebulizer every 6 hours  buDESOnide   0.5 milliGRAM(s) Respule 0.5milliGRAM(s) Inhalation two times a day  midodrine 10milliGRAM(s) Oral three times a day  piperacillin/tazobactam IVPB. 2.25Gram(s) IV Intermittent every 6 hours  clopidogrel Tablet 75milliGRAM(s) Oral daily  enoxaparin Injectable 30milliGRAM(s) SubCutaneous daily  folic acid 1milliGRAM(s) Oral daily  ascorbic acid 250milliGRAM(s) Oral daily  lactobacillus acidophilus 1Tablet(s) Oral two times a day with meals  psyllium Powder 1Packet(s) Oral two times a day  torsemide 5milliGRAM(s) Oral daily  iron sucrose IVPB 200milliGRAM(s) IV Intermittent every 24 hours      MEDICATIONS  (PRN):  acetaminophen   Tablet. 650milliGRAM(s) Oral every 6 hours PRN Moderate Pain (4 - 6)  loperamide 2milliGRAM(s) Oral every 4 hours PRN Diarrhea      Allergies    aspirin (Anaphylaxis)  NSAIDs (Other)    Intolerances    Lasix (Other)  [facility] PT (Unknown)      PAST MEDICAL & SURGICAL HISTORY:  Renal insufficiency  MR (mitral regurgitation): severe  Mitral valve stenosis, severe  On home oxygen therapy: not at this time  3-30-17  Obesity  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   r/o  out  stenosis  Cardiomyopathy  Osteoarthritis  Iron deficiency anemia  MI, old: 2008  Dyslipidemia  Acid reflux  Hx of CAB  CAD (coronary artery disease)  Heart failure  HTN (hypertension)  H/O tracheostomy: 2013 may  Cataract: b/l  2013  S/P CABG x 3:   [place]      SOCIAL HISTORY  Smoking History:       REVIEW OF SYSTEMS:    CONSTITUTIONAL:  No distress    HEENT:  Eyes:  No diplopia or blurred vision. ENT:  No earache, sore throat or runny nose.    CARDIOVASCULAR:  No pressure, squeezing, tightness, heaviness or aching about the chest; no palpitations.    RESPIRATORY: Per HPI    GASTROINTESTINAL:  No nausea, vomiting or diarrhea.    GENITOURINARY:  No dysuria, frequency or urgency.    MUSCULOSKELETAL:  No joint pain    SKIN:  No new lesions.    NEUROLOGIC:  No paresthesias, fasciculations, seizures or weakness.    PSYCHIATRIC:  No disorder of thought or mood.    ENDOCRINE:  No heat or cold intolerance, polyuria or polydipsia.    HEMATOLOGICAL:  No easy bruising or bleeding.     Vital Signs Last 24 Hrs  T(C): 36.7, Max: 37.2 ( @ 07:00)  T(F): 98.1, Max: 98.9 ( @ 07:00)  HR: 67 (60 - 74)  BP: 128/62 (126/59 - 130/64)  BP(mean): 85 (85 - 85)  RR: 16 (13 - 35)  SpO2: 95% (87% - 100%)    PHYSICAL EXAMINATION:    GENERAL: The patient is awake and alert in no apparent distress.     HEENT: Head is normocephalic and atraumatic. Extraocular muscles are intact. Mucous membranes are moist.    NECK: Supple.    LUNGS: Decreased breath sounds    HEART: Regular rate and rhythm without murmur.    ABDOMEN: Soft, nontender, and nondistended.      EXTREMITIES: Without any cyanosis, clubbing, rash, lesions or edema.    NEUROLOGIC: Grossly intact.    SKIN: No ulceration or induration present.      LABS:                        7.8    5.1   )-----------( 194      ( 2017 02:55 )             27.2     -    146<H>  |  99  |  26.0<H>  ----------------------------<  111  4.2   |  38.0<H>  |  1.13    Ca    8.9      2017 02:55  Phos  2.5       Mg     3.1         TPro  6.0<L>  /  Alb  3.4  /  TBili  0.3<L>  /  DBili  0.0  /  AST  13  /  ALT  20  /  AlkPhos  88          ABG - ( 2017 03:42 )  pH: 7.43  /  pCO2: 64    /  pO2: 140   / HCO3: 40    / Base Excess: 15.6  /  SaO2: 100                         Procalcitonin, Serum: 0.05 ng/mL ( @ 17:18)      MICROBIOLOGY:    RADIOLOGY & ADDITIONAL STUDIES:

## 2017-06-21 NOTE — PROGRESS NOTE ADULT - PROBLEM SELECTOR PLAN 1
s/p mitral clip   continue midodrine, lopressor and amio  continue cardiac monitoring  hold diuresis for now (torsemide)

## 2017-06-21 NOTE — PROGRESS NOTE ADULT - SUBJECTIVE AND OBJECTIVE BOX
Patient seen and examined  Comfortable; follows simple VC    I&O's Summary    I & Os for current day (as of 21 Jun 2017 12:17)  =============================================  IN: 1134 ml / OUT: 500 ml / NET: 634 ml      REVIEW OF SYSTEMS:    CONSTITUTIONAL: No F/C  RESPIRATORY: No cough or SOB  CARDIOVASCULAR: No CP/palpitations,    GASTROINTESTINAL: No abdominal pain , NVD   GENITOURINARY: No UTI sx  NEUROLOGICAL: No headaches/wk/numbness  MUSCULOSKELETAL:  No joint pain/swelling; No LBP  EXTREMITIES : no swelling,    Vital Signs Last 24 Hrs  T(C): 37.1, Max: 37.2 (06-21 @ 07:00)  T(F): 98.7, Max: 98.9 (06-21 @ 07:00)  HR: 65 (60 - 74)  BP: 130/64 (126/59 - 130/64)  BP(mean): 85 (85 - 85)  RR: 17 (13 - 35)  SpO2: 99% (87% - 100%)    PHYSICAL EXAM:    GENERAL: NAD, Obese pleasant  EYES:  conjunctiva and sclera clear  NECK: Supple, No JVD/Bruit  NERVOUS SYSTEM:  A/O x3,   CHEST:  CTA ,No rales or rhonchi, poor BS bases  HEART:  RRR, No murmurs  ABDOMEN: Soft, NT/ND BS+  EXTREMITIES:  tr Edema;  SKIN: No rashes    LABS:                        7.8    5.1   )-----------( 194      ( 21 Jun 2017 02:55 )             27.2     06-21    146<H>  |  99  |  26.0<H>  ----------------------------<  111  4.2   |  38.0<H>  |  1.13    Ca    8.9      21 Jun 2017 02:55  Phos  2.5     06-21  Mg     3.1     06-21    TPro  6.0<L>  /  Alb  3.4  /  TBili  0.3<L>  /  DBili  0.0  /  AST  13  /  ALT  20  /  AlkPhos  88  06-21      MEDICATIONS  (STANDING):  sodium chloride 0.9% lock flush  multivitamin  latanoprost 0.005% Ophthalmic Solution  pantoprazole    Tablet  ferrous    sulfate  amiodarone    Tablet  metoprolol succinate ER  acetaminophen   Tablet. PRN  ALBUTerol/ipratropium for Nebulization  buDESOnide   0.5 milliGRAM(s) Respule  midodrine  piperacillin/tazobactam IVPB.  clopidogrel Tablet  enoxaparin Injectable  folic acid  ascorbic acid  lactobacillus acidophilus  psyllium Powder  loperamide PRN

## 2017-06-21 NOTE — PROGRESS NOTE ADULT - ASSESSMENT
No change  Chronic CHF  Hypercapnia>multiple factors    Plan:  Per renal and cardiology  Continue O2  Nocturnal BIPAP

## 2017-06-21 NOTE — PROGRESS NOTE ADULT - SUBJECTIVE AND OBJECTIVE BOX
84y Female s/p return to ICU for change in mental status    Subjective: Patient intermittently confused, but redirectable    T(C): 36.7, Max: 37.1 (06-20 @ 12:00)  HR: 61 (60 - 74)  BP: 126/59 (126/59 - 126/59)  ABP: 132/51 (112/41 - 145/49)  ABP(mean): 76 (62 - 81)  RR: 19 (13 - 35)  SpO2: 100% (87% - 100%)          06-21      146<H>  |  99  |  26.0<H>  ----------------------------                  <             111    4.2   |  38.0<H>  |  1.13    Ca    8.9      21 Jun 2017 02:55  Phos  2.5     06-21  Mg     3.1     06-21    TPro  6.0<L>  /  Alb  3.4  /  TBili  0.3<L>  /  DBili  0.0  /  AST  13  /  ALT  20  /  AlkPhos  88  06-21                               7.8    5.1   )-----------( 194      ( 21 Jun 2017 02:55 )             27.2          ABG - ( 21 Jun 2017 03:42 )  pH: 7.43  /  pCO2: 64    /  pO2: 140   / HCO3: 40    / Base Excess: 15.6  /  SaO2: 100                        CAPILLARY BLOOD GLUCOSE           CXR:  Evidence of congestive heart failure. Stable exam without significant   change since the previous study. .    I&O's Detail    I & Os for current day (as of 21 Jun 2017 07:42)  =============================================  IN:    Oral Fluid: 834 ml    Solution: 300 ml    Total IN: 1134 ml  ---------------------------------------------  OUT:    Voided: 500 ml    Total OUT: 500 ml  ---------------------------------------------  Total NET: 634 ml      MEDICATIONS  (STANDING):  sodium chloride 0.9% lock flush 3milliLiter(s) IV Push every 8 hours  multivitamin 1Tablet(s) Oral daily  latanoprost 0.005% Ophthalmic Solution 1Drop(s) Both EYES at bedtime  pantoprazole    Tablet 40milliGRAM(s) Oral before breakfast  ferrous    sulfate 325milliGRAM(s) Oral daily  amiodarone    Tablet 200milliGRAM(s) Oral daily  metoprolol succinate ER 50milliGRAM(s) Oral daily  ALBUTerol/ipratropium for Nebulization 3milliLiter(s) Nebulizer every 6 hours  buDESOnide   0.5 milliGRAM(s) Respule 0.5milliGRAM(s) Inhalation two times a day  midodrine 10milliGRAM(s) Oral three times a day  piperacillin/tazobactam IVPB. 2.25Gram(s) IV Intermittent every 6 hours  clopidogrel Tablet 75milliGRAM(s) Oral daily  enoxaparin Injectable 30milliGRAM(s) SubCutaneous daily  folic acid 1milliGRAM(s) Oral daily  ascorbic acid 250milliGRAM(s) Oral daily  lactobacillus acidophilus 1Tablet(s) Oral two times a day with meals  psyllium Powder 1Packet(s) Oral two times a day    MEDICATIONS  (PRN):  acetaminophen   Tablet. 650milliGRAM(s) Oral every 6 hours PRN Moderate Pain (4 - 6)      Physical Exam  Neuro: Awake, alert and oriented to conversation, non-focal, speech clear and intact  Pulm: CTA, equal bilaterally  CV: RRR, +S1S2  Abd: soft, NT, ND  Ext: MARTI x 4, no edema

## 2017-06-21 NOTE — PROGRESS NOTE ADULT - ASSESSMENT
creat lower but likely low clearance CKD 2 or 3- check clearance  Has low T sat- add Venofer for Anemia  Edema slightCXR shows mild congestion and sl effusions; EF 40  agree with Lasix 20 qd and increase as needed

## 2017-06-22 LAB
ALBUMIN SERPL ELPH-MCNC: 3.3 G/DL — SIGNIFICANT CHANGE UP (ref 3.3–5.2)
ALP SERPL-CCNC: 106 U/L — SIGNIFICANT CHANGE UP (ref 40–120)
ALT FLD-CCNC: 22 U/L — SIGNIFICANT CHANGE UP
AMMONIA BLD-MCNC: 26 UMOL/L — SIGNIFICANT CHANGE UP (ref 11–55)
ANION GAP SERPL CALC-SCNC: 12 MMOL/L — SIGNIFICANT CHANGE UP (ref 5–17)
AST SERPL-CCNC: 21 U/L — SIGNIFICANT CHANGE UP
BILIRUB SERPL-MCNC: 0.3 MG/DL — LOW (ref 0.4–2)
BUN SERPL-MCNC: 28 MG/DL — HIGH (ref 8–20)
CALCIUM SERPL-MCNC: 9 MG/DL — SIGNIFICANT CHANGE UP (ref 8.6–10.2)
CHLORIDE SERPL-SCNC: 97 MMOL/L — LOW (ref 98–107)
CO2 SERPL-SCNC: 36 MMOL/L — HIGH (ref 22–29)
CREAT SERPL-MCNC: 1.31 MG/DL — HIGH (ref 0.5–1.3)
CULTURE RESULTS: SIGNIFICANT CHANGE UP
GAS PNL BLDA: SIGNIFICANT CHANGE UP
GLUCOSE SERPL-MCNC: 102 MG/DL — SIGNIFICANT CHANGE UP (ref 70–115)
HCT VFR BLD CALC: 28 % — LOW (ref 37–47)
HGB BLD-MCNC: 8 G/DL — LOW (ref 12–16)
MAGNESIUM SERPL-MCNC: 3 MG/DL — HIGH (ref 1.6–2.6)
MCHC RBC-ENTMCNC: 21.1 PG — LOW (ref 27–31)
MCHC RBC-ENTMCNC: 28.6 G/DL — LOW (ref 32–36)
MCV RBC AUTO: 73.9 FL — LOW (ref 81–99)
PHOSPHATE SERPL-MCNC: 3.3 MG/DL — SIGNIFICANT CHANGE UP (ref 2.4–4.7)
PLATELET # BLD AUTO: 196 K/UL — SIGNIFICANT CHANGE UP (ref 150–400)
POTASSIUM SERPL-MCNC: 4.5 MMOL/L — SIGNIFICANT CHANGE UP (ref 3.5–5.3)
POTASSIUM SERPL-SCNC: 4.5 MMOL/L — SIGNIFICANT CHANGE UP (ref 3.5–5.3)
PROCALCITONIN SERPL-MCNC: 0.08 NG/ML — HIGH (ref 0–0.04)
PROT SERPL-MCNC: 6.3 G/DL — LOW (ref 6.6–8.7)
RBC # BLD: 3.79 M/UL — LOW (ref 4.4–5.2)
RBC # FLD: 23.9 % — HIGH (ref 11–15.6)
SODIUM SERPL-SCNC: 145 MMOL/L — SIGNIFICANT CHANGE UP (ref 135–145)
SPECIMEN SOURCE: SIGNIFICANT CHANGE UP
WBC # BLD: 5.9 K/UL — SIGNIFICANT CHANGE UP (ref 4.8–10.8)
WBC # FLD AUTO: 5.9 K/UL — SIGNIFICANT CHANGE UP (ref 4.8–10.8)

## 2017-06-22 PROCEDURE — 99233 SBSQ HOSP IP/OBS HIGH 50: CPT

## 2017-06-22 RX ADMIN — Medication 0.5 MILLIGRAM(S): at 20:31

## 2017-06-22 RX ADMIN — MIDODRINE HYDROCHLORIDE 10 MILLIGRAM(S): 2.5 TABLET ORAL at 21:22

## 2017-06-22 RX ADMIN — CLOPIDOGREL BISULFATE 75 MILLIGRAM(S): 75 TABLET, FILM COATED ORAL at 11:47

## 2017-06-22 RX ADMIN — Medication 3 MILLILITER(S): at 20:31

## 2017-06-22 RX ADMIN — ENOXAPARIN SODIUM 30 MILLIGRAM(S): 100 INJECTION SUBCUTANEOUS at 11:48

## 2017-06-22 RX ADMIN — Medication 250 MILLIGRAM(S): at 11:48

## 2017-06-22 RX ADMIN — Medication 1 PACKET(S): at 18:09

## 2017-06-22 RX ADMIN — Medication 1 TABLET(S): at 11:49

## 2017-06-22 RX ADMIN — Medication 1 MILLIGRAM(S): at 11:47

## 2017-06-22 RX ADMIN — SODIUM CHLORIDE 3 MILLILITER(S): 9 INJECTION INTRAMUSCULAR; INTRAVENOUS; SUBCUTANEOUS at 14:02

## 2017-06-22 RX ADMIN — Medication 5 MILLIGRAM(S): at 10:38

## 2017-06-22 RX ADMIN — PIPERACILLIN AND TAZOBACTAM 200 GRAM(S): 4; .5 INJECTION, POWDER, LYOPHILIZED, FOR SOLUTION INTRAVENOUS at 18:24

## 2017-06-22 RX ADMIN — PANTOPRAZOLE SODIUM 40 MILLIGRAM(S): 20 TABLET, DELAYED RELEASE ORAL at 10:38

## 2017-06-22 RX ADMIN — Medication 3 MILLILITER(S): at 03:53

## 2017-06-22 RX ADMIN — IRON SUCROSE 110 MILLIGRAM(S): 20 INJECTION, SOLUTION INTRAVENOUS at 14:40

## 2017-06-22 RX ADMIN — MIDODRINE HYDROCHLORIDE 10 MILLIGRAM(S): 2.5 TABLET ORAL at 10:38

## 2017-06-22 RX ADMIN — Medication 3 MILLILITER(S): at 15:54

## 2017-06-22 RX ADMIN — Medication 1 TABLET(S): at 10:37

## 2017-06-22 RX ADMIN — Medication 1 TABLET(S): at 17:25

## 2017-06-22 RX ADMIN — SODIUM CHLORIDE 3 MILLILITER(S): 9 INJECTION INTRAMUSCULAR; INTRAVENOUS; SUBCUTANEOUS at 05:25

## 2017-06-22 RX ADMIN — Medication 1 PACKET(S): at 10:39

## 2017-06-22 RX ADMIN — PIPERACILLIN AND TAZOBACTAM 200 GRAM(S): 4; .5 INJECTION, POWDER, LYOPHILIZED, FOR SOLUTION INTRAVENOUS at 11:48

## 2017-06-22 RX ADMIN — AMIODARONE HYDROCHLORIDE 200 MILLIGRAM(S): 400 TABLET ORAL at 10:37

## 2017-06-22 RX ADMIN — MIDODRINE HYDROCHLORIDE 10 MILLIGRAM(S): 2.5 TABLET ORAL at 14:40

## 2017-06-22 RX ADMIN — PIPERACILLIN AND TAZOBACTAM 200 GRAM(S): 4; .5 INJECTION, POWDER, LYOPHILIZED, FOR SOLUTION INTRAVENOUS at 23:52

## 2017-06-22 RX ADMIN — Medication 0.5 MILLIGRAM(S): at 08:55

## 2017-06-22 RX ADMIN — Medication 50 MILLIGRAM(S): at 10:37

## 2017-06-22 RX ADMIN — Medication 3 MILLILITER(S): at 08:54

## 2017-06-22 RX ADMIN — LATANOPROST 1 DROP(S): 0.05 SOLUTION/ DROPS OPHTHALMIC; TOPICAL at 21:22

## 2017-06-22 RX ADMIN — SODIUM CHLORIDE 3 MILLILITER(S): 9 INJECTION INTRAMUSCULAR; INTRAVENOUS; SUBCUTANEOUS at 21:14

## 2017-06-22 RX ADMIN — PIPERACILLIN AND TAZOBACTAM 200 GRAM(S): 4; .5 INJECTION, POWDER, LYOPHILIZED, FOR SOLUTION INTRAVENOUS at 09:58

## 2017-06-22 NOTE — PROGRESS NOTE ADULT - ASSESSMENT
Impression:  abd pain, mildly elevated LFTs, abd sono noting possible acalculous cholecystitis in setting of recent cardiac surgery, AFib, hypercapneic resp failure, and CHF decompensation. s/p percut cholecystostomy. LFTs unremarkable. GI f/u today.  - abd pain improving; LFTs improving  - patient awake and alert now  - no fever      Plan:  - monitor LFTs  - f/u surgery  - c/w abx  - no endoscopic intervention at this time

## 2017-06-22 NOTE — PROGRESS NOTE ADULT - SUBJECTIVE AND OBJECTIVE BOX
Subjective: no complaints at this time, unaware of what happened this morning (see event note)    T(C): 36.7, Max: 37.2 (06-21 @ 22:11)  HR: 67 (65 - 82)  BP: 109/82 (109/82 - 144/60)  RR: 24 (18 - 30)  SpO2: 96% (92% - 100%)    06-22    145  |  97<L>  |  28.0<H>  ----------------------------<  102  4.5   |  36.0<H>  |  1.31<H>    Ca    9.0      22 Jun 2017 05:04  Phos  3.3     06-22  Mg     3.0     06-22    TPro  6.3<L>  /  Alb  3.3  /  TBili  0.3<L>  /  DBili  x   /  AST  21  /  ALT  22  /  AlkPhos  106  06-22                               8.0    5.9   )-----------( 196      ( 22 Jun 2017 05:04 )             28.0          ABG - ( 22 Jun 2017 05:35 )  pH: 7.37  /  pCO2: 74    /  pO2: 74    / HCO3: 39    / Base Excess: 14.9  /  SaO2: 95             I&O's Detail  I & Os for 24h ending 22 Jun 2017 07:00  =============================================  IN:    Oral Fluid: 260 ml    Total IN: 260 ml  ---------------------------------------------  OUT:    Voided: 200 ml    Total OUT: 200 ml  ---------------------------------------------  Total NET: 60 ml    I & Os for current day (as of 22 Jun 2017 13:59)  =============================================  IN:    Solution: 200 ml    Total IN: 200 ml  ---------------------------------------------  OUT:    Total OUT: 0 ml  ---------------------------------------------  Total NET: 200 ml    Drug Dosing Weight  Height (cm): 152.4 (10 Murphy 2017 08:55)  Weight (kg): 75.7 (10 Murphy 2017 08:55)  BMI (kg/m2): 32.6 (10 Murphy 2017 08:55)  BSA (m2): 1.73 (10 Murphy 2017 08:55)    MEDICATIONS  (STANDING):  sodium chloride 0.9% lock flush 3milliLiter(s) IV Push every 8 hours  multivitamin 1Tablet(s) Oral daily  latanoprost 0.005% Ophthalmic Solution 1Drop(s) Both EYES at bedtime  pantoprazole    Tablet 40milliGRAM(s) Oral before breakfast  amiodarone    Tablet 200milliGRAM(s) Oral daily  metoprolol succinate ER 50milliGRAM(s) Oral daily  ALBUTerol/ipratropium for Nebulization 3milliLiter(s) Nebulizer every 6 hours  buDESOnide   0.5 milliGRAM(s) Respule 0.5milliGRAM(s) Inhalation two times a day  midodrine 10milliGRAM(s) Oral three times a day  piperacillin/tazobactam IVPB. 2.25Gram(s) IV Intermittent every 6 hours  clopidogrel Tablet 75milliGRAM(s) Oral daily  enoxaparin Injectable 30milliGRAM(s) SubCutaneous daily  folic acid 1milliGRAM(s) Oral daily  ascorbic acid 250milliGRAM(s) Oral daily  lactobacillus acidophilus 1Tablet(s) Oral two times a day with meals  psyllium Powder 1Packet(s) Oral two times a day  torsemide 5milliGRAM(s) Oral daily  iron sucrose IVPB 200milliGRAM(s) IV Intermittent every 24 hours    MEDICATIONS  (PRN):  acetaminophen   Tablet. 650milliGRAM(s) Oral every 6 hours PRN Moderate Pain (4 - 6)  loperamide 2milliGRAM(s) Oral every 4 hours PRN Diarrhea    Physical Exam  Neuro: alert, oriented to self and place  Pulm: crackles b/l bases  CV: S1S2 RRR  Abd: + BS soft NT ND  Extrem/MS: 1+ b/l LE edema

## 2017-06-22 NOTE — PROGRESS NOTE ADULT - PROBLEM SELECTOR PLAN 2
continue nocturnal (and PRN) bipap> overnight pt found obtunded with bipap off, improved MS once placed on bipap

## 2017-06-22 NOTE — PROGRESS NOTE ADULT - ASSESSMENT
creat stable at 1.31  CO2 36; other lytes normal  On low dose Torsemide- cont to watch  On Venofer  shall follow

## 2017-06-22 NOTE — PROGRESS NOTE ADULT - ASSESSMENT
85YO FEMALE POST OP MV CLIPPING -     RETURN TO HOSPITAL WITH RUQ PAIN    ALTHOUGH SAID LLQ ON ADMIT  NO FEVER/CHILLS- NL CBC   FINDINGS RUQ PAIN AND SONO WITH ABNL GB - NO STONES BUT THICKENED WALL AS PER CHART NO IR INTERVENTION AT THIS TIME  WOULD  CONTINUE CURRENT IV ABX WOULD TX  TOTAL 5-7 DAYS     APPEARS MORE AWAKE NO CLINICAL EVIDENCE OF SEPSIS AT PRESENT  SLOW IMPROVMENT 85YO FEMALE POST OP MV CLIPPING -     RETURN TO HOSPITAL WITH RUQ PAIN    ALTHOUGH SAID LLQ ON ADMIT  NO FEVER/CHILLS- NL CBC   FINDINGS RUQ PAIN AND SONO WITH ABNL GB - NO STONES BUT THICKENED WALL AS PER CHART NO IR INTERVENTION AT THIS TIME  WOULD  CONTINUE CURRENT IV ABX WOULD TX  TOTAL 5-7 DAYS     APPEARS MORE AWAKE NO CLINICAL EVIDENCE OF SEPSIS AT PRESENT  SLOW IMPROVMENT  WILL FOLLOW UP AS NEEDED PLEASE CALL IF QUESTIONS

## 2017-06-22 NOTE — PROGRESS NOTE ADULT - SUBJECTIVE AND OBJECTIVE BOX
ANN FRANCES is a 84y Female with HPI:   PT IN CICU OOB TO CHAIR MORE AWAKE TODAY  POST OP MV CLIPPING - ASX  SIGNIF CO MORBIDIITES  RT HOSP ACUTE RUQ PAIN - ALTHOUGH SAID LLQ ON ADMIT  NO FEVER/CHILLS- NL CBC          Allergies:  aspirin (Anaphylaxis)  Lasix (Other)  NSAIDs (Other)  OHS PT (Unknown)      Medications:  sodium chloride 0.9% lock flush 3milliLiter(s) IV Push every 8 hours  sodium chloride 0.9% lock flush 3milliLiter(s) IV Push every 8 hours  multivitamin 1Tablet(s) Oral daily  latanoprost 0.005% Ophthalmic Solution 1Drop(s) Both EYES at bedtime  pantoprazole    Tablet 40milliGRAM(s) Oral before breakfast  ferrous    sulfate 325milliGRAM(s) Oral daily  amiodarone    Tablet 200milliGRAM(s) Oral daily  metoprolol succinate ER 50milliGRAM(s) Oral daily  docusate sodium 100milliGRAM(s) Oral three times a day  acetaminophen   Tablet. 650milliGRAM(s) Oral every 6 hours PRN  ALBUTerol/ipratropium for Nebulization 3milliLiter(s) Nebulizer every 6 hours  buDESOnide   0.5 milliGRAM(s) Respule 0.5milliGRAM(s) Inhalation two times a day  midodrine 10milliGRAM(s) Oral three times a day  piperacillin/tazobactam IVPB. 2.25Gram(s) IV Intermittent every 6 hours  clopidogrel Tablet 75milliGRAM(s) Oral daily  enoxaparin Injectable 30milliGRAM(s) SubCutaneous daily  folic acid 1milliGRAM(s) Oral daily  ascorbic acid 250milliGRAM(s) Oral daily  lactobacillus acidophilus 1Tablet(s) Oral two times a day with meals  psyllium Powder 1Packet(s) Oral two times a day      ANTIBIOTICS:  ZOSYN        Review of Systems: - Negative except as mentioned above     Physical Exam:  IVital Signs Last 24 Hrs  T(C): 36.9, Max: 36.9 (06-20 @ 07:00)  T(F): 98.4, Max: 98.4 (06-20 @ 07:00)  HR: 72 (59 - 99)  BP: --  BP(mean): --  RR: 23 (13 - 27)  SpO2: 98% (92% - 100%)      GEN: NAD,  confused oob to chair  HEENT: normocephalic and atraumatic. EOMI. ÓSCAR...  NECK: Supple. No carotid bruits.  No lymphadenopathy or thyromegaly.  LUNGS: Clear to auscultation.  HEART: Regular rate and rhythm without murmur.  ABDOMEN: Soft, nontender, and nondistended.  Positive bowel sounds.  No hepatosplenomegaly was noted.  NO REBOUND NO GUARDING  EXTREMITIES: Without any cyanosis, clubbing, rash, lesions or edema.  NEUROLOGIC: awake ANSWERS QUESTIONS APPROPRAITELY    SKIN: No ulceration or induration present.      Labs:                          8.3    5.4   )-----------( 209      ( 20 Jun 2017 04:17 )             27.9              8.6    4.8   )-----------( 215      ( 19 Jun 2017 03:11 )             29.0          06-20    147<H>  |  99  |  27.0<H>  ----------------------------<  113  4.1   |  41.0<H>  |  1.49<H>    Ca    9.1      20 Jun 2017 04:17  Mg     2.9     06-20    TPro  6.2<L>  /  Alb  3.5  /  TBili  0.3<L>  /  DBili  x   /  AST  14  /  ALT  24  /  AlkPhos  94  06-20

## 2017-06-22 NOTE — PROGRESS NOTE ADULT - SUBJECTIVE AND OBJECTIVE BOX
PA Event Note:    Patient was upgraded to CTICU after there was concern for change in mental status.   Patient was evaluated and was found to be obtunded, requiring deep physical stimulation to rouse.    Patient opens eyes, speaks clearly and moves all extremities purposefully and tries to push you away after noxious stimuli, but then quickly goes back to sleep.  Patient was placed back on Bipap, and checked ABG, which showed co2 in 70's, with normal pH.  Case discussed with Dr. Owen who requests pulmonary consultation, but states that patient appears nonfocal and more awake and at this time, does not require head CT or further neurologic work up

## 2017-06-22 NOTE — PROGRESS NOTE ADULT - SUBJECTIVE AND OBJECTIVE BOX
PULMONARY PROGRESS NOTE      [id number]    Patient is a 84y old  Female who presents with a chief complaint of Abdominal pain, insomnia, and increased shortness of breath (10 Murphy 2017 16:40)      INTERVAL HPI/OVERNIGHT EVENTS:  Patient transferred to CICU>obtunded this AM.  ?utilization of BIPAP last night.  Was placed on BIPAP and ABG done ON BIPAP was baseline.  Currently awake and alert on nasal O2 in no distress.   MEDICATIONS  (STANDING):  sodium chloride 0.9% lock flush 3milliLiter(s) IV Push every 8 hours  multivitamin 1Tablet(s) Oral daily  latanoprost 0.005% Ophthalmic Solution 1Drop(s) Both EYES at bedtime  pantoprazole    Tablet 40milliGRAM(s) Oral before breakfast  amiodarone    Tablet 200milliGRAM(s) Oral daily  metoprolol succinate ER 50milliGRAM(s) Oral daily  ALBUTerol/ipratropium for Nebulization 3milliLiter(s) Nebulizer every 6 hours  buDESOnide   0.5 milliGRAM(s) Respule 0.5milliGRAM(s) Inhalation two times a day  midodrine 10milliGRAM(s) Oral three times a day  piperacillin/tazobactam IVPB. 2.25Gram(s) IV Intermittent every 6 hours  clopidogrel Tablet 75milliGRAM(s) Oral daily  enoxaparin Injectable 30milliGRAM(s) SubCutaneous daily  folic acid 1milliGRAM(s) Oral daily  ascorbic acid 250milliGRAM(s) Oral daily  lactobacillus acidophilus 1Tablet(s) Oral two times a day with meals  psyllium Powder 1Packet(s) Oral two times a day  torsemide 5milliGRAM(s) Oral daily  iron sucrose IVPB 200milliGRAM(s) IV Intermittent every 24 hours      MEDICATIONS  (PRN):  acetaminophen   Tablet. 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 ENT:  No earache, sore throat or runny nose.    CARDIOVASCULAR:  No pressure, squeezing, tightness, heaviness or aching about the chest; no palpitations.    RESPIRATORY:  No cough, shortness of breath, PND or orthopnea. Mild SOBOE    GASTROINTESTINAL:  No nausea, vomiting or diarrhea.    GENITOURINARY:  No dysuria, frequency or urgency.    MUSCULOSKELETAL:  No joint pain    SKIN:  No new lesions.    NEUROLOGIC:  No paresthesias, fasciculations, seizures or weakness.    PSYCHIATRIC:  No disorder of thought or mood.    ENDOCRINE:  No heat or cold intolerance, polyuria or polydipsia.    HEMATOLOGICAL:  No easy bruising or bleeding.     Vital Signs Last 24 Hrs  T(C): 36.8, Max: 37.2 (- @ 22:11)  T(F): 98.2, Max: 99 (- @ 05:20)  HR: 73 (65 - 82)  BP: 127/62 (110/64 - 144/60)  BP(mean): 89 (84 - 89)  RR: 23 (16 - 30)  SpO2: 95% (95% - 100%)    PHYSICAL EXAMINATION:    GENERAL: The patient is awake and alert in no apparent distress.     HEENT: Head is normocephalic and atraumatic. Extraocular muscles are intact. Mucous membranes are moist.    NECK: Supple.    LUNGS: Clear to auscultation without wheezing, rales or rhonchi; respirations unlabored    HEART: Regular rate and rhythm without murmur.    ABDOMEN: Soft, nontender, and nondistended.      EXTREMITIES: Without any cyanosis, clubbing, rash, lesions or edema.    NEUROLOGIC: Grossly intact.    SKIN: No ulceration or induration present.      LABS:                        8.0    5.9   )-----------( 196      ( 2017 05:04 )             28.0     -    145  |  97<L>  |  28.0<H>  ----------------------------<  102  4.5   |  36.0<H>  |  1.31<H>    Ca    9.0      2017 05:04  Phos  3.3       Mg     3.0         TPro  6.3<L>  /  Alb  3.3  /  TBili  0.3<L>  /  DBili  x   /  AST  21  /  ALT  22  /  AlkPhos  106          ABG - ( 2017 05:35 )  pH: 7.37  /  pCO2: 74    /  pO2: 74    / HCO3: 39    / Base Excess: 14.9  /  SaO2: 95                              MICROBIOLOGY:    RADIOLOGY & ADDITIONAL STUDIES:

## 2017-06-22 NOTE — PROGRESS NOTE ADULT - SUBJECTIVE AND OBJECTIVE BOX
Patient seen and examined  OOB/Ch  was trasferred to ICU      I&O's Summary  I & Os for 24h ending 22 Jun 2017 07:00  =============================================  IN: 260 ml / OUT: 200 ml / NET: 60 ml    I & Os for current day (as of 22 Jun 2017 19:32)  =============================================  IN: 1680 ml / OUT: 300 ml / NET: 1380 ml      REVIEW OF SYSTEMS:    CONSTITUTIONAL: No F/C    RESPIRATORY: No cough, less SOB  CARDIOVASCULAR: No CP/palpitations,    GASTROINTESTINAL: No abdominal pain , NVD   GENITOURINARY: No UTI sx  NEUROLOGICAL: No headaches/wk/numbness  MUSCULOSKELETAL:  No joint pain/swelling; No LBP  EXTREMITIES : no swelling,    Vital Signs Last 24 Hrs  T(C): 36.7, Max: 37.2 (06-21 @ 22:11)  T(F): 98.1, Max: 99 (06-22 @ 05:20)  HR: 71 (64 - 82)  BP: 130/59 (109/82 - 163/70)  BP(mean): 85 (84 - 105)  RR: 21 (18 - 30)  SpO2: 96% (92% - 100%)    PHYSICAL EXAM:    GENERAL: NAD,   EYES:  conjunctiva and sclera clear  NECK: Supple, No JVD/Bruit  NERVOUS SYSTEM:  A/O x3,   CHEST:  CTA ,No rales, few rhonchi  HEART:  RRR, No murmurs  ABDOMEN: Soft, NT/ND BS+  EXTREMITIES: Tr pitting Edema;  SKIN: No rashes    LABS:                        8.0    5.9   )-----------( 196      ( 22 Jun 2017 05:04 )             28.0     06-22    145  |  97<L>  |  28.0<H>  ----------------------------<  102  4.5   |  36.0<H>  |  1.31<H>    Ca    9.0      22 Jun 2017 05:04  Phos  3.3     06-22  Mg     3.0     06-22    TPro  6.3<L>  /  Alb  3.3  /  TBili  0.3<L>  /  DBili  x   /  AST  21  /  ALT  22  /  AlkPhos  106  06-22      MEDICATIONS  (STANDING):  sodium chloride 0.9% lock flush  multivitamin  latanoprost 0.005% Ophthalmic Solution  pantoprazole    Tablet  amiodarone    Tablet  metoprolol succinate ER  acetaminophen   Tablet. PRN  ALBUTerol/ipratropium for Nebulization  buDESOnide   0.5 milliGRAM(s) Respule  midodrine  piperacillin/tazobactam IVPB.  clopidogrel Tablet  enoxaparin Injectable  folic acid  ascorbic acid  lactobacillus acidophilus  psyllium Powder  loperamide PRN  torsemide  iron sucrose IVPB

## 2017-06-22 NOTE — PROGRESS NOTE ADULT - ASSESSMENT
Suspect episode related to non-utilization of BIPAP (patient took off?).  No obvious acute changes    Plan:  1.Continue nocturnal BIPAP  2.Mobilize  3.Bronchodilators

## 2017-06-22 NOTE — PROGRESS NOTE ADULT - SUBJECTIVE AND OBJECTIVE BOX
INTERVAL HPI/OVERNIGHT EVENTS:  Patient seen and examined    MEDICATIONS  (STANDING):  sodium chloride 0.9% lock flush 3milliLiter(s) IV Push every 8 hours  multivitamin 1Tablet(s) Oral daily  latanoprost 0.005% Ophthalmic Solution 1Drop(s) Both EYES at bedtime  pantoprazole    Tablet 40milliGRAM(s) Oral before breakfast  amiodarone    Tablet 200milliGRAM(s) Oral daily  metoprolol succinate ER 50milliGRAM(s) Oral daily  ALBUTerol/ipratropium for Nebulization 3milliLiter(s) Nebulizer every 6 hours  buDESOnide   0.5 milliGRAM(s) Respule 0.5milliGRAM(s) Inhalation two times a day  midodrine 10milliGRAM(s) Oral three times a day  piperacillin/tazobactam IVPB. 2.25Gram(s) IV Intermittent every 6 hours  clopidogrel Tablet 75milliGRAM(s) Oral daily  enoxaparin Injectable 30milliGRAM(s) SubCutaneous daily  folic acid 1milliGRAM(s) Oral daily  ascorbic acid 250milliGRAM(s) Oral daily  lactobacillus acidophilus 1Tablet(s) Oral two times a day with meals  psyllium Powder 1Packet(s) Oral two times a day  torsemide 5milliGRAM(s) Oral daily  iron sucrose IVPB 200milliGRAM(s) IV Intermittent every 24 hours    MEDICATIONS  (PRN):  acetaminophen   Tablet. 650milliGRAM(s) Oral every 6 hours PRN Moderate Pain (4 - 6)  loperamide 2milliGRAM(s) Oral every 4 hours PRN Diarrhea      Allergies    aspirin (Anaphylaxis)  NSAIDs (Other)    Intolerances    Lasix (Other)  [facility]HS PT (Unknown)      Vital Signs Last 24 Hrs  T(C): 36.8, Max: 37.2 (06-21 @ 22:11)  T(F): 98.2, Max: 99 (06-22 @ 05:20)  HR: 73 (65 - 82)  BP: 127/62 (110/64 - 144/60)  BP(mean): 89 (84 - 89)  RR: 23 (16 - 30)  SpO2: 95% (95% - 100%)    PHYSICAL EXAM:  General: NAD.  CVS: S1, S2  Chest: air entry bilaterally present  Abd: BS present, soft, non-tender      LABS:                        8.0    5.9   )-----------( 196      ( 22 Jun 2017 05:04 )             28.0     06-22    145  |  97<L>  |  28.0<H>  ----------------------------<  102  4.5   |  36.0<H>  |  1.31<H>    Ca    9.0      22 Jun 2017 05:04  Phos  3.3     06-22  Mg     3.0     06-22    TPro  6.3<L>  /  Alb  3.3  /  TBili  0.3<L>  /  DBili  x   /  AST  21  /  ALT  22  /  AlkPhos  106  06-22

## 2017-06-22 NOTE — PROGRESS NOTE ADULT - PROBLEM SELECTOR PLAN 1
s/p mitral clip   continue midodrine, lopressor and amio  continue cardiac monitoring  diuresis restarted 6/21, tolerating

## 2017-06-23 LAB
ALBUMIN SERPL ELPH-MCNC: 3.8 G/DL — SIGNIFICANT CHANGE UP (ref 3.3–5.2)
ALP SERPL-CCNC: 111 U/L — SIGNIFICANT CHANGE UP (ref 40–120)
ALT FLD-CCNC: 25 U/L — SIGNIFICANT CHANGE UP
AMMONIA BLD-MCNC: 48 UMOL/L — SIGNIFICANT CHANGE UP (ref 11–55)
ANION GAP SERPL CALC-SCNC: 11 MMOL/L — SIGNIFICANT CHANGE UP (ref 5–17)
AST SERPL-CCNC: 24 U/L — SIGNIFICANT CHANGE UP
BILIRUB DIRECT SERPL-MCNC: 0.1 MG/DL — SIGNIFICANT CHANGE UP (ref 0–0.3)
BILIRUB INDIRECT FLD-MCNC: 0.4 MG/DL — SIGNIFICANT CHANGE UP (ref 0.2–1)
BILIRUB SERPL-MCNC: 0.5 MG/DL — SIGNIFICANT CHANGE UP (ref 0.4–2)
BUN SERPL-MCNC: 26 MG/DL — HIGH (ref 8–20)
CALCIUM SERPL-MCNC: 9.4 MG/DL — SIGNIFICANT CHANGE UP (ref 8.6–10.2)
CHLORIDE SERPL-SCNC: 97 MMOL/L — LOW (ref 98–107)
CO2 SERPL-SCNC: 38 MMOL/L — HIGH (ref 22–29)
CREAT SERPL-MCNC: 1.18 MG/DL — SIGNIFICANT CHANGE UP (ref 0.5–1.3)
GLUCOSE SERPL-MCNC: 93 MG/DL — SIGNIFICANT CHANGE UP (ref 70–115)
HCT VFR BLD CALC: 32.4 % — LOW (ref 37–47)
HGB BLD-MCNC: 9.5 G/DL — LOW (ref 12–16)
MAGNESIUM SERPL-MCNC: 2.9 MG/DL — HIGH (ref 1.6–2.6)
MCHC RBC-ENTMCNC: 21.5 PG — LOW (ref 27–31)
MCHC RBC-ENTMCNC: 29.3 G/DL — LOW (ref 32–36)
MCV RBC AUTO: 73.3 FL — LOW (ref 81–99)
OB PNL STL: POSITIVE
PHOSPHATE SERPL-MCNC: 3.6 MG/DL — SIGNIFICANT CHANGE UP (ref 2.4–4.7)
PLATELET # BLD AUTO: 226 K/UL — SIGNIFICANT CHANGE UP (ref 150–400)
POTASSIUM SERPL-MCNC: 4.6 MMOL/L — SIGNIFICANT CHANGE UP (ref 3.5–5.3)
POTASSIUM SERPL-SCNC: 4.6 MMOL/L — SIGNIFICANT CHANGE UP (ref 3.5–5.3)
PROT SERPL-MCNC: 7.2 G/DL — SIGNIFICANT CHANGE UP (ref 6.6–8.7)
RBC # BLD: 4.42 M/UL — SIGNIFICANT CHANGE UP (ref 4.4–5.2)
RBC # FLD: 24.3 % — HIGH (ref 11–15.6)
SODIUM SERPL-SCNC: 146 MMOL/L — HIGH (ref 135–145)
WBC # BLD: 6.4 K/UL — SIGNIFICANT CHANGE UP (ref 4.8–10.8)
WBC # FLD AUTO: 6.4 K/UL — SIGNIFICANT CHANGE UP (ref 4.8–10.8)

## 2017-06-23 PROCEDURE — 71010: CPT | Mod: 26

## 2017-06-23 RX ORDER — FUROSEMIDE 40 MG
20 TABLET ORAL ONCE
Qty: 0 | Refills: 0 | Status: COMPLETED | OUTPATIENT
Start: 2017-06-23 | End: 2017-06-23

## 2017-06-23 RX ADMIN — MIDODRINE HYDROCHLORIDE 10 MILLIGRAM(S): 2.5 TABLET ORAL at 15:43

## 2017-06-23 RX ADMIN — Medication 3 MILLILITER(S): at 20:33

## 2017-06-23 RX ADMIN — Medication 20 MILLIGRAM(S): at 13:40

## 2017-06-23 RX ADMIN — Medication 3 MILLILITER(S): at 02:38

## 2017-06-23 RX ADMIN — PIPERACILLIN AND TAZOBACTAM 200 GRAM(S): 4; .5 INJECTION, POWDER, LYOPHILIZED, FOR SOLUTION INTRAVENOUS at 13:00

## 2017-06-23 RX ADMIN — SODIUM CHLORIDE 3 MILLILITER(S): 9 INJECTION INTRAMUSCULAR; INTRAVENOUS; SUBCUTANEOUS at 22:00

## 2017-06-23 RX ADMIN — SODIUM CHLORIDE 3 MILLILITER(S): 9 INJECTION INTRAMUSCULAR; INTRAVENOUS; SUBCUTANEOUS at 13:32

## 2017-06-23 RX ADMIN — Medication 250 MILLIGRAM(S): at 12:10

## 2017-06-23 RX ADMIN — Medication 1 PACKET(S): at 06:24

## 2017-06-23 RX ADMIN — Medication 1 MILLIGRAM(S): at 12:10

## 2017-06-23 RX ADMIN — Medication 50 MILLIGRAM(S): at 06:23

## 2017-06-23 RX ADMIN — PIPERACILLIN AND TAZOBACTAM 200 GRAM(S): 4; .5 INJECTION, POWDER, LYOPHILIZED, FOR SOLUTION INTRAVENOUS at 23:51

## 2017-06-23 RX ADMIN — Medication 1 PACKET(S): at 18:10

## 2017-06-23 RX ADMIN — MIDODRINE HYDROCHLORIDE 10 MILLIGRAM(S): 2.5 TABLET ORAL at 06:23

## 2017-06-23 RX ADMIN — Medication 1 TABLET(S): at 18:10

## 2017-06-23 RX ADMIN — Medication 2 MILLIGRAM(S): at 22:22

## 2017-06-23 RX ADMIN — Medication 20 MILLIGRAM(S): at 07:41

## 2017-06-23 RX ADMIN — SODIUM CHLORIDE 3 MILLILITER(S): 9 INJECTION INTRAMUSCULAR; INTRAVENOUS; SUBCUTANEOUS at 06:29

## 2017-06-23 RX ADMIN — Medication 3 MILLILITER(S): at 08:11

## 2017-06-23 RX ADMIN — AMIODARONE HYDROCHLORIDE 200 MILLIGRAM(S): 400 TABLET ORAL at 06:23

## 2017-06-23 RX ADMIN — IRON SUCROSE 110 MILLIGRAM(S): 20 INJECTION, SOLUTION INTRAVENOUS at 14:00

## 2017-06-23 RX ADMIN — PANTOPRAZOLE SODIUM 40 MILLIGRAM(S): 20 TABLET, DELAYED RELEASE ORAL at 06:23

## 2017-06-23 RX ADMIN — MIDODRINE HYDROCHLORIDE 10 MILLIGRAM(S): 2.5 TABLET ORAL at 22:22

## 2017-06-23 RX ADMIN — Medication 0.5 MILLIGRAM(S): at 08:11

## 2017-06-23 RX ADMIN — Medication 1 TABLET(S): at 12:10

## 2017-06-23 RX ADMIN — Medication 3 MILLILITER(S): at 15:03

## 2017-06-23 RX ADMIN — PIPERACILLIN AND TAZOBACTAM 200 GRAM(S): 4; .5 INJECTION, POWDER, LYOPHILIZED, FOR SOLUTION INTRAVENOUS at 18:10

## 2017-06-23 RX ADMIN — Medication 0.5 MILLIGRAM(S): at 20:33

## 2017-06-23 RX ADMIN — LATANOPROST 1 DROP(S): 0.05 SOLUTION/ DROPS OPHTHALMIC; TOPICAL at 22:00

## 2017-06-23 RX ADMIN — Medication 5 MILLIGRAM(S): at 06:24

## 2017-06-23 RX ADMIN — CLOPIDOGREL BISULFATE 75 MILLIGRAM(S): 75 TABLET, FILM COATED ORAL at 12:10

## 2017-06-23 RX ADMIN — PIPERACILLIN AND TAZOBACTAM 200 GRAM(S): 4; .5 INJECTION, POWDER, LYOPHILIZED, FOR SOLUTION INTRAVENOUS at 06:23

## 2017-06-23 NOTE — PROGRESS NOTE ADULT - SUBJECTIVE AND OBJECTIVE BOX
PULMONARY PROGRESS NOTE      [id number]    Patient is a 84y old  Female who presents with a chief complaint of Abdominal pain, insomnia, and increased shortness of breath (10 Murphy 2017 16:40)      INTERVAL HPI/OVERNIGHT EVENTS:  Patient transferred to CICU>obtunded this AM.  ?utilization of BIPAP last night.  Was placed on BIPAP and ABG done ON BIPAP was baseline.  Currently awake and alert on nasal O2 in no distress.   MEDICATIONS  (STANDING):  sodium chloride 0.9% lock flush 3milliLiter(s) IV Push every 8 hours  multivitamin 1Tablet(s) Oral daily  latanoprost 0.005% Ophthalmic Solution 1Drop(s) Both EYES at bedtime  pantoprazole    Tablet 40milliGRAM(s) Oral before breakfast  amiodarone    Tablet 200milliGRAM(s) Oral daily  metoprolol succinate ER 50milliGRAM(s) Oral daily  ALBUTerol/ipratropium for Nebulization 3milliLiter(s) Nebulizer every 6 hours  buDESOnide   0.5 milliGRAM(s) Respule 0.5milliGRAM(s) Inhalation two times a day  midodrine 10milliGRAM(s) Oral three times a day  piperacillin/tazobactam IVPB. 2.25Gram(s) IV Intermittent every 6 hours  clopidogrel Tablet 75milliGRAM(s) Oral daily  enoxaparin Injectable 30milliGRAM(s) SubCutaneous daily  folic acid 1milliGRAM(s) Oral daily  ascorbic acid 250milliGRAM(s) Oral daily  lactobacillus acidophilus 1Tablet(s) Oral two times a day with meals  psyllium Powder 1Packet(s) Oral two times a day  torsemide 5milliGRAM(s) Oral daily  iron sucrose IVPB 200milliGRAM(s) IV Intermittent every 24 hours      MEDICATIONS  (PRN):  acetaminophen   Tablet. 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 ENT:  No earache, sore throat or runny nose.    CARDIOVASCULAR:  No pressure, squeezing, tightness, heaviness or aching about the chest; no palpitations.    RESPIRATORY:  No cough, shortness of breath, PND or orthopnea. Mild SOBOE    GASTROINTESTINAL:  No nausea, vomiting or diarrhea.    GENITOURINARY:  No dysuria, frequency or urgency.    MUSCULOSKELETAL:  No joint pain    SKIN:  No new lesions.    NEUROLOGIC:  No paresthesias, fasciculations, seizures or weakness.    PSYCHIATRIC:  No disorder of thought or mood.    ENDOCRINE:  No heat or cold intolerance, polyuria or polydipsia.    HEMATOLOGICAL:  No easy bruising or bleeding.     Vital Signs Last 24 Hrs  T(C): 36.8, Max: 37.2 (- @ 22:11)  T(F): 98.2, Max: 99 (- @ 05:20)  HR: 73 (65 - 82)  BP: 127/62 (110/64 - 144/60)  BP(mean): 89 (84 - 89)  RR: 23 (16 - 30)  SpO2: 95% (95% - 100%)    PHYSICAL EXAMINATION:    GENERAL: The patient is awake and alert in no apparent distress.     HEENT: Head is normocephalic and atraumatic. Extraocular muscles are intact. Mucous membranes are moist.    NECK: Supple.    LUNGS: Clear to auscultation without wheezing, rales or rhonchi; respirations unlabored    HEART: Regular rate and rhythm without murmur.    ABDOMEN: Soft, nontender, and nondistended.      EXTREMITIES: Without any cyanosis, clubbing, rash, lesions or edema.    NEUROLOGIC: Grossly intact.    SKIN: No ulceration or induration present.      LABS:                        8.0    5.9   )-----------( 196      ( 2017 05:04 )             28.0     -    145  |  97<L>  |  28.0<H>  ----------------------------<  102  4.5   |  36.0<H>  |  1.31<H>    Ca    9.0      2017 05:04  Phos  3.3       Mg     3.0         TPro  6.3<L>  /  Alb  3.3  /  TBili  0.3<L>  /  DBili  x   /  AST  21  /  ALT  22  /  AlkPhos  106  -        ABG - ( 2017 05:35 )  pH: 7.37  /  pCO2: 74    /  pO2: 74    / HCO3: 39    / Base Excess: 14.9  /  SaO2: 95                              MICROBIOLOGY:    RADIOLOGY & ADDITIONAL STUDIES:   EXAM:  CHEST SINGLE VIEW FRONTAL                          PROCEDURE DATE:  2017        INTERPRETATION:  TECHNIQUE: Single portable view of the chest.    COMPARISON: 2017    CLINICAL HISTORY: Status post open heart surgery.     FINDINGS:    Single frontal view of the chest demonstrates mild CHF. Small bilateral   pleural effusions. Mediastinal sternotomy wires. The cardiomediastinal   silhouette is enlarged. No acute osseous abnormalities. Overlying EKG   leads and wires are noted.    IMPRESSION: Cardiomegaly with mild CHF, unchanged.      HOSSEIN MANCIA M.D., ATTENDING RADIOLOGIST  This document has been electronically signed. 2017 11:30AM

## 2017-06-23 NOTE — PROGRESS NOTE ADULT - ASSESSMENT
CHRISTOPHER on CKD ? no hydro on renal sono cr better today 1.1 has ok UOP  s/p recent mitral clip recent admission  becoming more congested agree with restarting diuretics  Respiratory acidosis hypercapnia /obesity on nocturnal bipap  Underlying Chronic Lung disease Pulm HTN  AM labs

## 2017-06-23 NOTE — PROGRESS NOTE ADULT - PROBLEM SELECTOR PLAN 2
continue nocturnal (and PRN) bipap> overnight pt found obtunded with bipap off, improved MS once placed on bipap  Pt now with chronic co2 retention

## 2017-06-23 NOTE — PROGRESS NOTE ADULT - SUBJECTIVE AND OBJECTIVE BOX
Subjective:  84yFemale   Mitral Clip  mutiple readmissions to ICU for altered mental status  Pt currently stable on Bipap, no acute complaints  denies abd pain, fever/chills, n/v/d    Past Medical History:  Heart failure  H/o or current diagnosis of HF- Contraindication to ACEI/ARBs  H/o or current diagnosis of HF- no contraindication to ACEI/ARBs  H/o or current diagnosis of HF- ACEI/ARB contraindication unknown  H/o or current diagnosis of HF- no contraindication to ACEI/ARBs  H/o or current diagnosis of HF- Contraindication to ACEI/ARBs  H/o or current diagnosis of HF- ACEI/ARB contraindication unknown  H/o or current diagnosis of HF- ACEI/ARB contraindication unknown  Family history of hypertension  Family history of heart disease  Handoff  MEWS Score  Renal insufficiency  MR (mitral regurgitation)  Mitral valve stenosis, severe  On home oxygen therapy  Obesity  Tracheal stenosis  Cardiomyopathy  Osteoarthritis  Iron deficiency anemia  MI, old  Dyslipidemia  Acid reflux  Hx of CABG  CAD (coronary artery disease)  Heart failure  HTN (hypertension)  Congestive heart failure  Dyslipidemia  Cerebrovascular accident (CVA) due to other mechanism  Acalculous cholecystitis  Acute on chronic systolic heart failure  Coronary artery disease involving native coronary artery of native heart without angina pectoris  Acute on chronic respiratory failure with hypercapnia  Obesity, unspecified obesity severity, unspecified obesity type  Hypercapnic respiratory failure, chronic  On home oxygen therapy  Palliative care encounter  Prophylactic measure  Polyp of ascending colon, unspecified type  Delirium  Iron deficiency anemia, unspecified iron deficiency anemia type  CKD (chronic kidney disease) stage 3, GFR 30-59 ml/min  Non-rheumatic mitral regurgitation  Non-ST elevation (NSTEMI) myocardial infarction  Atrial fibrillation with RVR  Encephalopathy, unspecified  Left lower quadrant pain  Chronic obstructive pulmonary disease, unspecified COPD type  Acute on chronic systolic congestive heart failure  H/O tracheostomy  Cataract  S/P CABG x 3  SEVERE ABD PAIN  0        sodium chloride 0.9% lock flush 3milliLiter(s) IV Push every 8 hours  multivitamin 1Tablet(s) Oral daily  latanoprost 0.005% Ophthalmic Solution 1Drop(s) Both EYES at bedtime  pantoprazole    Tablet 40milliGRAM(s) Oral before breakfast  amiodarone    Tablet 200milliGRAM(s) Oral daily  metoprolol succinate ER 50milliGRAM(s) Oral daily  ALBUTerol/ipratropium for Nebulization 3milliLiter(s) Nebulizer every 6 hours  buDESOnide   0.5 milliGRAM(s) Respule 0.5milliGRAM(s) Inhalation two times a day  midodrine 10milliGRAM(s) Oral three times a day  piperacillin/tazobactam IVPB. 2.25Gram(s) IV Intermittent every 6 hours  clopidogrel Tablet 75milliGRAM(s) Oral daily  enoxaparin Injectable 30milliGRAM(s) SubCutaneous daily  folic acid 1milliGRAM(s) Oral daily  ascorbic acid 250milliGRAM(s) Oral daily  lactobacillus acidophilus 1Tablet(s) Oral two times a day with meals  psyllium Powder 1Packet(s) Oral two times a day  loperamide 2milliGRAM(s) Oral every 4 hours PRN  torsemide 5milliGRAM(s) Oral daily  iron sucrose IVPB 200milliGRAM(s) IV Intermittent every 24 hours  MEDICATIONS  (PRN):  loperamide 2milliGRAM(s) Oral every 4 hours PRN Diarrhea      Daily     Daily Weight in k.3 (2017 05:00)      ABG - ( 2017 05:35 )  pH: 7.37  /  pCO2: 74    /  pO2: 74    / HCO3: 39    / Base Excess: 14.9  /  SaO2: 95                                      8.0    5.9   )-----------( 196      ( 2017 05:04 )             28.0   -22    145  |  97<L>  |  28.0<H>  ----------------------------<  102  4.5   |  36.0<H>  |  1.31<H>    Ca    9.0      2017 05:04  Phos  3.3     -22  Mg     3.0         TPro  6.3<L>  /  Alb  3.3  /  TBili  0.3<L>  /  DBili  x   /  AST  21  /  ALT  22  /  AlkPhos  106              Objective:  T(C): 36.7, Max: 37.2 ( @ 05:20)  HR: 70 (64 - 82)  BP: 140/65 (109/82 - 163/70)  RR: 41 (18 - 41)  SpO2: 96% (92% - 100%)  Wt(kg): --CAPILLARY BLOOD GLUCOSE  I&O's Summary  I & Os for 24h ending 2017 07:00  =============================================  IN: 260 ml / OUT: 200 ml / NET: 60 ml    I & Os for current day (as of 2017 01:10)  =============================================  IN: 1680 ml / OUT: 450 ml / NET: 1230 ml      Physical Exam:  Neuro: a0x3  Pulm: cta b/l  CV: s1/s2  + sys M  Abd:  soft  no areas of focal tenderness  + BS  Ext: trace edema, non tender

## 2017-06-23 NOTE — PROGRESS NOTE ADULT - ASSESSMENT
84y year old Female  s/p   MV clip readmitted for abdominal pain, developed worsening confusion, neutropenia, hypotension.  RUQ sono concerning for  acalculous cholecystitis, GI, Surgery, IR, Infectious Disease consults obtained.  Reviewed previous CT scan with Dr. Lara believed GB on CT scan wall is thickened and unable to clearly see borders, suspicious for acalculous cholecystitis currently being treated with abx as per multidisciplinary team.  On 6/22 pt again with episode of altereted mental status requiring transfer to CTICU, throughout day with improvement

## 2017-06-23 NOTE — PROGRESS NOTE ADULT - SUBJECTIVE AND OBJECTIVE BOX
NEPHROLOGY INTERVAL HPI/OVERNIGHT EVENTS:    Examined earlier  Sitting up in bed   becoming more edematous   +BiPaP  + orthopnea    MEDICATIONS  (STANDING):  sodium chloride 0.9% lock flush 3milliLiter(s) IV Push every 8 hours  multivitamin 1Tablet(s) Oral daily  latanoprost 0.005% Ophthalmic Solution 1Drop(s) Both EYES at bedtime  pantoprazole    Tablet 40milliGRAM(s) Oral before breakfast  amiodarone    Tablet 200milliGRAM(s) Oral daily  metoprolol succinate ER 50milliGRAM(s) Oral daily  ALBUTerol/ipratropium for Nebulization 3milliLiter(s) Nebulizer every 6 hours  buDESOnide   0.5 milliGRAM(s) Respule 0.5milliGRAM(s) Inhalation two times a day  midodrine 10milliGRAM(s) Oral three times a day  piperacillin/tazobactam IVPB. 2.25Gram(s) IV Intermittent every 6 hours  clopidogrel Tablet 75milliGRAM(s) Oral daily  folic acid 1milliGRAM(s) Oral daily  ascorbic acid 250milliGRAM(s) Oral daily  lactobacillus acidophilus 1Tablet(s) Oral two times a day with meals  psyllium Powder 1Packet(s) Oral two times a day  iron sucrose IVPB 200milliGRAM(s) IV Intermittent every 24 hours    MEDICATIONS  (PRN):  loperamide 2milliGRAM(s) Oral every 4 hours PRN Diarrhea      Allergies    aspirin (Anaphylaxis)  NSAIDs (Other)    Intolerances    Lasix (Other)  OHS PT (Unknown)      Vital Signs Last 24 Hrs  T(C): 36.6, Max: 36.7 (06-22 @ 12:00)  T(F): 97.8, Max: 98.1 (06-22 @ 12:00)  HR: 71 (64 - 80)  BP: 122/58 (109/82 - 163/70)  BP(mean): 84 (84 - 105)  RR: 25 (19 - 41)  SpO2: 94% (91% - 100%)  Daily     Daily     PHYSICAL EXAM:  Neuro: AxO x3  Pulm: slight Rales at bases B/L   CV: S1S2, no wheezing, no rales  Abd: Soft, NT, ND, normoactive bowel sounds  Ext: +DP pulses    LABS:                        9.5    6.4   )-----------( 226      ( 23 Jun 2017 05:20 )             32.4     06-23    146<H>  |  97<L>  |  26.0<H>  ----------------------------<  93  4.6   |  38.0<H>  |  1.18    Ca    9.4      23 Jun 2017 05:20  Phos  3.6     06-23  Mg     2.9     06-23    TPro  7.2  /  Alb  3.8  /  TBili  0.5  /  DBili  0.1  /  AST  24  /  ALT  25  /  AlkPhos  111  06-23        Magnesium, Serum: 2.9 mg/dL (06-23 @ 05:20)  Phosphorus Level, Serum: 3.6 mg/dL (06-23 @ 05:20)    ABG - ( 22 Jun 2017 05:35 )  pH: 7.37  /  pCO2: 74    /  pO2: 74    / HCO3: 39    / Base Excess: 14.9  /  SaO2: 95                    RADIOLOGY & ADDITIONAL TESTS:

## 2017-06-24 LAB
ANION GAP SERPL CALC-SCNC: 12 MMOL/L — SIGNIFICANT CHANGE UP (ref 5–17)
BUN SERPL-MCNC: 31 MG/DL — HIGH (ref 8–20)
CALCIUM SERPL-MCNC: 9.2 MG/DL — SIGNIFICANT CHANGE UP (ref 8.6–10.2)
CHLORIDE SERPL-SCNC: 95 MMOL/L — LOW (ref 98–107)
CO2 SERPL-SCNC: 37 MMOL/L — HIGH (ref 22–29)
CREAT SERPL-MCNC: 1.41 MG/DL — HIGH (ref 0.5–1.3)
GLUCOSE SERPL-MCNC: 95 MG/DL — SIGNIFICANT CHANGE UP (ref 70–115)
HCT VFR BLD CALC: 29.7 % — LOW (ref 37–47)
HGB BLD-MCNC: 8.7 G/DL — LOW (ref 12–16)
MAGNESIUM SERPL-MCNC: 2.6 MG/DL — SIGNIFICANT CHANGE UP (ref 1.6–2.6)
MCHC RBC-ENTMCNC: 21.4 PG — LOW (ref 27–31)
MCHC RBC-ENTMCNC: 29.3 G/DL — LOW (ref 32–36)
MCV RBC AUTO: 73 FL — LOW (ref 81–99)
PHOSPHATE SERPL-MCNC: 4 MG/DL — SIGNIFICANT CHANGE UP (ref 2.4–4.7)
PLATELET # BLD AUTO: 225 K/UL — SIGNIFICANT CHANGE UP (ref 150–400)
POTASSIUM SERPL-MCNC: 4.6 MMOL/L — SIGNIFICANT CHANGE UP (ref 3.5–5.3)
POTASSIUM SERPL-SCNC: 4.6 MMOL/L — SIGNIFICANT CHANGE UP (ref 3.5–5.3)
RBC # BLD: 4.07 M/UL — LOW (ref 4.4–5.2)
RBC # FLD: 23.9 % — HIGH (ref 11–15.6)
SODIUM SERPL-SCNC: 144 MMOL/L — SIGNIFICANT CHANGE UP (ref 135–145)
WBC # BLD: 6.8 K/UL — SIGNIFICANT CHANGE UP (ref 4.8–10.8)
WBC # FLD AUTO: 6.8 K/UL — SIGNIFICANT CHANGE UP (ref 4.8–10.8)

## 2017-06-24 PROCEDURE — 71010: CPT | Mod: 26

## 2017-06-24 RX ORDER — MIDODRINE HYDROCHLORIDE 2.5 MG/1
5 TABLET ORAL EVERY 12 HOURS
Qty: 0 | Refills: 0 | Status: DISCONTINUED | OUTPATIENT
Start: 2017-06-24 | End: 2017-06-26

## 2017-06-24 RX ORDER — ONDANSETRON 8 MG/1
4 TABLET, FILM COATED ORAL ONCE
Qty: 0 | Refills: 0 | Status: COMPLETED | OUTPATIENT
Start: 2017-06-24 | End: 2017-06-24

## 2017-06-24 RX ORDER — AMIODARONE HYDROCHLORIDE 400 MG/1
100 TABLET ORAL DAILY
Qty: 0 | Refills: 0 | Status: DISCONTINUED | OUTPATIENT
Start: 2017-06-24 | End: 2017-06-25

## 2017-06-24 RX ORDER — LACTOBACILLUS ACIDOPHILUS 100MM CELL
1 CAPSULE ORAL DAILY
Qty: 0 | Refills: 0 | Status: DISCONTINUED | OUTPATIENT
Start: 2017-06-24 | End: 2017-06-24

## 2017-06-24 RX ADMIN — Medication 250 MILLIGRAM(S): at 11:33

## 2017-06-24 RX ADMIN — SODIUM CHLORIDE 3 MILLILITER(S): 9 INJECTION INTRAMUSCULAR; INTRAVENOUS; SUBCUTANEOUS at 06:01

## 2017-06-24 RX ADMIN — PANTOPRAZOLE SODIUM 40 MILLIGRAM(S): 20 TABLET, DELAYED RELEASE ORAL at 07:25

## 2017-06-24 RX ADMIN — SODIUM CHLORIDE 3 MILLILITER(S): 9 INJECTION INTRAMUSCULAR; INTRAVENOUS; SUBCUTANEOUS at 22:00

## 2017-06-24 RX ADMIN — IRON SUCROSE 110 MILLIGRAM(S): 20 INJECTION, SOLUTION INTRAVENOUS at 13:39

## 2017-06-24 RX ADMIN — MIDODRINE HYDROCHLORIDE 10 MILLIGRAM(S): 2.5 TABLET ORAL at 05:57

## 2017-06-24 RX ADMIN — PIPERACILLIN AND TAZOBACTAM 200 GRAM(S): 4; .5 INJECTION, POWDER, LYOPHILIZED, FOR SOLUTION INTRAVENOUS at 05:56

## 2017-06-24 RX ADMIN — Medication 0.5 MILLIGRAM(S): at 20:03

## 2017-06-24 RX ADMIN — Medication 50 MILLIGRAM(S): at 05:59

## 2017-06-24 RX ADMIN — ONDANSETRON 4 MILLIGRAM(S): 8 TABLET, FILM COATED ORAL at 13:30

## 2017-06-24 RX ADMIN — Medication 3 MILLILITER(S): at 04:46

## 2017-06-24 RX ADMIN — Medication 3 MILLILITER(S): at 08:52

## 2017-06-24 RX ADMIN — MIDODRINE HYDROCHLORIDE 5 MILLIGRAM(S): 2.5 TABLET ORAL at 17:45

## 2017-06-24 RX ADMIN — Medication 20 MILLIGRAM(S): at 06:00

## 2017-06-24 RX ADMIN — CLOPIDOGREL BISULFATE 75 MILLIGRAM(S): 75 TABLET, FILM COATED ORAL at 11:33

## 2017-06-24 RX ADMIN — Medication 0.5 MILLIGRAM(S): at 08:53

## 2017-06-24 RX ADMIN — ONDANSETRON 4 MILLIGRAM(S): 8 TABLET, FILM COATED ORAL at 05:56

## 2017-06-24 RX ADMIN — Medication 1 TABLET(S): at 07:25

## 2017-06-24 RX ADMIN — Medication 3 MILLILITER(S): at 14:44

## 2017-06-24 RX ADMIN — Medication 1 MILLIGRAM(S): at 11:33

## 2017-06-24 RX ADMIN — Medication 3 MILLILITER(S): at 20:03

## 2017-06-24 RX ADMIN — Medication 1 TABLET(S): at 17:45

## 2017-06-24 RX ADMIN — Medication 1 TABLET(S): at 11:33

## 2017-06-24 RX ADMIN — AMIODARONE HYDROCHLORIDE 200 MILLIGRAM(S): 400 TABLET ORAL at 05:58

## 2017-06-24 RX ADMIN — SODIUM CHLORIDE 3 MILLILITER(S): 9 INJECTION INTRAMUSCULAR; INTRAVENOUS; SUBCUTANEOUS at 13:13

## 2017-06-24 NOTE — PROGRESS NOTE ADULT - ASSESSMENT
84y year old Female  s/p   MV clip readmitted for abdominal pain, developed worsening confusion, neutropenia, hypotension.  RUQ sono concerning for  acalculous cholecystitis, GI, Surgery, IR, Infectious Disease consults obtained.  Reviewed previous CT scan with Dr. Lara believed GB on CT scan wall is thickened and unable to clearly see borders, suspicious for acalculous cholecystitis now completed abx agreed upon as per multidisciplinary team.    6/22 pt again with episode of altered mental status requiring transfer to CTICU; GI and ID consulted without change in management, Pulmonary recommended strict use of BIPAP overnight.  6/23 Improving, diuresis  6/24 tolerated BIPAP overnight

## 2017-06-24 NOTE — PROGRESS NOTE ADULT - SUBJECTIVE AND OBJECTIVE BOX
NEPHROLOGY INTERVAL HPI/OVERNIGHT EVENTS:    Seen earlier   Feels better   Torsemide resumed    ml ??  No new complaints     MEDICATIONS  (STANDING):  sodium chloride 0.9% lock flush 3milliLiter(s) IV Push every 8 hours  multivitamin 1Tablet(s) Oral daily  latanoprost 0.005% Ophthalmic Solution 1Drop(s) Both EYES at bedtime  pantoprazole    Tablet 40milliGRAM(s) Oral before breakfast  metoprolol succinate ER 50milliGRAM(s) Oral daily  ALBUTerol/ipratropium for Nebulization 3milliLiter(s) Nebulizer every 6 hours  buDESOnide   0.5 milliGRAM(s) Respule 0.5milliGRAM(s) Inhalation two times a day  clopidogrel Tablet 75milliGRAM(s) Oral daily  folic acid 1milliGRAM(s) Oral daily  ascorbic acid 250milliGRAM(s) Oral daily  lactobacillus acidophilus 1Tablet(s) Oral two times a day with meals  psyllium Powder 1Packet(s) Oral two times a day  iron sucrose IVPB 200milliGRAM(s) IV Intermittent every 24 hours  torsemide 20milliGRAM(s) Oral daily    MEDICATIONS  (PRN):  loperamide 2milliGRAM(s) Oral every 4 hours PRN Diarrhea      Allergies    aspirin (Anaphylaxis)  NSAIDs (Other)    Intolerances    Lasix (Other)  OHS PT (Unknown)        Vital Signs Last 24 Hrs  T(C): 36.6, Max: 36.7 (06-23 @ 19:15)  T(F): 97.9, Max: 98 (06-23 @ 19:15)  HR: 80 (64 - 93)  BP: 131/92 (113/55 - 156/69)  BP(mean): 106 (78 - 106)  RR: 32 (22 - 48)  SpO2: 98% (91% - 100%)  Daily     Daily Weight in k.2 (2017 03:14)  I&O's Detail    I & Os for current day (as of 2017 08:28)  =============================================  IN:    Oral Fluid: 750 ml    Solution: 300 ml    Solution: 100 ml    Total IN: 1150 ml  ---------------------------------------------  OUT:    Voided: 200 ml    Total OUT: 200 ml  ---------------------------------------------  Total NET: 950 ml    I&O's Summary    I & Os for current day (as of 2017 08:28)  =============================================  IN: 1150 ml / OUT: 200 ml / NET: 950 ml      PHYSICAL EXAM:    GENERAL: NAD,   HEAD:  No edema   NECK: Supple, No JVD,   CHEST/LUNG: EAE, No wheeze , Bibasilar crackles   HEART: Regular rate and rhythm; No rub  ABDOMEN: Soft, Nontender,   EXTREMITIES:  Less edema         LABS:                        8.7    6.8   )-----------( 225      ( 2017 05:01 )             29.7         144  |  95<L>  |  31.0<H>  ----------------------------<  95  4.6   |  37.0<H>  |  1.41<H>    Ca    9.2      2017 05:01  Phos  4.0       Mg     2.6         TPro  7.2  /  Alb  3.8  /  TBili  0.5  /  DBili  0.1  /  AST  24  /  ALT  25  /  AlkPhos  111          Magnesium, Serum: 2.6 mg/dL ( @ 05:01)  Phosphorus Level, Serum: 4.0 mg/dL ( @ 05:01)          RADIOLOGY & ADDITIONAL TESTS:  AM:  CHEST SINGLE VIEW FRONTAL                          PROCEDURE DATE:  2017        INTERPRETATION:  TECHNIQUE: Single portable view of the chest.    COMPARISON: 2017    CLINICAL HISTORY: Status post open heart surgery.     FINDINGS:    Single frontal view of the chest demonstrates mild CHF. Small bilateral   pleural effusions. Mediastinal sternotomy wires. The cardiomediastinal   silhouette is enlarged. No acute osseous abnormalities. Overlying EKG   leads and wires are noted.    IMPRESSION: Cardiomegaly with mild CHF, unchanged.                HOSSEIN MANCIA M.D., ATTENDING RADIOLOGIST  This document has been electronically signed. 2017 11:30AM

## 2017-06-24 NOTE — PROGRESS NOTE ADULT - PROBLEM SELECTOR PLAN 8
encourage ambulation   encourage IS and deep breathing  DVT prophylaxis held permanently per patient's daughter  protonix for GI prophylaxis

## 2017-06-24 NOTE — PROGRESS NOTE ADULT - ASSESSMENT
Improved CHRISTOPHER , Improved contraction alkalosis   Recent MV Clip  No hydro on renal imaging   Torsemide resumed ---> Track UO   If unable to achieve negative balance , will have to increase dose     Anemia - Venofer

## 2017-06-24 NOTE — PROGRESS NOTE ADULT - PROBLEM SELECTOR PLAN 1
s/p mitral clip   continue midodrine, lopressor and amio  continue cardiac monitoring  diuresis restarted 6/21, tolerating thus far  Daily BMP to monitor creatinine

## 2017-06-25 LAB
ALBUMIN SERPL ELPH-MCNC: 3.9 G/DL — SIGNIFICANT CHANGE UP (ref 3.3–5.2)
ALP SERPL-CCNC: 127 U/L — HIGH (ref 40–120)
ALT FLD-CCNC: 26 U/L — SIGNIFICANT CHANGE UP
ANION GAP SERPL CALC-SCNC: 13 MMOL/L — SIGNIFICANT CHANGE UP (ref 5–17)
ANISOCYTOSIS BLD QL: SLIGHT — SIGNIFICANT CHANGE UP
APPEARANCE UR: CLEAR — SIGNIFICANT CHANGE UP
AST SERPL-CCNC: 25 U/L — SIGNIFICANT CHANGE UP
BACTERIA # UR AUTO: ABNORMAL
BILIRUB SERPL-MCNC: 0.4 MG/DL — SIGNIFICANT CHANGE UP (ref 0.4–2)
BILIRUB UR-MCNC: NEGATIVE — SIGNIFICANT CHANGE UP
BUN SERPL-MCNC: 40 MG/DL — HIGH (ref 8–20)
CALCIUM SERPL-MCNC: 9.4 MG/DL — SIGNIFICANT CHANGE UP (ref 8.6–10.2)
CHLORIDE SERPL-SCNC: 95 MMOL/L — LOW (ref 98–107)
CO2 SERPL-SCNC: 38 MMOL/L — HIGH (ref 22–29)
COD CRY URNS QL: ABNORMAL
COLOR SPEC: YELLOW — SIGNIFICANT CHANGE UP
COMMENT - URINE: SIGNIFICANT CHANGE UP
CREAT SERPL-MCNC: 1.8 MG/DL — HIGH (ref 0.5–1.3)
DIFF PNL FLD: NEGATIVE — SIGNIFICANT CHANGE UP
ELLIPTOCYTES BLD QL SMEAR: SLIGHT — SIGNIFICANT CHANGE UP
EOSINOPHIL NFR BLD AUTO: 2 % — SIGNIFICANT CHANGE UP (ref 0–5)
EPI CELLS # UR: SIGNIFICANT CHANGE UP
FOLATE SERPL-MCNC: >20 NG/ML — HIGH (ref 4–16)
GAS PNL BLDA: SIGNIFICANT CHANGE UP
GGT SERPL-CCNC: 152 U/L — HIGH (ref 5–36)
GLUCOSE SERPL-MCNC: 108 MG/DL — SIGNIFICANT CHANGE UP (ref 70–115)
GLUCOSE UR QL: NEGATIVE MG/DL — SIGNIFICANT CHANGE UP
HCT VFR BLD CALC: 31.2 % — LOW (ref 37–47)
HGB BLD-MCNC: 9 G/DL — LOW (ref 12–16)
HYALINE CASTS # UR AUTO: ABNORMAL /LPF
HYPOCHROMIA BLD QL: SIGNIFICANT CHANGE UP
KETONES UR-MCNC: NEGATIVE — SIGNIFICANT CHANGE UP
LEUKOCYTE ESTERASE UR-ACNC: ABNORMAL
LYMPHOCYTES # BLD AUTO: 17 % — LOW (ref 20–55)
MACROCYTES BLD QL: SLIGHT — SIGNIFICANT CHANGE UP
MCHC RBC-ENTMCNC: 21.6 PG — LOW (ref 27–31)
MCHC RBC-ENTMCNC: 28.8 G/DL — LOW (ref 32–36)
MCV RBC AUTO: 74.8 FL — LOW (ref 81–99)
MICROCYTES BLD QL: SLIGHT — SIGNIFICANT CHANGE UP
MONOCYTES NFR BLD AUTO: 18 % — HIGH (ref 3–10)
NEUTROPHILS NFR BLD AUTO: 63 % — SIGNIFICANT CHANGE UP (ref 37–73)
NITRITE UR-MCNC: NEGATIVE — SIGNIFICANT CHANGE UP
NRBC # BLD: 11 /100 — HIGH (ref 0–0)
OVALOCYTES BLD QL SMEAR: SLIGHT — SIGNIFICANT CHANGE UP
PH UR: 5 — SIGNIFICANT CHANGE UP (ref 5–8)
PLAT MORPH BLD: NORMAL — SIGNIFICANT CHANGE UP
PLATELET # BLD AUTO: 239 K/UL — SIGNIFICANT CHANGE UP (ref 150–400)
POIKILOCYTOSIS BLD QL AUTO: SLIGHT — SIGNIFICANT CHANGE UP
POLYCHROMASIA BLD QL SMEAR: SLIGHT — SIGNIFICANT CHANGE UP
POTASSIUM SERPL-MCNC: 5 MMOL/L — SIGNIFICANT CHANGE UP (ref 3.5–5.3)
POTASSIUM SERPL-SCNC: 5 MMOL/L — SIGNIFICANT CHANGE UP (ref 3.5–5.3)
PROCALCITONIN SERPL-MCNC: 0.29 NG/ML — HIGH (ref 0–0.04)
PROT SERPL-MCNC: 7.1 G/DL — SIGNIFICANT CHANGE UP (ref 6.6–8.7)
PROT UR-MCNC: 30 MG/DL
RBC # BLD: 4.17 M/UL — LOW (ref 4.4–5.2)
RBC # FLD: 24.3 % — HIGH (ref 11–15.6)
RBC BLD AUTO: ABNORMAL
RBC CASTS # UR COMP ASSIST: SIGNIFICANT CHANGE UP /HPF (ref 0–4)
SODIUM SERPL-SCNC: 146 MMOL/L — HIGH (ref 135–145)
SP GR SPEC: 1.02 — SIGNIFICANT CHANGE UP (ref 1.01–1.02)
SPHEROCYTES BLD QL SMEAR: SLIGHT — SIGNIFICANT CHANGE UP
TARGETS BLD QL SMEAR: SLIGHT — SIGNIFICANT CHANGE UP
UROBILINOGEN FLD QL: NEGATIVE MG/DL — SIGNIFICANT CHANGE UP
VIT B12 SERPL-MCNC: 954 PG/ML — HIGH (ref 180–914)
WBC # BLD: 5.6 K/UL — SIGNIFICANT CHANGE UP (ref 4.8–10.8)
WBC # FLD AUTO: 5.6 K/UL — SIGNIFICANT CHANGE UP (ref 4.8–10.8)
WBC UR QL: ABNORMAL

## 2017-06-25 PROCEDURE — 76705 ECHO EXAM OF ABDOMEN: CPT | Mod: 26

## 2017-06-25 PROCEDURE — 71010: CPT | Mod: 26

## 2017-06-25 RX ORDER — PIPERACILLIN AND TAZOBACTAM 4; .5 G/20ML; G/20ML
2.25 INJECTION, POWDER, LYOPHILIZED, FOR SOLUTION INTRAVENOUS EVERY 6 HOURS
Qty: 0 | Refills: 0 | Status: DISCONTINUED | OUTPATIENT
Start: 2017-06-25 | End: 2017-06-30

## 2017-06-25 RX ORDER — FUROSEMIDE 40 MG
20 TABLET ORAL ONCE
Qty: 0 | Refills: 0 | Status: COMPLETED | OUTPATIENT
Start: 2017-06-25 | End: 2017-06-25

## 2017-06-25 RX ORDER — PANTOPRAZOLE SODIUM 20 MG/1
40 TABLET, DELAYED RELEASE ORAL ONCE
Qty: 0 | Refills: 0 | Status: COMPLETED | OUTPATIENT
Start: 2017-06-25 | End: 2017-06-25

## 2017-06-25 RX ADMIN — PANTOPRAZOLE SODIUM 40 MILLIGRAM(S): 20 TABLET, DELAYED RELEASE ORAL at 10:06

## 2017-06-25 RX ADMIN — Medication 1 TABLET(S): at 17:27

## 2017-06-25 RX ADMIN — LATANOPROST 1 DROP(S): 0.05 SOLUTION/ DROPS OPHTHALMIC; TOPICAL at 21:18

## 2017-06-25 RX ADMIN — Medication 3 MILLILITER(S): at 03:21

## 2017-06-25 RX ADMIN — SODIUM CHLORIDE 3 MILLILITER(S): 9 INJECTION INTRAMUSCULAR; INTRAVENOUS; SUBCUTANEOUS at 21:18

## 2017-06-25 RX ADMIN — Medication 20 MILLIGRAM(S): at 10:06

## 2017-06-25 RX ADMIN — Medication 1 TABLET(S): at 13:08

## 2017-06-25 RX ADMIN — CLOPIDOGREL BISULFATE 75 MILLIGRAM(S): 75 TABLET, FILM COATED ORAL at 13:08

## 2017-06-25 RX ADMIN — Medication 3 MILLILITER(S): at 20:07

## 2017-06-25 RX ADMIN — PIPERACILLIN AND TAZOBACTAM 200 GRAM(S): 4; .5 INJECTION, POWDER, LYOPHILIZED, FOR SOLUTION INTRAVENOUS at 18:06

## 2017-06-25 RX ADMIN — Medication 1 MILLIGRAM(S): at 13:09

## 2017-06-25 RX ADMIN — Medication 0.5 MILLIGRAM(S): at 20:07

## 2017-06-25 RX ADMIN — SODIUM CHLORIDE 3 MILLILITER(S): 9 INJECTION INTRAMUSCULAR; INTRAVENOUS; SUBCUTANEOUS at 14:12

## 2017-06-25 RX ADMIN — MIDODRINE HYDROCHLORIDE 5 MILLIGRAM(S): 2.5 TABLET ORAL at 17:27

## 2017-06-25 RX ADMIN — Medication 3 MILLILITER(S): at 14:22

## 2017-06-25 RX ADMIN — Medication 0.5 MILLIGRAM(S): at 09:09

## 2017-06-25 RX ADMIN — SODIUM CHLORIDE 3 MILLILITER(S): 9 INJECTION INTRAMUSCULAR; INTRAVENOUS; SUBCUTANEOUS at 05:16

## 2017-06-25 RX ADMIN — Medication 1 PACKET(S): at 17:27

## 2017-06-25 RX ADMIN — Medication 250 MILLIGRAM(S): at 13:08

## 2017-06-25 RX ADMIN — PIPERACILLIN AND TAZOBACTAM 200 GRAM(S): 4; .5 INJECTION, POWDER, LYOPHILIZED, FOR SOLUTION INTRAVENOUS at 07:54

## 2017-06-25 RX ADMIN — PIPERACILLIN AND TAZOBACTAM 200 GRAM(S): 4; .5 INJECTION, POWDER, LYOPHILIZED, FOR SOLUTION INTRAVENOUS at 13:19

## 2017-06-25 RX ADMIN — Medication 20 MILLIGRAM(S): at 17:27

## 2017-06-25 RX ADMIN — Medication 3 MILLILITER(S): at 09:09

## 2017-06-25 NOTE — PROGRESS NOTE ADULT - SUBJECTIVE AND OBJECTIVE BOX
NEPHROLOGY INTERVAL HPI/OVERNIGHT EVENTS:    Seen this am   Some agitation and refusing to wear bipap mask  I/O not accurately recorded due to incontinence   Montero new being placed   Received dose of IV Lasix yesterday   am ABG and CXR noted     MEDICATIONS  (STANDING):  sodium chloride 0.9% lock flush 3milliLiter(s) IV Push every 8 hours  multivitamin 1Tablet(s) Oral daily  latanoprost 0.005% Ophthalmic Solution 1Drop(s) Both EYES at bedtime  pantoprazole    Tablet 40milliGRAM(s) Oral before breakfast  metoprolol succinate ER 50milliGRAM(s) Oral daily  ALBUTerol/ipratropium for Nebulization 3milliLiter(s) Nebulizer every 6 hours  buDESOnide   0.5 milliGRAM(s) Respule 0.5milliGRAM(s) Inhalation two times a day  clopidogrel Tablet 75milliGRAM(s) Oral daily  folic acid 1milliGRAM(s) Oral daily  ascorbic acid 250milliGRAM(s) Oral daily  lactobacillus acidophilus 1Tablet(s) Oral two times a day with meals  psyllium Powder 1Packet(s) Oral two times a day  torsemide 20milliGRAM(s) Oral daily  midodrine 5milliGRAM(s) Oral every 12 hours  piperacillin/tazobactam IVPB. 2.25Gram(s) IV Intermittent every 6 hours    MEDICATIONS  (PRN):  loperamide 2milliGRAM(s) Oral every 4 hours PRN Diarrhea      Allergies    aspirin (Anaphylaxis)  NSAIDs (Other)    Intolerances    Lasix (Other)  OHS PT (Unknown)          Vital Signs Last 24 Hrs  T(C): 36.2, Max: 37 ( @ 00:00)  T(F): 97.1, Max: 98.6 (- @ 00:00)  HR: 63 (62 - 75)  BP: 109/57 (103/51 - 146/65)  BP(mean): 76 (73 - 95)  RR: 32 (13 - 43)  SpO2: 95% (92% - 100%)  Daily     Daily Weight in k.7 (2017 04:26)  I&O's Detail  I & Os for 24h ending 2017 07:00  =============================================  IN:    Oral Fluid: 480 ml    Total IN: 480 ml  ---------------------------------------------  OUT:    Total OUT: 0 ml  ---------------------------------------------  Total NET: 480 ml    I & Os for current day (as of 2017 09:52)  =============================================  IN:    Solution: 100 ml    Total IN: 100 ml  ---------------------------------------------  OUT:    Indwelling Catheter - Urethral: 135 ml    Total OUT: 135 ml  ---------------------------------------------  Total NET: -35 ml    I&O's Summary  I & Os for 24h ending 2017 07:00  =============================================  IN: 480 ml / OUT: 0 ml / NET: 480 ml    I & Os for current day (as of 2017 09:52)  =============================================  IN: 100 ml / OUT: 135 ml / NET: -35 ml      PHYSICAL EXAM:    GENERAL: NAD,   HEAD:  No edema   NECK: Supple, No JVD,   CHEST/LUNG: EAE, No wheeze , Bibasilar crackles   HEART: Regular rate and rhythm; No rub  ABDOMEN: Soft, Nontender,   EXTREMITIES:  Less edema     LABS:                        9.0    5.6   )-----------( 239      ( 2017 04:24 )             31.2     06-    146<H>  |  95<L>  |  40.0<H>  ----------------------------<  108  5.0   |  38.0<H>  |  1.80<H>    Ca    9.4      2017 04:24  Phos  4.0       Mg     2.6     -    TPro  7.1  /  Alb  3.9  /  TBili  0.4  /  DBili  x   /  AST  25  /  ALT  26  /  AlkPhos  127<H>        Urinalysis Basic - ( 2017 09:14 )    Color: Yellow / Appearance: Clear / S.020 / pH: x  Gluc: x / Ketone: Negative  / Bili: Negative / Urobili: Negative mg/dL   Blood: x / Protein: 30 mg/dL / Nitrite: Negative   Leuk Esterase: Trace / RBC: x / WBC x   Sq Epi: x / Non Sq Epi: x / Bacteria: x        ABG - ( 2017 09:26 )  pH: 7.35  /  pCO2: 75    /  pO2: 68    / HCO3: 37    / Base Excess: 13.0  /  SaO2: 96                    RADIOLOGY & ADDITIONAL TESTS:

## 2017-06-25 NOTE — PROGRESS NOTE ADULT - ASSESSMENT
83 y/o F w/ epigastric abdominal pain. LFTs normal, will get ultrasound to evaluate hepatobiliary system

## 2017-06-25 NOTE — PROGRESS NOTE ADULT - PROBLEM SELECTOR PLAN 8
encourage ambulation   encourage IS and deep breathing  DVT prophylaxis held permanently per patient's daughter request  protonix for GI prophylaxis

## 2017-06-25 NOTE — PROGRESS NOTE ADULT - PROBLEM SELECTOR PLAN 1
s/p mitral clip   continue midodrine, lopressor.  Amio held for 6/25 due to family concern for possible pulmonary effects of amiodarone  continue cardiac monitoring  diuresis restarted 6/21, tolerating thus far  Daily BMP to monitor creatinine

## 2017-06-25 NOTE — PROGRESS NOTE ADULT - SUBJECTIVE AND OBJECTIVE BOX
84y Female s/p mitral clip 6/7/2017, returned with abdominal pain, found to have acalculous cholecystitis.  Upgraded to CTICU for altered mental status    Subjective: Patient has     T(C): 37, Max: 37 (06-25 @ 00:00)  HR: 65 (62 - 80)  BP: 118/56 (103/51 - 146/68)  RR: 28 (13 - 43)  SpO2: 99% (91% - 100%)        06-24    144  |  95<L>  |  31.0<H>  ----------------------------<  95  4.6   |  37.0<H>  |  1.41<H>    Ca    9.2      24 Jun 2017 05:01  Phos  4.0     06-24  Mg     2.6     06-24    TPro  7.2  /  Alb  3.8  /  TBili  0.5  /  DBili  0.1  /  AST  24  /  ALT  25  /  AlkPhos  111  06-23                               8.7    6.8   )-----------( 225      ( 24 Jun 2017 05:01 )             29.7                     CAPILLARY BLOOD GLUCOSE           CXR: No interval change.    I&O's Detail  I & Os for 24h ending 24 Jun 2017 07:00  =============================================  IN:    Oral Fluid: 750 ml    Solution: 300 ml    Solution: 100 ml    Total IN: 1150 ml  ---------------------------------------------  OUT:    Voided: 200 ml    Total OUT: 200 ml  ---------------------------------------------  Total NET: 950 ml    I & Os for current day (as of 25 Jun 2017 01:26)  =============================================  IN:    Oral Fluid: 480 ml    Total IN: 480 ml  ---------------------------------------------  OUT:    Total OUT: 0 ml  ---------------------------------------------  Total NET: 480 ml      MEDICATIONS  (STANDING):  sodium chloride 0.9% lock flush 3milliLiter(s) IV Push every 8 hours  multivitamin 1Tablet(s) Oral daily  latanoprost 0.005% Ophthalmic Solution 1Drop(s) Both EYES at bedtime  pantoprazole    Tablet 40milliGRAM(s) Oral before breakfast  metoprolol succinate ER 50milliGRAM(s) Oral daily  ALBUTerol/ipratropium for Nebulization 3milliLiter(s) Nebulizer every 6 hours  buDESOnide   0.5 milliGRAM(s) Respule 0.5milliGRAM(s) Inhalation two times a day  clopidogrel Tablet 75milliGRAM(s) Oral daily  folic acid 1milliGRAM(s) Oral daily  ascorbic acid 250milliGRAM(s) Oral daily  lactobacillus acidophilus 1Tablet(s) Oral two times a day with meals  psyllium Powder 1Packet(s) Oral two times a day  torsemide 20milliGRAM(s) Oral daily  amiodarone    Tablet 100milliGRAM(s) Oral daily   (held for 6/25)  midodrine 5milliGRAM(s) Oral every 12 hours    MEDICATIONS  (PRN):  loperamide 2milliGRAM(s) Oral every 4 hours PRN Diarrhea      Physical Exam  Neuro: A+O x conversation.  Soft spoken, knows she is in hospital.  Shakes head yes and no appropriately  Pulm: CTA, equal bilaterally, no wheeze  CV: RRR, +S1S2, no murmur  Abd: soft, NT, ND, reports passing bowel movements  Ext: MARTI x 4, no edema

## 2017-06-25 NOTE — PROGRESS NOTE ADULT - SUBJECTIVE AND OBJECTIVE BOX
INTERVAL HPI: Patient seen and evaluated at bedside. Surgery consulted for AMS and pt c/o abdominal pain. Currently no c.o at this time, patient sitting comfortably on BiPAP.     MEDICATIONS  (STANDING):  sodium chloride 0.9% lock flush 3milliLiter(s) IV Push every 8 hours  multivitamin 1Tablet(s) Oral daily  latanoprost 0.005% Ophthalmic Solution 1Drop(s) Both EYES at bedtime  pantoprazole    Tablet 40milliGRAM(s) Oral before breakfast  metoprolol succinate ER 50milliGRAM(s) Oral daily  ALBUTerol/ipratropium for Nebulization 3milliLiter(s) Nebulizer every 6 hours  buDESOnide   0.5 milliGRAM(s) Respule 0.5milliGRAM(s) Inhalation two times a day  clopidogrel Tablet 75milliGRAM(s) Oral daily  folic acid 1milliGRAM(s) Oral daily  ascorbic acid 250milliGRAM(s) Oral daily  lactobacillus acidophilus 1Tablet(s) Oral two times a day with meals  psyllium Powder 1Packet(s) Oral two times a day  torsemide 20milliGRAM(s) Oral daily  midodrine 5milliGRAM(s) Oral every 12 hours  piperacillin/tazobactam IVPB. 2.25Gram(s) IV Intermittent every 6 hours    MEDICATIONS  (PRN):  loperamide 2milliGRAM(s) Oral every 4 hours PRN Diarrhea      Vital Signs Last 24 Hrs  T(C): 36.4, Max: 37 (06-25 @ 00:00)  T(F): 97.6, Max: 98.6 (06-25 @ 00:00)  HR: 67 (61 - 72)  BP: 131/58 (103/51 - 146/65)  BP(mean): 83 (73 - 95)  RR: 25 (13 - 32)  SpO2: 100% (92% - 100%)    Physical Exam:    Neurological:  No sensory/motor deficits    HEENT: PERRLA, EOMI, no drainage or redness    Neck: No bruits; no thyromegaly or nodules,  No JVD    Back: Normal spine flexure, No CVA tenderness, No deformity or limitation of movement    Respiratory: on BiPAP    Cardiovascular: Regular rate & rhythm, normal S1, S2; no murmurs, gallops or rubs    Gastrointestinal:soft, nondistended. Mild TTP in epigastrium. Negative Loya's sign. No rebound tenderness or guarding.     Extremities: No peripheral edema, No cyanosis, clubbing     Vascular: Equal and normal pulses: 2+ peripheral pulses throughout    Musculoskeletal: No joint pain, swelling or deformity; no limitation of movement    Skin: No rashes      I&O's Detail  I & Os for 24h ending 2017 07:00  =============================================  IN:    Oral Fluid: 480 ml    Total IN: 480 ml  ---------------------------------------------  OUT:    Total OUT: 0 ml  ---------------------------------------------  Total NET: 480 ml    I & Os for current day (as of 2017 13:39)  =============================================  IN:    Solution: 100 ml    Total IN: 100 ml  ---------------------------------------------  OUT:    Indwelling Catheter - Urethral: 640 ml    Total OUT: 640 ml  ---------------------------------------------  Total NET: -540 ml      LABS:                        9.0    5.6   )-----------( 239      ( 2017 04:24 )             31.2     06-25    146<H>  |  95<L>  |  40.0<H>  ----------------------------<  108  5.0   |  38.0<H>  |  1.80<H>    Ca    9.4      2017 04:24  Phos  4.0     06-24  Mg     2.6     06-24    TPro  7.1  /  Alb  3.9  /  TBili  0.4  /  DBili  x   /  AST  25  /  ALT  26  /  AlkPhos  127<H>  06-      Urinalysis Basic - ( 2017 09:14 )    Color: Yellow / Appearance: Clear / S.020 / pH: x  Gluc: x / Ketone: Negative  / Bili: Negative / Urobili: Negative mg/dL   Blood: x / Protein: 30 mg/dL / Nitrite: Negative   Leuk Esterase: Trace / RBC: 0-2 /HPF / WBC 6-10   Sq Epi: x / Non Sq Epi: Few / Bacteria: Few        RADIOLOGY & ADDITIONAL STUDIES:

## 2017-06-25 NOTE — PROGRESS NOTE ADULT - ASSESSMENT
Renal insufficiency-Pre renal azotemia    Recent MV Clip  No hydro on renal imaging   Torsemide resumed ---> atkinson now placed for closer monitoring of UO   Given am CXR and ABG , would favor augmented diuresis   Will have to accept some degree of augmented azotemia and treat exacerbated alkalosis as needed   Will d/w CTICU team     Anemia - Venofer

## 2017-06-25 NOTE — PROGRESS NOTE ADULT - PROBLEM SELECTOR PLAN 1
Will f/u abdominal ultrasound, and will reassess after results    Case discussed with Dr. Liu -Contracted gallbladder, no stones  -Minimal LUQ pain, no RUQ pain  -No indication for perc drainage at this time

## 2017-06-26 LAB
ANION GAP SERPL CALC-SCNC: 10 MMOL/L — SIGNIFICANT CHANGE UP (ref 5–17)
BUN SERPL-MCNC: 35 MG/DL — HIGH (ref 8–20)
CALCIUM SERPL-MCNC: 9.2 MG/DL — SIGNIFICANT CHANGE UP (ref 8.6–10.2)
CHLORIDE SERPL-SCNC: 98 MMOL/L — SIGNIFICANT CHANGE UP (ref 98–107)
CO2 SERPL-SCNC: 39 MMOL/L — HIGH (ref 22–29)
CREAT SERPL-MCNC: 1.58 MG/DL — HIGH (ref 0.5–1.3)
GLUCOSE SERPL-MCNC: 86 MG/DL — SIGNIFICANT CHANGE UP (ref 70–115)
HCT VFR BLD CALC: 29.2 % — LOW (ref 37–47)
HGB BLD-MCNC: 8.4 G/DL — LOW (ref 12–16)
MAGNESIUM SERPL-MCNC: 2.6 MG/DL — SIGNIFICANT CHANGE UP (ref 1.6–2.6)
MCHC RBC-ENTMCNC: 21.3 PG — LOW (ref 27–31)
MCHC RBC-ENTMCNC: 28.8 G/DL — LOW (ref 32–36)
MCV RBC AUTO: 74.1 FL — LOW (ref 81–99)
PHOSPHATE SERPL-MCNC: 3.9 MG/DL — SIGNIFICANT CHANGE UP (ref 2.4–4.7)
PLATELET # BLD AUTO: 210 K/UL — SIGNIFICANT CHANGE UP (ref 150–400)
POTASSIUM SERPL-MCNC: 4 MMOL/L — SIGNIFICANT CHANGE UP (ref 3.5–5.3)
POTASSIUM SERPL-SCNC: 4 MMOL/L — SIGNIFICANT CHANGE UP (ref 3.5–5.3)
PROCALCITONIN SERPL-MCNC: 0.15 NG/ML — HIGH (ref 0–0.04)
RBC # BLD: 3.94 M/UL — LOW (ref 4.4–5.2)
RBC # FLD: 24.4 % — HIGH (ref 11–15.6)
SODIUM SERPL-SCNC: 147 MMOL/L — HIGH (ref 135–145)
WBC # BLD: 5.6 K/UL — SIGNIFICANT CHANGE UP (ref 4.8–10.8)
WBC # FLD AUTO: 5.6 K/UL — SIGNIFICANT CHANGE UP (ref 4.8–10.8)

## 2017-06-26 PROCEDURE — 99285 EMERGENCY DEPT VISIT HI MDM: CPT

## 2017-06-26 PROCEDURE — 93010 ELECTROCARDIOGRAM REPORT: CPT

## 2017-06-26 PROCEDURE — 71010: CPT | Mod: 26

## 2017-06-26 RX ORDER — IPRATROPIUM/ALBUTEROL SULFATE 18-103MCG
3 AEROSOL WITH ADAPTER (GRAM) INHALATION ONCE
Qty: 0 | Refills: 0 | Status: COMPLETED | OUTPATIENT
Start: 2017-06-26 | End: 2017-06-26

## 2017-06-26 RX ORDER — ACETAMINOPHEN 500 MG
500 TABLET ORAL ONCE
Qty: 0 | Refills: 0 | Status: COMPLETED | OUTPATIENT
Start: 2017-06-26 | End: 2017-06-26

## 2017-06-26 RX ORDER — PANTOPRAZOLE SODIUM 20 MG/1
40 TABLET, DELAYED RELEASE ORAL ONCE
Qty: 0 | Refills: 0 | Status: COMPLETED | OUTPATIENT
Start: 2017-06-26 | End: 2017-06-26

## 2017-06-26 RX ORDER — MAGNESIUM HYDROXIDE 400 MG/1
30 TABLET, CHEWABLE ORAL ONCE
Qty: 0 | Refills: 0 | Status: DISCONTINUED | OUTPATIENT
Start: 2017-06-26 | End: 2017-06-26

## 2017-06-26 RX ORDER — MIDODRINE HYDROCHLORIDE 2.5 MG/1
2.5 TABLET ORAL
Qty: 0 | Refills: 0 | Status: DISCONTINUED | OUTPATIENT
Start: 2017-06-26 | End: 2017-06-27

## 2017-06-26 RX ADMIN — Medication 3 MILLILITER(S): at 03:11

## 2017-06-26 RX ADMIN — CLOPIDOGREL BISULFATE 75 MILLIGRAM(S): 75 TABLET, FILM COATED ORAL at 11:26

## 2017-06-26 RX ADMIN — Medication 200 MILLIGRAM(S): at 16:10

## 2017-06-26 RX ADMIN — Medication 3 MILLILITER(S): at 14:57

## 2017-06-26 RX ADMIN — SODIUM CHLORIDE 3 MILLILITER(S): 9 INJECTION INTRAMUSCULAR; INTRAVENOUS; SUBCUTANEOUS at 06:16

## 2017-06-26 RX ADMIN — Medication 20 MILLIGRAM(S): at 06:15

## 2017-06-26 RX ADMIN — PIPERACILLIN AND TAZOBACTAM 200 GRAM(S): 4; .5 INJECTION, POWDER, LYOPHILIZED, FOR SOLUTION INTRAVENOUS at 00:21

## 2017-06-26 RX ADMIN — PANTOPRAZOLE SODIUM 40 MILLIGRAM(S): 20 TABLET, DELAYED RELEASE ORAL at 15:56

## 2017-06-26 RX ADMIN — Medication 30 MILLILITER(S): at 14:44

## 2017-06-26 RX ADMIN — PANTOPRAZOLE SODIUM 40 MILLIGRAM(S): 20 TABLET, DELAYED RELEASE ORAL at 06:16

## 2017-06-26 RX ADMIN — Medication 250 MILLIGRAM(S): at 11:26

## 2017-06-26 RX ADMIN — SODIUM CHLORIDE 3 MILLILITER(S): 9 INJECTION INTRAMUSCULAR; INTRAVENOUS; SUBCUTANEOUS at 22:32

## 2017-06-26 RX ADMIN — Medication 1 TABLET(S): at 17:03

## 2017-06-26 RX ADMIN — Medication 0.5 MILLIGRAM(S): at 08:43

## 2017-06-26 RX ADMIN — Medication 1 MILLIGRAM(S): at 11:26

## 2017-06-26 RX ADMIN — MIDODRINE HYDROCHLORIDE 2.5 MILLIGRAM(S): 2.5 TABLET ORAL at 17:03

## 2017-06-26 RX ADMIN — PIPERACILLIN AND TAZOBACTAM 200 GRAM(S): 4; .5 INJECTION, POWDER, LYOPHILIZED, FOR SOLUTION INTRAVENOUS at 17:03

## 2017-06-26 RX ADMIN — Medication 3 MILLILITER(S): at 21:21

## 2017-06-26 RX ADMIN — Medication 1 TABLET(S): at 07:47

## 2017-06-26 RX ADMIN — Medication 3 MILLILITER(S): at 08:43

## 2017-06-26 RX ADMIN — PIPERACILLIN AND TAZOBACTAM 200 GRAM(S): 4; .5 INJECTION, POWDER, LYOPHILIZED, FOR SOLUTION INTRAVENOUS at 11:26

## 2017-06-26 RX ADMIN — Medication 1 TABLET(S): at 11:26

## 2017-06-26 RX ADMIN — SODIUM CHLORIDE 3 MILLILITER(S): 9 INJECTION INTRAMUSCULAR; INTRAVENOUS; SUBCUTANEOUS at 13:14

## 2017-06-26 RX ADMIN — PIPERACILLIN AND TAZOBACTAM 200 GRAM(S): 4; .5 INJECTION, POWDER, LYOPHILIZED, FOR SOLUTION INTRAVENOUS at 06:16

## 2017-06-26 RX ADMIN — Medication 50 MILLIGRAM(S): at 06:16

## 2017-06-26 RX ADMIN — Medication 3 MILLILITER(S): at 17:11

## 2017-06-26 RX ADMIN — LATANOPROST 1 DROP(S): 0.05 SOLUTION/ DROPS OPHTHALMIC; TOPICAL at 22:30

## 2017-06-26 RX ADMIN — Medication 500 MILLIGRAM(S): at 16:25

## 2017-06-26 RX ADMIN — Medication 0.5 MILLIGRAM(S): at 21:21

## 2017-06-26 NOTE — CONSULT NOTE ADULT - SUBJECTIVE AND OBJECTIVE BOX
84y year old Female  h/o stage 3 CKD, HLD, chronic systolic CHF non-rheumatic MR s/p MV clip 17,  readmitted for abdominal pain, developed worsening confusion, neutropenia, hypotension and found to have acalculous cholecystitis.         PAST MEDICAL & SURGICAL HISTORY:  Renal insufficiency  MR (mitral regurgitation): severe  Mitral valve stenosis, severe  On home oxygen therapy: not at this time  3-30-17  Obesity  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   r/o  out  stenosis  Cardiomyopathy  Osteoarthritis  Iron deficiency anemia  MI, old: 2008  Dyslipidemia  Acid reflux  Hx of CAB  CAD (coronary artery disease)  Heart failure  HTN (hypertension)  H/O tracheostomy: 2013 may  Cataract: b/l  2013  S/P CABG x 3:   [place]      REVIEW OF SYSTEMS:    CONSTITUTIONAL: No fever, weight loss, or fatigue  EYES: No eye pain, visual disturbances, or discharge  ENMT:  No difficulty hearing, tinnitus, vertigo; No sinus or throat pain  NECK: No pain or stiffness  BREASTS: No pain, masses, or nipple discharge  RESPIRATORY: No cough, wheezing, chills or hemoptysis; No shortness of breath  CARDIOVASCULAR: No chest pain, palpitations, dizziness, or leg swelling  GASTROINTESTINAL: No abdominal or epigastric pain. No nausea, vomiting, or hematemesis; No diarrhea or constipation. No melena or hematochezia.  GENITOURINARY: No dysuria, frequency, hematuria, or incontinence  NEUROLOGICAL: No headaches, memory loss, loss of strength, numbness, or tremors  SKIN: No itching, burning, rashes, or lesions   LYMPH NODES: No enlarged glands  ENDOCRINE: No heat or cold intolerance; No hair loss  MUSCULOSKELETAL: No joint pain or swelling; No muscle, back, or extremity pain  PSYCHIATRIC: No depression, anxiety, mood swings, or difficulty sleeping  HEME/LYMPH: No easy bruising, or bleeding gums  ALLERY AND IMMUNOLOGIC: No hives or eczema      MEDICATIONS  (STANDING):  sodium chloride 0.9% lock flush 3milliLiter(s) IV Push every 8 hours  multivitamin 1Tablet(s) Oral daily  latanoprost 0.005% Ophthalmic Solution 1Drop(s) Both EYES at bedtime  pantoprazole    Tablet 40milliGRAM(s) Oral before breakfast  metoprolol succinate ER 50milliGRAM(s) Oral daily  ALBUTerol/ipratropium for Nebulization 3milliLiter(s) Nebulizer every 6 hours  buDESOnide   0.5 milliGRAM(s) Respule 0.5milliGRAM(s) Inhalation two times a day  clopidogrel Tablet 75milliGRAM(s) Oral daily  folic acid 1milliGRAM(s) Oral daily  ascorbic acid 250milliGRAM(s) Oral daily  lactobacillus acidophilus 1Tablet(s) Oral two times a day with meals  psyllium Powder 1Packet(s) Oral two times a day  torsemide 20milliGRAM(s) Oral daily  piperacillin/tazobactam IVPB. 2.25Gram(s) IV Intermittent every 6 hours  midodrine 2.5milliGRAM(s) Oral two times a day    MEDICATIONS  (PRN):  loperamide 2milliGRAM(s) Oral every 4 hours PRN Diarrhea      Allergies    aspirin (Anaphylaxis)  NSAIDs (Other)    Intolerances    Lasix (Other)  [facility] PT (Unknown)      SOCIAL HISTORY:    FAMILY HISTORY:  Family history of hypertension  Family history of heart disease      Vital Signs Last 24 Hrs  T(C): 36.3, Max: 36.4 (06-25 @ 12:17)  T(F): 97.4, Max: 97.6 (06-25 @ 12:17)  HR: 68 (60 - 71)  BP: 112/52 (103/53 - 132/60)  BP(mean): 75 (70 - 94)  RR: 20 (13 - 36)  SpO2: 100% (96% - 100%)    Physical Exam:  Constitutional: AAOx3, NAD  Neck: supple, No JVD  Cardiovascular: +S1S2 RRR, no murmurs, rubs, gallops   Pulmonary: CTA b/l, unlabored, no wheezes, rales. rhonci  Abdomen: +BS, soft NTND  Extremities: no edema b/l, +distal pulses b/l  Neuro: non focal, speech clear, MARTI x 4    LABS:                        8.4    5.6   )-----------( 210      ( 2017 04:40 )             29.2   06-    147<H>  |  98  |  35.0<H>  ----------------------------<  86  4.0   |  39.0<H>  |  1.58<H>    Ca    9.2      2017 04:40  Phos  3.9     06-  Mg     2.6     06-    TPro  7.1  /  Alb  3.9  /  TBili  0.4  /  DBili  x   /  AST  25  /  ALT  26  /  AlkPhos  127<H>  06-  LIVER FUNCTIONS - ( 2017 04:24 )  Alb: 3.9 g/dL / Pro: 7.1 g/dL / ALK PHOS: 127 U/L / ALT: 26 U/L / AST: 25 U/L / GGT: 152 U/L           Urinalysis Basic - ( 2017 09:14 )    Color: Yellow / Appearance: Clear / S.020 / pH: x  Gluc: x / Ketone: Negative  / Bili: Negative / Urobili: Negative mg/dL   Blood: x / Protein: 30 mg/dL / Nitrite: Negative   Leuk Esterase: Trace / RBC: 0-2 /HPF / WBC 6-10   Sq Epi: x / Non Sq Epi: Few / Bacteria: Few        ECHOCARDIOGRAM: 84y year old Female  h/o stage 3 CKD, CAD s/p remote MI & CABG x 3 (), mixed cardiomyopathy, NYHA class III chronic systolic HFrEF, LVEF 45-50% (TTE 17), non-rheumatic severe MR s/p MV clip 17,  readmitted POD #3 for abdominal pain, worsening confusion, neutropenia, hypotension and found to have acalculous cholecystitis and chronic CO2 retention (nocturnal)- improved w/ PM BiPAP. Also noted to have brief episode of AF , treated w/ amiodarone & beta blockade; maintaining sinus rhythm to date w/ amiodarone tx now d/c'd.     Cardiology consulted per family request for 2nd opinion re: HF management.       PAST MEDICAL & SURGICAL HISTORY:  Renal insufficiency  MR (mitral regurgitation): severe  Mitral valve stenosis, severe  On home oxygen therapy: not at this time  3-30-17  Obesity  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   r/o  out  stenosis  Cardiomyopathy  Osteoarthritis  Iron deficiency anemia  MI, old: 2008  Dyslipidemia  Acid reflux  Hx of CAB  CAD (coronary artery disease)  Heart failure  HTN (hypertension)  H/O tracheostomy: 2013 may  Cataract: b/l  2013  S/P CABG x 3:   [place]      REVIEW OF SYSTEMS:    CONSTITUTIONAL: No fever, weight loss, or fatigue  EYES: No eye pain, visual disturbances, or discharge  ENMT:  No difficulty hearing, tinnitus, vertigo; No sinus or throat pain  NECK: No pain or stiffness  BREASTS: No pain, masses, or nipple discharge  RESPIRATORY: No cough, wheezing, chills or hemoptysis; No shortness of breath  CARDIOVASCULAR: No chest pain, palpitations, dizziness, or leg swelling  GASTROINTESTINAL: No abdominal or epigastric pain. No nausea, vomiting, or hematemesis; No diarrhea or constipation. No melena or hematochezia.  GENITOURINARY: No dysuria, frequency, hematuria, or incontinence  NEUROLOGICAL: No headaches, memory loss, loss of strength, numbness, or tremors  SKIN: No itching, burning, rashes, or lesions   LYMPH NODES: No enlarged glands  ENDOCRINE: No heat or cold intolerance; No hair loss  MUSCULOSKELETAL: No joint pain or swelling; No muscle, back, or extremity pain  PSYCHIATRIC: No depression, anxiety, mood swings, or difficulty sleeping  HEME/LYMPH: No easy bruising, or bleeding gums  ALLERY AND IMMUNOLOGIC: No hives or eczema      MEDICATIONS  (STANDING):  sodium chloride 0.9% lock flush 3milliLiter(s) IV Push every 8 hours  multivitamin 1Tablet(s) Oral daily  latanoprost 0.005% Ophthalmic Solution 1Drop(s) Both EYES at bedtime  pantoprazole    Tablet 40milliGRAM(s) Oral before breakfast  metoprolol succinate ER 50milliGRAM(s) Oral daily  ALBUTerol/ipratropium for Nebulization 3milliLiter(s) Nebulizer every 6 hours  buDESOnide   0.5 milliGRAM(s) Respule 0.5milliGRAM(s) Inhalation two times a day  clopidogrel Tablet 75milliGRAM(s) Oral daily  folic acid 1milliGRAM(s) Oral daily  ascorbic acid 250milliGRAM(s) Oral daily  lactobacillus acidophilus 1Tablet(s) Oral two times a day with meals  psyllium Powder 1Packet(s) Oral two times a day  torsemide 20milliGRAM(s) Oral daily  piperacillin/tazobactam IVPB. 2.25Gram(s) IV Intermittent every 6 hours  midodrine 2.5milliGRAM(s) Oral two times a day    MEDICATIONS  (PRN):  loperamide 2milliGRAM(s) Oral every 4 hours PRN Diarrhea      Allergies    aspirin (Anaphylaxis)  NSAIDs (Other)    Intolerances    Lasix (Other)  [facility] PT (Unknown)      SOCIAL HISTORY:    FAMILY HISTORY:  Family history of hypertension  Family history of heart disease      Vital Signs Last 24 Hrs  T(C): 36.3, Max: 36.4 (06-25 @ 12:17)  T(F): 97.4, Max: 97.6 (06-25 @ 12:17)  HR: 68 (60 - 71)  BP: 112/52 (103/53 - 132/60)  BP(mean): 75 (70 - 94)  RR: 20 (13 - 36)  SpO2: 100% (96% - 100%)    Physical Exam:  Constitutional: AAOx3, NAD  Neck: supple, No JVD  Cardiovascular: +S1S2 RRR, no murmurs, rubs, gallops   Pulmonary: CTA b/l, unlabored, no wheezes, rales. rhonci  Abdomen: +BS, soft NTND  Extremities: no edema b/l, +distal pulses b/l  Neuro: non focal, speech clear, MARTI x 4    LABS:                        8.4    5.6   )-----------( 210      ( 2017 04:40 )             29.2   -    147<H>  |  98  |  35.0<H>  ----------------------------<  86  4.0   |  39.0<H>  |  1.58<H>    Ca    9.2      2017 04:40  Phos  3.9     -  Mg     2.6         TPro  7.1  /  Alb  3.9  /  TBili  0.4  /  DBili  x   /  AST  25  /  ALT  26  /  AlkPhos  127<H>    LIVER FUNCTIONS - ( 2017 04:24 )  Alb: 3.9 g/dL / Pro: 7.1 g/dL / ALK PHOS: 127 U/L / ALT: 26 U/L / AST: 25 U/L / GGT: 152 U/L           Urinalysis Basic - ( 2017 09:14 )    Color: Yellow / Appearance: Clear / S.020 / pH: x  Gluc: x / Ketone: Negative  / Bili: Negative / Urobili: Negative mg/dL   Blood: x / Protein: 30 mg/dL / Nitrite: Negative   Leuk Esterase: Trace / RBC: 0-2 /HPF / WBC 6-10   Sq Epi: x / Non Sq Epi: Few / Bacteria: Few        ECHOCARDIOGRAM 17:   1. Mildly decreased global left ventricular systolic function.   2. Left ventricular ejection fraction, by visual estimation, is 45 to   50%.   3. Mild to moderate right ventricular systolic dysfunction.   4. Severely enlarged left atrium.   5. Moderately dilated right atrium.   6. Patient is s/p Mitral Clip with severe regurgitation.   7. Moderate-severe tricuspid regurgitation.   8. Mild aortic regurgitation.   9. Estimated pulmonary artery systolic pressure is 49.7 mmHg assuming a   right atrial pressure of 8 mmHg, which is consistent with mild pulmonary   hypertension.  10. There is no evidence of pericardial effusion. 84y year old Female  h/o stage 3 CKD, CAD s/p remote MI & CABG x 3 (), mixed cardiomyopathy, NYHA class III chronic systolic HFrEF, LVEF 45-50% (TTE 17), non-rheumatic severe MR s/p MV clip 17,  readmitted POD #3 for abdominal pain, worsening confusion, neutropenia, hypotension and found to have acalculous cholecystitis and chronic CO2 retention (nocturnal)- improved w/ PM BiPAP. Also noted to have brief episode of AF , treated w/ amiodarone & beta blockade; maintaining sinus rhythm to date w/ amiodarone tx now d/c'd. Diuresis now improving with activity level & daytime supplemental O2 demand also improving.     Cardiology consulted per family request for 2nd opinion re: HF management.       PAST MEDICAL & SURGICAL HISTORY:  Renal insufficiency  MR (mitral regurgitation): severe  Mitral valve stenosis, severe  On home oxygen therapy: not at this time  3-30-17  Obesity  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   r/o  out  stenosis  Cardiomyopathy  Osteoarthritis  Iron deficiency anemia  MI, old: 2008  Dyslipidemia  Acid reflux  Hx of CAB  CAD (coronary artery disease)  Heart failure  HTN (hypertension)  H/O tracheostomy: 2013 may  Cataract: b/l  2013  S/P CABG x 3:   [place]      REVIEW OF SYSTEMS:    CONSTITUTIONAL: No fever, weight loss, or fatigue  EYES: No eye pain, visual disturbances, or discharge  ENMT:  No difficulty hearing, tinnitus, vertigo; No sinus or throat pain  NECK: No pain or stiffness  BREASTS: No pain, masses, or nipple discharge  RESPIRATORY: No cough, wheezing, chills or hemoptysis; No shortness of breath  CARDIOVASCULAR: No chest pain, palpitations, dizziness, or leg swelling  GASTROINTESTINAL: No abdominal or epigastric pain. No nausea, vomiting, or hematemesis; No diarrhea or constipation. No melena or hematochezia.  GENITOURINARY: No dysuria, frequency, hematuria, or incontinence  NEUROLOGICAL: No headaches, memory loss, loss of strength, numbness, or tremors  SKIN: No itching, burning, rashes, or lesions   LYMPH NODES: No enlarged glands  ENDOCRINE: No heat or cold intolerance; No hair loss  MUSCULOSKELETAL: No joint pain or swelling; No muscle, back, or extremity pain  PSYCHIATRIC: No depression, anxiety, mood swings, or difficulty sleeping  HEME/LYMPH: No easy bruising, or bleeding gums  ALLERY AND IMMUNOLOGIC: No hives or eczema      MEDICATIONS  (STANDING):  sodium chloride 0.9% lock flush 3milliLiter(s) IV Push every 8 hours  multivitamin 1Tablet(s) Oral daily  latanoprost 0.005% Ophthalmic Solution 1Drop(s) Both EYES at bedtime  pantoprazole    Tablet 40milliGRAM(s) Oral before breakfast  metoprolol succinate ER 50milliGRAM(s) Oral daily  ALBUTerol/ipratropium for Nebulization 3milliLiter(s) Nebulizer every 6 hours  buDESOnide   0.5 milliGRAM(s) Respule 0.5milliGRAM(s) Inhalation two times a day  clopidogrel Tablet 75milliGRAM(s) Oral daily  folic acid 1milliGRAM(s) Oral daily  ascorbic acid 250milliGRAM(s) Oral daily  lactobacillus acidophilus 1Tablet(s) Oral two times a day with meals  psyllium Powder 1Packet(s) Oral two times a day  torsemide 20milliGRAM(s) Oral daily  piperacillin/tazobactam IVPB. 2.25Gram(s) IV Intermittent every 6 hours  midodrine 2.5milliGRAM(s) Oral two times a day    MEDICATIONS  (PRN):  loperamide 2milliGRAM(s) Oral every 4 hours PRN Diarrhea      Allergies    aspirin (Anaphylaxis)  NSAIDs (Other)    Intolerances    Lasix (Other)  [facility] PT (Unknown)      SOCIAL HISTORY:    FAMILY HISTORY:  Family history of hypertension  Family history of heart disease      Vital Signs Last 24 Hrs  T(C): 36.3, Max: 36.4 (-25 @ 12:17)  T(F): 97.4, Max: 97.6 (06-25 @ 12:17)  HR: 68 (60 - 71)  BP: 112/52 (103/53 - 132/60)  BP(mean): 75 (70 - 94)  RR: 20 (13 - 36)  SpO2: 100% (96% - 100%)    Physical Exam:  Constitutional: VSS, NAD, alert & oriented to self  Neck: supple, + mild JVD  Cardiovascular: +S1S2 RRR, II/VI holosystolic [place]  Pulmonary: intermittent b/l rhonchi; no wheezes  Abdomen: +BS, soft NTND  Extremities: 2+ pitting edema to b/l knees  Neuro: non focal, speech clear, MARTI x 4    LABS:                        8.4    5.6   )-----------( 210      ( 2017 04:40 )             29.2   06-    147<H>  |  98  |  35.0<H>  ----------------------------<  86  4.0   |  39.0<H>  |  1.58<H>    Ca    9.2      2017 04:40  Phos  3.9     06-  Mg     2.6     -    TPro  7.1  /  Alb  3.9  /  TBili  0.4  /  DBili  x   /  AST  25  /  ALT  26  /  AlkPhos  127<H>  06-  LIVER FUNCTIONS - ( 2017 04:24 )  Alb: 3.9 g/dL / Pro: 7.1 g/dL / ALK PHOS: 127 U/L / ALT: 26 U/L / AST: 25 U/L / GGT: 152 U/L         Urinalysis Basic - ( 2017 09:14 )    Color: Yellow / Appearance: Clear / S.020 / pH: x  Gluc: x / Ketone: Negative  / Bili: Negative / Urobili: Negative mg/dL   Blood: x / Protein: 30 mg/dL / Nitrite: Negative   Leuk Esterase: Trace / RBC: 0-2 /HPF / WBC 6-10   Sq Epi: x / Non Sq Epi: Few / Bacteria: Few      ECHOCARDIOGRAM 17:   1. Mildly decreased global left ventricular systolic function.   2. Left ventricular ejection fraction, by visual estimation, is 45 to   50%.   3. Mild to moderate right ventricular systolic dysfunction.   4. Severely enlarged left atrium.   5. Moderately dilated right atrium.   6. Patient is s/p Mitral Clip with severe regurgitation.   7. Moderate-severe tricuspid regurgitation.   8. Mild aortic regurgitation.   9. Estimated pulmonary artery systolic pressure is 49.7 mmHg assuming a   right atrial pressure of 8 mmHg, which is consistent with mild pulmonary   hypertension.  10. There is no evidence of pericardial effusion.

## 2017-06-26 NOTE — PROGRESS NOTE ADULT - SUBJECTIVE AND OBJECTIVE BOX
Subjective:  84yFemale  MItral clip on 6/7,  transferred back to [facility] on 6/22 w/ AMS    no acute complaints, no overnight events    Past Medical History:  Heart failure  H/o or current diagnosis of HF- Contraindication to ACEI/ARBs  H/o or current diagnosis of HF- no contraindication to ACEI/ARBs  H/o or current diagnosis of HF- ACEI/ARB contraindication unknown  H/o or current diagnosis of HF- no contraindication to ACEI/ARBs  H/o or current diagnosis of HF- Contraindication to ACEI/ARBs  H/o or current diagnosis of HF- ACEI/ARB contraindication unknown  H/o or current diagnosis of HF- ACEI/ARB contraindication unknown  Family history of hypertension  Family history of heart disease  Handoff  MEWS Score  Renal insufficiency  MR (mitral regurgitation)  Mitral valve stenosis, severe  On home oxygen therapy  Obesity  Tracheal stenosis  Cardiomyopathy  Osteoarthritis  Iron deficiency anemia  MI, old  Dyslipidemia  Acid reflux  Hx of CABG  CAD (coronary artery disease)  Heart failure  HTN (hypertension)  Congestive heart failure  Dyslipidemia  Cerebrovascular accident (CVA) due to other mechanism  Acalculous cholecystitis  Acute on chronic systolic heart failure  Coronary artery disease involving native coronary artery of native heart without angina pectoris  Acute on chronic respiratory failure with hypercapnia  Obesity, unspecified obesity severity, unspecified obesity type  Hypercapnic respiratory failure, chronic  On home oxygen therapy  Palliative care encounter  Prophylactic measure  Polyp of ascending colon, unspecified type  Delirium  Iron deficiency anemia, unspecified iron deficiency anemia type  CKD (chronic kidney disease) stage 3, GFR 30-59 ml/min  Non-rheumatic mitral regurgitation  Non-ST elevation (NSTEMI) myocardial infarction  Atrial fibrillation with RVR  Encephalopathy, unspecified  Left lower quadrant pain  Chronic obstructive pulmonary disease, unspecified COPD type  Acute on chronic systolic congestive heart failure  H/O tracheostomy  Cataract  S/P CABG x 3  SEVERE ABD PAIN  0        sodium chloride 0.9% lock flush 3milliLiter(s) IV Push every 8 hours  multivitamin 1Tablet(s) Oral daily  latanoprost 0.005% Ophthalmic Solution 1Drop(s) Both EYES at bedtime  pantoprazole    Tablet 40milliGRAM(s) Oral before breakfast  metoprolol succinate ER 50milliGRAM(s) Oral daily  ALBUTerol/ipratropium for Nebulization 3milliLiter(s) Nebulizer every 6 hours  buDESOnide   0.5 milliGRAM(s) Respule 0.5milliGRAM(s) Inhalation two times a day  clopidogrel Tablet 75milliGRAM(s) Oral daily  folic acid 1milliGRAM(s) Oral daily  ascorbic acid 250milliGRAM(s) Oral daily  lactobacillus acidophilus 1Tablet(s) Oral two times a day with meals  psyllium Powder 1Packet(s) Oral two times a day  loperamide 2milliGRAM(s) Oral every 4 hours PRN  torsemide 20milliGRAM(s) Oral daily  midodrine 5milliGRAM(s) Oral every 12 hours  piperacillin/tazobactam IVPB. 2.25Gram(s) IV Intermittent every 6 hours  MEDICATIONS  (PRN):  loperamide 2milliGRAM(s) Oral every 4 hours PRN Diarrhea      Daily     Daily       ABG - ( 25 Jun 2017 09:26 )  pH: 7.35  /  pCO2: 75    /  pO2: 68    / HCO3: 37    / Base Excess: 13.0  /  SaO2: 96                                      9.0    5.6   )-----------( 239      ( 25 Jun 2017 04:24 )             31.2   06-25    146<H>  |  95<L>  |  40.0<H>  ----------------------------<  108  5.0   |  38.0<H>  |  1.80<H>    Ca    9.4      25 Jun 2017 04:24  Phos  4.0     06-24  Mg     2.6     06-24    TPro  7.1  /  Alb  3.9  /  TBili  0.4  /  DBili  x   /  AST  25  /  ALT  26  /  AlkPhos  127<H>  06-25            Objective:  T(C): 36.3, Max: 36.4 (06-25 @ 12:17)  HR: 69 (60 - 71)  BP: 121/76 (103/53 - 132/60)  RR: 23 (15 - 36)  SpO2: 100% (92% - 100%)  Wt(kg): --CAPILLARY BLOOD GLUCOSE  I&O's Summary  I & Os for 24h ending 25 Jun 2017 07:00  =============================================  IN: 480 ml / OUT: 0 ml / NET: 480 ml    I & Os for current day (as of 26 Jun 2017 04:27)  =============================================  IN: 400 ml / OUT: 1430 ml / NET: -1030 ml      Physical Exam:  Neuro: a0x3  Pulm: b/l rales  CV: s1/s2  Abd: soft non tender  Ext: warm, no edema

## 2017-06-26 NOTE — PROGRESS NOTE ADULT - ASSESSMENT
Chronic respiratory failure>multifactorial:obesity RAÚL, post op state, deconditioned.  Issue currently is related to patient's ability to maintain status quo with nocturnal BIPAP  alone.  Considerations may need to be given to elective tracheostomy and formal ventilation nocturnally as there is no reserve for this patient    Plan:  Continue nocturnal BIPAP  Consideration for formal ventilation via trach?

## 2017-06-26 NOTE — CONSULT NOTE ADULT - ASSESSMENT
84y year old Female  h/o stage 3 CKD, CAD s/p remote MI & CABG x 3 (2008), mixed cardiomyopathy, NYHA class III chronic systolic HFrEF, LVEF 45-50% (TTE 6/16/17), non-rheumatic severe MR s/p MV clip 6/7/17,  readmitted POD #3 for abdominal pain, worsening confusion, neutropenia, hypotension and found to have acalculous cholecystitis and chronic CO2 retention (nocturnal)- improved w/ PM BiPAP. Also noted to have brief episode of AF 6/11, treated w/ amiodarone & beta blockade; maintaining sinus rhythm to date w/ amiodarone tx now d/c'd. Activity level & daytime supplemental O2 demand improving.     Recommendations:   HF tx limited by CKD - ACE is contraindicated & caution needed with diuretic use.   Spironolactone tx may be attempted at renally adjusted dose (12.5mg daily) w/ close monitoring of K levels.  Given maintenance of sinus rhythm, would d/c Toprol & start Coreg 84y year old Female  h/o stage 3 CKD, CAD s/p remote MI & CABG x 3 (2008), mixed cardiomyopathy, NYHA class III chronic systolic HFrEF, LVEF 45-50% (TTE 6/16/17), non-rheumatic severe MR s/p MV clip 6/7/17,  readmitted POD #3 for abdominal pain, worsening confusion, neutropenia, hypotension and found to have acalculous cholecystitis and chronic CO2 retention (nocturnal)- improved w/ PM BiPAP. Also noted to have brief episode of AF 6/11, treated w/ amiodarone & beta blockade; maintaining sinus rhythm to date w/ amiodarone tx now d/c'd. Activity level & daytime supplemental O2 demand improving.     Recommendations:   HF tx limited by CKD - ACE is contraindicated & caution needed with diuretic use.   Spironolactone tx may be attempted at renally adjusted dose (12.5mg daily) w/ close monitoring of K levels.  Given maintenance of sinus rhythm and per MADIT-CRT data, could consider d/c'ing Toprol & starting Coreg  12.5 mg BID - may titrate up as tolerated with careful monitoring of ongoing diuresis/volume status.   Will d/w Dr. Martinez.

## 2017-06-26 NOTE — PROGRESS NOTE ADULT - SUBJECTIVE AND OBJECTIVE BOX
PULMONARY PROGRESS NOTE      ANN FRANCESLEE [id number]    Patient is a 84y old  Female who presents with a chief complaint of Abdominal pain, insomnia, and increased shortness of breath (10 Murphy 2017 16:40)      INTERVAL HPI/OVERNIGHT EVENTS:  Apparently with another episode of obtundation>was off BIPAP at night>now awake and alert.     MEDICATIONS  (STANDING):  sodium chloride 0.9% lock flush 3milliLiter(s) IV Push every 8 hours  multivitamin 1Tablet(s) Oral daily  latanoprost 0.005% Ophthalmic Solution 1Drop(s) Both EYES at bedtime  pantoprazole    Tablet 40milliGRAM(s) Oral before breakfast  metoprolol succinate ER 50milliGRAM(s) Oral daily  ALBUTerol/ipratropium for Nebulization 3milliLiter(s) Nebulizer every 6 hours  buDESOnide   0.5 milliGRAM(s) Respule 0.5milliGRAM(s) Inhalation two times a day  clopidogrel Tablet 75milliGRAM(s) Oral daily  folic acid 1milliGRAM(s) Oral daily  ascorbic acid 250milliGRAM(s) Oral daily  lactobacillus acidophilus 1Tablet(s) Oral two times a day with meals  psyllium Powder 1Packet(s) Oral two times a day  torsemide 20milliGRAM(s) Oral daily  piperacillin/tazobactam IVPB. 2.25Gram(s) IV Intermittent every 6 hours  midodrine 2.5milliGRAM(s) Oral two times a day      MEDICATIONS  (PRN):  loperamide 2milliGRAM(s) Oral every 4 hours PRN Diarrhea      Allergies    aspirin (Anaphylaxis)  NSAIDs (Other)    Intolerances    Lasix (Other)  [facility] PT (Unknown)      PAST MEDICAL & SURGICAL HISTORY:  Renal insufficiency  MR (mitral regurgitation): severe  Mitral valve stenosis, severe  On home oxygen therapy: not at this time  3-30-17  Obesity  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   r/o  out  stenosis  Cardiomyopathy  Osteoarthritis  Iron deficiency anemia  MI, old: 2008  Dyslipidemia  Acid reflux  Hx of CAB  CAD (coronary artery disease)  Heart failure  HTN (hypertension)  H/O tracheostomy: 2013 may  Cataract: b/l  2013  S/P CABG x 3:   [place]      SOCIAL HISTORY  Smoking History:       REVIEW OF SYSTEMS:    CONSTITUTIONAL:  No distress    HEENT:  Eyes:  No diplopia or blurred vision. ENT:  No earache, sore throat or runny nose.    CARDIOVASCULAR:  No pressure, squeezing, tightness, heaviness or aching about the chest; no palpitations.    RESPIRATORY:  + [place]    GASTROINTESTINAL:  No nausea, vomiting or diarrhea.    GENITOURINARY:  No dysuria, frequency or urgency.    MUSCULOSKELETAL:  No joint pain    SKIN:  No new lesions.    NEUROLOGIC:  No paresthesias, fasciculations, seizures or weakness.    PSYCHIATRIC:  No disorder of thought or mood.    ENDOCRINE:  No heat or cold intolerance, polyuria or polydipsia.    HEMATOLOGICAL:  No easy bruising or bleeding.     Vital Signs Last 24 Hrs  T(C): 36.3, Max: 36.4 ( @ 12:17)  T(F): 97.4, Max: 97.6 ( @ 12:17)  HR: 73 (60 - 73)  BP: 114/55 (103/53 - 132/60)  BP(mean): 79 (70 - 94)  RR: 22 (13 - 36)  SpO2: 100% (96% - 100%)    PHYSICAL EXAMINATION:    GENERAL: The patient is awake and alert in no apparent distress.     HEENT: Head is normocephalic and atraumatic. Extraocular muscles are intact. Mucous membranes are moist.    NECK: Supple.    LUNGS: Clear to auscultation without wheezing, rales or rhonchi; respirations unlabored,diminished at bases    HEART: Regular rate and rhythm without murmur.    ABDOMEN: Soft, nontender, and nondistended.      EXTREMITIES: Without any cyanosis, clubbing, rash, lesions or edema.    NEUROLOGIC: Grossly intact.    SKIN: No ulceration or induration present.      LABS:                        8.4    5.6   )-----------( 210      ( 2017 04:40 )             29.2     06-    147<H>  |  98  |  35.0<H>  ----------------------------<  86  4.0   |  39.0<H>  |  1.58<H>    Ca    9.2      2017 04:40  Phos  3.9     06-  Mg     2.6     -    TPro  7.1  /  Alb  3.9  /  TBili  0.4  /  DBili  x   /  AST  25  /  ALT  26  /  AlkPhos  127<H>  -      Urinalysis Basic - ( 2017 09:14 )    Color: Yellow / Appearance: Clear / S.020 / pH: x  Gluc: x / Ketone: Negative  / Bili: Negative / Urobili: Negative mg/dL   Blood: x / Protein: 30 mg/dL / Nitrite: Negative   Leuk Esterase: Trace / RBC: 0-2 /HPF / WBC 6-10   Sq Epi: x / Non Sq Epi: Few / Bacteria: Few      ABG - ( 2017 09:26 )  pH: 7.35  /  pCO2: 75    /  pO2: 68    / HCO3: 37    / Base Excess: 13.0  /  SaO2: 96    on BIPAP                      Procalcitonin, Serum: 0.15 ng/mL ( @ 04:40)  Procalcitonin, Serum: 0.29 ng/mL ( @ 04:24)      MICROBIOLOGY:    RADIOLOGY & ADDITIONAL STUDIES:

## 2017-06-26 NOTE — PROGRESS NOTE ADULT - ASSESSMENT
Renal insufficiency-Pre renal azotemia    Recent MV Clip  No hydro on renal imaging   Torsemide resumed    Will have to accept some degree of augmented azotemia and treat exacerbated alkalosis as needed   Would not be opposed to trial of low dose ACE , with close observation of labs   cardio follow up     Anemia - Venofer

## 2017-06-26 NOTE — PROGRESS NOTE ADULT - ASSESSMENT
84y year old Female  s/p   MV clip readmitted for abdominal pain, developed worsening confusion, neutropenia, hypotension.  RUQ sono concerning for  acalculous cholecystitis, GI, Surgery, IR, Infectious Disease consults obtained.  Reviewed previous CT scan with Dr. Lara believed GB on CT scan wall is thickened and unable to clearly see borders, suspicious for acalculous cholecystitis now completed abx agreed upon as per multidisciplinary team.    6/22 pt again with episode of altered mental status requiring transfer to CTICU; GI and ID consulted without change in management, Pulmonary recommended strict use of BIPAP overnight.  6/23 Improving, diuresis  6/24 tolerated BIPAP overnight   6/25  repeat abd sono to r/o worsening cholecystitis

## 2017-06-26 NOTE — PROGRESS NOTE ADULT - SUBJECTIVE AND OBJECTIVE BOX
NEPHROLOGY INTERVAL HPI/OVERNIGHT EVENTS:    Pulm , Cardio and Sx follow up noted   More alert today   OOB to chair   UO 1.4 L on oral Torsemide     MEDICATIONS  (STANDING):  sodium chloride 0.9% lock flush 3milliLiter(s) IV Push every 8 hours  multivitamin 1Tablet(s) Oral daily  latanoprost 0.005% Ophthalmic Solution 1Drop(s) Both EYES at bedtime  pantoprazole    Tablet 40milliGRAM(s) Oral before breakfast  metoprolol succinate ER 50milliGRAM(s) Oral daily  ALBUTerol/ipratropium for Nebulization 3milliLiter(s) Nebulizer every 6 hours  buDESOnide   0.5 milliGRAM(s) Respule 0.5milliGRAM(s) Inhalation two times a day  clopidogrel Tablet 75milliGRAM(s) Oral daily  folic acid 1milliGRAM(s) Oral daily  ascorbic acid 250milliGRAM(s) Oral daily  lactobacillus acidophilus 1Tablet(s) Oral two times a day with meals  psyllium Powder 1Packet(s) Oral two times a day  torsemide 20milliGRAM(s) Oral daily  piperacillin/tazobactam IVPB. 2.25Gram(s) IV Intermittent every 6 hours  midodrine 2.5milliGRAM(s) Oral two times a day    MEDICATIONS  (PRN):  loperamide 2milliGRAM(s) Oral every 4 hours PRN Diarrhea      Allergies    aspirin (Anaphylaxis)  NSAIDs (Other)    Intolerances    Lasix (Other)  [facility] PT (Unknown)          Vital Signs Last 24 Hrs  T(C): 36.4, Max: 36.4 ( @ 12:17)  T(F): 97.6, Max: 97.6 (-25 @ 12:17)  HR: 73 (60 - 73)  BP: 114/55 (103/53 - 132/60)  BP(mean): 79 (70 - 94)  RR: 22 (13 - 36)  SpO2: 100% (96% - 100%)  Daily     Daily Weight in k (2017 06:00)  I&O's Detail  I & Os for 24h ending 2017 07:00  =============================================  IN:    Solution: 300 ml    Oral Fluid: 100 ml    Total IN: 400 ml  ---------------------------------------------  OUT:    Indwelling Catheter - Urethral: 1490 ml    Total OUT: 1490 ml  ---------------------------------------------  Total NET: -1090 ml    I & Os for current day (as of 2017 11:59)  =============================================  IN:    Oral Fluid: 480 ml    Solution: 200 ml    Total IN: 680 ml  ---------------------------------------------  OUT:    Indwelling Catheter - Urethral: 180 ml    Total OUT: 180 ml  ---------------------------------------------  Total NET: 500 ml    I&O's Summary  I & Os for 24h ending 2017 07:00  =============================================  IN: 400 ml / OUT: 1490 ml / NET: -1090 ml    I & Os for current day (as of 2017 11:59)  =============================================  IN: 680 ml / OUT: 180 ml / NET: 500 ml      PHYSICAL EXAM:  GENERAL: NAD,   HEAD:  No edema   NECK: Supple, No JVD,   CHEST/LUNG: EAE, No wheeze , Bibasilar crackles   HEART: Regular rate and rhythm; No rub  ABDOMEN: Soft, Nontender,   EXTREMITIES:  Less edema     LABS:                        8.4    5.6   )-----------( 210      ( 2017 04:40 )             29.2         147<H>  |  98  |  35.0<H>  ----------------------------<  86  4.0   |  39.0<H>  |  1.58<H>    Ca    9.2      2017 04:40  Phos  3.9       Mg     2.6         TPro  7.1  /  Alb  3.9  /  TBili  0.4  /  DBili  x   /  AST    /  ALT    /  AlkPhos  127<H>        Urinalysis Basic - ( 2017 09:14 )    Color: Yellow / Appearance: Clear / S.020 / pH: x  Gluc: x / Ketone: Negative  / Bili: Negative / Urobili: Negative mg/dL   Blood: x / Protein: 30 mg/dL / Nitrite: Negative   Leuk Esterase: Trace / RBC: 0-2 /HPF / WBC 6-10   Sq Epi: x / Non Sq Epi: Few / Bacteria: Few      Magnesium, Serum: 2.6 mg/dL ( @ 04:40)  Phosphorus Level, Serum: 3.9 mg/dL ( @ 04:40)    ABG - ( 2017 09:26 )  pH: 7.35  /  pCO2: 75    /  pO2: 68    / HCO3: 37    / Base Excess: 13.0  /  SaO2: 96                    RADIOLOGY & ADDITIONAL TESTS:

## 2017-06-27 DIAGNOSIS — I50.22 CHRONIC SYSTOLIC (CONGESTIVE) HEART FAILURE: ICD-10-CM

## 2017-06-27 LAB
ANION GAP SERPL CALC-SCNC: 7 MMOL/L — SIGNIFICANT CHANGE UP (ref 5–17)
BUN SERPL-MCNC: 32 MG/DL — HIGH (ref 8–20)
CALCIUM SERPL-MCNC: 9 MG/DL — SIGNIFICANT CHANGE UP (ref 8.6–10.2)
CHLORIDE SERPL-SCNC: 98 MMOL/L — SIGNIFICANT CHANGE UP (ref 98–107)
CO2 SERPL-SCNC: 41 MMOL/L — HIGH (ref 22–29)
CREAT SERPL-MCNC: 1.31 MG/DL — HIGH (ref 0.5–1.3)
CULTURE RESULTS: SIGNIFICANT CHANGE UP
GLUCOSE SERPL-MCNC: 106 MG/DL — SIGNIFICANT CHANGE UP (ref 70–115)
POTASSIUM SERPL-MCNC: 4.2 MMOL/L — SIGNIFICANT CHANGE UP (ref 3.5–5.3)
POTASSIUM SERPL-SCNC: 4.2 MMOL/L — SIGNIFICANT CHANGE UP (ref 3.5–5.3)
SODIUM SERPL-SCNC: 146 MMOL/L — HIGH (ref 135–145)
SPECIMEN SOURCE: SIGNIFICANT CHANGE UP

## 2017-06-27 RX ORDER — ONDANSETRON 8 MG/1
4 TABLET, FILM COATED ORAL ONCE
Qty: 0 | Refills: 0 | Status: COMPLETED | OUTPATIENT
Start: 2017-06-27 | End: 2017-06-27

## 2017-06-27 RX ADMIN — PIPERACILLIN AND TAZOBACTAM 200 GRAM(S): 4; .5 INJECTION, POWDER, LYOPHILIZED, FOR SOLUTION INTRAVENOUS at 12:18

## 2017-06-27 RX ADMIN — Medication 3 MILLILITER(S): at 14:51

## 2017-06-27 RX ADMIN — Medication 1 MILLIGRAM(S): at 12:18

## 2017-06-27 RX ADMIN — Medication 1 TABLET(S): at 12:18

## 2017-06-27 RX ADMIN — MIDODRINE HYDROCHLORIDE 2.5 MILLIGRAM(S): 2.5 TABLET ORAL at 05:54

## 2017-06-27 RX ADMIN — Medication 1 TABLET(S): at 07:44

## 2017-06-27 RX ADMIN — PIPERACILLIN AND TAZOBACTAM 200 GRAM(S): 4; .5 INJECTION, POWDER, LYOPHILIZED, FOR SOLUTION INTRAVENOUS at 00:51

## 2017-06-27 RX ADMIN — Medication 0.5 MILLIGRAM(S): at 20:06

## 2017-06-27 RX ADMIN — ONDANSETRON 4 MILLIGRAM(S): 8 TABLET, FILM COATED ORAL at 06:18

## 2017-06-27 RX ADMIN — Medication 3 MILLILITER(S): at 02:52

## 2017-06-27 RX ADMIN — SODIUM CHLORIDE 3 MILLILITER(S): 9 INJECTION INTRAMUSCULAR; INTRAVENOUS; SUBCUTANEOUS at 22:00

## 2017-06-27 RX ADMIN — Medication 20 MILLIGRAM(S): at 05:54

## 2017-06-27 RX ADMIN — Medication 50 MILLIGRAM(S): at 05:54

## 2017-06-27 RX ADMIN — CLOPIDOGREL BISULFATE 75 MILLIGRAM(S): 75 TABLET, FILM COATED ORAL at 12:18

## 2017-06-27 RX ADMIN — PIPERACILLIN AND TAZOBACTAM 200 GRAM(S): 4; .5 INJECTION, POWDER, LYOPHILIZED, FOR SOLUTION INTRAVENOUS at 23:50

## 2017-06-27 RX ADMIN — Medication 3 MILLILITER(S): at 20:06

## 2017-06-27 RX ADMIN — SODIUM CHLORIDE 3 MILLILITER(S): 9 INJECTION INTRAMUSCULAR; INTRAVENOUS; SUBCUTANEOUS at 12:19

## 2017-06-27 RX ADMIN — Medication 1 TABLET(S): at 17:00

## 2017-06-27 RX ADMIN — Medication 250 MILLIGRAM(S): at 12:18

## 2017-06-27 RX ADMIN — LATANOPROST 1 DROP(S): 0.05 SOLUTION/ DROPS OPHTHALMIC; TOPICAL at 22:05

## 2017-06-27 RX ADMIN — Medication 3 MILLILITER(S): at 08:45

## 2017-06-27 RX ADMIN — Medication 0.5 MILLIGRAM(S): at 08:46

## 2017-06-27 RX ADMIN — PANTOPRAZOLE SODIUM 40 MILLIGRAM(S): 20 TABLET, DELAYED RELEASE ORAL at 05:54

## 2017-06-27 RX ADMIN — PIPERACILLIN AND TAZOBACTAM 200 GRAM(S): 4; .5 INJECTION, POWDER, LYOPHILIZED, FOR SOLUTION INTRAVENOUS at 17:00

## 2017-06-27 RX ADMIN — PIPERACILLIN AND TAZOBACTAM 200 GRAM(S): 4; .5 INJECTION, POWDER, LYOPHILIZED, FOR SOLUTION INTRAVENOUS at 05:54

## 2017-06-27 RX ADMIN — SODIUM CHLORIDE 3 MILLILITER(S): 9 INJECTION INTRAMUSCULAR; INTRAVENOUS; SUBCUTANEOUS at 05:54

## 2017-06-27 NOTE — PROGRESS NOTE ADULT - ASSESSMENT
Renal insufficiency-Pre renal azotemia    Recent MV Clip  No hydro on renal imaging   Torsemide resumed   Will have to accept some degree of augmented azotemia and treat exacerbated alkalosis as needed   Would not be opposed to trial of low dose ACE , with close observation of labs   Cardio and Pulm follow up     Anemia - Venofer

## 2017-06-27 NOTE — PROGRESS NOTE ADULT - SUBJECTIVE AND OBJECTIVE BOX
NEPHROLOGY INTERVAL HPI/OVERNIGHT EVENTS:    Comfortable   OOB to chair   Given additional lasix to augment UO     MEDICATIONS  (STANDING):  sodium chloride 0.9% lock flush 3 milliLiter(s) IV Push every 8 hours  latanoprost 0.005% Ophthalmic Solution 1 Drop(s) Both EYES at bedtime  pantoprazole    Tablet 40 milliGRAM(s) Oral before breakfast  multivitamin 1 Tablet(s) Oral daily  metoprolol succinate ER 50 milliGRAM(s) Oral daily  ALBUTerol/ipratropium for Nebulization 3 milliLiter(s) Nebulizer every 6 hours  buDESOnide   0.5 milliGRAM(s) Respule 0.5 milliGRAM(s) Inhalation two times a day  clopidogrel Tablet 75 milliGRAM(s) Oral daily  folic acid 1 milliGRAM(s) Oral daily  ascorbic acid 250 milliGRAM(s) Oral daily  lactobacillus acidophilus 1 Tablet(s) Oral two times a day with meals  psyllium Powder 1 Packet(s) Oral two times a day  torsemide 20 milliGRAM(s) Oral daily  piperacillin/tazobactam IVPB. 2.25 Gram(s) IV Intermittent every 6 hours    MEDICATIONS  (PRN):  loperamide 2 milliGRAM(s) Oral every 4 hours PRN Diarrhea      Allergies    aspirin (Anaphylaxis)  NSAIDs (Other)    Intolerances    Lasix (Other)  [facility] PT (Unknown)      Vital Signs Last 24 Hrs  T(C): 36.8 (27 Jun 2017 07:00), Max: 36.8 (26 Jun 2017 15:15)  T(F): 98.2 (27 Jun 2017 07:00), Max: 98.3 (26 Jun 2017 15:15)  HR: 77 (27 Jun 2017 12:00) (69 - 82)  BP: 121/60 (27 Jun 2017 12:00) (110/53 - 128/55)  BP(mean): 86 (27 Jun 2017 12:00) (76 - 86)  RR: 33 (27 Jun 2017 12:00) (18 - 36)  SpO2: 97% (27 Jun 2017 12:00) (94% - 100%)  Daily     Daily   I&O's Detail    26 Jun 2017 07:01  -  27 Jun 2017 07:00  --------------------------------------------------------  IN:    Oral Fluid: 1197 mL    Solution: 200 mL    Solution: 50 mL  Total IN: 1447 mL    OUT:    Indwelling Catheter - Urethral: 1190 mL  Total OUT: 1190 mL    Total NET: 257 mL      27 Jun 2017 07:01  -  27 Jun 2017 12:39  --------------------------------------------------------  IN:    Oral Fluid: 120 mL  Total IN: 120 mL    OUT:    Indwelling Catheter - Urethral: 255 mL  Total OUT: 255 mL    Total NET: -135 mL        I&O's Summary    26 Jun 2017 07:01  -  27 Jun 2017 07:00  --------------------------------------------------------  IN: 1447 mL / OUT: 1190 mL / NET: 257 mL    27 Jun 2017 07:01  -  27 Jun 2017 12:39  --------------------------------------------------------  IN: 120 mL / OUT: 255 mL / NET: -135 mL        PHYSICAL EXAM:  GENERAL: NAD,   HEAD:  No edema   NECK: Supple, No JVD,   CHEST/LUNG: EAE, No wheeze , Bibasilar crackles   HEART: Regular rate and rhythm; No rub  ABDOMEN: Soft, Nontender,   EXTREMITIES:  Less edema     LABS:                        8.4    5.6   )-----------( 210      ( 26 Jun 2017 04:40 )             29.2     06-27    146<H>  |  98  |  32.0<H>  ----------------------------<  106  4.2   |  41.0<H>  |  1.31<H>    Ca    9.0      27 Jun 2017 08:27  Phos  3.9     06-26  Mg     2.6     06-26                  RADIOLOGY & ADDITIONAL TESTS:

## 2017-06-27 NOTE — PROGRESS NOTE ADULT - PROBLEM SELECTOR PLAN 8
cont toprol XL   midodrine weaned off, consider ACE in the next couple days (ok per renal)  torsemide daily> may need to increased to BID

## 2017-06-27 NOTE — PROGRESS NOTE ADULT - ASSESSMENT
84y year old Female  s/p   MV clip readmitted for abdominal pain, developed worsening confusion, neutropenia, hypotension.  RUQ sono concerning for  acalculous cholecystitis, GI, Surgery, IR, Infectious Disease consults obtained.  Reviewed previous CT scan with Dr. Lara believed GB on CT scan wall is thickened and unable to clearly see borders, suspicious for acalculous cholecystitis which was treated with IV antibiotics as per multidisciplinary team.  hospital course complicated by multiple bouts of AMS/obtundation with bipap off> blood gases c/w resp acidosis necessitating strict use of bipap at night.

## 2017-06-27 NOTE — PROGRESS NOTE ADULT - PROBLEM SELECTOR PLAN 1
s/p mitral clip   midodrine weaned off, tolerating lopressor, no further amio as of 6/25 due to family concern for possible pulmonary effects of amiodarone, pt remains in SR  continue tele monitoring  cont diuresis as BP will tolerate  Daily BMP to monitor creatinine

## 2017-06-27 NOTE — PROGRESS NOTE ADULT - PROBLEM SELECTOR PLAN 6
completed zosyn 7 days on 6/23, restarted 6/25 per Dr. French due to obtundation, repeat abd US with gallbladder wall thickening> non specific per radiology  abd exam remains benign

## 2017-06-27 NOTE — PROGRESS NOTE ADULT - SUBJECTIVE AND OBJECTIVE BOX
Subjective: c/o SOB while sitting in the chair, per report, slept well with bipap on    T(C): 36.5 (06-27-17 @ 15:22), Max: 36.8 (06-27-17 @ 07:00)  HR: 77 (06-27-17 @ 16:00) (69 - 82)  BP: 109/59 (06-27-17 @ 16:00) (109/59 - 128/55)  RR: 24 (06-27-17 @ 16:00) (18 - 33)  SpO2: 97% (06-27-17 @ 16:00) (95% - 100%)    06-27    146<H>  |  98  |  32.0<H>  ----------------------------<  106  4.2   |  41.0<H>  |  1.31<H>    Ca    9.0      27 Jun 2017 08:27  Phos  3.9     06-26  Mg     2.6     06-26                                 8.4    5.6   )-----------( 210      ( 26 Jun 2017 04:40 )             29.2       I&O's Detail    26 Jun 2017 07:01  -  27 Jun 2017 07:00  --------------------------------------------------------  IN:    Oral Fluid: 1197 mL    Solution: 200 mL    Solution: 50 mL  Total IN: 1447 mL    OUT:    Indwelling Catheter - Urethral: 1190 mL  Total OUT: 1190 mL    Total NET: 257 mL      27 Jun 2017 07:01  -  27 Jun 2017 17:04  --------------------------------------------------------  IN:    Oral Fluid: 840 mL    Solution: 200 mL  Total IN: 1040 mL    OUT:    Indwelling Catheter - Urethral: 280 mL    Voided: 100 mL  Total OUT: 380 mL    Total NET: 660 mL    Drug Dosing Weight  Height (cm): 152.4 (10 Murphy 2017 08:55)  Weight (kg): 75.7 (10 Murphy 2017 08:55)  BMI (kg/m2): 32.6 (10 Murphy 2017 08:55)  BSA (m2): 1.73 (10 Murphy 2017 08:55)    MEDICATIONS  (STANDING):  sodium chloride 0.9% lock flush 3 milliLiter(s) IV Push every 8 hours  latanoprost 0.005% Ophthalmic Solution 1 Drop(s) Both EYES at bedtime  pantoprazole    Tablet 40 milliGRAM(s) Oral before breakfast  multivitamin 1 Tablet(s) Oral daily  metoprolol succinate ER 50 milliGRAM(s) Oral daily  ALBUTerol/ipratropium for Nebulization 3 milliLiter(s) Nebulizer every 6 hours  buDESOnide   0.5 milliGRAM(s) Respule 0.5 milliGRAM(s) Inhalation two times a day  clopidogrel Tablet 75 milliGRAM(s) Oral daily  folic acid 1 milliGRAM(s) Oral daily  ascorbic acid 250 milliGRAM(s) Oral daily  lactobacillus acidophilus 1 Tablet(s) Oral two times a day with meals  psyllium Powder 1 Packet(s) Oral two times a day  torsemide 20 milliGRAM(s) Oral daily  piperacillin/tazobactam IVPB. 2.25 Gram(s) IV Intermittent every 6 hours    MEDICATIONS  (PRN):  loperamide 2 milliGRAM(s) Oral every 4 hours PRN Diarrhea      Physical Exam  Neuro: A&O to person and place  Pulm: crackles b/l  CV: S1S2 RRR  Abd: + BS soft NT mildly distended  Extrem/MS: 1+ b/l LE edema

## 2017-06-28 LAB
ANION GAP SERPL CALC-SCNC: 7 MMOL/L — SIGNIFICANT CHANGE UP (ref 5–17)
BUN SERPL-MCNC: 31 MG/DL — HIGH (ref 8–20)
CALCIUM SERPL-MCNC: 9.1 MG/DL — SIGNIFICANT CHANGE UP (ref 8.6–10.2)
CHLORIDE SERPL-SCNC: 99 MMOL/L — SIGNIFICANT CHANGE UP (ref 98–107)
CO2 SERPL-SCNC: 42 MMOL/L — HIGH (ref 22–29)
CREAT SERPL-MCNC: 1.2 MG/DL — SIGNIFICANT CHANGE UP (ref 0.5–1.3)
GAS PNL BLDA: SIGNIFICANT CHANGE UP
GAS PNL BLDA: SIGNIFICANT CHANGE UP
GLUCOSE SERPL-MCNC: 103 MG/DL — SIGNIFICANT CHANGE UP (ref 70–115)
HCT VFR BLD CALC: 28 % — LOW (ref 37–47)
HGB BLD-MCNC: 7.7 G/DL — LOW (ref 12–16)
MAGNESIUM SERPL-MCNC: 2.6 MG/DL — SIGNIFICANT CHANGE UP (ref 1.6–2.6)
MCHC RBC-ENTMCNC: 21 PG — LOW (ref 27–31)
MCHC RBC-ENTMCNC: 27.5 G/DL — LOW (ref 32–36)
MCV RBC AUTO: 76.5 FL — LOW (ref 81–99)
PHOSPHATE SERPL-MCNC: 2.7 MG/DL — SIGNIFICANT CHANGE UP (ref 2.4–4.7)
PLATELET # BLD AUTO: 225 K/UL — SIGNIFICANT CHANGE UP (ref 150–400)
POTASSIUM SERPL-MCNC: 4.5 MMOL/L — SIGNIFICANT CHANGE UP (ref 3.5–5.3)
POTASSIUM SERPL-SCNC: 4.5 MMOL/L — SIGNIFICANT CHANGE UP (ref 3.5–5.3)
RBC # BLD: 3.66 M/UL — LOW (ref 4.4–5.2)
RBC # FLD: 26.1 % — HIGH (ref 11–15.6)
SODIUM SERPL-SCNC: 148 MMOL/L — HIGH (ref 135–145)
WBC # BLD: 4.7 K/UL — LOW (ref 4.8–10.8)
WBC # FLD AUTO: 4.7 K/UL — LOW (ref 4.8–10.8)

## 2017-06-28 PROCEDURE — 71010: CPT | Mod: 26

## 2017-06-28 RX ADMIN — Medication 1 TABLET(S): at 11:00

## 2017-06-28 RX ADMIN — PIPERACILLIN AND TAZOBACTAM 200 GRAM(S): 4; .5 INJECTION, POWDER, LYOPHILIZED, FOR SOLUTION INTRAVENOUS at 05:18

## 2017-06-28 RX ADMIN — PIPERACILLIN AND TAZOBACTAM 200 GRAM(S): 4; .5 INJECTION, POWDER, LYOPHILIZED, FOR SOLUTION INTRAVENOUS at 23:27

## 2017-06-28 RX ADMIN — Medication 2 MILLIGRAM(S): at 08:00

## 2017-06-28 RX ADMIN — PIPERACILLIN AND TAZOBACTAM 200 GRAM(S): 4; .5 INJECTION, POWDER, LYOPHILIZED, FOR SOLUTION INTRAVENOUS at 18:26

## 2017-06-28 RX ADMIN — Medication 1 TABLET(S): at 16:13

## 2017-06-28 RX ADMIN — Medication 3 MILLILITER(S): at 03:57

## 2017-06-28 RX ADMIN — Medication 3 MILLILITER(S): at 08:25

## 2017-06-28 RX ADMIN — Medication 1 TABLET(S): at 11:57

## 2017-06-28 RX ADMIN — Medication 3 MILLILITER(S): at 15:30

## 2017-06-28 RX ADMIN — CLOPIDOGREL BISULFATE 75 MILLIGRAM(S): 75 TABLET, FILM COATED ORAL at 11:56

## 2017-06-28 RX ADMIN — PIPERACILLIN AND TAZOBACTAM 200 GRAM(S): 4; .5 INJECTION, POWDER, LYOPHILIZED, FOR SOLUTION INTRAVENOUS at 11:57

## 2017-06-28 RX ADMIN — Medication 250 MILLIGRAM(S): at 11:56

## 2017-06-28 RX ADMIN — Medication 0.5 MILLIGRAM(S): at 20:17

## 2017-06-28 RX ADMIN — Medication 1 MILLIGRAM(S): at 11:56

## 2017-06-28 RX ADMIN — SODIUM CHLORIDE 3 MILLILITER(S): 9 INJECTION INTRAMUSCULAR; INTRAVENOUS; SUBCUTANEOUS at 05:08

## 2017-06-28 RX ADMIN — SODIUM CHLORIDE 3 MILLILITER(S): 9 INJECTION INTRAMUSCULAR; INTRAVENOUS; SUBCUTANEOUS at 12:31

## 2017-06-28 RX ADMIN — Medication 3 MILLILITER(S): at 20:17

## 2017-06-28 RX ADMIN — PANTOPRAZOLE SODIUM 40 MILLIGRAM(S): 20 TABLET, DELAYED RELEASE ORAL at 11:00

## 2017-06-28 RX ADMIN — Medication 0.5 MILLIGRAM(S): at 08:30

## 2017-06-28 RX ADMIN — Medication 20 MILLIGRAM(S): at 05:18

## 2017-06-28 RX ADMIN — Medication 50 MILLIGRAM(S): at 05:18

## 2017-06-28 RX ADMIN — SODIUM CHLORIDE 3 MILLILITER(S): 9 INJECTION INTRAMUSCULAR; INTRAVENOUS; SUBCUTANEOUS at 21:47

## 2017-06-28 NOTE — PROGRESS NOTE ADULT - ASSESSMENT
Renal insufficiency: Pre renal azotemia    Recent MV Clip  No hydro on renal imaging   - continue Torsemide   - will need to achieve azotemia to keep free of CHF      Anemia - Venofer; PRBCs as needed

## 2017-06-28 NOTE — PROGRESS NOTE ADULT - ASSESSMENT
Assess  [facility]/ restrictive lung diseas  COPD  Post MR clip  Chronic systolic CHF  Rec  Must use noct bipap   Will try to arrange nocturnal mask vent like trilogy for post rehab  Nocturnal mask ventilation required to reduce PCO2, avoid respiratory failure and possible death and to avoid readmission Assess  Obese  Clinically advanced COPD with hypercarbia unresponsive to bipap and neb Rx  Post MR clip  Chronic systolic CHF  Rec  Must use noct bipap for now  Needs non-invasive mask ventilation to lower her PCO2, to prevent progressive respiratory failure and to avoid recurrent hospitalization.   Nocturnal mask ventilation required to reduce PCO2, avoid respiratory failure and possible death and to avoid readmission

## 2017-06-28 NOTE — PROGRESS NOTE ADULT - SUBJECTIVE AND OBJECTIVE BOX
NEPHROLOGY INTERVAL HPI/OVERNIGHT EVENTS:  pt seated in chair when seen earlier  no acute distress noted    MEDICATIONS  (STANDING):  sodium chloride 0.9% lock flush 3 milliLiter(s) IV Push every 8 hours  latanoprost 0.005% Ophthalmic Solution 1 Drop(s) Both EYES at bedtime  pantoprazole    Tablet 40 milliGRAM(s) Oral before breakfast  multivitamin 1 Tablet(s) Oral daily  metoprolol succinate ER 50 milliGRAM(s) Oral daily  ALBUTerol/ipratropium for Nebulization 3 milliLiter(s) Nebulizer every 6 hours  buDESOnide   0.5 milliGRAM(s) Respule 0.5 milliGRAM(s) Inhalation two times a day  clopidogrel Tablet 75 milliGRAM(s) Oral daily  folic acid 1 milliGRAM(s) Oral daily  ascorbic acid 250 milliGRAM(s) Oral daily  lactobacillus acidophilus 1 Tablet(s) Oral two times a day with meals  psyllium Powder 1 Packet(s) Oral two times a day  torsemide 20 milliGRAM(s) Oral daily  piperacillin/tazobactam IVPB. 2.25 Gram(s) IV Intermittent every 6 hours    MEDICATIONS  (PRN):  loperamide 2 milliGRAM(s) Oral every 4 hours PRN Diarrhea      Allergies    aspirin (Anaphylaxis)  NSAIDs (Other)    Intolerances    Lasix (Other)  [facility] PT (Unknown)          Vital Signs Last 24 Hrs  T(C): 36.8 (28 Jun 2017 07:00), Max: 36.8 (27 Jun 2017 19:30)  T(F): 98.3 (28 Jun 2017 07:00), Max: 98.3 (28 Jun 2017 07:00)  HR: 69 (28 Jun 2017 10:00) (66 - 77)  BP: 137/57 (28 Jun 2017 10:00) (109/59 - 137/57)  BP(mean): 82 (28 Jun 2017 10:00) (77 - 86)  RR: 22 (28 Jun 2017 10:00) (16 - 33)  SpO2: 100% (28 Jun 2017 10:00) (93% - 100%)    PHYSICAL EXAM:  GENERAL: NAD,   HEAD:  No edema   NECK: Supple, No JVD,   CHEST/LUNG: Diminished BS at bases  HEART: Regular rate and rhythm; No rub  ABDOMEN: Soft, Nontender,   EXTREMITIES:  Less edema     LABS:                        7.7    4.7   )-----------( 225      ( 28 Jun 2017 05:35 )             28.0     06-28    148<H>  |  99  |  31.0<H>  ----------------------------<  103  4.5   |  42.0<H>  |  1.20    Ca    9.1      28 Jun 2017 05:35  Phos  2.7     06-28  Mg     2.6     06-28          Magnesium, Serum: 2.6 mg/dL (06-28 @ 05:35)  Phosphorus Level, Serum: 2.7 mg/dL (06-28 @ 05:35)      RADIOLOGY & ADDITIONAL TESTS:  < from: US Abdomen Limited (06.25.17 @ 13:56) >     EXAM:  US ABDOMEN LIMITED                          PROCEDURE DATE:  06/25/2017        INTERPRETATION:  Clinical Information: Epigastric pain.    Comparison:None available.    Technique: Sonography of the right upper quadrant.    FINDINGS:    Liver: Normal echogenicity and echotexture. No focal masses.  Bile ducts: No intrahepatic or extrahepatic biliary ductal dilatation.    Common bile duct measures 5 mm.   Gallbladder:  No calculi or intraluminal abnormality. Gallbladder wall   thickening to5 mm. No pericholecystic fluid. No tenderness.   Pancreas: Visualized portions are grossly normal.  Right kidney: 11.4 cm. No hydronephrosis.  There is a 1.6 cm mid renal   cyst.  Ascites: None.    The visualized portions of the IVC and Aorta are within normal limits.    Right pleural effusion is incidentally noted.    IMPRESSION:     Diffuse gallbladder wall thickening which is nonspecific finding.    Right renal cyst.    Right pleural effusion.    Otherwise, normal right upper quadrant sonogram.    < end of copied text >

## 2017-06-28 NOTE — PROGRESS NOTE ADULT - SUBJECTIVE AND OBJECTIVE BOX
PULMONARY PROGRESS NOTE      ANN FRANCESLEE ANN-[id number]    Patient is a 84y old  Female who presents with a chief complaint of Abdominal pain, insomnia, and increased shortness of breath (10 Murphy 2017 16:40)      INTERVAL HPI/OVERNIGHT EVENTS:  DALTON    MEDICATIONS  (STANDING):  sodium chloride 0.9% lock flush 3 milliLiter(s) IV Push every 8 hours  latanoprost 0.005% Ophthalmic Solution 1 Drop(s) Both EYES at bedtime  pantoprazole    Tablet 40 milliGRAM(s) Oral before breakfast  multivitamin 1 Tablet(s) Oral daily  metoprolol succinate ER 50 milliGRAM(s) Oral daily  ALBUTerol/ipratropium for Nebulization 3 milliLiter(s) Nebulizer every 6 hours  buDESOnide   0.5 milliGRAM(s) Respule 0.5 milliGRAM(s) Inhalation two times a day  clopidogrel Tablet 75 milliGRAM(s) Oral daily  folic acid 1 milliGRAM(s) Oral daily  ascorbic acid 250 milliGRAM(s) Oral daily  lactobacillus acidophilus 1 Tablet(s) Oral two times a day with meals  psyllium Powder 1 Packet(s) Oral two times a day  torsemide 20 milliGRAM(s) Oral daily  piperacillin/tazobactam IVPB. 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 ENT:  No earache, sore throat or runny nose.    CARDIOVASCULAR:  No pressure, squeezing, tightness, heaviness or aching about the chest; no palpitations.    RESPIRATORY:  No PND or orthopnea. Mild SOBOE    GASTROINTESTINAL:  No nausea, vomiting or diarrhea.    GENITOURINARY:  No dysuria, frequency or urgency.    NEUROLOGIC:  No paresthesias, fasciculations, seizures or weakness.    PSYCHIATRIC:  No disorder of thought or mood.    Vital Signs Last 24 Hrs  T(C): 36.8 (2017 07:00), Max: 36.8 (2017 19:30)  T(F): 98.3 (2017 07:00), Max: 98.3 (2017 07:00)  HR: 66 (2017 11:00) (66 - 77)  BP: 118/57 (2017 11:00) (109/59 - 137/57)  BP(mean): 82 (2017 11:00) (77 - 86)  RR: 15 (2017 11:00) (15 - 33)  SpO2: 98% (2017 11:00) (93% - 100%)    PHYSICAL EXAMINATION:    GENERAL: The patient is awake and alert in no apparent distress. MO    HEENT: Head is normocephalic and atraumatic. Extraocular muscles are intact. Mucous membranes are moist.    NECK: Supple.    LUNGS: Diminished  to auscultation without wheezing, rales or rhonchi; respirations unlabored    HEART: Regular rate and rhythm without murmur.    ABDOMEN: Soft, nontender, and nondistended.      EXTREMITIES: Without any cyanosis, clubbing, rash, lesions or edema.    NEUROLOGIC: Grossly intact.    LABS:                        7.7    4.7   )-----------( 225      ( 2017 05:35 )             28.0         148<H>  |  99  |  31.0<H>  ----------------------------<  103  4.5   |  42.0<H>  |  1.20    Ca    9.1      2017 05:35  Phos  2.7       Mg     2.6               ABG - ( 2017 10:16 )  pH: 7.38  /  pCO2: 78    /  pO2: 61    / HCO3: 42    / Base Excess: 18.1  /  SaO2: 94                          Procalcitonin, Serum: 0.15 ng/mL (17 @ 04:40)        RADIOLOGY & ADDITIONAL STUDIES:   EXAM:  CHEST SINGLE VIEW FRONTAL                          PROCEDURE DATE:  2017        INTERPRETATION:  CHEST AP PORTABLE:    History: postop.     Date and time of exam: 2017 4:27 AM.    Technique: A single AP view of the chest was obtained.    Comparison exam: 2017 6:42 AM.    Findings:  Cardiomegaly. Pulmonary vascular congestion consistent with congestive   heart failure. Small bilateral pleural effusions. Evidence of prior   cardiac surgery..    Impression:  Congestive heart failure unchanged since the prior study..      LUCÍA ULLOA M.D., ATTENDING RADIOLOGIST  This document has been electronically signed. 2017  8:22AM

## 2017-06-28 NOTE — PROGRESS NOTE ADULT - ASSESSMENT
84y year old Female  s/p   MV clip readmitted for abdominal pain, developed worsening confusion, neutropenia, hypotension.  RUQ sono concerning for  acalculous cholecystitis, GI, Surgery, IR, Infectious Disease consults obtained.  Reviewed previous CT scan with Dr. Lara believed GB on CT scan wall is thickened and unable to clearly see borders, suspicious for acalculous cholecystitis which was treated with IV antibiotics as per multidisciplinary team.  hospital course complicated by multiple bouts of AMS/obtundation with bipap off> blood gases c/w resp acidosis necessitating strict use of bipap at night.      AM of 6/28, plan was to downgrade patient to floor level of care, but routine ABG revealed a co2 in 80's off of Bipap.  Bipap resumed and Co2 rechecked, which improved to baseline in 70's.  Patient to stay in [facility]ICU for further monitoring.  Patient's daughter states that her mother would never want a trache again after being trached in past.  Reached out to pulmonary MD, Dr. Stewart, who suggests performing spirometry to see if patient is candidate for noninvasive ventilator at rehab / home.

## 2017-06-28 NOTE — PROGRESS NOTE ADULT - SUBJECTIVE AND OBJECTIVE BOX
84y Female mitral clip, and return to [facility] for abdominal pain, found to have acalculous cholecystitis, as well as hypercapnic respiratory failure.    Subjective: Patient has no acute complaints. Smiles, pleasant and specifically denies trouble breathing or abdominal pain    T(C): 36.8 (06-28-17 @ 12:00), Max: 36.8 (06-27-17 @ 19:30)  HR: 72 (06-28-17 @ 16:00) (66 - 75)  BP: 111/54 (06-28-17 @ 16:00) (110/53 - 137/57)  RR: 22 (06-28-17 @ 16:00) (15 - 27)  SpO2: 97% (06-28-17 @ 16:17) (93% - 100%)          06-28    148<H>  |  99  |  31.0<H>  ----------------------------<  103  4.5   |  42.0<H>  |  1.20    Ca    9.1      28 Jun 2017 05:35  Phos  2.7     06-28  Mg     2.6     06-28                                 7.7    4.7   )-----------( 225      ( 28 Jun 2017 05:35 )             28.0          ABG - ( 28 Jun 2017 10:16 )  pH: 7.38  /  pCO2: 78    /  pO2: 61    / HCO3: 42    / Base Excess: 18.1  /  SaO2: 94                         CAPILLARY BLOOD GLUCOSE                CXR:   Congestive heart failure unchanged since the prior study..          I&O's Detail    27 Jun 2017 07:01  -  28 Jun 2017 07:00  --------------------------------------------------------  IN:    Oral Fluid: 840 mL    Solution: 300 mL  Total IN: 1140 mL    OUT:    Indwelling Catheter - Urethral: 280 mL    Voided: 100 mL  Total OUT: 380 mL    Total NET: 760 mL      28 Jun 2017 07:01  -  28 Jun 2017 16:57  --------------------------------------------------------  IN:    Oral Fluid: 400 mL    Solution: 100 mL  Total IN: 500 mL    OUT:  Total OUT: 0 mL    Total NET: 500 mL          MEDICATIONS  (STANDING):  sodium chloride 0.9% lock flush 3 milliLiter(s) IV Push every 8 hours  latanoprost 0.005% Ophthalmic Solution 1 Drop(s) Both EYES at bedtime  pantoprazole    Tablet 40 milliGRAM(s) Oral before breakfast  multivitamin 1 Tablet(s) Oral daily  metoprolol succinate ER 50 milliGRAM(s) Oral daily  ALBUTerol/ipratropium for Nebulization 3 milliLiter(s) Nebulizer every 6 hours  buDESOnide   0.5 milliGRAM(s) Respule 0.5 milliGRAM(s) Inhalation two times a day  clopidogrel Tablet 75 milliGRAM(s) Oral daily  folic acid 1 milliGRAM(s) Oral daily  ascorbic acid 250 milliGRAM(s) Oral daily  lactobacillus acidophilus 1 Tablet(s) Oral two times a day with meals  psyllium Powder 1 Packet(s) Oral two times a day  torsemide 20 milliGRAM(s) Oral daily  piperacillin/tazobactam IVPB. 2.25 Gram(s) IV Intermittent every 6 hours    MEDICATIONS  (PRN):  loperamide 2 milliGRAM(s) Oral every 4 hours PRN Diarrhea      Physical Exam  Neuro: A+O x conversation, knows her name, knows who her daughter is and knows she is in hospital.  Nonfocal, no cranial nerve abnormalities, no weakness in extremities.  Pulm: CTA, equal bilaterally  CV: RRR, +S1S2 no appreciable murmur  Abd: soft, NT, ND,   Ext: MARTI x 4, no edema

## 2017-06-29 LAB
ALBUMIN SERPL ELPH-MCNC: 3.6 G/DL — SIGNIFICANT CHANGE UP (ref 3.3–5.2)
ALP SERPL-CCNC: 105 U/L — SIGNIFICANT CHANGE UP (ref 40–120)
ALT FLD-CCNC: 25 U/L — SIGNIFICANT CHANGE UP
ANION GAP SERPL CALC-SCNC: 9 MMOL/L — SIGNIFICANT CHANGE UP (ref 5–17)
AST SERPL-CCNC: 24 U/L — SIGNIFICANT CHANGE UP
BILIRUB SERPL-MCNC: 0.4 MG/DL — SIGNIFICANT CHANGE UP (ref 0.4–2)
BUN SERPL-MCNC: 30 MG/DL — HIGH (ref 8–20)
C DIFF BY PCR RESULT: SIGNIFICANT CHANGE UP
C DIFF TOX GENS STL QL NAA+PROBE: SIGNIFICANT CHANGE UP
CALCIUM SERPL-MCNC: 8.9 MG/DL — SIGNIFICANT CHANGE UP (ref 8.6–10.2)
CHLORIDE SERPL-SCNC: 100 MMOL/L — SIGNIFICANT CHANGE UP (ref 98–107)
CO2 SERPL-SCNC: 42 MMOL/L — HIGH (ref 22–29)
CREAT SERPL-MCNC: 1.15 MG/DL — SIGNIFICANT CHANGE UP (ref 0.5–1.3)
GLUCOSE SERPL-MCNC: 98 MG/DL — SIGNIFICANT CHANGE UP (ref 70–115)
HCT VFR BLD CALC: 29.1 % — LOW (ref 37–47)
HGB BLD-MCNC: 8.1 G/DL — LOW (ref 12–16)
MAGNESIUM SERPL-MCNC: 2.6 MG/DL — SIGNIFICANT CHANGE UP (ref 1.6–2.6)
MCHC RBC-ENTMCNC: 21.5 PG — LOW (ref 27–31)
MCHC RBC-ENTMCNC: 27.8 G/DL — LOW (ref 32–36)
MCV RBC AUTO: 77.2 FL — LOW (ref 81–99)
NT-PROBNP SERPL-SCNC: 6096 PG/ML — HIGH (ref 0–300)
PHOSPHATE SERPL-MCNC: 3.1 MG/DL — SIGNIFICANT CHANGE UP (ref 2.4–4.7)
PLATELET # BLD AUTO: 212 K/UL — SIGNIFICANT CHANGE UP (ref 150–400)
POTASSIUM SERPL-MCNC: 4.2 MMOL/L — SIGNIFICANT CHANGE UP (ref 3.5–5.3)
POTASSIUM SERPL-SCNC: 4.2 MMOL/L — SIGNIFICANT CHANGE UP (ref 3.5–5.3)
PROT SERPL-MCNC: 6.6 G/DL — SIGNIFICANT CHANGE UP (ref 6.6–8.7)
RBC # BLD: 3.77 M/UL — LOW (ref 4.4–5.2)
RBC # FLD: 25.5 % — HIGH (ref 11–15.6)
SODIUM SERPL-SCNC: 151 MMOL/L — HIGH (ref 135–145)
WBC # BLD: 4.6 K/UL — LOW (ref 4.8–10.8)
WBC # FLD AUTO: 4.6 K/UL — LOW (ref 4.8–10.8)

## 2017-06-29 PROCEDURE — 71010: CPT | Mod: 26

## 2017-06-29 PROCEDURE — 99232 SBSQ HOSP IP/OBS MODERATE 35: CPT

## 2017-06-29 RX ADMIN — Medication 20 MILLIGRAM(S): at 05:02

## 2017-06-29 RX ADMIN — Medication 1 TABLET(S): at 20:03

## 2017-06-29 RX ADMIN — Medication 2 MILLIGRAM(S): at 20:03

## 2017-06-29 RX ADMIN — SODIUM CHLORIDE 3 MILLILITER(S): 9 INJECTION INTRAMUSCULAR; INTRAVENOUS; SUBCUTANEOUS at 21:08

## 2017-06-29 RX ADMIN — PIPERACILLIN AND TAZOBACTAM 200 GRAM(S): 4; .5 INJECTION, POWDER, LYOPHILIZED, FOR SOLUTION INTRAVENOUS at 14:24

## 2017-06-29 RX ADMIN — CLOPIDOGREL BISULFATE 75 MILLIGRAM(S): 75 TABLET, FILM COATED ORAL at 14:24

## 2017-06-29 RX ADMIN — SODIUM CHLORIDE 3 MILLILITER(S): 9 INJECTION INTRAMUSCULAR; INTRAVENOUS; SUBCUTANEOUS at 05:01

## 2017-06-29 RX ADMIN — Medication 1 TABLET(S): at 14:24

## 2017-06-29 RX ADMIN — PANTOPRAZOLE SODIUM 40 MILLIGRAM(S): 20 TABLET, DELAYED RELEASE ORAL at 08:40

## 2017-06-29 RX ADMIN — Medication 3 MILLILITER(S): at 21:50

## 2017-06-29 RX ADMIN — Medication 1 TABLET(S): at 08:40

## 2017-06-29 RX ADMIN — Medication 50 MILLIGRAM(S): at 05:02

## 2017-06-29 RX ADMIN — Medication 0.5 MILLIGRAM(S): at 08:28

## 2017-06-29 RX ADMIN — Medication 3 MILLILITER(S): at 04:03

## 2017-06-29 RX ADMIN — Medication 3 MILLILITER(S): at 14:42

## 2017-06-29 RX ADMIN — PIPERACILLIN AND TAZOBACTAM 200 GRAM(S): 4; .5 INJECTION, POWDER, LYOPHILIZED, FOR SOLUTION INTRAVENOUS at 05:02

## 2017-06-29 RX ADMIN — PIPERACILLIN AND TAZOBACTAM 200 GRAM(S): 4; .5 INJECTION, POWDER, LYOPHILIZED, FOR SOLUTION INTRAVENOUS at 23:46

## 2017-06-29 RX ADMIN — LATANOPROST 1 DROP(S): 0.05 SOLUTION/ DROPS OPHTHALMIC; TOPICAL at 21:09

## 2017-06-29 RX ADMIN — PIPERACILLIN AND TAZOBACTAM 200 GRAM(S): 4; .5 INJECTION, POWDER, LYOPHILIZED, FOR SOLUTION INTRAVENOUS at 18:48

## 2017-06-29 RX ADMIN — Medication 250 MILLIGRAM(S): at 14:24

## 2017-06-29 RX ADMIN — Medication 1 MILLIGRAM(S): at 14:24

## 2017-06-29 RX ADMIN — Medication 3 MILLILITER(S): at 08:28

## 2017-06-29 RX ADMIN — Medication 0.5 MILLIGRAM(S): at 21:50

## 2017-06-29 RX ADMIN — SODIUM CHLORIDE 3 MILLILITER(S): 9 INJECTION INTRAMUSCULAR; INTRAVENOUS; SUBCUTANEOUS at 14:33

## 2017-06-29 NOTE — PROGRESS NOTE ADULT - PROBLEM SELECTOR PLAN 1
s/p mitral clip   Currently NSR  continue tele monitoring  cont diuresis as BP will tolerate  Daily BMP to monitor creatinine

## 2017-06-29 NOTE — PROGRESS NOTE ADULT - SUBJECTIVE AND OBJECTIVE BOX
PULMONARY PROGRESS NOTE      [id number]    Patient is a 84y old  Female who presents with a chief complaint of Abdominal pain, insomnia, and increased shortness of breath (10 Murphy 2017 16:40)      INTERVAL HPI/OVERNIGHT EVENTS:  DALTON  Better post Bipap during recent day and night    MEDICATIONS  (STANDING):  sodium chloride 0.9% lock flush 3 milliLiter(s) IV Push every 8 hours  latanoprost 0.005% Ophthalmic Solution 1 Drop(s) Both EYES at bedtime  pantoprazole    Tablet 40 milliGRAM(s) Oral before breakfast  multivitamin 1 Tablet(s) Oral daily  metoprolol succinate ER 50 milliGRAM(s) Oral daily  ALBUTerol/ipratropium for Nebulization 3 milliLiter(s) Nebulizer every 6 hours  buDESOnide   0.5 milliGRAM(s) Respule 0.5 milliGRAM(s) Inhalation two times a day  clopidogrel Tablet 75 milliGRAM(s) Oral daily  folic acid 1 milliGRAM(s) Oral daily  ascorbic acid 250 milliGRAM(s) Oral daily  lactobacillus acidophilus 1 Tablet(s) Oral two times a day with meals  psyllium Powder 1 Packet(s) Oral two times a day  torsemide 20 milliGRAM(s) Oral daily  piperacillin/tazobactam IVPB. 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 ENT:  No earache, sore throat or runny nose.    CARDIOVASCULAR:  No pressure, squeezing, tightness, heaviness or aching about the chest; no palpitations.    RESPIRATORY:  No PND or orthopnea. Mild SOBOE    GASTROINTESTINAL:  No nausea, vomiting or diarrhea.    GENITOURINARY:  No dysuria, frequency or urgency.    NEUROLOGIC:  No paresthesias, fasciculations, seizures or weakness.    PSYCHIATRIC:  No disorder of thought or mood.    Vital Signs Last 24 Hrs  T(C): 36.8 (2017 07:00), Max: 36.8 (2017 19:30)  T(F): 98.3 (2017 07:00), Max: 98.3 (2017 07:00)  HR: 66 (2017 11:00) (66 - 77)  BP: 118/57 (2017 11:00) (109/59 - 137/57)  BP(mean): 82 (2017 11:00) (77 - 86)  RR: 15 (2017 11:00) (15 - 33)  SpO2: 98% (2017 11:00) (93% - 100%)    PHYSICAL EXAMINATION:    GENERAL: The patient is awake and alert in no apparent distress. MO    HEENT: Head is normocephalic and atraumatic. Extraocular muscles are intact. Mucous membranes are moist.    NECK: Supple.    LUNGS: Diminished  to auscultation without wheezing, rales or rhonchi; respirations unlabored    HEART: Regular rate and rhythm without murmur.    ABDOMEN: Soft, nontender, and nondistended.      EXTREMITIES: Without any cyanosis, clubbing, rash, lesions or edema.    NEUROLOGIC: Grossly intact.    LABS:                        7.7    4.7   )-----------( 225      ( 2017 05:35 )             28.0         148<H>  |  99  |  31.0<H>  ----------------------------<  103  4.5   |  42.0<H>  |  1.20    Ca    9.1      2017 05:35  Phos  2.7       Mg     2.6               ABG - ( 2017 10:16 )  pH: 7.38  /  pCO2: 78    /  pO2: 61    / HCO3: 42    / Base Excess: 18.1  /  SaO2: 94                          Procalcitonin, Serum: 0.15 ng/mL (17 @ 04:40)        RADIOLOGY & ADDITIONAL STUDIES:   EXAM:  CHEST SINGLE VIEW FRONTAL                          PROCEDURE DATE:  2017        INTERPRETATION:  CHEST AP PORTABLE:    History: postop.     Date and time of exam: 2017 4:27 AM.    Technique: A single AP view of the chest was obtained.    Comparison exam: 2017 6:42 AM.    Findings:  Cardiomegaly. Pulmonary vascular congestion consistent with congestive   heart failure. Small bilateral pleural effusions. Evidence of prior   cardiac surgery..    Impression:  Congestive heart failure unchanged since the prior study..      LUCÍA ULLOA M.D., ATTENDING RADIOLOGIST  This document has been electronically signed. 2017  8:22AM

## 2017-06-29 NOTE — PROGRESS NOTE ADULT - ASSESSMENT
Assess  Obese  Clinically advanced COPD with hypercarbia unresponsive to bipap and neb Rx  Post MR clip  Chronic systolic CHF  Rec  Must use noct bipap for now  Needs non-invasive mask ventilation to lower her PCO2, to prevent progressive respiratory failure and to avoid recurrent hospitalization.   Nocturnal mask ventilation required to reduce PCO2, avoid respiratory failure and possible death and to avoid readmission  In patient rehab  NIV on DC

## 2017-06-29 NOTE — PROGRESS NOTE ADULT - SUBJECTIVE AND OBJECTIVE BOX
INTERVAL SUBJECTIVE & REVIEW OF SYMPTOMS:  84y year old Female  s/p   MV clip readmitted for abdominal pain, developed worsening confusion, neutropenia, hypotension.  RUQ sono concerning for  acalculous cholecystitis,  Treated with IV antibiotics as per multidisciplinary team.  hospital course complicated by multiple bouts of AMS/obtundation, blood gases c/w resp acidosis -  bipap at night.    Rehab f/u requested        MEDICATIONS:  sodium chloride 0.9% lock flush 3 milliLiter(s) IV Push every 8 hours  latanoprost 0.005% Ophthalmic Solution 1 Drop(s) Both EYES at bedtime  pantoprazole    Tablet 40 milliGRAM(s) Oral before breakfast  multivitamin 1 Tablet(s) Oral daily  metoprolol succinate ER 50 milliGRAM(s) Oral daily  ALBUTerol/ipratropium for Nebulization 3 milliLiter(s) Nebulizer every 6 hours  buDESOnide   0.5 milliGRAM(s) Respule 0.5 milliGRAM(s) Inhalation two times a day  clopidogrel Tablet 75 milliGRAM(s) Oral daily  folic acid 1 milliGRAM(s) Oral daily  ascorbic acid 250 milliGRAM(s) Oral daily  lactobacillus acidophilus 1 Tablet(s) Oral two times a day with meals  psyllium Powder 1 Packet(s) Oral two times a day  loperamide 2 milliGRAM(s) Oral every 4 hours PRN  torsemide 20 milliGRAM(s) Oral daily  piperacillin/tazobactam IVPB. 2.25 Gram(s) IV Intermittent every 6 hours      REVIEW OF SYSTEMS  [   ] Constitutional WNL  [   ] Cardio WNL  [   ] Resp WNL  [   ] GI WNL  [   ]  WNL  [   ] Heme WNL  [   ] Endo WNL  [   ] Skin WNL  [   ] MSK WNL  [   ] Neuro WNL  [   ] Cognitive WNL  [   ] Psych WNL    VITAL SIGNS  Vital Signs Last 24 Hrs  T(C): 36.9 (29 Jun 2017 07:00), Max: 37.1 (28 Jun 2017 15:30)  T(F): 98.5 (29 Jun 2017 07:00), Max: 98.7 (28 Jun 2017 15:30)  HR: 77 (29 Jun 2017 11:00) (66 - 77)  BP: 119/55 (29 Jun 2017 11:00) (103/57 - 139/62)  BP(mean): 79 (29 Jun 2017 11:00) (76 - 103)  RR: 27 (29 Jun 2017 11:00) (12 - 31)  SpO2: 95% (29 Jun 2017 11:00) (90% - 99%)    PHYSICAL EXAM  General:   HEENT:   Cardio:   Resp:   Abdomen:   Neuro:   Extrem:   Skin:   Functional Exam:     RECENT LABS:                        8.1    4.6   )-----------( 212      ( 29 Jun 2017 04:46 )             29.1     06-29    151<H>  |  100  |  30.0<H>  ----------------------------<  98  4.2   |  42.0<H>  |  1.15    Ca    8.9      29 Jun 2017 04:46  Phos  3.1     06-29  Mg     2.6     06-29    TPro  6.6  /  Alb  3.6  /  TBili  0.4  /  DBili  x   /  AST  24  /  ALT  25  /  AlkPhos  105  06-29    RADIOLOGY/OTHER RESULTS: INTERVAL SUBJECTIVE & REVIEW OF SYMPTOMS:  84y year old Female  s/p   MV clip readmitted for abdominal pain, developed worsening confusion, neutropenia, hypotension.  RUQ sono concerning for  acalculous cholecystitis,  Treated with IV antibiotics as per multidisciplinary team.  hospital course complicated by multiple bouts of AMS/obtundation, blood gases c/w resp acidosis -  bipap at night.    Rehab f/u requested for further recommendations. Per Pulmonary patient needs nocturnal Bipap for now and noninvasive mask ventilation to reduce co2.   She denies any abdominal pain or nausea.        MEDICATIONS:  sodium chloride 0.9% lock flush 3 milliLiter(s) IV Push every 8 hours  latanoprost 0.005% Ophthalmic Solution 1 Drop(s) Both EYES at bedtime  pantoprazole    Tablet 40 milliGRAM(s) Oral before breakfast  multivitamin 1 Tablet(s) Oral daily  metoprolol succinate ER 50 milliGRAM(s) Oral daily  ALBUTerol/ipratropium for Nebulization 3 milliLiter(s) Nebulizer every 6 hours  buDESOnide   0.5 milliGRAM(s) Respule 0.5 milliGRAM(s) Inhalation two times a day  clopidogrel Tablet 75 milliGRAM(s) Oral daily  folic acid 1 milliGRAM(s) Oral daily  ascorbic acid 250 milliGRAM(s) Oral daily  lactobacillus acidophilus 1 Tablet(s) Oral two times a day with meals  psyllium Powder 1 Packet(s) Oral two times a day  loperamide 2 milliGRAM(s) Oral every 4 hours PRN  torsemide 20 milliGRAM(s) Oral daily  piperacillin/tazobactam IVPB. 2.25 Gram(s) IV Intermittent every 6 hours      REVIEW OF SYSTEMS  [   ] Constitutional WNL  [   ] Cardio WNL  [   ] Resp WNL  [  x ] GI WNL  [   ]  WNL  [   ] Heme WNL  [   ] Endo WNL  [   ] Skin WNL  [   ] MSK WNL  [   ] Neuro WNL  [   ] Cognitive WNL  [   ] Psych WNL    VITAL SIGNS  Vital Signs Last 24 Hrs  T(C): 36.9 (29 Jun 2017 07:00), Max: 37.1 (28 Jun 2017 15:30)  T(F): 98.5 (29 Jun 2017 07:00), Max: 98.7 (28 Jun 2017 15:30)  HR: 77 (29 Jun 2017 11:00) (66 - 77)  BP: 119/55 (29 Jun 2017 11:00) (103/57 - 139/62)  BP(mean): 79 (29 Jun 2017 11:00) (76 - 103)  RR: 27 (29 Jun 2017 11:00) (12 - 31)  SpO2: 95% (29 Jun 2017 11:00) (90% - 99%)    PHYSICAL EXAM  General: NAD, sitting up in chair, smiling, pleasant,   HEENT: AT, NC  Cardio: S1S2+  Resp: poor air entry  Abdomen: soft, obese  Extrem: no edema, motor 4/5 all extremities, sensory grossly intact to light touch.     Functional Exam: with PT she is at a supervision level for transfers and min assist for ambulation with walker with limited endurance    RECENT LABS:                        8.1    4.6   )-----------( 212      ( 29 Jun 2017 04:46 )             29.1     06-29    151<H>  |  100  |  30.0<H>  ----------------------------<  98  4.2   |  42.0<H>  |  1.15    Ca    8.9      29 Jun 2017 04:46  Phos  3.1     06-29  Mg     2.6     06-29    TPro  6.6  /  Alb  3.6  /  TBili  0.4  /  DBili  x   /  AST  24  /  ALT  25  /  AlkPhos  105  06-29    RADIOLOGY/OTHER RESULTS:    < from: Xray Chest 1 View AP/PA. (06.29.17 @ 05:10) >   EXAM:  CHEST SINGLE VIEW FRONTAL                          PROCEDURE DATE:  06/29/2017        INTERPRETATION:  CHEST AP PORTABLE:    History: evaluate for effusion.     Date and time of exam: 6/29/2017 4:37 AM.    Technique: A single AP view of thechest was obtained.    Comparison exam: 6/28/2017 4:13 AM.    Findings:  Cardiomegaly, pulmonary vascular congestion, bilateral pleural effusions.   Evidence of congestive heart failure, unchanged. Status post cardiac   surgery. No evidence of pneumothorax..    Impression:  Stable exam without significant change since the previous study..      A/P    86 year old female with h/o MV clip, hospitalization for abdominal pain/ confusion- Acalculous cholecystitis,   Patient advanced COPD, hypercarbic resp failure.   She is needing supervision with transfers and walking short distances with min assist.   Patient unlikely to be able to tolerate intensity of acute rehab program. Recommend LISSY at this time.  Thank you

## 2017-06-29 NOTE — PROGRESS NOTE ADULT - SUBJECTIVE AND OBJECTIVE BOX
NEPHROLOGY INTERVAL HPI/OVERNIGHT EVENTS:    seen earlier   Pulmonary foolow up noted     MEDICATIONS  (STANDING):  sodium chloride 0.9% lock flush 3 milliLiter(s) IV Push every 8 hours  latanoprost 0.005% Ophthalmic Solution 1 Drop(s) Both EYES at bedtime  pantoprazole    Tablet 40 milliGRAM(s) Oral before breakfast  multivitamin 1 Tablet(s) Oral daily  metoprolol succinate ER 50 milliGRAM(s) Oral daily  ALBUTerol/ipratropium for Nebulization 3 milliLiter(s) Nebulizer every 6 hours  buDESOnide   0.5 milliGRAM(s) Respule 0.5 milliGRAM(s) Inhalation two times a day  clopidogrel Tablet 75 milliGRAM(s) Oral daily  folic acid 1 milliGRAM(s) Oral daily  ascorbic acid 250 milliGRAM(s) Oral daily  lactobacillus acidophilus 1 Tablet(s) Oral two times a day with meals  psyllium Powder 1 Packet(s) Oral two times a day  torsemide 20 milliGRAM(s) Oral daily  piperacillin/tazobactam IVPB. 2.25 Gram(s) IV Intermittent every 6 hours    MEDICATIONS  (PRN):  loperamide 2 milliGRAM(s) Oral every 4 hours PRN Diarrhea      Allergies    aspirin (Anaphylaxis)  NSAIDs (Other)    Intolerances    Lasix (Other)  [facility] PT (Unknown)        Vital Signs Last 24 Hrs  T(C): 36.9 (2017 07:00), Max: 37.1 (2017 15:30)  T(F): 98.5 (2017 07:00), Max: 98.7 (2017 15:30)  HR: 77 (2017 11:00) (66 - 77)  BP: 119/55 (2017 11:00) (103/57 - 139/62)  BP(mean): 79 (2017 11:00) (76 - 103)  RR: 27 (2017 11:00) (12 - 31)  SpO2: 95% (2017 11:00) (90% - 100%)  Daily     Daily Weight in k.2 (2017 05:13)  I&O's Detail    2017 07:01  -  2017 07:00  --------------------------------------------------------  IN:    Oral Fluid: 400 mL    Solution: 300 mL  Total IN: 700 mL    OUT:  Total OUT: 0 mL    Total NET: 700 mL      2017 07:  -  2017 11:57  --------------------------------------------------------  IN:    Oral Fluid: 240 mL  Total IN: 240 mL    OUT:  Total OUT: 0 mL    Total NET: 240 mL        I&O's Summary    2017 07:  -  2017 07:00  --------------------------------------------------------  IN: 700 mL / OUT: 0 mL / NET: 700 mL    2017 07:  -  2017 11:57  --------------------------------------------------------  IN: 240 mL / OUT: 0 mL / NET: 240 mL        PHYSICAL EXAM:    GENERAL: NAD,   HEAD:  No edema   NECK: Supple, No JVD,   CHEST/LUNG: EAE, No wheeze , Bibasilar crackles   HEART: Regular rate and rhythm; No rub  ABDOMEN: Soft, Nontender,   EXTREMITIES:  Less edema       LABS:                        8.1    4.6   )-----------( 212      ( 2017 04:46 )             29.1         151<H>  |  100  |  30.0<H>  ----------------------------<  98  4.2   |  42.0<H>  |  1.15    Ca    8.9      2017 04:46  Phos  3.1       Mg     2.6         TPro  6.6  /  Alb  3.6  /  TBili  0.4  /  DBili  x   /  AST  24  /  ALT  25  /  AlkPhos  105          Magnesium, Serum: 2.6 mg/dL ( @ 04:46)  Phosphorus Level, Serum: 3.1 mg/dL ( @ 04:46)    ABG - ( 2017 10:16 )  pH: 7.38  /  pCO2: 78    /  pO2: 61    / HCO3: 42    / Base Excess: 18.1  /  SaO2: 94                    RADIOLOGY & ADDITIONAL TESTS:

## 2017-06-29 NOTE — PROGRESS NOTE ADULT - ASSESSMENT
Renal insufficiency-Pre renal azotemia  , Sig COPD , hypercarbia   Recent MV Clip ( POD # 22 )  No hydro on renal imaging   Torsemide resumed   Will have to accept some degree of augmented azotemia and treat exacerbated alkalosis as needed   Would not be opposed to trial of low dose ACE , with close observation of labs   Cardio and Pulm follow up noted     Anemia - Venofer , trend Hgb

## 2017-06-29 NOTE — PROGRESS NOTE ADULT - ASSESSMENT
84y year old Female  s/p   MV clip readmitted for abdominal pain, developed worsening confusion, neutropenia, hypotension.  RUQ sono concerning for  acalculous cholecystitis, GI, Surgery, IR, Infectious Disease consults obtained.  Reviewed previous CT scan with Dr. Lara believed GB on CT scan wall is thickened and unable to clearly see borders, suspicious for acalculous cholecystitis which was treated with IV antibiotics as per multidisciplinary team.  hospital course complicated by multiple bouts of AMS/obtundation with bipap off> blood gases c/w resp acidosis necessitating strict use of bipap at night.      AM of 6/28, plan was to downgrade patient to floor level of care, but routine ABG revealed a co2 in 80's off of Bipap.  Bipap resumed and Co2 rechecked, which improved to baseline in 70's.  Patient to stay in CTICU for further monitoring.  Patient's daughter states that her mother would never want a trach again after being trached in past.  Reached out to pulmonary MD, Dr. Ritter, who suggests performing spirometry to see if patient is candidate for noninvasive ventilator at rehab / home.   6/29: Spirometry pending (attempted but patient is not following directions, will attempt with daughter at bedside)

## 2017-06-29 NOTE — PROGRESS NOTE ADULT - SUBJECTIVE AND OBJECTIVE BOX
Brief summary:  84yFemale POD# 22 Mitral Clip, post-operative stay complicated by acute on chronic hypercapnic respiratory failure, most recently readmitted for AMS/Obtundation on . Currently following a strict nocturnal BIPAP regiment.     Overnight events:  Patient wearing nocturnal BIPAP without acute complaints.    Past Medical History:  Heart failure  H/o or current diagnosis of HF- Contraindication to ACEI/ARBs  H/o or current diagnosis of HF- no contraindication to ACEI/ARBs  H/o or current diagnosis of HF- ACEI/ARB contraindication unknown  H/o or current diagnosis of HF- no contraindication to ACEI/ARBs  H/o or current diagnosis of HF- Contraindication to ACEI/ARBs  H/o or current diagnosis of HF- ACEI/ARB contraindication unknown  H/o or current diagnosis of HF- ACEI/ARB contraindication unknown  Family history of hypertension  Family history of heart disease  Handoff  MEWS Score  Renal insufficiency  MR (mitral regurgitation)  Mitral valve stenosis, severe  On home oxygen therapy  Obesity  Tracheal stenosis  Cardiomyopathy  Osteoarthritis  Iron deficiency anemia  MI, old  Dyslipidemia  Acid reflux  Hx of CABG  CAD (coronary artery disease)  Heart failure  HTN (hypertension)  Congestive heart failure  Chronic systolic (congestive) heart failure  Dyslipidemia  Cerebrovascular accident (CVA) due to other mechanism  Acalculous cholecystitis  Acute on chronic systolic heart failure  Coronary artery disease involving native coronary artery of native heart without angina pectoris  Acute on chronic respiratory failure with hypercapnia  Obesity, unspecified obesity severity, unspecified obesity type  Hypercapnic respiratory failure, chronic  On home oxygen therapy  Palliative care encounter  Prophylactic measure  Polyp of ascending colon, unspecified type  Delirium  Iron deficiency anemia, unspecified iron deficiency anemia type  CKD (chronic kidney disease) stage 3, GFR 30-59 ml/min  Non-rheumatic mitral regurgitation  Non-ST elevation (NSTEMI) myocardial infarction  Atrial fibrillation with RVR  Encephalopathy, unspecified  Left lower quadrant pain  Chronic obstructive pulmonary disease, unspecified COPD type  Acute on chronic systolic congestive heart failure  H/O tracheostomy  Cataract  S/P CABG x 3  SEVERE ABD PAIN  0        sodium chloride 0.9% lock flush 3 milliLiter(s) IV Push every 8 hours  latanoprost 0.005% Ophthalmic Solution 1 Drop(s) Both EYES at bedtime  pantoprazole    Tablet 40 milliGRAM(s) Oral before breakfast  multivitamin 1 Tablet(s) Oral daily  metoprolol succinate ER 50 milliGRAM(s) Oral daily  ALBUTerol/ipratropium for Nebulization 3 milliLiter(s) Nebulizer every 6 hours  buDESOnide   0.5 milliGRAM(s) Respule 0.5 milliGRAM(s) Inhalation two times a day  clopidogrel Tablet 75 milliGRAM(s) Oral daily  folic acid 1 milliGRAM(s) Oral daily  ascorbic acid 250 milliGRAM(s) Oral daily  lactobacillus acidophilus 1 Tablet(s) Oral two times a day with meals  psyllium Powder 1 Packet(s) Oral two times a day  loperamide 2 milliGRAM(s) Oral every 4 hours PRN  torsemide 20 milliGRAM(s) Oral daily  piperacillin/tazobactam IVPB. 2.25 Gram(s) IV Intermittent every 6 hours  MEDICATIONS  (PRN):  loperamide 2 milliGRAM(s) Oral every 4 hours PRN Diarrhea      Daily     Daily Weight in k.9 (2017 03:31)      ABG - ( 2017 10:16 )  pH: 7.38  /  pCO2: 78    /  pO2: 61    / HCO3: 42    / Base Excess: 18.1  /  SaO2: 94                                      7.7    4.7   )-----------( 225      ( 2017 05:35 )             28.0       148<H>  |  99  |  31.0<H>  ----------------------------<  103  4.5   |  42.0<H>  |  1.20    Ca    9.1      2017 05:35  Phos  2.7       Mg     2.6                   Objective:  T(C): 36.8 (17 @ 00:00), Max: 37.1 (17 @ 15:30)  HR: 66 (17 @ 02:00) (66 - 77)  BP: 110/57 (17 @ 02:00) (103/57 - 139/62)  RR: 13 (17 @ 02:00) (13 - 31)  SpO2: 95% (17 @ 02:00) (93% - 100%)  Wt(kg): --CAPILLARY BLOOD GLUCOSE      I&O's Summary    2017 07:01  -  2017 07:00  --------------------------------------------------------  IN: 1140 mL / OUT: 380 mL / NET: 760 mL    2017 07:  -  2017 03:22  --------------------------------------------------------  IN: 600 mL / OUT: 0 mL / NET: 600 mL        Physical Exam  Neuro: A+O x 3, non-focal, speech clear and intact  Pulm: CTA, equal bilaterally  CV: RRR, no murmurs, +S1S2  Abd: soft, NT, ND, +BS  Ext: +DP Pulses b/l, +PT pulses, +1 pedal edema

## 2017-06-30 LAB
ALBUMIN SERPL ELPH-MCNC: 3.5 G/DL — SIGNIFICANT CHANGE UP (ref 3.3–5.2)
ALP SERPL-CCNC: 102 U/L — SIGNIFICANT CHANGE UP (ref 40–120)
ALT FLD-CCNC: 23 U/L — SIGNIFICANT CHANGE UP
ANION GAP SERPL CALC-SCNC: 7 MMOL/L — SIGNIFICANT CHANGE UP (ref 5–17)
AST SERPL-CCNC: 20 U/L — SIGNIFICANT CHANGE UP
BILIRUB SERPL-MCNC: 0.4 MG/DL — SIGNIFICANT CHANGE UP (ref 0.4–2)
BUN SERPL-MCNC: 28 MG/DL — HIGH (ref 8–20)
CALCIUM SERPL-MCNC: 8.9 MG/DL — SIGNIFICANT CHANGE UP (ref 8.6–10.2)
CHLORIDE SERPL-SCNC: 99 MMOL/L — SIGNIFICANT CHANGE UP (ref 98–107)
CO2 SERPL-SCNC: 43 MMOL/L — HIGH (ref 22–29)
CREAT SERPL-MCNC: 1.2 MG/DL — SIGNIFICANT CHANGE UP (ref 0.5–1.3)
GLUCOSE SERPL-MCNC: 90 MG/DL — SIGNIFICANT CHANGE UP (ref 70–115)
HCT VFR BLD CALC: 26.7 % — LOW (ref 37–47)
HGB BLD-MCNC: 7.7 G/DL — LOW (ref 12–16)
MAGNESIUM SERPL-MCNC: 2.4 MG/DL — SIGNIFICANT CHANGE UP (ref 1.6–2.6)
MCHC RBC-ENTMCNC: 21.9 PG — LOW (ref 27–31)
MCHC RBC-ENTMCNC: 28.8 G/DL — LOW (ref 32–36)
MCV RBC AUTO: 75.9 FL — LOW (ref 81–99)
PHOSPHATE SERPL-MCNC: 2.6 MG/DL — SIGNIFICANT CHANGE UP (ref 2.4–4.7)
PLATELET # BLD AUTO: 174 K/UL — SIGNIFICANT CHANGE UP (ref 150–400)
POTASSIUM SERPL-MCNC: 4.1 MMOL/L — SIGNIFICANT CHANGE UP (ref 3.5–5.3)
POTASSIUM SERPL-SCNC: 4.1 MMOL/L — SIGNIFICANT CHANGE UP (ref 3.5–5.3)
PROT SERPL-MCNC: 6.4 G/DL — LOW (ref 6.6–8.7)
RBC # BLD: 3.52 M/UL — LOW (ref 4.4–5.2)
RBC # FLD: 25.7 % — HIGH (ref 11–15.6)
SODIUM SERPL-SCNC: 149 MMOL/L — HIGH (ref 135–145)
WBC # BLD: 4.2 K/UL — LOW (ref 4.8–10.8)
WBC # FLD AUTO: 4.2 K/UL — LOW (ref 4.8–10.8)

## 2017-06-30 PROCEDURE — 71010: CPT | Mod: 26

## 2017-06-30 RX ORDER — FERROUS SULFATE 325(65) MG
325 TABLET ORAL
Qty: 0 | Refills: 0 | Status: DISCONTINUED | OUTPATIENT
Start: 2017-06-30 | End: 2017-06-30

## 2017-06-30 RX ORDER — IRON SUCROSE 20 MG/ML
200 INJECTION, SOLUTION INTRAVENOUS EVERY 24 HOURS
Qty: 0 | Refills: 0 | Status: COMPLETED | OUTPATIENT
Start: 2017-06-30 | End: 2017-07-02

## 2017-06-30 RX ORDER — SIMVASTATIN 20 MG/1
20 TABLET, FILM COATED ORAL AT BEDTIME
Qty: 0 | Refills: 0 | Status: DISCONTINUED | OUTPATIENT
Start: 2017-06-30 | End: 2017-07-20

## 2017-06-30 RX ADMIN — Medication 3 MILLILITER(S): at 15:17

## 2017-06-30 RX ADMIN — SODIUM CHLORIDE 3 MILLILITER(S): 9 INJECTION INTRAMUSCULAR; INTRAVENOUS; SUBCUTANEOUS at 06:17

## 2017-06-30 RX ADMIN — PIPERACILLIN AND TAZOBACTAM 200 GRAM(S): 4; .5 INJECTION, POWDER, LYOPHILIZED, FOR SOLUTION INTRAVENOUS at 06:31

## 2017-06-30 RX ADMIN — Medication 325 MILLIGRAM(S): at 13:42

## 2017-06-30 RX ADMIN — LATANOPROST 1 DROP(S): 0.05 SOLUTION/ DROPS OPHTHALMIC; TOPICAL at 21:04

## 2017-06-30 RX ADMIN — Medication 20 MILLIGRAM(S): at 06:25

## 2017-06-30 RX ADMIN — Medication 1 MILLIGRAM(S): at 12:24

## 2017-06-30 RX ADMIN — Medication 0.5 MILLIGRAM(S): at 20:25

## 2017-06-30 RX ADMIN — Medication 50 MILLIGRAM(S): at 06:25

## 2017-06-30 RX ADMIN — Medication 1 TABLET(S): at 12:24

## 2017-06-30 RX ADMIN — Medication 5 MILLIGRAM(S): at 18:01

## 2017-06-30 RX ADMIN — PANTOPRAZOLE SODIUM 40 MILLIGRAM(S): 20 TABLET, DELAYED RELEASE ORAL at 06:35

## 2017-06-30 RX ADMIN — Medication 1 TABLET(S): at 12:25

## 2017-06-30 RX ADMIN — CLOPIDOGREL BISULFATE 75 MILLIGRAM(S): 75 TABLET, FILM COATED ORAL at 12:24

## 2017-06-30 RX ADMIN — SODIUM CHLORIDE 3 MILLILITER(S): 9 INJECTION INTRAMUSCULAR; INTRAVENOUS; SUBCUTANEOUS at 13:12

## 2017-06-30 RX ADMIN — Medication 0.5 MILLIGRAM(S): at 09:12

## 2017-06-30 RX ADMIN — IRON SUCROSE 110 MILLIGRAM(S): 20 INJECTION, SOLUTION INTRAVENOUS at 18:00

## 2017-06-30 RX ADMIN — Medication 2 MILLIGRAM(S): at 12:23

## 2017-06-30 RX ADMIN — SIMVASTATIN 20 MILLIGRAM(S): 20 TABLET, FILM COATED ORAL at 21:04

## 2017-06-30 RX ADMIN — Medication 250 MILLIGRAM(S): at 12:24

## 2017-06-30 RX ADMIN — Medication 3 MILLILITER(S): at 03:25

## 2017-06-30 RX ADMIN — SODIUM CHLORIDE 3 MILLILITER(S): 9 INJECTION INTRAMUSCULAR; INTRAVENOUS; SUBCUTANEOUS at 21:03

## 2017-06-30 RX ADMIN — Medication 3 MILLILITER(S): at 09:06

## 2017-06-30 RX ADMIN — Medication 3 MILLILITER(S): at 20:25

## 2017-06-30 RX ADMIN — PIPERACILLIN AND TAZOBACTAM 200 GRAM(S): 4; .5 INJECTION, POWDER, LYOPHILIZED, FOR SOLUTION INTRAVENOUS at 12:26

## 2017-06-30 RX ADMIN — Medication 1 TABLET(S): at 18:01

## 2017-06-30 NOTE — PROGRESS NOTE ADULT - ASSESSMENT
84y year old Female  s/p   MV clip readmitted for abdominal pain, developed worsening confusion, neutropenia, hypotension.  RUQ sono concerning for  acalculous cholecystitis, GI, Surgery, IR, Infectious Disease consults obtained.  Reviewed previous CT scan with Dr. Lara believed GB on CT scan wall is thickened and unable to clearly see borders, suspicious for acalculous cholecystitis which was treated with IV antibiotics as per multidisciplinary team.  hospital course complicated by multiple bouts of AMS/obtundation with bipap off> blood gases c/w resp acidosis necessitating strict use of bipap at night.      AM of 6/28, plan was to downgrade patient to floor level of care, but routine ABG revealed a co2 in 80's off of Bipap.  Bipap resumed and Co2 rechecked, which improved to baseline in 70's.  Patient to stay in CTICU for further monitoring.  Patient's daughter states that her mother would never want a trach again after being trached in past.  Pulmonary suggesting non invasive ventilation ot rehab/home.  Bedside spirometry attempted but pt unable to cooperate at the time.

## 2017-06-30 NOTE — PROGRESS NOTE ADULT - ASSESSMENT
on Noct bipap; has recurrent CO2 retention despite that  now has more edema  daughter keen for more diuretics  creat normal  shall give extra Torsemide for today and watch  add venofer  Na sl high; cause ?

## 2017-06-30 NOTE — PROGRESS NOTE ADULT - PROBLEM SELECTOR PLAN 8
Lovenox held permanently per patient's daughter request.  Protonix for GI prophylaxis.    Plan for discharge to [facility] possibly Monday.  Daughter requested pt not be moved over the weekend.  Discussed with CT surgery team and Dr. Owen in AM rounds.

## 2017-06-30 NOTE — PROGRESS NOTE ADULT - SUBJECTIVE AND OBJECTIVE BOX
Events noted  stable but increase lE edema noted , c/o DALTON      17   Xray Chest 1 View AP/PA. (17 @ 05:00)   Findings:  Cardiomegaly. Pulmonary vascular congestion. Bilateral pleural effusions.   Postcardiac surgery changes. No evidence of pneumothorax..    Impression:  Evidence of congestive heart failure. Stable exam without significant   change since the previous study..    < end of copied text >    EKG:  TELE: PAF not sustained currently IN NSR    MEDICATIONS  (STANDING):  sodium chloride 0.9% lock flush 3 milliLiter(s) IV Push every 8 hours  latanoprost 0.005% Ophthalmic Solution 1 Drop(s) Both EYES at bedtime  pantoprazole    Tablet 40 milliGRAM(s) Oral before breakfast  multivitamin 1 Tablet(s) Oral daily  metoprolol succinate ER 50 milliGRAM(s) Oral daily  ALBUTerol/ipratropium for Nebulization 3 milliLiter(s) Nebulizer every 6 hours  buDESOnide   0.5 milliGRAM(s) Respule 0.5 milliGRAM(s) Inhalation two times a day  clopidogrel Tablet 75 milliGRAM(s) Oral daily  folic acid 1 milliGRAM(s) Oral daily  ascorbic acid 250 milliGRAM(s) Oral daily  lactobacillus acidophilus 1 Tablet(s) Oral two times a day with meals  psyllium Powder 1 Packet(s) Oral two times a day  torsemide 20 milliGRAM(s) Oral daily  piperacillin/tazobactam IVPB. 2.25 Gram(s) IV Intermittent every 6 hours  iron sucrose IVPB 200 milliGRAM(s) IV Intermittent every 24 hours  torsemide 5 milliGRAM(s) Oral daily    MEDICATIONS  (PRN):  loperamide 2 milliGRAM(s) Oral every 4 hours PRN Diarrhea      Allergies    aspirin (Anaphylaxis)  NSAIDs (Other)    Intolerances    Lasix (Other)  [facility] PT (Unknown)    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   r/o  out  stenosis  Cardiomyopathy  Osteoarthritis  Iron deficiency anemia  MI, old: 2008  Dyslipidemia  Acid reflux  Hx of CAB  CAD (coronary artery disease)  Heart failure  HTN (hypertension)  H/O tracheostomy: 2013 may  Cataract: b/l  2013  S/P CABG x 3:   [place]      Vital Signs Last 24 Hrs  T(C): 36.6 (2017 13:48), Max: 37.2 (2017 04:35)  T(F): 97.8 (2017 13:48), Max: 98.9 (2017 04:35)  HR: 67 (2017 13:48) (66 - 79)  BP: 108/60 (2017 13:48) (94/56 - 136/60)  BP(mean): 77 (2017 16:00) (77 - 77)  RR: 19 (2017 13:48) (16 - 35)  SpO2: 100% (2017 08:56) (91% - 100%)    Physical Exam:  Constitutional: NAD, AAOx3  Cardiovascular: +S1S2 RRR  Pulmonary: CTA b/l, unlabored  Abd: soft NTND +BS  Groins: C/D/I bilaterally; no bleeding, hematoma, edema  Extremities: no pedal edema, +distal pulses b/l  Neuro: non focal, MARTI x4    LABS:                        7.7    4.2   )-----------( 174      ( 2017 06:03 )             26.7     06-30    149<H>  |  99  |  28.0<H>  ----------------------------<  90  4.1   |  43.0<H>  |  1.20    Ca    8.9      2017 06:03  Phos  2.6     06-30  Mg     2.4     06-30    TPro  6.4<L>  /  Alb  3.5  /  TBili  0.4  /  DBili  x   /  AST  20  /  ALT  23  /  AlkPhos  102  06-30

## 2017-06-30 NOTE — PROGRESS NOTE ADULT - ASSESSMENT
Obese  Clinically advanced COPD with hypercarbia unresponsive to bipap and neb Rx  Post MR clip  Chronic systolic CHF  Must use noct bipap for now and Ok for bipap at rehab  Needs non-invasive mask ventilation to lower her PCO2, to prevent progressive respiratory failure and to avoid recurrent hospitalization.   Nocturnal mask ventilation required to reduce PCO2, avoid respiratory failure and possible death and to avoid readmission  In patient rehab  NIV on DC  pulmonary follow up as out pt

## 2017-06-30 NOTE — PROGRESS NOTE ADULT - SUBJECTIVE AND OBJECTIVE BOX
Subjective: Sitting up in chair.  Daughter visiting at bedside.  Denies CP.  NAD noted.      Tele: SR                         T(F): 97.8 (17 @ 13:48), Max: 98.9 (17 @ 04:35)  HR: 67 (17 @ 13:48) (66 - 79)  BP: 108/60 (17 @ 13:48) (94/56 - 136/60)  RR: 19 (17 @ 13:48) (16 - 35)  SpO2: 100% (17 @ 08:56) (91% - 100%) on BIpap overnight and 3 liters nasal O2 currently.          Daily     Daily Weight in k.9 (2017 05:00)    LV EF: 25%      Allergies:  aspirin (Anaphylaxis)  Lasix (Other)  NSAIDs (Other)          149<H>  |  99  |  28.0<H>  ----------------------------<  90  4.1   |  43.0<H>  |  1.20    Ca    8.9      2017 06:03  Phos  2.6       Mg     2.4         TPro  6.4<L>  /  Alb  3.5  /  TBili  0.4  /  DBili  x   /  AST  20  /  ALT  23  /  AlkPhos  102                                 7.7    4.2   )-----------( 174      ( 2017 06:03 )             26.7               CAPILLARY BLOOD GLUCOSE  109 (2017 20:58)               CXR:   Xray Chest 1 View AP/PA. (17 @ 05:00) >  Findings:  Cardiomegaly. Pulmonary vascular congestion. Bilateral pleural effusions.   Postcardiac surgery changes. No evidence of pneumothorax..    Impression:  Evidence of congestive heart failure. Stable exam without significant   change since the previous study.    LUCÍA ULLOA M.D., ATTENDING RADIOLOGIST  This document has been electronically signed. 2017  6:55AM          I&O's Detail    2017 07:01  -  2017 07:00  --------------------------------------------------------  IN:    Oral Fluid: 1199 mL    Solution: 300 mL  Total IN: 1499 mL    OUT:  Total OUT: 0 mL    Total NET: 1499 mL      2017 07:  -  2017 14:28  --------------------------------------------------------  IN:    Oral Fluid: 600 mL  Total IN: 600 mL    OUT:    Voided: 200 mL  Total OUT: 200 mL    Total NET: 400 mL          CHEST TUBE:  [ ] YES x[ ] NO  OUTPUT:     per 24 hours    AIR LEAKS:  [ ] YES [ ] NO      TOYA DRAIN:   [ ] YES [ x] NO  OUTPUT:     per 24 hours    EPICARDIAL WIRES:  [ ] YES [ x] NO      BOWEL MOVEMENT:  [ x] YES [ ] NO        Active Medications:  sodium chloride 0.9% lock flush 3 milliLiter(s) IV Push every 8 hours  latanoprost 0.005% Ophthalmic Solution 1 Drop(s) Both EYES at bedtime  pantoprazole    Tablet 40 milliGRAM(s) Oral before breakfast  multivitamin 1 Tablet(s) Oral daily  metoprolol succinate ER 50 milliGRAM(s) Oral daily  ALBUTerol/ipratropium for Nebulization 3 milliLiter(s) Nebulizer every 6 hours  buDESOnide   0.5 milliGRAM(s) Respule 0.5 milliGRAM(s) Inhalation two times a day  clopidogrel Tablet 75 milliGRAM(s) Oral daily  folic acid 1 milliGRAM(s) Oral daily  ascorbic acid 250 milliGRAM(s) Oral daily  lactobacillus acidophilus 1 Tablet(s) Oral two times a day with meals  psyllium Powder 1 Packet(s) Oral two times a day  loperamide 2 milliGRAM(s) Oral every 4 hours PRN  torsemide 20 milliGRAM(s) Oral daily  piperacillin/tazobactam IVPB. 2.25 Gram(s) IV Intermittent every 6 hours  ferrous    sulfate 325 milliGRAM(s) Oral three times a day with meals      Physical Exam:    Neuro: AAOX3.  No focal deficits.    Pulm: Diminished BS bilaterally with Fine base crackles.    CV: RRR.  +S1+S2.    Abd: Soft/NT/ND.  +BS.    Extremities: +1 edema BLE.  +pp.            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   r/o  out  stenosis  Cardiomyopathy  Osteoarthritis  Iron deficiency anemia  MI, old: 2008  Dyslipidemia  Acid reflux  Hx of CAB  CAD (coronary artery disease)  Heart failure  HTN (hypertension)  H/O tracheostomy: 2013 may  Cataract: b/l  2013  S/P CABG x 3:   [place]

## 2017-06-30 NOTE — PROGRESS NOTE ADULT - SUBJECTIVE AND OBJECTIVE BOX
PULMONARY PROGRESS NOTE      ANN FRANCESLEE [id number]    Patient is a 84y old  Female who presents with a chief complaint of Abdominal pain, insomnia, and increased shortness of breath (10 Murphy 2017 16:40)      INTERVAL HPI/OVERNIGHT EVENTS:  no new pulmonary complaints  tolerating nocturnal bipap  resting comfortably in chair on nasal cannula    MEDICATIONS  (STANDING):  sodium chloride 0.9% lock flush 3 milliLiter(s) IV Push every 8 hours  latanoprost 0.005% Ophthalmic Solution 1 Drop(s) Both EYES at bedtime  pantoprazole    Tablet 40 milliGRAM(s) Oral before breakfast  multivitamin 1 Tablet(s) Oral daily  metoprolol succinate ER 50 milliGRAM(s) Oral daily  ALBUTerol/ipratropium for Nebulization 3 milliLiter(s) Nebulizer every 6 hours  buDESOnide   0.5 milliGRAM(s) Respule 0.5 milliGRAM(s) Inhalation two times a day  clopidogrel Tablet 75 milliGRAM(s) Oral daily  folic acid 1 milliGRAM(s) Oral daily  ascorbic acid 250 milliGRAM(s) Oral daily  lactobacillus acidophilus 1 Tablet(s) Oral two times a day with meals  psyllium Powder 1 Packet(s) Oral two times a day  torsemide 20 milliGRAM(s) Oral daily  piperacillin/tazobactam IVPB. 2.25 Gram(s) IV Intermittent every 6 hours  ferrous    sulfate 325 milliGRAM(s) Oral three times a day with meals      MEDICATIONS  (PRN):  loperamide 2 milliGRAM(s) Oral every 4 hours PRN Diarrhea      Allergies    aspirin (Anaphylaxis)  NSAIDs (Other)    Intolerances    Lasix (Other)  [facility] PT (Unknown)      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   r/o  out  stenosis  Cardiomyopathy  Osteoarthritis  Iron deficiency anemia  MI, old: 2008  Dyslipidemia  Acid reflux  Hx of CAB  CAD (coronary artery disease)  Heart failure  HTN (hypertension)  H/O tracheostomy: 2013 may  Cataract: b/l  2013  S/P CABG x 3:   [place]      SOCIAL HISTORY  Smoking History:       REVIEW OF SYSTEMS:    CONSTITUTIONAL:  No distress    HEENT:  Eyes:  No diplopia or blurred vision. ENT:  No earache, sore throat or runny nose.    CARDIOVASCULAR:  No pressure, squeezing, tightness, heaviness or aching about the chest; no palpitations.    RESPIRATORY:  see hpi    GASTROINTESTINAL:  No nausea, vomiting or diarrhea.    GENITOURINARY:  No dysuria, frequency or urgency.    NEUROLOGIC:  No paresthesias, fasciculations, seizures or weakness.    PSYCHIATRIC:  No disorder of thought or mood.    Vital Signs Last 24 Hrs  T(C): 37.2 (2017 04:35), Max: 37.2 (2017 04:35)  T(F): 98.9 (2017 04:35), Max: 98.9 (2017 04:35)  HR: 66 (2017 08:56) (66 - 79)  BP: 108/58 (2017 08:56) (94/56 - 136/60)  BP(mean): 77 (2017 16:00) (77 - 77)  RR: 19 (2017 08:56) (16 - 35)  SpO2: 100% (2017 08:56) (91% - 100%)    PHYSICAL EXAMINATION:    GENERAL: The patient is awake and alert in no apparent distress.     HEENT: Head is normocephalic and atraumatic. Extraocular muscles are intact. Mucous membranes are moist.    NECK: Supple.    LUNGS: moderate air entry bilat; respirations unlabored    HEART: Regular rate and rhythm without murmur.    ABDOMEN: Soft, nontender, and nondistended.      EXTREMITIES: Without any cyanosis, clubbing, rash, lesions or edema.    NEUROLOGIC: Grossly intact.    LABS:                        7.7    4.2   )-----------( 174      ( 2017 06:03 )             26.7     06-30    149<H>  |  99  |  28.0<H>  ----------------------------<  90  4.1   |  43.0<H>  |  1.20    Ca    8.9      2017 06:03  Phos  2.6     06-30  Mg     2.4     30    TPro  6.4<L>  /  Alb  3.5  /  TBili  0.4  /  DBili  x   /  AST  20  /  ALT  23  /  AlkPhos  102                  Serum Pro-Brain Natriuretic Peptide: 6096 pg/mL (17 @ 04:46)          MICROBIOLOGY:    RADIOLOGY & ADDITIONAL STUDIES:  cxr reviewed and compared

## 2017-06-30 NOTE — PROGRESS NOTE ADULT - PROBLEM SELECTOR PLAN 5
Completed zosyn 7 days on 6/23, restarted 6/25 per Dr. French due to obtundation, repeat abd US with gallbladder wall thickening> non specific per radiology.  Will discuss with ID today regarding antibiotics end date.  Abd exam remains benign.

## 2017-06-30 NOTE — PROGRESS NOTE ADULT - ASSESSMENT
84y year old Female  h/o stage 3 CKD, CAD s/p remote MI & CABG x 3 (2008), mixed cardiomyopathy, NYHA class III chronic systolic HFrEF, LVEF 45-50% (TTE 6/16/17), non-rheumatic severe MR s/p MV clip 6/7/17,  readmitted POD #3 for abdominal pain, worsening confusion, neutropenia, hypotension and found to have acalculous cholecystitis and chronic CO2 retention (nocturnal)- improved w/ PM BiPAP. Also noted to have brief episode of AF 6/11, treated w/ amiodarone & beta blockade as per daughter's wishes Amiodarone d/cd and maintained on Metoprolol currently in NSR on telemetry Repeat chest Xray : stable    Continue Torsemide, -renal team following , agree with increasing diuretic challenge with close monitoring of renal function, strict I/Os, keep K>4 mg >2.  May need low dose Dopamine or  to augment diuresis  Can consider Multaq 400mg po bid for break-thru symptomatic AF with RVR   [id number] : 2(AGE) 1(F) 1(HTN) (1) HF total =5. Consider Warfarin goal INR 2-3 for stroke prophylaxis unless contra-indicated  Plan discussed with daughter all in agreement

## 2017-06-30 NOTE — PROGRESS NOTE ADULT - SUBJECTIVE AND OBJECTIVE BOX
Patient seen and examined  OOB/Ch  daughter present    I&O's Summary    29 Jun 2017 07:01  -  30 Jun 2017 07:00  --------------------------------------------------------  IN: 1499 mL / OUT: 0 mL / NET: 1499 mL    30 Jun 2017 07:01  -  30 Jun 2017 14:42  --------------------------------------------------------  IN: 600 mL / OUT: 200 mL / NET: 400 mL        REVIEW OF SYSTEMS:    CONSTITUTIONAL: No F/C  RESPIRATORY: No cough; mild SOB  CARDIOVASCULAR: No CP/palpitations,    GASTROINTESTINAL: No abdominal pain , NVD   GENITOURINARY: No UTI sx  NEUROLOGICAL: No headaches/wk/numbness  MUSCULOSKELETAL:  No joint pain/swelling; No LBP  EXTREMITIES : LE swelling,    Vital Signs Last 24 Hrs  T(C): 36.6 (30 Jun 2017 13:48), Max: 37.2 (30 Jun 2017 04:35)  T(F): 97.8 (30 Jun 2017 13:48), Max: 98.9 (30 Jun 2017 04:35)  HR: 67 (30 Jun 2017 13:48) (66 - 79)  BP: 108/60 (30 Jun 2017 13:48) (94/56 - 136/60)  BP(mean): 77 (29 Jun 2017 16:00) (77 - 77)  RR: 19 (30 Jun 2017 13:48) (16 - 35)  SpO2: 100% (30 Jun 2017 08:56) (91% - 100%)    PHYSICAL EXAM:    GENERAL: NAD,   EYES:  conjunctiva and sclera clear  NECK: Supple, No JVD/Bruit  NERVOUS SYSTEM:  A/O x3,   CHEST:  CTA ,No rales or rhonchi  HEART:  RRR, gr 2 murmur  ABDOMEN: Soft, NT/ND BS+  EXTREMITIES:  + pitting Edema;  SKIN: No rashes    LABS:                        7.7    4.2   )-----------( 174      ( 30 Jun 2017 06:03 )             26.7     06-30    149<H>  |  99  |  28.0<H>  ----------------------------<  90  4.1   |  43.0<H>  |  1.20    Ca    8.9      30 Jun 2017 06:03  Phos  2.6     06-30  Mg     2.4     06-30    TPro  6.4<L>  /  Alb  3.5  /  TBili  0.4  /  DBili  x   /  AST  20  /  ALT  23  /  AlkPhos  102  06-30      MEDICATIONS  (STANDING):  sodium chloride 0.9% lock flush  latanoprost 0.005% Ophthalmic Solution  pantoprazole    Tablet  multivitamin  metoprolol succinate ER  ALBUTerol/ipratropium for Nebulization  buDESOnide   0.5 milliGRAM(s) Respule  clopidogrel Tablet  folic acid  ascorbic acid  lactobacillus acidophilus  psyllium Powder  loperamide PRN  torsemide  piperacillin/tazobactam IVPB.  ferrous    sulfate

## 2017-06-30 NOTE — PROGRESS NOTE ADULT - PROBLEM SELECTOR PLAN 2
DASH/TLC diet.  Zocor has been on hold due to recent elevated LFT's.  Daughter requesting statin be restarted.  Discussed with Dr. Owen, and Zocor restarted.

## 2017-07-01 LAB
ALBUMIN SERPL ELPH-MCNC: 3.7 G/DL — SIGNIFICANT CHANGE UP (ref 3.3–5.2)
ALP SERPL-CCNC: 114 U/L — SIGNIFICANT CHANGE UP (ref 40–120)
ALT FLD-CCNC: 25 U/L — SIGNIFICANT CHANGE UP
ANION GAP SERPL CALC-SCNC: 9 MMOL/L — SIGNIFICANT CHANGE UP (ref 5–17)
AST SERPL-CCNC: 23 U/L — SIGNIFICANT CHANGE UP
BASE EXCESS BLDA CALC-SCNC: 18.9 MMOL/L — HIGH (ref -3–3)
BILIRUB SERPL-MCNC: 0.4 MG/DL — SIGNIFICANT CHANGE UP (ref 0.4–2)
BLOOD GAS COMMENTS ARTERIAL: SIGNIFICANT CHANGE UP
BUN SERPL-MCNC: 33 MG/DL — HIGH (ref 8–20)
CALCIUM SERPL-MCNC: 9.1 MG/DL — SIGNIFICANT CHANGE UP (ref 8.6–10.2)
CHLORIDE SERPL-SCNC: 97 MMOL/L — LOW (ref 98–107)
CO2 SERPL-SCNC: 43 MMOL/L — HIGH (ref 22–29)
CREAT SERPL-MCNC: 1.29 MG/DL — SIGNIFICANT CHANGE UP (ref 0.5–1.3)
GAS PNL BLDA: SIGNIFICANT CHANGE UP
GLUCOSE SERPL-MCNC: 125 MG/DL — HIGH (ref 70–115)
HCO3 BLDA-SCNC: 43 MMOL/L — HIGH (ref 20–26)
HCT VFR BLD CALC: 28.9 % — LOW (ref 37–47)
HGB BLD-MCNC: 8.3 G/DL — LOW (ref 12–16)
HOROWITZ INDEX BLDA+IHG-RTO: 35 — SIGNIFICANT CHANGE UP
MAGNESIUM SERPL-MCNC: 2.2 MG/DL — SIGNIFICANT CHANGE UP (ref 1.6–2.6)
MCHC RBC-ENTMCNC: 21.8 PG — LOW (ref 27–31)
MCHC RBC-ENTMCNC: 28.7 G/DL — LOW (ref 32–36)
MCV RBC AUTO: 75.9 FL — LOW (ref 81–99)
OB PNL STL: POSITIVE
PCO2 BLDA: 107 MMHG — CRITICAL HIGH (ref 35–45)
PH BLDA: 7.28 — LOW (ref 7.35–7.45)
PHOSPHATE SERPL-MCNC: 3.2 MG/DL — SIGNIFICANT CHANGE UP (ref 2.4–4.7)
PLATELET # BLD AUTO: 184 K/UL — SIGNIFICANT CHANGE UP (ref 150–400)
PO2 BLDA: 47 MMHG — CRITICAL LOW (ref 83–108)
POTASSIUM SERPL-MCNC: 4.2 MMOL/L — SIGNIFICANT CHANGE UP (ref 3.5–5.3)
POTASSIUM SERPL-SCNC: 4.2 MMOL/L — SIGNIFICANT CHANGE UP (ref 3.5–5.3)
PROT SERPL-MCNC: 6.9 G/DL — SIGNIFICANT CHANGE UP (ref 6.6–8.7)
RBC # BLD: 3.81 M/UL — LOW (ref 4.4–5.2)
RBC # FLD: 25.4 % — HIGH (ref 11–15.6)
SAO2 % BLDA: 76 % — LOW (ref 95–99)
SODIUM SERPL-SCNC: 149 MMOL/L — HIGH (ref 135–145)
WBC # BLD: 6 K/UL — SIGNIFICANT CHANGE UP (ref 4.8–10.8)
WBC # FLD AUTO: 6 K/UL — SIGNIFICANT CHANGE UP (ref 4.8–10.8)

## 2017-07-01 PROCEDURE — 71010: CPT | Mod: 26

## 2017-07-01 PROCEDURE — 99291 CRITICAL CARE FIRST HOUR: CPT

## 2017-07-01 RX ADMIN — SODIUM CHLORIDE 3 MILLILITER(S): 9 INJECTION INTRAMUSCULAR; INTRAVENOUS; SUBCUTANEOUS at 14:10

## 2017-07-01 RX ADMIN — SODIUM CHLORIDE 3 MILLILITER(S): 9 INJECTION INTRAMUSCULAR; INTRAVENOUS; SUBCUTANEOUS at 21:03

## 2017-07-01 RX ADMIN — IRON SUCROSE 110 MILLIGRAM(S): 20 INJECTION, SOLUTION INTRAVENOUS at 18:17

## 2017-07-01 RX ADMIN — Medication 2 MILLIGRAM(S): at 00:27

## 2017-07-01 RX ADMIN — SIMVASTATIN 20 MILLIGRAM(S): 20 TABLET, FILM COATED ORAL at 21:02

## 2017-07-01 RX ADMIN — Medication 0.5 MILLIGRAM(S): at 09:15

## 2017-07-01 RX ADMIN — Medication 20 MILLIGRAM(S): at 06:49

## 2017-07-01 RX ADMIN — Medication 5 MILLIGRAM(S): at 17:47

## 2017-07-01 RX ADMIN — CLOPIDOGREL BISULFATE 75 MILLIGRAM(S): 75 TABLET, FILM COATED ORAL at 17:46

## 2017-07-01 RX ADMIN — Medication 3 MILLILITER(S): at 03:30

## 2017-07-01 RX ADMIN — Medication 3 MILLILITER(S): at 20:12

## 2017-07-01 RX ADMIN — Medication 3 MILLILITER(S): at 15:15

## 2017-07-01 RX ADMIN — PANTOPRAZOLE SODIUM 40 MILLIGRAM(S): 20 TABLET, DELAYED RELEASE ORAL at 06:49

## 2017-07-01 RX ADMIN — SODIUM CHLORIDE 3 MILLILITER(S): 9 INJECTION INTRAMUSCULAR; INTRAVENOUS; SUBCUTANEOUS at 06:48

## 2017-07-01 RX ADMIN — LATANOPROST 1 DROP(S): 0.05 SOLUTION/ DROPS OPHTHALMIC; TOPICAL at 21:03

## 2017-07-01 RX ADMIN — Medication 3 MILLILITER(S): at 09:15

## 2017-07-01 RX ADMIN — Medication 0.5 MILLIGRAM(S): at 20:12

## 2017-07-01 NOTE — PROGRESS NOTE ADULT - PROBLEM SELECTOR PLAN 8
cont toprol XL   midodrine weaned off, consider ACE in the next couple days (ok per renal)  torsemide daily> may need to increased to BID cont toprol XL   midodrine weaned off   torsemide daily> may need to increased to BID

## 2017-07-01 NOTE — PROGRESS NOTE ADULT - SUBJECTIVE AND OBJECTIVE BOX
PULMONARY PROGRESS NOTE      [id number]    Patient is a 84y old  Female who presents with a chief complaint of Abdominal pain, insomnia, and increased shortness of breath (10 Murphy 2017 16:40)  Pt on BIPAP overnight. Pt had RRT called for hypercapnic Respiratory distress, pt found unresponsive by nursing team shortly after BIPAP discontinued. RRT called and CT-ICU staff along with Dr. Smith evaluated pt at bedside. Pt transferred to CT-ICU for further monitoring.    INTERVAL HPI/OVERNIGHT EVENTS:  now awake on bipap  no report of chest pain  MEDICATIONS  (STANDING):  sodium chloride 0.9% lock flush 3 milliLiter(s) IV Push every 8 hours  latanoprost 0.005% Ophthalmic Solution 1 Drop(s) Both EYES at bedtime  pantoprazole    Tablet 40 milliGRAM(s) Oral before breakfast  multivitamin 1 Tablet(s) Oral daily  metoprolol succinate ER 50 milliGRAM(s) Oral daily  ALBUTerol/ipratropium for Nebulization 3 milliLiter(s) Nebulizer every 6 hours  buDESOnide   0.5 milliGRAM(s) Respule 0.5 milliGRAM(s) Inhalation two times a day  clopidogrel Tablet 75 milliGRAM(s) Oral daily  folic acid 1 milliGRAM(s) Oral daily  ascorbic acid 250 milliGRAM(s) Oral daily  lactobacillus acidophilus 1 Tablet(s) Oral two times a day with meals  psyllium Powder 1 Packet(s) Oral two times a day  torsemide 20 milliGRAM(s) Oral daily  simvastatin 20 milliGRAM(s) Oral at bedtime  iron sucrose IVPB 200 milliGRAM(s) IV Intermittent every 24 hours  torsemide 5 milliGRAM(s) Oral daily      MEDICATIONS  (PRN):  loperamide 2 milliGRAM(s) Oral every 4 hours PRN Diarrhea      Allergies    aspirin (Anaphylaxis)  NSAIDs (Other)    Intolerances    Lasix (Other)  OHS PT (Unknown)      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    RESPIRATORY:  see hpi  .    Vital Signs Last 24 Hrs  T(C): 36.7 (2017 08:13), Max: 37.1 (2017 15:00)  T(F): 98 (2017 08:13), Max: 98.7 (2017 15:00)  HR: 85 (2017 13:00) (66 - 104)  BP: 125/67 (2017 13:00) (98/58 - 138/71)  BP(mean): 91 (:00) (83 - 99)  RR: 27 (2017 13:00) (17 - 29)  SpO2: 98% (2017 13:) (92% - 100%)    PHYSICAL EXAMINATION:    GENERAL: The patient is awake and alert in no apparent distress.     HEENT: Head is normocephalic and atraumatic. Extraocular muscles are intact. Mucous membranes are moist.    NECK: Supple.    LUNGS: moderate air entry bilaterally without wheezing, rales or rhonchi; respirations unlabored    HEART: Regular rate and rhythm without murmur.    ABDOMEN: Soft, nontender, and nondistended.      EXTREMITIES: Without edema.    NEUROLOGIC: Grossly intact.    LABS:                        8.3    6.0   )-----------( 184      ( 2017 05:21 )             28.9     07-01    149<H>  |  97<L>  |  33.0<H>  ----------------------------<  125<H>  4.2   |  43.0<H>  |  1.29    Ca    9.1      2017 05:21  Phos  3.2     07-  Mg     2.2     07-    TPro  6.9  /  Alb  3.7  /  TBili  0.4  /  DBili  x   /  AST  23  /  ALT  25  /  AlkPhos  114  07-01        ABG - ( 2017 09:44 )  pH: 7.28  /  pCO2: 107   /  pO2: 47    / HCO3: 43    / Base Excess: 18.9  /  SaO2: 76                      Serum Pro-Brain Natriuretic Peptide: 6096 pg/mL (17 @ 04:46)          MICROBIOLOGY:    RADIOLOGY & ADDITIONAL STUDIES:

## 2017-07-01 NOTE — PROGRESS NOTE ADULT - SUBJECTIVE AND OBJECTIVE BOX
Subjective:    T(C): 36.7 (07-01-17 @ 08:13), Max: 37.1 (06-30-17 @ 15:00)  HR: 82 (07-01-17 @ 12:26) (66 - 104)  BP: 123/58 (07-01-17 @ 12:00) (98/58 - 138/71)  ABP: --  ABP(mean): --  RR: 22 (07-01-17 @ 12:00) (17 - 29)  SpO2: 100% (07-01-17 @ 12:26) (92% - 100%)  Wt(kg): --  CVP(mm Hg): --  CO: --  CI: --  PA: --      07-01    149<H>  |  97<L>  |  33.0<H>  ----------------------------<  125<H>  4.2   |  43.0<H>  |  1.29    Ca    9.1      01 Jul 2017 05:21  Phos  3.2     07-01  Mg     2.2     07-01    TPro  6.9  /  Alb  3.7  /  TBili  0.4  /  DBili  x   /  AST  23  /  ALT  25  /  AlkPhos  114  07-01                               8.3    6.0   )-----------( 184      ( 01 Jul 2017 05:21 )             28.9          ABG - ( 01 Jul 2017 09:44 )  pH: 7.28  /  pCO2: 107   /  pO2: 47    / HCO3: 43    / Base Excess: 18.9  /  SaO2: 76                                   CAPILLARY BLOOD GLUCOSE           CXR:    I&O's Detail    30 Jun 2017 07:01  -  01 Jul 2017 07:00  --------------------------------------------------------  IN:    Oral Fluid: 600 mL  Total IN: 600 mL    OUT:    Voided: 200 mL  Total OUT: 200 mL    Total NET: 400 mL        Drug Dosing Weight  Height (cm): 152.4 (10 Murphy 2017 08:55)  Weight (kg): 75.7 (10 Murphy 2017 08:55)  BMI (kg/m2): 32.6 (10 Murphy 2017 08:55)  BSA (m2): 1.73 (10 Murphy 2017 08:55)    MEDICATIONS  (STANDING):  sodium chloride 0.9% lock flush 3 milliLiter(s) IV Push every 8 hours  latanoprost 0.005% Ophthalmic Solution 1 Drop(s) Both EYES at bedtime  pantoprazole    Tablet 40 milliGRAM(s) Oral before breakfast  multivitamin 1 Tablet(s) Oral daily  metoprolol succinate ER 50 milliGRAM(s) Oral daily  ALBUTerol/ipratropium for Nebulization 3 milliLiter(s) Nebulizer every 6 hours  buDESOnide   0.5 milliGRAM(s) Respule 0.5 milliGRAM(s) Inhalation two times a day  clopidogrel Tablet 75 milliGRAM(s) Oral daily  folic acid 1 milliGRAM(s) Oral daily  ascorbic acid 250 milliGRAM(s) Oral daily  lactobacillus acidophilus 1 Tablet(s) Oral two times a day with meals  psyllium Powder 1 Packet(s) Oral two times a day  torsemide 20 milliGRAM(s) Oral daily  simvastatin 20 milliGRAM(s) Oral at bedtime  iron sucrose IVPB 200 milliGRAM(s) IV Intermittent every 24 hours  torsemide 5 milliGRAM(s) Oral daily    MEDICATIONS  (PRN):  loperamide 2 milliGRAM(s) Oral every 4 hours PRN Diarrhea      Physical Exam  Neuro: AAOx3 in NAD  Pulm: CTA b/l  CV: RRR, normal S1/S2  Abd: NT/ND, positive bowel sounds  Extremities: DP 2+, no pitting edema   Incision(s): sternal wound c/d/i Subjective:   Pt on BIPAP overnight. Pt had RRT called for hypercapnic Respiratory distress, pt found unresponsive by nursing team shortly after BIPAP discontinued. RRT called and CT-ICU staff along with Dr. Smith evaluated pt at bedside. Pt transferred to CT-ICU for further monitoring.       T(C): 36.7 (07-01-17 @ 08:13), Max: 37.1 (06-30-17 @ 15:00)  HR: 82 (07-01-17 @ 12:26) (66 - 104)  BP: 123/58 (07-01-17 @ 12:00) (98/58 - 138/71)  ABP: --  ABP(mean): --  RR: 22 (07-01-17 @ 12:00) (17 - 29)  SpO2: 100% (07-01-17 @ 12:26) (92% - 100%)  Wt(kg): --  CVP(mm Hg): --  CO: --  CI: --  PA: --      07-01    149<H>  |  97<L>  |  33.0<H>  ----------------------------<  125<H>  4.2   |  43.0<H>  |  1.29    Ca    9.1      01 Jul 2017 05:21  Phos  3.2     07-01  Mg     2.2     07-01    TPro  6.9  /  Alb  3.7  /  TBili  0.4  /  DBili  x   /  AST  23  /  ALT  25  /  AlkPhos  114  07-01                               8.3    6.0   )-----------( 184      ( 01 Jul 2017 05:21 )             28.9          ABG - ( 01 Jul 2017 09:44 )  pH: 7.28  /  pCO2: 107   /  pO2: 47    / HCO3: 43    / Base Excess: 18.9  /  SaO2: 76                                   CAPILLARY BLOOD GLUCOSE           CXR: pending     I&O's Detail    30 Jun 2017 07:01  -  01 Jul 2017 07:00  --------------------------------------------------------  IN:    Oral Fluid: 600 mL  Total IN: 600 mL    OUT:    Voided: 200 mL  Total OUT: 200 mL    Total NET: 400 mL        Drug Dosing Weight  Height (cm): 152.4 (10 Murphy 2017 08:55)  Weight (kg): 75.7 (10 Murphy 2017 08:55)  BMI (kg/m2): 32.6 (10 Murphy 2017 08:55)  BSA (m2): 1.73 (10 Murphy 2017 08:55)    MEDICATIONS  (STANDING):  sodium chloride 0.9% lock flush 3 milliLiter(s) IV Push every 8 hours  latanoprost 0.005% Ophthalmic Solution 1 Drop(s) Both EYES at bedtime  pantoprazole    Tablet 40 milliGRAM(s) Oral before breakfast  multivitamin 1 Tablet(s) Oral daily  metoprolol succinate ER 50 milliGRAM(s) Oral daily  ALBUTerol/ipratropium for Nebulization 3 milliLiter(s) Nebulizer every 6 hours  buDESOnide   0.5 milliGRAM(s) Respule 0.5 milliGRAM(s) Inhalation two times a day  clopidogrel Tablet 75 milliGRAM(s) Oral daily  folic acid 1 milliGRAM(s) Oral daily  ascorbic acid 250 milliGRAM(s) Oral daily  lactobacillus acidophilus 1 Tablet(s) Oral two times a day with meals  psyllium Powder 1 Packet(s) Oral two times a day  torsemide 20 milliGRAM(s) Oral daily  simvastatin 20 milliGRAM(s) Oral at bedtime  iron sucrose IVPB 200 milliGRAM(s) IV Intermittent every 24 hours  torsemide 5 milliGRAM(s) Oral daily    MEDICATIONS  (PRN):  loperamide 2 milliGRAM(s) Oral every 4 hours PRN Diarrhea      Physical Exam  Neuro: AAOx3 in NAD  Pulm: Diminished breath sounds b/l, pt on BIPAP  CV: RRR, positive S1/S2  Abd: NT/ND, positive bowel sounds  Extremities: DP 2+, 1+ pitting edema

## 2017-07-01 NOTE — CHART NOTE - NSCHARTNOTEFT_GEN_A_CORE
PGY2 Rapid Response Note  Rapid response team arrived to rapid response to evaluate the patient.  CT surgery Dr Smith at bedside to evaluate the patient.  Was dismissed from the code.    [facility] PGY2

## 2017-07-01 NOTE — PROGRESS NOTE ADULT - PROBLEM SELECTOR PLAN 6
completed zosyn 7 days on 6/23, restarted 6/25 per Dr. French due to obtundation, repeat abd US with gallbladder wall thickening> non specific per radiology  abd exam remains benign completed zosyn 7 days on 6/23, restarted 6/25 per Dr. French due to obtundation, repeat abd US with gallbladder wall thickening> non specific per radiology, abx course since completed.   abd exam remains benign

## 2017-07-01 NOTE — PROGRESS NOTE ADULT - ASSESSMENT
Hypercapnic vent failure mental status  improved on bipap now 22 IPAP , but currently dependent on NIV  CHF, s/p mitral valve clip, obese,  COPD component by hx  aggressive diuresis, afterload, follow lytes  needs family discussion re end of life care  pt does not want trach  continue supportive care , discussed with CT ICU team Hypercapnic vent failure mental status  improved on bipap now 22 IPAP , but currently dependent on NIV  CHF, s/p mitral valve clip, obese,  COPD component by hx  aggressive diuresis, afterload, follow lytes  needs family discussion re end of life care  pt does not want trach  continue supportive care , discussed with CT ICU team  critical

## 2017-07-01 NOTE — PROGRESS NOTE ADULT - PROBLEM SELECTOR PLAN 2
STRICT nocturnal (and PRN) BIPAP   Pt now with chronic CO2 retention baseline CO2 in the 70s  Continue BIPAP, pt evaluated by Dr. garcia STRICT nocturnal (and PRN) BIPAP   Pt now with chronic CO2 retention baseline CO2 in the 70s  Continue BIPAP, pt evaluated by Dr. garcia, plan for family meeting/ decision for trach. If there is no plan for trach then plan for discussion with pt and family regarding palliative care/ end of life care.

## 2017-07-01 NOTE — PROGRESS NOTE ADULT - ASSESSMENT
85 yo F  s/p   MV clip readmitted for abdominal pain, developed worsening confusion, neutropenia, hypotension.  RUQ sono concerning for  acalculous cholecystitis, GI, Surgery, IR, Infectious Disease consults obtained. Previous CT scan reveiwed with Dr. Lara believed GB on CT scan wall is thickened and unable to clearly see borders, suspicious for acalculous cholecystitis which was treated with IV antibiotics as per multidisciplinary team.  hospital course complicated by multiple bouts of AMS/obtundation with bipap off> blood gases c/w resp acidosis necessitating strict use of bipap at night.      AM of 6/28, plan was to downgrade patient to floor level of care, but routine ABG revealed a co2 in 80's off of Bipap.  Bipap resumed and Co2 rechecked, which improved to baseline in 70's.  Patient remained in CTICU for further monitoring.  Patient's daughter states that her mother would never want a trach again after being trached in past.  Reached out to pulmonary MD, Dr. Ritter, who suggests performing spirometry to see if patient is candidate for noninvasive ventilator at rehab / home.   6/29: Spirometry pending (attempted but patient is not following directions, will attempt with daughter at bedside)  7/1 Pt on BIPAP overnight. Pt had RRT called for hypercapnic Respiratory distress, pt found unresponsive by nursing team shortly after BIPAP discontinued. RRT called and CT-ICU staff along with Dr. Smith evaluated pt at bedside. Pt transferred to CT-ICU for further monitoring. 83 yo F  s/p   MV clip readmitted for abdominal pain, developed worsening confusion, neutropenia, hypotension.  RUQ sono concerning for  acalculous cholecystitis, GI, Surgery, IR, Infectious Disease consults obtained. Previous CT scan reveiwed with Dr. Lara believed GB on CT scan wall is thickened and unable to clearly see borders, suspicious for acalculous cholecystitis which was treated with IV antibiotics as per multidisciplinary team.  hospital course complicated by multiple bouts of AMS/obtundation with bipap off> blood gases c/w resp acidosis necessitating strict use of bipap at night.      AM of 6/28, plan was to downgrade patient to floor level of care, but routine ABG revealed a co2 in 80's off of Bipap.  Bipap resumed and Co2 rechecked, which improved to baseline in 70's.  Patient remained in CTICU for further monitoring.  Patient's daughter states that her mother would never want a trach again after being trached in past.  Reached out to pulmonary MD, Dr. Ritter, who suggests performing spirometry to see if patient is candidate for noninvasive ventilator at rehab / home.   6/29: Spirometry pending (attempted but patient is not following directions, will attempt with daughter at bedside)  7/1 Pt on BIPAP overnight. Pt had RRT called in AM for hypercapnic Respiratory failure, pt found unresponsive by nursing team shortly after BIPAP discontinued. RRT called and CT-ICU staff along with Dr. Smith evaluated pt at bedside. Pt transferred to CT-ICU for further monitoring. After BIPAP restarted pt became responsive and Hypercapnic respiratory failure presently imprvoing.

## 2017-07-01 NOTE — PROGRESS NOTE ADULT - ASSESSMENT
The patient was seen examined, lab reports and imaging studied reviewed and plan of care discussed with CTICU MAGALIS Romero and noted as below:    Resp: Hypercarbic respiratory failure in setting of COPD/CHF/OHS. Continue BIPAP at 22/6. Mental status improved on BIPAP. Pulling tidal volume 275-350ml. No respiratory distress on BIPAP and impending need for intubation. Recommend sitting upright to reduce effect of abdomen on diaphragm to increase effect of BIPAP  f/u ABG in few hours  ID: suspected acalculous cholecystitis was on abx  Renal: stable  CVS: hemodynamically stable, managed per CTICU/Cardiology  Neuro: mental status improving  Discussed plan of care with daughter. Currently no need for MICU service. Will closely follow along with CTICU service. Goals of care regarding tracheostomy discussed with daughter, she will decide with mom.

## 2017-07-01 NOTE — PROGRESS NOTE ADULT - SUBJECTIVE AND OBJECTIVE BOX
REASON FOR CONSULT: Hypercarbic respiratory failure    CONSULT REQUESTED BY: Dr Manas Smith      Patient is a 84y old  Female who presents with a chief complaint of Abdominal pain, insomnia, and increased shortness of breath (10 Murphy 2017 16:40)      BRIEF HOSPITAL COURSE:  84y year old Female  h/o stage 3 CKD, CAD s/p remote MI & CABG x 3 (), mixed cardiomyopathy, NYHA class III chronic systolic HFrEF, LVEF 45-50% (TTE 17), non-rheumatic severe MR s/p MV clip 17, advanced COPD as per pulmonology note readmitted POD #3 for abdominal pain, worsening confusion, neutropenia, hypotension and found to have acalculous cholecystitis and chronic CO2 retention (nocturnal)-  Also noted to have brief episode of AF     Events last 24 hours: RRT on floor today, patient found with altered mental status due to hypercarbic respiratory failure and transferred to CTICU    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   r/o  out  stenosis  Cardiomyopathy  Osteoarthritis  Iron deficiency anemia  MI, old: 2008  Dyslipidemia  Acid reflux  Hx of CAB  CAD (coronary artery disease)  Heart failure  HTN (hypertension)  H/O tracheostomy: 2013 may  Cataract: b/l  2013  S/P CABG x 3:   [place]    Allergies    aspirin (Anaphylaxis)  NSAIDs (Other)    Intolerances    Lasix (Other)  OHS PT (Unknown)    FAMILY HISTORY:  Family history of hypertension      Review of Systems:  partially obtainable as patient on BIPAP and unable to speak  more alert since morning  AOx3  no complaints      Medications:    metoprolol succinate ER 50 milliGRAM(s) Oral daily  torsemide 20 milliGRAM(s) Oral daily  torsemide 5 milliGRAM(s) Oral daily    ALBUTerol/ipratropium for Nebulization 3 milliLiter(s) Nebulizer every 6 hours  buDESOnide   0.5 milliGRAM(s) Respule 0.5 milliGRAM(s) Inhalation two times a day        clopidogrel Tablet 75 milliGRAM(s) Oral daily    pantoprazole    Tablet 40 milliGRAM(s) Oral before breakfast  psyllium Powder 1 Packet(s) Oral two times a day  loperamide 2 milliGRAM(s) Oral every 4 hours PRN      simvastatin 20 milliGRAM(s) Oral at bedtime    multivitamin 1 Tablet(s) Oral daily  folic acid 1 milliGRAM(s) Oral daily  ascorbic acid 250 milliGRAM(s) Oral daily  iron sucrose IVPB 200 milliGRAM(s) IV Intermittent every 24 hours      latanoprost 0.005% Ophthalmic Solution 1 Drop(s) Both EYES at bedtime    sodium chloride 0.9% lock flush 3 milliLiter(s) IV Push every 8 hours  lactobacillus acidophilus 1 Tablet(s) Oral two times a day with meals          ICU Vital Signs Last 24 Hrs  T(C): 36.7 (2017 08:13), Max: 37.1 (2017 15:00)  T(F): 98 (2017 08:13), Max: 98.7 (2017 15:00)  HR: 93 (2017 11:00) (66 - 104)  BP: 137/85 (2017 11:00) (98/58 - 138/71)  BP(mean): 95 (2017 11:00) (84 - 99)  ABP: --  ABP(mean): --  RR: 29 (2017 11:00) (17 - 29)  SpO2: 92% (2017 11:00) (92% - 100%)    Vital Signs Last 24 Hrs  T(C): 36.7 (2017 08:13), Max: 37.1 (2017 15:00)  T(F): 98 (2017 08:13), Max: 98.7 (2017 15:00)  HR: 93 (2017 11:00) (66 - 104)  BP: 137/85 (2017 11:00) (98/58 - 138/71)  BP(mean): 95 (2017 11:00) (84 - 99)  RR: 29 (2017 11:00) (17 - 29)  SpO2: 92% (2017 11:00) (92% - 100%)    ABG - ( 2017 09:44 )  pH: 7.28  /  pCO2: 107   /  pO2: 47    / HCO3: 43    / Base Excess: 18.9  /  SaO2: 76                  I&O's Detail    2017 07:01  -  2017 07:00  --------------------------------------------------------  IN:    Oral Fluid: 600 mL  Total IN: 600 mL    OUT:    Voided: 200 mL  Total OUT: 200 mL    Total NET: 400 mL            LABS:                        8.3    6.0   )-----------( 184      ( 2017 05:21 )             28.9     07-    149<H>  |  97<L>  |  33.0<H>  ----------------------------<  125<H>  4.2   |  43.0<H>  |  1.29    Ca    9.1      2017 05:21  Phos  3.2     07-  Mg     2.2     07-    TPro  6.9  /  Alb  3.7  /  TBili  0.4  /  DBili  x   /  AST  23  /  ALT  25  /  AlkPhos  114  -          CAPILLARY BLOOD GLUCOSE  109 (2017 20:58)            CULTURES:  C Diff by PCR Result: NotDetec ( @ 12:00)      Physical Examination:    General: No acute distress.  Alert, oriented, interactive, nonfocal    HEENT: Pupils equal, reactive to light.  Symmetric.    PULM: Clear to auscultation bilaterally in upper fields    CVS: Regular rate and rhythm, no murmurs, rubs, or gallops    ABD: Soft, nondistended, nontender, normoactive bowel sounds, no masses    EXT: No edema, nontender    SKIN: Warm and well perfused, no rashes noted.    RADIOLOGY: reviewed    CRITICAL CARE TIME SPENT: 35

## 2017-07-02 LAB
ALBUMIN SERPL ELPH-MCNC: 3.8 G/DL — SIGNIFICANT CHANGE UP (ref 3.3–5.2)
ALP SERPL-CCNC: 104 U/L — SIGNIFICANT CHANGE UP (ref 40–120)
ALT FLD-CCNC: 22 U/L — SIGNIFICANT CHANGE UP
AMYLASE P1 CFR SERPL: 31 U/L — LOW (ref 36–128)
ANION GAP SERPL CALC-SCNC: 14 MMOL/L — SIGNIFICANT CHANGE UP (ref 5–17)
AST SERPL-CCNC: 25 U/L — SIGNIFICANT CHANGE UP
BASE EXCESS BLDA CALC-SCNC: 18.6 MMOL/L — HIGH (ref -3–3)
BILIRUB DIRECT SERPL-MCNC: 0.1 MG/DL — SIGNIFICANT CHANGE UP (ref 0–0.3)
BILIRUB INDIRECT FLD-MCNC: 0.4 MG/DL — SIGNIFICANT CHANGE UP (ref 0.2–1)
BILIRUB SERPL-MCNC: 0.5 MG/DL — SIGNIFICANT CHANGE UP (ref 0.4–2)
BLOOD GAS COMMENTS ARTERIAL: SIGNIFICANT CHANGE UP
BUN SERPL-MCNC: 34 MG/DL — HIGH (ref 8–20)
CALCIUM SERPL-MCNC: 9.5 MG/DL — SIGNIFICANT CHANGE UP (ref 8.6–10.2)
CHLORIDE SERPL-SCNC: 97 MMOL/L — LOW (ref 98–107)
CO2 SERPL-SCNC: 42 MMOL/L — HIGH (ref 22–29)
CREAT SERPL-MCNC: 1.24 MG/DL — SIGNIFICANT CHANGE UP (ref 0.5–1.3)
GAS PNL BLDA: SIGNIFICANT CHANGE UP
GLUCOSE SERPL-MCNC: 92 MG/DL — SIGNIFICANT CHANGE UP (ref 70–115)
HCO3 BLDA-SCNC: 43 MMOL/L — HIGH (ref 20–26)
HCT VFR BLD CALC: 28.1 % — LOW (ref 37–47)
HGB BLD-MCNC: 8.1 G/DL — LOW (ref 12–16)
HOROWITZ INDEX BLDA+IHG-RTO: SIGNIFICANT CHANGE UP
LIDOCAIN IGE QN: 11 U/L — LOW (ref 22–51)
MAGNESIUM SERPL-MCNC: 2.1 MG/DL — SIGNIFICANT CHANGE UP (ref 1.6–2.6)
MCHC RBC-ENTMCNC: 21.5 PG — LOW (ref 27–31)
MCHC RBC-ENTMCNC: 28.8 G/DL — LOW (ref 32–36)
MCV RBC AUTO: 74.7 FL — LOW (ref 81–99)
PCO2 BLDA: 61 MMHG — HIGH (ref 35–45)
PH BLDA: 7.48 — HIGH (ref 7.35–7.45)
PHOSPHATE SERPL-MCNC: 3 MG/DL — SIGNIFICANT CHANGE UP (ref 2.4–4.7)
PLATELET # BLD AUTO: 184 K/UL — SIGNIFICANT CHANGE UP (ref 150–400)
PO2 BLDA: 64 MMHG — LOW (ref 83–108)
POTASSIUM SERPL-MCNC: 4.2 MMOL/L — SIGNIFICANT CHANGE UP (ref 3.5–5.3)
POTASSIUM SERPL-SCNC: 4.2 MMOL/L — SIGNIFICANT CHANGE UP (ref 3.5–5.3)
PROT SERPL-MCNC: 6.8 G/DL — SIGNIFICANT CHANGE UP (ref 6.6–8.7)
RBC # BLD: 3.76 M/UL — LOW (ref 4.4–5.2)
RBC # FLD: 25.7 % — HIGH (ref 11–15.6)
SAO2 % BLDA: 95 % — SIGNIFICANT CHANGE UP (ref 95–99)
SODIUM SERPL-SCNC: 153 MMOL/L — HIGH (ref 135–145)
WBC # BLD: 5.6 K/UL — SIGNIFICANT CHANGE UP (ref 4.8–10.8)
WBC # FLD AUTO: 5.6 K/UL — SIGNIFICANT CHANGE UP (ref 4.8–10.8)

## 2017-07-02 PROCEDURE — 99233 SBSQ HOSP IP/OBS HIGH 50: CPT

## 2017-07-02 PROCEDURE — 71010: CPT | Mod: 26

## 2017-07-02 RX ORDER — FAMOTIDINE 10 MG/ML
20 INJECTION INTRAVENOUS ONCE
Qty: 0 | Refills: 0 | Status: COMPLETED | OUTPATIENT
Start: 2017-07-02 | End: 2017-07-02

## 2017-07-02 RX ORDER — ACETAMINOPHEN 500 MG
500 TABLET ORAL ONCE
Qty: 0 | Refills: 0 | Status: COMPLETED | OUTPATIENT
Start: 2017-07-02 | End: 2017-07-02

## 2017-07-02 RX ORDER — SODIUM CHLORIDE 9 MG/ML
1000 INJECTION, SOLUTION INTRAVENOUS
Qty: 0 | Refills: 0 | Status: DISCONTINUED | OUTPATIENT
Start: 2017-07-02 | End: 2017-07-02

## 2017-07-02 RX ADMIN — Medication 3 MILLILITER(S): at 02:11

## 2017-07-02 RX ADMIN — SODIUM CHLORIDE 3 MILLILITER(S): 9 INJECTION INTRAMUSCULAR; INTRAVENOUS; SUBCUTANEOUS at 06:15

## 2017-07-02 RX ADMIN — Medication 1 TABLET(S): at 11:55

## 2017-07-02 RX ADMIN — Medication 200 MILLIGRAM(S): at 11:00

## 2017-07-02 RX ADMIN — Medication 500 MILLIGRAM(S): at 11:15

## 2017-07-02 RX ADMIN — CLOPIDOGREL BISULFATE 75 MILLIGRAM(S): 75 TABLET, FILM COATED ORAL at 11:55

## 2017-07-02 RX ADMIN — SODIUM CHLORIDE 50 MILLILITER(S): 9 INJECTION, SOLUTION INTRAVENOUS at 17:36

## 2017-07-02 RX ADMIN — SODIUM CHLORIDE 3 MILLILITER(S): 9 INJECTION INTRAMUSCULAR; INTRAVENOUS; SUBCUTANEOUS at 11:51

## 2017-07-02 RX ADMIN — Medication 3 MILLILITER(S): at 20:28

## 2017-07-02 RX ADMIN — Medication 20 MILLIGRAM(S): at 17:36

## 2017-07-02 RX ADMIN — IRON SUCROSE 110 MILLIGRAM(S): 20 INJECTION, SOLUTION INTRAVENOUS at 17:36

## 2017-07-02 RX ADMIN — Medication 0.5 MILLIGRAM(S): at 20:32

## 2017-07-02 RX ADMIN — Medication 20 MILLIGRAM(S): at 06:13

## 2017-07-02 RX ADMIN — Medication 0.5 MILLIGRAM(S): at 08:55

## 2017-07-02 RX ADMIN — Medication 50 MILLIGRAM(S): at 06:13

## 2017-07-02 RX ADMIN — FAMOTIDINE 20 MILLIGRAM(S): 10 INJECTION INTRAVENOUS at 09:40

## 2017-07-02 RX ADMIN — Medication 3 MILLILITER(S): at 08:55

## 2017-07-02 RX ADMIN — Medication 3 MILLILITER(S): at 15:08

## 2017-07-02 RX ADMIN — Medication 30 MILLILITER(S): at 11:55

## 2017-07-02 RX ADMIN — SIMVASTATIN 20 MILLIGRAM(S): 20 TABLET, FILM COATED ORAL at 23:17

## 2017-07-02 RX ADMIN — SODIUM CHLORIDE 3 MILLILITER(S): 9 INJECTION INTRAMUSCULAR; INTRAVENOUS; SUBCUTANEOUS at 22:00

## 2017-07-02 RX ADMIN — Medication 1 MILLIGRAM(S): at 11:55

## 2017-07-02 RX ADMIN — Medication 250 MILLIGRAM(S): at 11:55

## 2017-07-02 RX ADMIN — PANTOPRAZOLE SODIUM 40 MILLIGRAM(S): 20 TABLET, DELAYED RELEASE ORAL at 06:13

## 2017-07-02 NOTE — PROGRESS NOTE ADULT - PROBLEM SELECTOR PLAN 1
Pt now with chronic CO2 retention baseline CO2 in the 70s  Continue BIPAP, pt evaluated by Dr. garcia, plan for family meeting/ decision for trach. If there is no plan for trach then plan for discussion with pt and family regarding palliative care/ end of life care.

## 2017-07-02 NOTE — PROGRESS NOTE ADULT - PROBLEM SELECTOR PLAN 2
STRICT nocturnal (and PRN) BIPAP   Pt now with chronic CO2 retention baseline CO2 in the 70s  Continue BIPAP, After discussion with daughter if worsening in respiratory status intubation will be next step , plan for family meeting/ decision for trach. If there is no plan for trach then plan for discussion with pt and family regarding palliative care/ end of life care.

## 2017-07-02 NOTE — PROGRESS NOTE ADULT - SUBJECTIVE AND OBJECTIVE BOX
PULMONARY PROGRESS NOTE      ANN FRANCESLEE [id number]    Patient is a 84y old  Female who presents with a chief complaint of Abdominal pain, insomnia, and increased shortness of breath (10 Murphy 2017 16:40)      INTERVAL HPI/OVERNIGHT EVENTS:  on bipap overnight  no fever, chill, chest pain reported  MEDICATIONS  (STANDING):  sodium chloride 0.9% lock flush 3 milliLiter(s) IV Push every 8 hours  latanoprost 0.005% Ophthalmic Solution 1 Drop(s) Both EYES at bedtime  pantoprazole    Tablet 40 milliGRAM(s) Oral before breakfast  multivitamin 1 Tablet(s) Oral daily  metoprolol succinate ER 50 milliGRAM(s) Oral daily  ALBUTerol/ipratropium for Nebulization 3 milliLiter(s) Nebulizer every 6 hours  buDESOnide   0.5 milliGRAM(s) Respule 0.5 milliGRAM(s) Inhalation two times a day  clopidogrel Tablet 75 milliGRAM(s) Oral daily  folic acid 1 milliGRAM(s) Oral daily  ascorbic acid 250 milliGRAM(s) Oral daily  lactobacillus acidophilus 1 Tablet(s) Oral two times a day with meals  psyllium Powder 1 Packet(s) Oral two times a day  simvastatin 20 milliGRAM(s) Oral at bedtime  iron sucrose IVPB 200 milliGRAM(s) IV Intermittent every 24 hours  torsemide 20 milliGRAM(s) Oral two times a day      MEDICATIONS  (PRN):  loperamide 2 milliGRAM(s) Oral every 4 hours PRN Diarrhea      Allergies    aspirin (Anaphylaxis)  NSAIDs (Other)    Intolerances    Lasix (Other)  [facility] PT (Unknown)      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 ENT:  No earache, sore throat or runny nose.    CARDIOVASCULAR:  No pressure, squeezing, tightness, heaviness or aching about the chest; no palpitations.    RESPIRATORY:  see hpi    GASTROINTESTINAL:  No nausea, vomiting or diarrhea.    GENITOURINARY:  No dysuria, frequency or urgency.    NEUROLOGIC:  No paresthesias, fasciculations, seizures or weakness.    PSYCHIATRIC:  No disorder of thought or mood.    Vital Signs Last 24 Hrs  T(C): 36.6 (2017 08:13), Max: 36.9 (2017 04:00)  T(F): 97.8 (2017 08:13), Max: 98.4 (2017 04:00)  HR: 88 (2017 09:00) (80 - 108)  BP: 144/99 (:00) (123/58 - 178/96)  BP(mean): 106 (2017 09:00) (19 - 124)  RR: 30 (:) (22 - 34)  SpO2: 94% (:) (92% - 100%)    PHYSICAL EXAMINATION:    GENERAL: The patient is awake and alert in no apparent distress, sitting in chair on bipap.     HEENT: Head is normocephalic and atraumatic. Extraocular muscles are intact. Mucous membranes are moist.    NECK: Supple.    LUNGS: moderate air entry bilat without wheezing, rales or rhonchi; respirations unlabored    HEART: Regular rate and rhythm without murmur.    ABDOMEN: Soft, nontender, and nondistended.      EXTREMITIES: With edema.    NEUROLOGIC: Grossly intact.    LABS:                        8.1    5.6   )-----------( 184      ( 2017 05:28 )             28.1     07-02    153<H>  |  97<L>  |  34.0<H>  ----------------------------<  92  4.2   |  42.0<H>  |  1.24    Ca    9.5      2017 05:28  Phos  3.0     07-02  Mg     2.1     07-02    TPro  6.9  /  Alb  3.7  /  TBili  0.4  /  DBili  x   /  AST  23  /  ALT  25  /  AlkPhos  114  07-01        ABG - ( 2017 04:24 )  pH: 7.48  /  pCO2: 61    /  pO2: 64    / HCO3: 43    / Base Excess: 18.6  /  SaO2: 95                              MICROBIOLOGY:    RADIOLOGY & ADDITIONAL STUDIES:  cxr reviewed and compared PULMONARY PROGRESS NOTE      ANN FRANCESLEE [id number]    Patient is a 84y old  Female who presents with a chief complaint of Abdominal pain, insomnia, and increased shortness of breath (10 Murphy 2017 16:40)      INTERVAL HPI/OVERNIGHT EVENTS:  on bipap overnight  no fever, chill, chest pain reported  MEDICATIONS  (STANDING):  sodium chloride 0.9% lock flush 3 milliLiter(s) IV Push every 8 hours  latanoprost 0.005% Ophthalmic Solution 1 Drop(s) Both EYES at bedtime  pantoprazole    Tablet 40 milliGRAM(s) Oral before breakfast  multivitamin 1 Tablet(s) Oral daily  metoprolol succinate ER 50 milliGRAM(s) Oral daily  ALBUTerol/ipratropium for Nebulization 3 milliLiter(s) Nebulizer every 6 hours  buDESOnide   0.5 milliGRAM(s) Respule 0.5 milliGRAM(s) Inhalation two times a day  clopidogrel Tablet 75 milliGRAM(s) Oral daily  folic acid 1 milliGRAM(s) Oral daily  ascorbic acid 250 milliGRAM(s) Oral daily  lactobacillus acidophilus 1 Tablet(s) Oral two times a day with meals  psyllium Powder 1 Packet(s) Oral two times a day  simvastatin 20 milliGRAM(s) Oral at bedtime  iron sucrose IVPB 200 milliGRAM(s) IV Intermittent every 24 hours  torsemide 20 milliGRAM(s) Oral two times a day      MEDICATIONS  (PRN):  loperamide 2 milliGRAM(s) Oral every 4 hours PRN Diarrhea      Allergies    aspirin (Anaphylaxis)  NSAIDs (Other)    Intolerances    Lasix (Other)  [facility] PT (Unknown)      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 ENT:  No earache, sore throat or runny nose.    CARDIOVASCULAR:  No pressure, squeezing, tightness, heaviness or aching about the chest; no palpitations.    RESPIRATORY:  see hpi    GASTROINTESTINAL:  No nausea, vomiting or diarrhea.    GENITOURINARY:  No dysuria, frequency or urgency.    NEUROLOGIC:  No paresthesias, fasciculations, seizures or weakness.    PSYCHIATRIC:  No disorder of thought or mood.    Vital Signs Last 24 Hrs  T(C): 36.6 (2017 08:13), Max: 36.9 (2017 04:00)  T(F): 97.8 (2017 08:13), Max: 98.4 (2017 04:00)  HR: 88 (2017 09:00) (80 - 108)  BP: 144/99 (2017 09:00) (123/58 - 178/96)  BP(mean): 106 (2017 09:00) (19 - 124)  RR: 30 (2017 09:00) (22 - 34)  SpO2: 94% (2017 09:) (92% - 100%)    PHYSICAL EXAMINATION:    GENERAL: The patient is awake and alert in no apparent distress, sitting in chair on bipap.     HEENT: Head is normocephalic and atraumatic. Extraocular muscles are intact. Mucous membranes are moist.    NECK: Supple.    LUNGS: moderate air entry bilat with rales bases, respirations unlabored    HEART: Regular rate and rhythm without murmur.    ABDOMEN: Soft, nontender, and nondistended.      EXTREMITIES: With edema.    NEUROLOGIC: Grossly intact.    LABS:                        8.1    5.6   )-----------( 184      ( 2017 05:28 )             28.1     07-02    153<H>  |  97<L>  |  34.0<H>  ----------------------------<  92  4.2   |  42.0<H>  |  1.24    Ca    9.5      2017 05:28  Phos  3.0     07-02  Mg     2.1     07-02    TPro  6.9  /  Alb  3.7  /  TBili  0.4  /  DBili  x   /  AST  23  /  ALT  25  /  AlkPhos  114  07-01        ABG - ( 2017 04:24 )  pH: 7.48  /  pCO2: 61    /  pO2: 64    / HCO3: 43    / Base Excess: 18.6  /  SaO2: 95                              MICROBIOLOGY:    RADIOLOGY & ADDITIONAL STUDIES:  cxr reviewed and compared

## 2017-07-02 NOTE — PROGRESS NOTE ADULT - ASSESSMENT
remains SOB; pCO2 87  has persistent rales; MR PAH likely cause  Increased diuretics not helpful so far  Na 153 - shall add D5w 500 cc over !) hours  May decrease Torsemide as tolerated  Needs to blow CO2 off ; may need to stay on bipap

## 2017-07-02 NOTE — PROGRESS NOTE ADULT - SUBJECTIVE AND OBJECTIVE BOX
Subjective: Pt resting in bed tolerating BIPAP. No acute issues overnight.     T(C): 36.7 (07-01-17 @ 20:00), Max: 36.8 (07-01-17 @ 12:26)  HR: 108 (07-01-17 @ 23:57) (80 - 108)  BP: 158/85 (07-01-17 @ 23:00) (98/58 - 158/85)  ABP: --  ABP(mean): --  RR: 32 (07-01-17 @ 23:00) (17 - 32)  SpO2: 97% (07-01-17 @ 23:57) (92% - 100%)  Wt(kg): --  CVP(mm Hg): --  CO: --  CI: --  PA: --      07-01    149<H>  |  97<L>  |  33.0<H>  ----------------------------<  125<H>  4.2   |  43.0<H>  |  1.29    Ca    9.1      01 Jul 2017 05:21  Phos  3.2     07-01  Mg     2.2     07-01    TPro  6.9  /  Alb  3.7  /  TBili  0.4  /  DBili  x   /  AST  23  /  ALT  25  /  AlkPhos  114  07-01                               8.3    6.0   )-----------( 184      ( 01 Jul 2017 05:21 )             28.9          ABG - ( 01 Jul 2017 14:52 )  pH: 7.38  /  pCO2: 84    /  pO2: 76    / HCO3: 45    / Base Excess: 20.9  /  SaO2: 97                                   CAPILLARY BLOOD GLUCOSE           CXR: Pending AM.     I&O's Detail    30 Jun 2017 07:01  -  01 Jul 2017 07:00  --------------------------------------------------------  IN:    Oral Fluid: 600 mL  Total IN: 600 mL    OUT:    Voided: 200 mL  Total OUT: 200 mL    Total NET: 400 mL      01 Jul 2017 07:01  -  02 Jul 2017 00:38  --------------------------------------------------------  IN:    Oral Fluid: 50 mL    Solution: 100 mL  Total IN: 150 mL    OUT:    Voided: 50 mL  Total OUT: 50 mL    Total NET: 100 mL        Drug Dosing Weight  Height (cm): 152.4 (10 Murphy 2017 08:55)  Weight (kg): 75.7 (10 Murphy 2017 08:55)  BMI (kg/m2): 32.6 (10 Murphy 2017 08:55)  BSA (m2): 1.73 (10 Murphy 2017 08:55)    MEDICATIONS  (STANDING):  sodium chloride 0.9% lock flush 3 milliLiter(s) IV Push every 8 hours  latanoprost 0.005% Ophthalmic Solution 1 Drop(s) Both EYES at bedtime  pantoprazole    Tablet 40 milliGRAM(s) Oral before breakfast  multivitamin 1 Tablet(s) Oral daily  metoprolol succinate ER 50 milliGRAM(s) Oral daily  ALBUTerol/ipratropium for Nebulization 3 milliLiter(s) Nebulizer every 6 hours  buDESOnide   0.5 milliGRAM(s) Respule 0.5 milliGRAM(s) Inhalation two times a day  clopidogrel Tablet 75 milliGRAM(s) Oral daily  folic acid 1 milliGRAM(s) Oral daily  ascorbic acid 250 milliGRAM(s) Oral daily  lactobacillus acidophilus 1 Tablet(s) Oral two times a day with meals  psyllium Powder 1 Packet(s) Oral two times a day  simvastatin 20 milliGRAM(s) Oral at bedtime  iron sucrose IVPB 200 milliGRAM(s) IV Intermittent every 24 hours  torsemide 20 milliGRAM(s) Oral two times a day    MEDICATIONS  (PRN):  loperamide 2 milliGRAM(s) Oral every 4 hours PRN Diarrhea      Physical Exam  Neuro: AAOx3 in NAD  Pulm: Diminished breath sounds b/l  CV: RRR, positive S1/S2  Abd: NT/ND, positive bowel sounds  Extremities: DP 2+, 1+ pitting edema

## 2017-07-02 NOTE — PROGRESS NOTE ADULT - PROBLEM SELECTOR PLAN 2
torsemide resumed > cont to trend Bun/Cr  atkinson d/c'd to reduce risk of infection, follow resp status, lung exam and CXR  per renal, will allow for some degree of azotemia as pt requires diuresis for chronic systolic heart failue

## 2017-07-02 NOTE — PROGRESS NOTE ADULT - ASSESSMENT
85 yo F  s/p   MV clip readmitted for abdominal pain, developed worsening confusion, neutropenia, hypotension.  RUQ sono concerning for  acalculous cholecystitis, GI, Surgery, IR, Infectious Disease consults obtained. Previous CT scan reveiwed with Dr. Lara believed GB on CT scan wall is thickened and unable to clearly see borders, suspicious for acalculous cholecystitis which was treated with IV antibiotics as per multidisciplinary team.  hospital course complicated by multiple bouts of AMS/obtundation with bipap off> blood gases c/w resp acidosis necessitating strict use of bipap at night.      AM of 6/28, plan was to downgrade patient to floor level of care, but routine ABG revealed a co2 in 80's off of Bipap.  Bipap resumed and Co2 rechecked, which improved to baseline in 70's.  Patient remained in CTICU for further monitoring.  Patient's daughter states that her mother would never want a trach again after being trached in past.  Reached out to pulmonary MD, Dr. Ritter, who suggests performing spirometry to see if patient is candidate for noninvasive ventilator at rehab / home.   6/29: Spirometry pending (attempted but patient is not following directions, will attempt with daughter at bedside)  7/1 Pt on BIPAP overnight. Pt had RRT called in AM for hypercapnic Respiratory failure, pt found unresponsive by nursing team shortly after BIPAP discontinued. RRT called and CT-ICU staff along with Dr. Smith evaluated pt at bedside. Pt transferred to CT-ICU for further monitoring. After BIPAP resumed pt became responsive and Hypercapnic respiratory failure imprvoing.

## 2017-07-02 NOTE — PROGRESS NOTE ADULT - SUBJECTIVE AND OBJECTIVE BOX
Patient seen and examined  OOB/Ch  denies pain ; Sl dyspnic when talks    I&O's Summary    01 Jul 2017 07:01  -  02 Jul 2017 07:00  --------------------------------------------------------  IN: 150 mL / OUT: 50 mL / NET: 100 mL    02 Jul 2017 07:01  -  02 Jul 2017 14:02  --------------------------------------------------------  IN: 150 mL / OUT: 0 mL / NET: 150 mL        REVIEW OF SYSTEMS:    CONSTITUTIONAL: No F/C    RESPIRATORY: No cough + SOB  CARDIOVASCULAR: No CP/palpitations,    GASTROINTESTINAL: No abdominal pain , NVD   GENITOURINARY: No UTI sx  NEUROLOGICAL: No headaches/wk/numbness  MUSCULOSKELETAL:  No joint pain/swelling; No LBP  EXTREMITIES :+ swelling,    Vital Signs Last 24 Hrs  T(C): 37.1 (02 Jul 2017 12:44), Max: 37.1 (02 Jul 2017 12:44)  T(F): 98.8 (02 Jul 2017 12:44), Max: 98.8 (02 Jul 2017 12:44)  HR: 95 (02 Jul 2017 10:00) (80 - 108)  BP: 198/90 (02 Jul 2017 10:00) (128/76 - 198/90)  BP(mean): 123 (02 Jul 2017 10:00) (19 - 124)  RR: 32 (02 Jul 2017 10:00) (22 - 34)  SpO2: 92% (02 Jul 2017 10:00) (92% - 100%)    PHYSICAL EXAM:    GENERAL: NAD,   EYES:  conjunctiva and sclera clear  NECK: Supple, No JVD/Bruit  NERVOUS SYSTEM:  A/O x3,   CHEST:  CTA ,bibasilar rales, no  rhonchi  HEART:  RRR, gr 3 murmur  ABDOMEN: Soft, NT/ND BS+  EXTREMITIES:  + Edema;  SKIN: No rashes    LABS:                        8.1    5.6   )-----------( 184      ( 02 Jul 2017 05:28 )             28.1     07-02    153<H>  |  97<L>  |  34.0<H>  ----------------------------<  92  4.2   |  42.0<H>  |  1.24    Ca    9.5      02 Jul 2017 05:28  Phos  3.0     07-02  Mg     2.1     07-02    TPro  6.8  /  Alb  3.8  /  TBili  0.5  /  DBili  0.1  /  AST  25  /  ALT  22  /  AlkPhos  104  07-02    stool OB +    MEDICATIONS  (STANDING):  sodium chloride 0.9% lock flush  latanoprost 0.005% Ophthalmic Solution  pantoprazole    Tablet  multivitamin  metoprolol succinate ER  ALBUTerol/ipratropium for Nebulization  buDESOnide   0.5 milliGRAM(s) Respule  clopidogrel Tablet  folic acid  ascorbic acid  lactobacillus acidophilus  psyllium Powder  loperamide PRN  simvastatin  iron sucrose IVPB  torsemide  aluminum hydroxide/magnesium hydroxide/simethicone Suspension PRN

## 2017-07-02 NOTE — PROGRESS NOTE ADULT - PROBLEM SELECTOR PLAN 6
completed zosyn 7 days on 6/23, restarted 6/25 per Dr. French due to obtundation, repeat abd US with gallbladder wall thickening> non specific per radiology, abx course since completed.   abd exam remains benign

## 2017-07-02 NOTE — PROGRESS NOTE ADULT - SUBJECTIVE AND OBJECTIVE BOX
HPI:    84y year old Female  s/p   MV clip readmitted for abdominal pain, developed worsening confusion, neutropenia, hypotension.  RUQ sono concerning for  acalculous cholecystitis,  Treated with IV antibiotics as per multidisciplinary team.  hospital course complicated by multiple bouts of AMS/obtundation, blood gases c/w resp acidosis -  bipap at night.    Per Pulmonary patient needs nocturnal Bipap for now and noninvasive mask ventilation to reduce co2.   She denies any abdominal pain or nausea.        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   r/o  out  stenosis  Cardiomyopathy  Osteoarthritis  Iron deficiency anemia  MI, old: 2008  Dyslipidemia  Acid reflux  Hx of CAB  CAD (coronary artery disease)  Heart failure  HTN (hypertension)  H/O tracheostomy: 2013 may  Cataract: b/l  2013  S/P CABG x 3:   [place]        Allergies    aspirin (Anaphylaxis)  NSAIDs (Other)    Intolerances    Lasix (Other)  [facility] PT (Unknown)              Vital Signs Last 24 Hrs  T(C): 37.1 (2017 12:44), Max: 37.1 (2017 12:44)  T(F): 98.8 (2017 12:44), Max: 98.8 (2017 12:44)  HR: 84 (2017 15:23) (80 - 108)  BP: 198/90 (2017 10:00) (128/76 - 198/90)  BP(mean): 123 (2017 10:00) (19 - 124)  RR: 32 (2017 10:00) (22 - 34)  SpO2: 89% (2017 15:23) (89% - 100%)    Daily     Daily Weight in k.8 (2017 04:00)    I&O's Detail    2017 07:01  -  2017 07:00  --------------------------------------------------------  IN:    Oral Fluid: 50 mL    Solution: 100 mL  Total IN: 150 mL    OUT:    Voided: 50 mL  Total OUT: 50 mL    Total NET: 100 mL      2017 07:  -  2017 16:38  --------------------------------------------------------  IN:    Oral Fluid: 100 mL    Solution: 50 mL  Total IN: 150 mL    OUT:  Total OUT: 0 mL    Total NET: 150 mL          PHYSICAL EXAM:  Appearance: Tachypneic fatigued   HEENT:  dry  oral mucosa   Cardiovascular: Normal S1 S2,  No cardiac murmurs, No carotid bruits, +1 bilateral  peripheral edema  Respiratory: Lungs bilateral rales 	  Neurologic: awake answeres question appropriate   Extremities:  Peripheral pulses palpable trace  bilaterally      INTERPRETATION OF TELEMETRY: Sinus Tachycardia 108-120       LABS:                        8.1    5.6   )-----------( 184      ( 2017 05:28 )             28.1     07-02    153<H>  |  97<L>  |  34.0<H>  ----------------------------<  92  4.2   |  42.0<H>  |  1.24    Ca    9.5      2017 05:28  Phos  3.0       Mg     2.1         TPro  6.8  /  Alb  3.8  /  TBili  0.5  /  DBili  0.1  /  AST  25  /  ALT  22  /  AlkPhos  104  07-02            I&O's Summary    2017 07:  -  2017 07:00  --------------------------------------------------------  IN: 150 mL / OUT: 50 mL / NET: 100 mL    2017 07:  -  2017 16:38  --------------------------------------------------------  IN: 150 mL / OUT: 0 mL / NET: 150 mL

## 2017-07-02 NOTE — PROGRESS NOTE ADULT - ASSESSMENT
84y year old Female  s/p   MV clip readmitted for abdominal pain, developed worsening confusion, neutropenia, hypotension.  RUQ sono concerning for  acalculous cholecystitis,  Treated with IV antibiotics as per multidisciplinary team.  hospital course complicated by multiple bouts of AMS/obtundation.

## 2017-07-02 NOTE — PROGRESS NOTE ADULT - ASSESSMENT
Hypercapnic vent failure mental status  improved on bipap now 22 IPAP , but currently dependent on NIV  can decrease IPAP, has post hypercapnic alkalosis  CHF, s/p mitral valve clip, obese,  COPD component by hx  diuresis, afterload, follow lytes  needs family discussion re end of life care  critical  pt does not want trach  continue supportive care , discussed with CT ICU team  critical Hypercapnic vent failure mental status  improved on bipap now 22 IPAP , but currently dependent on NIV  can decrease IPAP, has post hypercapnic alkalosis  CHF, s/p mitral valve clip, obese,  COPD component by hx  diuresis, afterload, follow lytes  CNS stimulants such as provera and theophylline less helpful given cardiac issues  needs family discussion re end of life care  critical  pt does not want trach  continue supportive care , discussed with CT ICU team  critical

## 2017-07-03 LAB
ALBUMIN SERPL ELPH-MCNC: 3.9 G/DL — SIGNIFICANT CHANGE UP (ref 3.3–5.2)
ALP SERPL-CCNC: 122 U/L — HIGH (ref 40–120)
ALT FLD-CCNC: 24 U/L — SIGNIFICANT CHANGE UP
AMYLASE P1 CFR SERPL: 32 U/L — LOW (ref 36–128)
ANION GAP SERPL CALC-SCNC: 12 MMOL/L — SIGNIFICANT CHANGE UP (ref 5–17)
AST SERPL-CCNC: 28 U/L — SIGNIFICANT CHANGE UP
BILIRUB SERPL-MCNC: 0.6 MG/DL — SIGNIFICANT CHANGE UP (ref 0.4–2)
BUN SERPL-MCNC: 40 MG/DL — HIGH (ref 8–20)
CALCIUM SERPL-MCNC: 9.6 MG/DL — SIGNIFICANT CHANGE UP (ref 8.6–10.2)
CHLORIDE SERPL-SCNC: 98 MMOL/L — SIGNIFICANT CHANGE UP (ref 98–107)
CO2 SERPL-SCNC: 41 MMOL/L — HIGH (ref 22–29)
CREAT SERPL-MCNC: 1.47 MG/DL — HIGH (ref 0.5–1.3)
GAS PNL BLDA: SIGNIFICANT CHANGE UP
GLUCOSE SERPL-MCNC: 117 MG/DL — HIGH (ref 70–115)
HCT VFR BLD CALC: 29.1 % — LOW (ref 37–47)
HGB BLD-MCNC: 8.5 G/DL — LOW (ref 12–16)
LIDOCAIN IGE QN: 17 U/L — LOW (ref 22–51)
MAGNESIUM SERPL-MCNC: 2.6 MG/DL — SIGNIFICANT CHANGE UP (ref 1.6–2.6)
MCHC RBC-ENTMCNC: 21.7 PG — LOW (ref 27–31)
MCHC RBC-ENTMCNC: 29.2 G/DL — LOW (ref 32–36)
MCV RBC AUTO: 74.2 FL — LOW (ref 81–99)
PHOSPHATE SERPL-MCNC: 3.5 MG/DL — SIGNIFICANT CHANGE UP (ref 2.4–4.7)
PLATELET # BLD AUTO: 170 K/UL — SIGNIFICANT CHANGE UP (ref 150–400)
POTASSIUM SERPL-MCNC: 3.9 MMOL/L — SIGNIFICANT CHANGE UP (ref 3.5–5.3)
POTASSIUM SERPL-SCNC: 3.9 MMOL/L — SIGNIFICANT CHANGE UP (ref 3.5–5.3)
PROT SERPL-MCNC: 6.9 G/DL — SIGNIFICANT CHANGE UP (ref 6.6–8.7)
RBC # BLD: 3.92 M/UL — LOW (ref 4.4–5.2)
RBC # FLD: 25.6 % — HIGH (ref 11–15.6)
SODIUM SERPL-SCNC: 151 MMOL/L — HIGH (ref 135–145)
WBC # BLD: 7 K/UL — SIGNIFICANT CHANGE UP (ref 4.8–10.8)
WBC # FLD AUTO: 7 K/UL — SIGNIFICANT CHANGE UP (ref 4.8–10.8)

## 2017-07-03 PROCEDURE — 71010: CPT | Mod: 26

## 2017-07-03 RX ORDER — LISINOPRIL 2.5 MG/1
5 TABLET ORAL DAILY
Qty: 0 | Refills: 0 | Status: DISCONTINUED | OUTPATIENT
Start: 2017-07-03 | End: 2017-07-20

## 2017-07-03 RX ORDER — HYDRALAZINE HCL 50 MG
10 TABLET ORAL
Qty: 0 | Refills: 0 | Status: DISCONTINUED | OUTPATIENT
Start: 2017-07-03 | End: 2017-07-20

## 2017-07-03 RX ORDER — ACETAZOLAMIDE 250 MG/1
250 TABLET ORAL EVERY 8 HOURS
Qty: 0 | Refills: 0 | Status: COMPLETED | OUTPATIENT
Start: 2017-07-03 | End: 2017-07-03

## 2017-07-03 RX ADMIN — Medication 3 MILLILITER(S): at 14:07

## 2017-07-03 RX ADMIN — Medication 3 MILLILITER(S): at 20:53

## 2017-07-03 RX ADMIN — SODIUM CHLORIDE 3 MILLILITER(S): 9 INJECTION INTRAMUSCULAR; INTRAVENOUS; SUBCUTANEOUS at 21:59

## 2017-07-03 RX ADMIN — SODIUM CHLORIDE 3 MILLILITER(S): 9 INJECTION INTRAMUSCULAR; INTRAVENOUS; SUBCUTANEOUS at 06:20

## 2017-07-03 RX ADMIN — Medication 50 MILLIGRAM(S): at 07:00

## 2017-07-03 RX ADMIN — PANTOPRAZOLE SODIUM 40 MILLIGRAM(S): 20 TABLET, DELAYED RELEASE ORAL at 07:00

## 2017-07-03 RX ADMIN — LATANOPROST 1 DROP(S): 0.05 SOLUTION/ DROPS OPHTHALMIC; TOPICAL at 22:00

## 2017-07-03 RX ADMIN — Medication 20 MILLIGRAM(S): at 18:52

## 2017-07-03 RX ADMIN — Medication 0.5 MILLIGRAM(S): at 08:18

## 2017-07-03 RX ADMIN — Medication 3 MILLILITER(S): at 08:18

## 2017-07-03 RX ADMIN — Medication 20 MILLIGRAM(S): at 07:00

## 2017-07-03 RX ADMIN — SODIUM CHLORIDE 3 MILLILITER(S): 9 INJECTION INTRAMUSCULAR; INTRAVENOUS; SUBCUTANEOUS at 13:42

## 2017-07-03 RX ADMIN — Medication 0.5 MILLIGRAM(S): at 20:53

## 2017-07-03 RX ADMIN — ACETAZOLAMIDE 250 MILLIGRAM(S): 250 TABLET ORAL at 18:52

## 2017-07-03 RX ADMIN — Medication 3 MILLILITER(S): at 03:14

## 2017-07-03 RX ADMIN — LISINOPRIL 5 MILLIGRAM(S): 2.5 TABLET ORAL at 18:52

## 2017-07-03 NOTE — PROGRESS NOTE ADULT - SUBJECTIVE AND OBJECTIVE BOX
Patient seen and examined  oob/ch  remains dyspnic; on bipap  no cp f/c  still has swelling feet    I&O's Summary    02 Jul 2017 07:01  -  03 Jul 2017 07:00  --------------------------------------------------------  IN: 400 mL / OUT: 0 mL / NET: 400 mL    Vital Signs Last 24 Hrs  T(C): 36.4 (03 Jul 2017 12:00), Max: 36.4 (03 Jul 2017 12:00)  T(F): 97.5 (03 Jul 2017 12:00), Max: 97.5 (03 Jul 2017 12:00)  HR: 79 (03 Jul 2017 18:00) (71 - 103)  BP: 149/73 (03 Jul 2017 18:00) (120/56 - 177/86)  BP(mean): 104 (03 Jul 2017 18:00) (81 - 123)  RR: 17 (03 Jul 2017 18:00) (12 - 46)  SpO2: 95% (03 Jul 2017 18:00) (78% - 98%)    PHYSICAL EXAM:    GENERAL: NAD,   EYES:  conjunctiva and sclera clear  NECK: Supple, No JVD/Bruit  NERVOUS SYSTEM:  A/O x3,   CHEST: fewer rales but more rhonchi  HEART:  RRR, No murmurs  ABDOMEN: Soft, NT/ND BS+  EXTREMITIES:  + Edema;  SKIN: No rashes    LABS:                        8.5    7.0   )-----------( 170      ( 03 Jul 2017 04:02 )             29.1     07-03    151<H>  |  98  |  40.0<H>  ----------------------------<  117<H>  3.9   |  41.0<H>  |  1.47<H>    Ca    9.6      03 Jul 2017 04:02  Phos  3.5     07-03  Mg     2.6     07-03    TPro  6.9  /  Alb  3.9  /  TBili  0.6  /  DBili  x   /  AST  28  /  ALT  24  /  AlkPhos  122<H>  07-03      MEDICATIONS  (STANDING):  sodium chloride 0.9% lock flush  latanoprost 0.005% Ophthalmic Solution  pantoprazole    Tablet  multivitamin  metoprolol succinate ER  ALBUTerol/ipratropium for Nebulization  buDESOnide   0.5 milliGRAM(s) Respule  clopidogrel Tablet  folic acid  ascorbic acid  lactobacillus acidophilus  psyllium Powder  loperamide PRN  simvastatin  aluminum hydroxide/magnesium hydroxide/simethicone Suspension PRN  acetazolamide Injectable  hydrALAZINE Injectable PRN  torsemide  lisinopril

## 2017-07-03 NOTE — PROGRESS NOTE ADULT - ASSESSMENT
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

## 2017-07-03 NOTE — PROGRESS NOTE ADULT - SUBJECTIVE AND OBJECTIVE BOX
84y Female s/p mitral clip, return to [facility] for abdominal pain.  Recently upgraded to cticu for respiratory failure and obtundation.    Subjective: I'm okay, leave me alone    T(C): 36.3 (17 @ 20:00), Max: 37.1 (17 @ 12:44)  HR: 86 (17 @ 00:08) (83 - 107)  BP: 135/78 (17 @ 20:00) (128/76 - 198/90)  RR: 19 (17 @ 00:00) (19 - 45)  SpO2: 93% (17 @ 00:08) (88% - 99%)          153<H>  |  97<L>  |  34.0<H>  ----------------------------<  92  4.2   |  42.0<H>  |  1.24    Ca    9.5      2017 05:28  Phos  3.0       Mg     2.1         TPro  6.8  /  Alb  3.8  /  TBili  0.5  /  DBili  0.1  /  AST  25  /  ALT  22  /  AlkPhos  104                                 8.1    5.6   )-----------( 184      ( 2017 05:28 )             28.1          ABG - ( 2017 20:12 )  pH: 7.36  /  pCO2: 84    /  pO2: 81    / HCO3: 43    / Base Excess: 19.2  /  SaO2: 96                            CXR:   Support Devices: None  Heart: Cardiomegaly  Mediastinum:  Unremarkable  Lungs/Airways: Mildly worsened pulmonary edema, small effusion/airspace   disease  Bones/Soft tissues: Unremarkable    < end of copied text >      I&O's Detail    2017 07:01  -  2017 07:00  --------------------------------------------------------  IN:    Oral Fluid: 50 mL    Solution: 100 mL  Total IN: 150 mL    OUT:    Voided: 50 mL  Total OUT: 50 mL    Total NET: 100 mL      2017 07:01  -  2017 00:25  --------------------------------------------------------  IN:    Oral Fluid: 200 mL    Solution: 100 mL    Solution: 50 mL  Total IN: 350 mL    OUT:  Total OUT: 0 mL    Total NET: 350 mL          MEDICATIONS  (STANDING):  sodium chloride 0.9% lock flush 3 milliLiter(s) IV Push every 8 hours  latanoprost 0.005% Ophthalmic Solution 1 Drop(s) Both EYES at bedtime  pantoprazole    Tablet 40 milliGRAM(s) Oral before breakfast  multivitamin 1 Tablet(s) Oral daily  metoprolol succinate ER 50 milliGRAM(s) Oral daily  ALBUTerol/ipratropium for Nebulization 3 milliLiter(s) Nebulizer every 6 hours  buDESOnide   0.5 milliGRAM(s) Respule 0.5 milliGRAM(s) Inhalation two times a day  clopidogrel Tablet 75 milliGRAM(s) Oral daily  folic acid 1 milliGRAM(s) Oral daily  ascorbic acid 250 milliGRAM(s) Oral daily  lactobacillus acidophilus 1 Tablet(s) Oral two times a day with meals  psyllium Powder 1 Packet(s) Oral two times a day  simvastatin 20 milliGRAM(s) Oral at bedtime  torsemide 20 milliGRAM(s) Oral two times a day    MEDICATIONS  (PRN):  loperamide 2 milliGRAM(s) Oral every 4 hours PRN Diarrhea  aluminum hydroxide/magnesium hydroxide/simethicone Suspension 30 milliLiter(s) Oral every 6 hours PRN Dyspepsia      Physical Exam  Neuro: A+O x conversation, knows she is in hospital and knows her name and her daughter, still thinks it is 2017, non-focal, speech clear and intact  Pulm: CTA, equal bilaterally,  at bases  CV: RRR, +S1S2, no appreciable murmur  Abd: soft, NT, ND  Ext: MARTI x 4, no edema

## 2017-07-03 NOTE — PROGRESS NOTE ADULT - ASSESSMENT
IMPRESSION/PLAN:    1) HYPERCAPNEIC RESPIRATORY FAILURE: MULTIFACTORAL IN NATURE, Related to Obesity hypoventilation, but largly due to sever mitral regurgitation and CHF, with fluid overload. The patient has not responded well to attempts at diuresis and medical optimization. Furthermore, she failed an attempt at mitral clipping and is not a surgical candidate for further intervention;  Will attempt adding diamox and continuing Demadex, with added afterload reduction using lisinopril and hydralazine. Given her tenuous renal function and hypecapneic state, it is not clear weather this will add any further benfit and /or may worsen her renal function    2) Acute on chronic CHF related to systolic heart failure, Mitral regurgitation and Pulmonary HTN: will continue as above.    3) Obesity hypoventilation: Cntinue Bipap for now with added diuresis and BP control    4) Acute renal failure; Likely related  to CHF and diuresis.    The Patient's overall prognosis for improvement is limited given her baseline status, with hypercapneic failure, CHF and obesity hypoventilation, Pulmonary HTN.  At this point I discussed in detail with the patient's daughter Mini the goals of care. She is aware that the patient may liely not survive but would like to give her a chance at further medical optimizing over the next 48hrs,, while understanding that she may still fail an require intubation. the question that she is struggling with is whether the patient would agree with a tracheostomy again. Her last tracheostomy was @ 2 years ago. Mini is considering it, if there were a reasonable opportunity that she could return home with long term vent supprt. I explained that although this may be possible, it is not highly propbale. Furthermore, that this would require tracheostomy with PEG pplacement and transfer to a rehab facility fisrt. If indeed she improved, she could potentially go home with home vent support. Over all,  though this is not a likely scenario. I explaine dthat we shoould involve Palliative care at this point to allow for added support her her and the patient.

## 2017-07-03 NOTE — PROGRESS NOTE ADULT - PROBLEM SELECTOR PLAN 1
s/p mitral clip   Currently NSR  continue tele monitoring  cont diuresis BID as BP will tolerate  Daily BMP to monitor creatinine

## 2017-07-03 NOTE — CHART NOTE - NSCHARTNOTEFT_GEN_A_CORE
Pt's daughter (Mini) requested transfer from CT Surgery to MICU service. Case reviewed with MICU team who agreed to accept Mrs Bowman to their service. Dr Owen aware of and agrees to this plan.

## 2017-07-03 NOTE — PROGRESS NOTE ADULT - ASSESSMENT
83 yo F  s/p   MV clip readmitted for abdominal pain, developed worsening confusion, neutropenia, hypotension.  RUQ sono concerning for  acalculous cholecystitis, GI, Surgery, IR, Infectious Disease consults obtained. Imaging  suspicious for acalculous cholecystitis which was treated with IV antibiotics as per multidisciplinary team.  hospital course complicated by multiple bouts of AMS/obtundation with bipap off> blood gases c/w resp acidosis necessitating strict use of bipap at night.      AM of 6/28, plan was to downgrade patient to floor level of care, but routine ABG revealed a co2 in 80's off of Bipap.  Bipap resumed and Co2 rechecked, which improved to baseline in 70's.  Patient remained in CTICU for further monitoring.  Patient's daughter states that her mother would never want a trach again after being trached in past.  Reached out to pulmonary MD, Dr. Ritter, who suggests performing spirometry to see if patient is candidate for noninvasive ventilator at rehab / home.   6/29: Spirometry pending (attempted but patient is not following directions, will attempt with daughter at bedside)  7/1 Pt on BIPAP overnight. Pt had RRT called in AM for hypercapnic Respiratory failure, pt found unresponsive by nursing team shortly after BIPAP discontinued. RRT called and CT-ICU staff along with Dr. Smith evaluated pt at bedside. Pt transferred to CT-ICU for further monitoring. After BIPAP resumed pt became responsive and Hypercapnic respiratory failure improving.   7/2: Co2 remained elevated, though metabolic compensated. Patient awake and not obtunded but agitated at times.

## 2017-07-03 NOTE — PROGRESS NOTE ADULT - PROBLEM SELECTOR PLAN 2
STRICT nocturnal (and PRN) BIPAP   Pt now with chronic CO2 retention baseline CO2 in the 70s to 80's, compensated  Continue BIPAP, After discussion with daughter if worsening in respiratory status intubation will be next step , plan for family meeting/ decision for trach. If there is no plan for trach then plan for discussion with pt and family regarding palliative care/ end of life care.

## 2017-07-03 NOTE — PROGRESS NOTE ADULT - SUBJECTIVE AND OBJECTIVE BOX
Patient is a 84y old  Female who presents with a chief complaint of Abdominal pain, insomnia, and increased shortness of breath (10 Murphy 2017 16:40)      BRIEF HOSPITAL COURSE:  84F with a long history of CHF, decompensated failure, elated to severe MR, Obesity hypoventilation and chronic hypercapneic respiratory failure. She si S/p Mitrak Vake Clipping procedure several weeks ago. Post procedure, she has failed to improve, she has had chronic respiraotry failure, requiring Bipap for the most part over ja last several days, despite attempts at diuresis and medical optimization. .           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   r/o  out  stenosis  Cardiomyopathy  Osteoarthritis  Iron deficiency anemia  MI, old: 2008  Dyslipidemia  Acid reflux  Hx of CAB  CAD (coronary artery disease)  Heart failure  HTN (hypertension)  H/O tracheostomy: 2013 may  Cataract: b/l  2013  S/P CABG x 3:   [place]    Allergies    aspirin (Anaphylaxis)  NSAIDs (Other)    Intolerances    Lasix (Other)  OHS PT (Unknown)    FAMILY HISTORY:  Family history of hypertension      Review of Systems:  CONSTITUTIONAL: No fever, chills, or fatigue  EYES: No eye pain, visual disturbances, or discharge  ENMT:  No difficulty hearing, tinnitus, vertigo; No sinus or throat pain  NECK: No pain or stiffness  RESPIRATORY: + shortness of breath  CARDIOVASCULAR: No chest pain, palpitations, dizziness, + leg swelling  GASTROINTESTINAL: No abdominal or epigastric pain. No nausea, vomiting, or hematemesis; No diarrhea or constipation. No melena or hematochezia.  GENITOURINARY: No dysuria, frequency, hematuria, or incontinence  NEUROLOGICAL: No headaches, memory loss, loss of strength, numbness, or tremors  SKIN: No itching, burning, rashes, or lesions   MUSCULOSKELETAL: No joint pain or swelling; No muscle, back, or extremity pain  PSYCHIATRIC: + depression, No anxiety, mood swings, or difficulty sleeping      Medications:    metoprolol succinate ER 50 milliGRAM(s) Oral daily  acetazolamide Injectable 250 milliGRAM(s) IV Push every 8 hours  hydrALAZINE Injectable 10 milliGRAM(s) IV Push every 3 hours PRN  torsemide 20 milliGRAM(s) Oral two times a day  lisinopril 5 milliGRAM(s) Oral daily    ALBUTerol/ipratropium for Nebulization 3 milliLiter(s) Nebulizer every 6 hours  buDESOnide   0.5 milliGRAM(s) Respule 0.5 milliGRAM(s) Inhalation two times a day        clopidogrel Tablet 75 milliGRAM(s) Oral daily    pantoprazole    Tablet 40 milliGRAM(s) Oral before breakfast  psyllium Powder 1 Packet(s) Oral two times a day  loperamide 2 milliGRAM(s) Oral every 4 hours PRN  aluminum hydroxide/magnesium hydroxide/simethicone Suspension 30 milliLiter(s) Oral every 6 hours PRN      simvastatin 20 milliGRAM(s) Oral at bedtime    multivitamin 1 Tablet(s) Oral daily  folic acid 1 milliGRAM(s) Oral daily  ascorbic acid 250 milliGRAM(s) Oral daily      latanoprost 0.005% Ophthalmic Solution 1 Drop(s) Both EYES at bedtime    sodium chloride 0.9% lock flush 3 milliLiter(s) IV Push every 8 hours  lactobacillus acidophilus 1 Tablet(s) Oral two times a day with meals          ICU Vital Signs Last 24 Hrs  T(C): 36.4 (2017 12:00), Max: 36.4 (2017 12:00)  T(F): 97.5 (2017 12:00), Max: 97.5 (2017 12:00)  HR: 79 (2017 18:00) (71 - 103)  BP: 149/73 (2017 18:00) (120/56 - 177/86)  BP(mean): 104 (2017 18:00) (81 - 123)  ABP: --  ABP(mean): --  RR: 17 (2017 18:00) (12 - 46)  SpO2: 95% (2017 18:00) (78% - 98%)    Vital Signs Last 24 Hrs  T(C): 36.4 (2017 12:00), Max: 36.4 (2017 12:00)  T(F): 97.5 (2017 12:00), Max: 97.5 (2017 12:00)  HR: 79 (2017 18:00) (71 - 103)  BP: 149/73 (2017 18:00) (120/56 - 177/86)  BP(mean): 104 (2017 18:00) (81 - 123)  RR: 17 (2017 18:00) (12 - 46)  SpO2: 95% (2017 18:00) (78% - 98%)    ABG - ( 2017 03:59 )  pH: 7.45  /  pCO2: 67    /  pO2: 70    / HCO3: 43    / Base Excess: 19.0  /  SaO2: 97                  I&O's Detail    2017 07:01  -  2017 07:00  --------------------------------------------------------  IN:    dextrose 5% + sodium chloride 0.45%.: 50 mL    Oral Fluid: 200 mL    Solution: 100 mL    Solution: 50 mL  Total IN: 400 mL    OUT:  Total OUT: 0 mL    Total NET: 400 mL            LABS:                        8.5    7.0   )-----------( 170      ( 2017 04:02 )             29.1     07-03    151<H>  |  98  |  40.0<H>  ----------------------------<  117<H>  3.9   |  41.0<H>  |  1.47<H>    Ca    9.6      2017 04:02  Phos  3.5     07-03  Mg     2.6     07-03    TPro  6.9  /  Alb  3.9  /  TBili  0.6  /  DBili  x   /  AST  28  /  ALT  24  /  AlkPhos  122<H>  07-03          CAPILLARY BLOOD GLUCOSE            CULTURES:  C Diff by PCR Result: NotDetec ( @ 12:00)      Physical Examination:    General: Lethargic but arousable, responds to name, answers simple questions,     HEENT: Pupils equal, reactive to light.  Symmetric.    PULM: Decreased BS b/l with poor air mobement, + rales at bases, No wheezes    CVS: Regular rate and rhythm, no murmurs, rubs, or gallops    ABD: Soft, nondistended, nontender, normoactive bowel sounds, no masses    EXT:2+ edema of B/L lower extremities and in presacral region    SKIN: Warm and well perfused, no rashes noted.    RADIOLOGY: cxr: Impression:  Evidence of congestive heart failure. No change since the prior day..        CRITICAL CARE TIME SPENT: 60 MINS

## 2017-07-03 NOTE — PROGRESS NOTE ADULT - SUBJECTIVE AND OBJECTIVE BOX
PULMONARY PROGRESS NOTE      ANN FRANCESLEE [id number]    Patient is a 84y old  Female who presents with a chief complaint of Abdominal pain, insomnia, and increased shortness of breath (10 Murphy 2017 16:40)      INTERVAL HPI/OVERNIGHT EVENTS:    Tolerating only Minutes off BIPAP 20/6  S/p MR repair  Awake alert on NCO    MEDICATIONS  (STANDING):  sodium chloride 0.9% lock flush 3 milliLiter(s) IV Push every 8 hours  latanoprost 0.005% Ophthalmic Solution 1 Drop(s) Both EYES at bedtime  pantoprazole    Tablet 40 milliGRAM(s) Oral before breakfast  multivitamin 1 Tablet(s) Oral daily  metoprolol succinate ER 50 milliGRAM(s) Oral daily  ALBUTerol/ipratropium for Nebulization 3 milliLiter(s) Nebulizer every 6 hours  buDESOnide   0.5 milliGRAM(s) Respule 0.5 milliGRAM(s) Inhalation two times a day  clopidogrel Tablet 75 milliGRAM(s) Oral daily  folic acid 1 milliGRAM(s) Oral daily  ascorbic acid 250 milliGRAM(s) Oral daily  lactobacillus acidophilus 1 Tablet(s) Oral two times a day with meals  psyllium Powder 1 Packet(s) Oral two times a day  simvastatin 20 milliGRAM(s) Oral at bedtime  torsemide 20 milliGRAM(s) Oral two times a day      MEDICATIONS  (PRN):  loperamide 2 milliGRAM(s) Oral every 4 hours PRN Diarrhea  aluminum hydroxide/magnesium hydroxide/simethicone Suspension 30 milliLiter(s) Oral every 6 hours PRN Dyspepsia      Allergies    aspirin (Anaphylaxis)  NSAIDs (Other)    Intolerances    Lasix (Other)  OHS PT (Unknown)      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 ENT:  No earache, sore throat or runny nose.    CARDIOVASCULAR:  No pressure, squeezing, tightness, heaviness or aching about the chest; no palpitations.    RESPIRATORY:  above    GASTROINTESTINAL:  No nausea, vomiting or diarrhea.    GENITOURINARY:  No dysuria, frequency or urgency.    NEUROLOGIC:  No paresthesias, fasciculations, seizures or weakness.    Extremities: No cyanosis, clubbing or edema    PSYCHIATRIC:  No disorder of thought or mood.    Vital Signs Last 24 Hrs  T(C): 36.4 (2017 12:00), Max: 36.4 (2017 12:00)  T(F): 97.5 (2017 12:00), Max: 97.5 (2017 12:00)  HR: 71 (2017 13:00) (71 - 103)  BP: 120/56 (2017 13:00) (120/56 - 177/86)  BP(mean): 81 (:00) (81 - 123)  RR: 12 (:00) (12 - 46)  SpO2: 94% (2017 14:08) (78% - 98%)      07-02 @ 07:01  -  07-03 @ 07:00  --------------------------------------------------------  IN: 400 mL / OUT: 0 mL / NET: 400 mL            T(C): 36.4 (2017 12:00), Max: 36.4 (2017 12:00)  HR: 71 (2017 13:00) (71 - 103)  BP: 120/56 (2017 13:00) (120/56 - 177/86)  RR: 12 (:00) (12 - 46)  SpO2: 94% (2017 14:08) (78% - 98%)  Wt(kg): --    PHYSICAL EXAMINATION:    GENERAL: The patient is awake and alert in no apparent distress.     HEENT: Head is normocephalic and atraumatic. Extraocular muscles are intact. Mucous membranes are moist.    NECK: Supple.    LUNGS: Clear to auscultation without wheezing, rales or rhonchi; respirations unlabored    HEART: Regular rate and rhythm without murmur.    ABDOMEN: Soft, nontender, and nondistended.      EXTREMITIES: Without any cyanosis, clubbing, rash, lesions or edema.    NEUROLOGIC: Grossly intact.    LABS:                        8.5    7.0   )-----------( 170      ( 2017 04:02 )             29.1     07-03    151<H>  |  98  |  40.0<H>  ----------------------------<  117<H>  3.9   |  41.0<H>  |  1.47<H>    Ca    9.6      2017 04:02  Phos  3.5     07-  Mg     2.6     -    TPro  6.9  /  Alb  3.9  /  TBili  0.6  /  DBili  x   /  AST  28  /  ALT  24  /  AlkPhos  122<H>  07-        ABG - ( 2017 03:59 )  pH: 7.45  /  pCO2: 67    /  pO2: 70    / HCO3: 43    / Base Excess: 19.0  /  SaO2: 97                              MICROBIOLOGY:    RADIOLOGY & ADDITIONAL STUDIES:  < from: Xray Chest 1 View AP/PA. (17 @ 05:41) >   EXAM:  CHEST SINGLE VIEW FRONTAL                          PROCEDURE DATE:  2017          INTERPRETATION:  CHEST AP PORTABLE:    History: evaluate for effusion.     Date and time of exam: 2017 5:09 AM.    Technique: A single AP view of the chest was obtained.    Comparison exam: 2017 5:02 AM.    Findings:  Persistent cardiomegaly and pulmonary vascular congestion. Persistent   bilateral pleural effusions. No evidence of pneumothorax..    Impression:  Evidence of congestive heart failure. No change since the prior day..                LUCÍA ULLOA M.D., ATTENDING RADIOLOGIST  This document has been electronically signed. Jul  3 2017  9:13AM    < end of copied text >  Films reviewed on PACS

## 2017-07-04 DIAGNOSIS — E66.01 MORBID (SEVERE) OBESITY DUE TO EXCESS CALORIES: ICD-10-CM

## 2017-07-04 DIAGNOSIS — N17.9 ACUTE KIDNEY FAILURE, UNSPECIFIED: ICD-10-CM

## 2017-07-04 LAB
ANION GAP SERPL CALC-SCNC: <15 MMOL/L — SIGNIFICANT CHANGE UP (ref 5–17)
BUN SERPL-MCNC: 47 MG/DL — HIGH (ref 8–20)
CALCIUM SERPL-MCNC: 9.7 MG/DL — SIGNIFICANT CHANGE UP (ref 8.6–10.2)
CHLORIDE SERPL-SCNC: 101 MMOL/L — SIGNIFICANT CHANGE UP (ref 98–107)
CO2 SERPL-SCNC: >43 MMOL/L — CRITICAL HIGH (ref 22–29)
CREAT SERPL-MCNC: 1.6 MG/DL — HIGH (ref 0.5–1.3)
GAS PNL BLDA: SIGNIFICANT CHANGE UP
GLUCOSE SERPL-MCNC: 82 MG/DL — SIGNIFICANT CHANGE UP (ref 70–115)
POTASSIUM SERPL-MCNC: 3.7 MMOL/L — SIGNIFICANT CHANGE UP (ref 3.5–5.3)
POTASSIUM SERPL-SCNC: 3.7 MMOL/L — SIGNIFICANT CHANGE UP (ref 3.5–5.3)
SODIUM SERPL-SCNC: 159 MMOL/L — HIGH (ref 135–145)

## 2017-07-04 PROCEDURE — 99291 CRITICAL CARE FIRST HOUR: CPT

## 2017-07-04 RX ORDER — SODIUM CHLORIDE 9 MG/ML
1000 INJECTION, SOLUTION INTRAVENOUS
Qty: 0 | Refills: 0 | Status: DISCONTINUED | OUTPATIENT
Start: 2017-07-04 | End: 2017-07-07

## 2017-07-04 RX ORDER — ACETAZOLAMIDE 250 MG/1
500 TABLET ORAL ONCE
Qty: 0 | Refills: 0 | Status: COMPLETED | OUTPATIENT
Start: 2017-07-04 | End: 2017-07-04

## 2017-07-04 RX ADMIN — Medication 1 MILLIGRAM(S): at 12:26

## 2017-07-04 RX ADMIN — Medication 3 MILLILITER(S): at 08:28

## 2017-07-04 RX ADMIN — SODIUM CHLORIDE 3 MILLILITER(S): 9 INJECTION INTRAMUSCULAR; INTRAVENOUS; SUBCUTANEOUS at 06:06

## 2017-07-04 RX ADMIN — CLOPIDOGREL BISULFATE 75 MILLIGRAM(S): 75 TABLET, FILM COATED ORAL at 12:26

## 2017-07-04 RX ADMIN — Medication 0.5 MILLIGRAM(S): at 08:28

## 2017-07-04 RX ADMIN — ACETAZOLAMIDE 110 MILLIGRAM(S): 250 TABLET ORAL at 18:40

## 2017-07-04 RX ADMIN — ACETAZOLAMIDE 250 MILLIGRAM(S): 250 TABLET ORAL at 00:30

## 2017-07-04 RX ADMIN — Medication 250 MILLIGRAM(S): at 12:26

## 2017-07-04 RX ADMIN — SODIUM CHLORIDE 3 MILLILITER(S): 9 INJECTION INTRAMUSCULAR; INTRAVENOUS; SUBCUTANEOUS at 22:20

## 2017-07-04 RX ADMIN — Medication 3 MILLILITER(S): at 02:50

## 2017-07-04 RX ADMIN — SODIUM CHLORIDE 3 MILLILITER(S): 9 INJECTION INTRAMUSCULAR; INTRAVENOUS; SUBCUTANEOUS at 13:01

## 2017-07-04 RX ADMIN — LATANOPROST 1 DROP(S): 0.05 SOLUTION/ DROPS OPHTHALMIC; TOPICAL at 22:20

## 2017-07-04 RX ADMIN — SODIUM CHLORIDE 30 MILLILITER(S): 9 INJECTION, SOLUTION INTRAVENOUS at 12:58

## 2017-07-04 RX ADMIN — Medication 1 TABLET(S): at 12:26

## 2017-07-04 RX ADMIN — ACETAZOLAMIDE 110 MILLIGRAM(S): 250 TABLET ORAL at 09:42

## 2017-07-04 RX ADMIN — Medication 3 MILLILITER(S): at 20:36

## 2017-07-04 RX ADMIN — Medication 3 MILLILITER(S): at 14:46

## 2017-07-04 RX ADMIN — Medication 0.5 MILLIGRAM(S): at 20:36

## 2017-07-04 NOTE — PROGRESS NOTE ADULT - SUBJECTIVE AND OBJECTIVE BOX
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    ICU Vital Signs Last 24 Hrs  T(C): 36.7 (2017 23:00), Max: 36.7 (2017 23:00)  T(F): 98 (2017 23:00), Max: 98 (2017 23:00)  HR: 61 (2017 00:00) (61 - 103)  BP: 108/53 (2017 00:00) (108/53 - 177/86)  BP(mean): 76 (2017 00:00) (76 - 123)  ABP: --  ABP(mean): --  RR: 28 (2017 00:00) (12 - 46)  SpO2: 91% (2017 00:00) (78% - 95%)    ABG - ( 2017 03:59 )  pH: 7.45  /  pCO2: 67    /  pO2: 70    / HCO3: 43    / Base Excess: 19.0  /  SaO2: 97        LABS:                        8.5    7.0   )-----------( 170      ( 2017 04:02 )             29.1     07-03    151<H>  |  98  |  40.0<H>  ----------------------------<  117<H>  3.9   |  41.0<H>  |  1.47<H>    Ca    9.6      2017 04:02  Phos  3.5     07-03  Mg     2.6     07-03    TPro  6.9  /  Alb  3.9  /  TBili  0.6  /  DBili  x   /  AST  28  /  ALT  24  /  AlkPhos  122<H>          CULTURES:  C Diff by PCR Result: Susannah ( @ 12:00)      Medications:    metoprolol succinate ER 50 milliGRAM(s) Oral daily  hydrALAZINE Injectable 10 milliGRAM(s) IV Push every 3 hours PRN  torsemide 20 milliGRAM(s) Oral two times a day  lisinopril 5 milliGRAM(s) Oral daily  ALBUTerol/ipratropium for Nebulization 3 milliLiter(s) Nebulizer every 6 hours  buDESOnide   0.5 milliGRAM(s) Respule 0.5 milliGRAM(s) Inhalation two times a day  clopidogrel Tablet 75 milliGRAM(s) Oral daily  pratoprazole    Tablet 40 milliGRAM(s) Oral before breakfast  psyllium Powder 1 Packet(s) Oral two times a day  loperamide 2 milliGRAM(s) Oral every 4 hours PRN  aluminum hydroxide/magnesium hydroxide/simethicone Suspension 30 milliLiter(s) Oral every 6 hours PRN  simvastatin 20 milliGRAM(s) Oral at bedtime  multivitamin 1 Tablet(s) Oral daily  folic acid 1 milliGRAM(s) Oral daily  ascorbic acid 250 milliGRAM(s) Oral kika  latanoprost 0.005% Ophthalmic Solution 1 Drop(s) Both EYES at bedtim  sodium chloride 0.9% lock flush 3 milliLiter(s) IV Push every 8 hours  lactobacillus acidophilus 1 Tablet(s) Oral two times a day with meals       @ 07:01  -   @ 07:00  --------------------------------------------------------  IN: 400 mL / OUT: 0 mL / NET: 400 mL    RADIOLOGY/IMAGING/ECHO    Evidence of congestive heart failure.         Assessment/Plan:    84 F recent mitral clip for severe MR admit 6/10 with abdominal pain due to acalculous cholecystitis s/p course of ABX .   Now BIPAP dependent hypercapnic respiratory failure with related to CHF, pulm HTN, OHS, deconditioning.   Diuresing yielding  hypernatremia and a metabolic contraction alkalosis    Have discussed this case with the patients daughter Mini who is a critical care trained RN.  The only safe option here is tracheostomy, patient does not want one from past discussions.       Mini  is hesitant  to "throw in the towel" and wants to see if MICU management will alter her mothers outcome.  The patient rides the line of CHF fluid overload and worsening renal fx with diuresis the mitral clip did not help.  There is little reversible here.    I have asked Mini to speak to and hear her mothers thoughts on tracheostomy and the real possibility that she may be perpetually vent dependent, require a feeding tube, and live in a skilled nursing facility.    For now hydralazine and low dose ACE have been added for CHF management.  Renal fx being monitored.      Minutes of Critical Care time: 33  (Reviewing data, imaging, discussing with multidisciplinary team, non inclusive of procedures, discussing goals of care with patient/family)

## 2017-07-04 NOTE — PROGRESS NOTE ADULT - SUBJECTIVE AND OBJECTIVE BOX
Patient seen and examined  Remains on bipap  mildly distresed    I&O's Summary    04 Jul 2017 07:01  -  04 Jul 2017 23:31  --------------------------------------------------------  IN: 460 mL / OUT: 0 mL / NET: 460 mL        REVIEW OF SYSTEMS:    CONSTITUTIONAL: No F/C  RESPIRATORY: No cough+ SOB  CARDIOVASCULAR: No CP/palpitations,    GASTROINTESTINAL: No abdominal pain , NVD   GENITOURINARY: No UTI sx  NEUROLOGICAL: No headaches/wk/numbness  MUSCULOSKELETAL:  No joint pain/swelling; No LBP  EXTREMITIES : + swelling,    Vital Signs Last 24 Hrs  T(C): 36.8 (04 Jul 2017 20:00), Max: 36.8 (04 Jul 2017 20:00)  T(F): 98.3 (04 Jul 2017 20:00), Max: 98.3 (04 Jul 2017 20:00)  HR: 61 (04 Jul 2017 23:00) (61 - 75)  BP: 131/59 (04 Jul 2017 23:00) (94/48 - 145/72)  BP(mean): 85 (04 Jul 2017 23:00) (54 - 111)  RR: 18 (04 Jul 2017 23:00) (13 - 55)  SpO2: 92% (04 Jul 2017 23:00) (89% - 100%)    PHYSICAL EXAM:    GENERAL: NAD,   EYES:  conjunctiva and sclera clear  NECK: Supple, No JVD/Bruit  NERVOUS SYSTEM:  A/O x3,   CHEST:  CTA ,- fewer rales occ rhonchi  HEART:  RRR, gr 2 murmurs  ABDOMEN: Soft, NT/ND BS+  EXTREMITIES:  + Edema;  SKIN: No rashes    LABS:                        8.5    7.0   )-----------( 170      ( 03 Jul 2017 04:02 )             29.1     07-04    159<H>  |  101  |  47.0<H>  ----------------------------<  82  3.7   |  >43.0<HH>  |  1.60<H>    Ca    9.7      04 Jul 2017 05:57  Phos  3.5     07-03  Mg     2.6     07-03    TPro  6.9  /  Alb  3.9  /  TBili  0.6  /  DBili  x   /  AST  28  /  ALT  24  /  AlkPhos  122<H>  07-03      MEDICATIONS  (STANDING):  sodium chloride 0.9% lock flush  latanoprost 0.005% Ophthalmic Solution  pantoprazole    Tablet  multivitamin  metoprolol succinate ER  ALBUTerol/ipratropium for Nebulization  buDESOnide   0.5 milliGRAM(s) Respule  clopidogrel Tablet  folic acid  ascorbic acid  lactobacillus acidophilus  psyllium Powder  loperamide PRN  simvastatin  aluminum hydroxide/magnesium hydroxide/simethicone Suspension PRN  hydrALAZINE Injectable PRN  torsemide  lisinopril  dextrose 5%.

## 2017-07-04 NOTE — PROGRESS NOTE ADULT - PROBLEM SELECTOR PLAN 1
- NIV dependent  - giving acetazolamide to trigger respiratory alkalosis and loss of metabolic bicarbonate.   - has tolerated some time off of NIV; however I feel that in time she will require tracheostomy if she cannot tolerate longer than 30min's  - family/daugther feel that trach/peg maybe their best option at this time (this decision does seem to change daily)

## 2017-07-04 NOTE — PROGRESS NOTE ADULT - ASSESSMENT
needs bipap most of the time  d/w Cardiology  overall no great solution for her dyspnea  Has hypercapnia - Pulm noted  Family meeting soon, overall prognosis not very good

## 2017-07-04 NOTE — PROGRESS NOTE ADULT - PROBLEM SELECTOR PLAN 2
- continue with medications and blood pressure control  - diuresis as tolerated with avoidance of contraction alkalosis   - close watch on potassium

## 2017-07-04 NOTE — PROGRESS NOTE ADULT - ASSESSMENT
Hypercapnic resp failure   Multifactorial including MS, obesity, chest wall restriction, pulm Htn,VDH  Prognosis dismal  will need trach and home vent unless terminal wean

## 2017-07-04 NOTE — PROGRESS NOTE ADULT - SUBJECTIVE AND OBJECTIVE BOX
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 since that time from admission she has suffered from persistent hypercapnic respiratory failure with inability to wean from NIV now with a compensated respiratory acidosis and metabolic alkalosis and contraction alkalosis. Tranferred to the MICU service 7/3 for management of her NIV and metabolic disturbances.      Events last 24 hours: none acute remains on NIV.     Review of systems:   unable to assess this morning, obutunded.     this afternoon, she denied any complaints or concerns, admitted to be hungry and was without chest pain or shortness of breath.     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   r/o  out  stenosis  Cardiomyopathy  Osteoarthritis  Iron deficiency anemia  MI, old: 2008  Dyslipidemia  Acid reflux  Hx of CAB  CAD (coronary artery disease)  Heart failure  HTN (hypertension)  H/O tracheostomy: 2013 may  Cataract: b/l  2013  S/P CABG x 3:   [place]        Medications:    metoprolol succinate ER 50 milliGRAM(s) Oral daily  hydrALAZINE Injectable 10 milliGRAM(s) IV Push every 3 hours PRN  torsemide 20 milliGRAM(s) Oral two times a day  lisinopril 5 milliGRAM(s) Oral daily    ALBUTerol/ipratropium for Nebulization 3 milliLiter(s) Nebulizer every 6 hours  buDESOnide   0.5 milliGRAM(s) Respule 0.5 milliGRAM(s) Inhalation two times a day        clopidogrel Tablet 75 milliGRAM(s) Oral daily    pantoprazole    Tablet 40 milliGRAM(s) Oral before breakfast  psyllium Powder 1 Packet(s) Oral two times a day  loperamide 2 milliGRAM(s) Oral every 4 hours PRN  aluminum hydroxide/magnesium hydroxide/simethicone Suspension 30 milliLiter(s) Oral every 6 hours PRN      simvastatin 20 milliGRAM(s) Oral at bedtime    multivitamin 1 Tablet(s) Oral daily  folic acid 1 milliGRAM(s) Oral daily  ascorbic acid 250 milliGRAM(s) Oral daily  dextrose 5%. 1000 milliLiter(s) IV Continuous <Continuous>      latanoprost 0.005% Ophthalmic Solution 1 Drop(s) Both EYES at bedtime    sodium chloride 0.9% lock flush 3 milliLiter(s) IV Push every 8 hours  lactobacillus acidophilus 1 Tablet(s) Oral two times a day with meals          Adult Advanced Hemodynamics Last 24 Hrs  CVP(mm Hg): --  CVP(cm H2O): --  CO: --  CI: --  PA: --  PA(mean): --  PCWP: --  SVR: --  SVRI: --  PVR: --  PVRI: --      ICU Vital Signs Last 24 Hrs  T(C): 36.4 (2017 11:00), Max: 36.7 (2017 23:00)  T(F): 97.5 (2017 11:00), Max: 98 (2017 23:00)  HR: 69 (2017 15:00) (61 - 85)  BP: 125/59 (2017 15:00) (94/48 - 151/72)  BP(mean): 85 (2017 15:00) (69 - 111)  ABP: --  ABP(mean): --  RR: 35 (2017 15:00) (13 - 55)  SpO2: 89% (2017 15:00) (89% - 99%)    CAPILLARY BLOOD GLUCOSE                LABS:  CBC Full  -  ( 2017 04:02 )  WBC Count : 7.0 K/uL  Hemoglobin : 8.5 g/dL  Hematocrit : 29.1 %  Platelet Count - Automated : 170 K/uL  Mean Cell Volume : 74.2 fl  Mean Cell Hemoglobin : 21.7 pg  Mean Cell Hemoglobin Concentration : 29.2 g/dL  Auto Neutrophil # : x  Auto Lymphocyte # : x  Auto Monocyte # : x  Auto Eosinophil # : x  Auto Basophil # : x  Auto Neutrophil % : x  Auto Lymphocyte % : x  Auto Monocyte % : x  Auto Eosinophil % : x  Auto Basophil % : x    07-04    159<H>  |  101  |  47.0<H>  ----------------------------<  82  3.7   |  >43.0<HH>  |  1.60<H>    Ca    9.7      2017 05:57  Phos  3.5     -  Mg     2.6     -    TPro  6.9  /  Alb  3.9  /  TBili  0.6  /  DBili  x   /  AST  28  /  ALT  24  /  AlkPhos  122<H>                     CULTURES:  C Diff by PCR Result: Robertotejj ( @ 12:00)      Physical Examination:    General: No acute distress.  Alert, Follows simple instructions    HEENT: Atraumatic, membranes dry, Pupils equal, reactive to light.  Symmetric. obese    PULM: reduced at bases, bibasliar crackles;     CVS: Regular rate and rhythm, 4/6 olegario rubs; S1/S2. + JVD/HJR    ABD: Soft, nondistended, nontender, normoactive bowel sounds, no masses    EXT: 2+ dependent pitting  Distal pulses 2+ equal bilaterally    SKIN: Warm and well perfused, no rashes noted.    Neuro: nonfocal, moves all extremities.     RADIOLOGY:     < from: Xray Chest 1 View AP/PA. (17 @ 05:41) >  NTERPRETATION:  CHEST AP PORTABLE:    History: evaluate for effusion.     Date and time of exam: 2017 5:09 AM.    Technique: A single AP view of the chest was obtained.    Comparison exam: 2017 5:02 AM.    Findings:  Persistent cardiomegaly and pulmonary vascular congestion. Persistent   bilateral pleural effusions. No evidence of pneumothorax..    Impression:  Evidence of congestive heart failure. No change since the prior day..    < end of copied text >      CRITICAL CARE TIME SPENT: 65 minutes

## 2017-07-04 NOTE — PROGRESS NOTE ADULT - SUBJECTIVE AND OBJECTIVE BOX
PULMONARY PROGRESS NOTE      ANN FRANCESLEE [id number]    Patient is a 84y old  Female who presents with a chief complaint of Abdominal pain, insomnia, and increased shortness of breath (10 Murphy 2017 16:40)      INTERVAL HPI/OVERNIGHT EVENTS:  Transferred to ICU  Remains on BIPAP / with effective  and VE 6lpm  No cough or sputum  no visible pain    MEDICATIONS  (STANDING):  sodium chloride 0.9% lock flush 3 milliLiter(s) IV Push every 8 hours  latanoprost 0.005% Ophthalmic Solution 1 Drop(s) Both EYES at bedtime  pantoprazole    Tablet 40 milliGRAM(s) Oral before breakfast  multivitamin 1 Tablet(s) Oral daily  metoprolol succinate ER 50 milliGRAM(s) Oral daily  ALBUTerol/ipratropium for Nebulization 3 milliLiter(s) Nebulizer every 6 hours  buDESOnide   0.5 milliGRAM(s) Respule 0.5 milliGRAM(s) Inhalation two times a day  clopidogrel Tablet 75 milliGRAM(s) Oral daily  folic acid 1 milliGRAM(s) Oral daily  ascorbic acid 250 milliGRAM(s) Oral daily  lactobacillus acidophilus 1 Tablet(s) Oral two times a day with meals  psyllium Powder 1 Packet(s) Oral two times a day  simvastatin 20 milliGRAM(s) Oral at bedtime  torsemide 20 milliGRAM(s) Oral two times a day  lisinopril 5 milliGRAM(s) Oral daily      MEDICATIONS  (PRN):  loperamide 2 milliGRAM(s) Oral every 4 hours PRN Diarrhea  aluminum hydroxide/magnesium hydroxide/simethicone Suspension 30 milliLiter(s) Oral every 6 hours PRN Dyspepsia  hydrALAZINE Injectable 10 milliGRAM(s) IV Push every 3 hours PRN sbp > 150      Allergies    aspirin (Anaphylaxis)  NSAIDs (Other)    Intolerances    Lasix (Other)  [facility] PT (Unknown)      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   r/o  out  stenosis  Cardiomyopathy  Osteoarthritis  Iron deficiency anemia  MI, old: 2008  Dyslipidemia  Acid reflux  Hx of CAB  CAD (coronary artery disease)  Heart failure  HTN (hypertension)  H/O tracheostomy: 2013 may  Cataract: b/l  2013  S/P CABG x 3:   [place]      SOCIAL HISTORY  Smoking History:       REVIEW OF SYSTEMS:    CONSTITUTIONAL:  No distress    unable to obtain    Vital Signs Last 24 Hrs  T(C): 36.1 (2017 03:00), Max: 36.7 (2017 23:00)  T(F): 96.9 (2017 03:00), Max: 98 (2017 23:00)  HR: 73 (2017 08:29) (61 - 97)  BP: 132/61 (2017 08:00) (94/48 - 168/81)  BP(mean): 88 (2017 08:00) (69 - 117)  RR: 24 (2017 08:00) (12 - 55)  SpO2: 97% (2017 08:32) (78% - 97%)            T(C): 36.1 (2017 03:00), Max: 36.7 (2017 23:00)  HR: 73 (2017 08:29) (61 - 97)  BP: 132/61 (2017 08:00) (94/48 - 168/81)  RR: 24 (2017 08:00) (12 - 55)  SpO2: 97% (2017 08:32) (78% - 97%)  Wt(kg): --    PHYSICAL EXAMINATION:    GENERAL: The patient is awake and alert in no apparent distress.     HEENT: Head is normocephalic and atraumatic. Extraocular muscles are intact. Mucous membranes are moist.    NECK: Supple.    LUNGS: Clear to auscultation without wheezing, rales or rhonchi; respirations unlabored    HEART: Regular rate and rhythm without murmur.    ABDOMEN: Soft, nontender, and nondistended.      EXTREMITIES: Without any cyanosis, clubbing, rash, lesions or edema.    NEUROLOGIC: Grossly intact.    LABS:                        8.5    7.0   )-----------( 170      ( 2017 04:02 )             29.1     07-04    159<H>  |  101  |  47.0<H>  ----------------------------<  82  3.7   |  >43.0<HH>  |  1.60<H>    Ca    9.7      2017 05:57  Phos  3.5     -  Mg     2.6     -    TPro  6.9  /  Alb  3.9  /  TBili  0.6  /  DBili  x   /  AST  28  /  ALT  24  /  AlkPhos  122<H>          ABG - ( 2017 03:59 )  pH: 7.45  /  pCO2: 67    /  pO2: 70    / HCO3: 43    / Base Excess: 19.0  /  SaO2: 97                              MICROBIOLOGY:    RADIOLOGY & ADDITIONAL STUDIES:

## 2017-07-05 ENCOUNTER — APPOINTMENT (OUTPATIENT)
Dept: CARDIOTHORACIC SURGERY | Facility: CLINIC | Age: 82
End: 2017-07-05

## 2017-07-05 DIAGNOSIS — I38 ENDOCARDITIS, VALVE UNSPECIFIED: ICD-10-CM

## 2017-07-05 DIAGNOSIS — R52 PAIN, UNSPECIFIED: ICD-10-CM

## 2017-07-05 DIAGNOSIS — I50.9 HEART FAILURE, UNSPECIFIED: ICD-10-CM

## 2017-07-05 LAB
ANION GAP SERPL CALC-SCNC: 10 MMOL/L — SIGNIFICANT CHANGE UP (ref 5–17)
ANION GAP SERPL CALC-SCNC: 11 MMOL/L — SIGNIFICANT CHANGE UP (ref 5–17)
BUN SERPL-MCNC: 40 MG/DL — HIGH (ref 8–20)
BUN SERPL-MCNC: 43 MG/DL — HIGH (ref 8–20)
CALCIUM SERPL-MCNC: 9.1 MG/DL — SIGNIFICANT CHANGE UP (ref 8.6–10.2)
CALCIUM SERPL-MCNC: 9.6 MG/DL — SIGNIFICANT CHANGE UP (ref 8.6–10.2)
CHLORIDE SERPL-SCNC: 101 MMOL/L — SIGNIFICANT CHANGE UP (ref 98–107)
CHLORIDE SERPL-SCNC: 98 MMOL/L — SIGNIFICANT CHANGE UP (ref 98–107)
CO2 SERPL-SCNC: 36 MMOL/L — HIGH (ref 22–29)
CO2 SERPL-SCNC: 43 MMOL/L — HIGH (ref 22–29)
CREAT SERPL-MCNC: 1.57 MG/DL — HIGH (ref 0.5–1.3)
CREAT SERPL-MCNC: 1.57 MG/DL — HIGH (ref 0.5–1.3)
GLUCOSE SERPL-MCNC: 147 MG/DL — HIGH (ref 70–115)
GLUCOSE SERPL-MCNC: 97 MG/DL — SIGNIFICANT CHANGE UP (ref 70–115)
HCT VFR BLD CALC: 29.1 % — LOW (ref 37–47)
HGB BLD-MCNC: 8 G/DL — LOW (ref 12–16)
MAGNESIUM SERPL-MCNC: 2.7 MG/DL — HIGH (ref 1.6–2.6)
MCHC RBC-ENTMCNC: 21.2 PG — LOW (ref 27–31)
MCHC RBC-ENTMCNC: 27.5 G/DL — LOW (ref 32–36)
MCV RBC AUTO: 77 FL — LOW (ref 81–99)
PHOSPHATE SERPL-MCNC: 3.3 MG/DL — SIGNIFICANT CHANGE UP (ref 2.4–4.7)
PLATELET # BLD AUTO: 149 K/UL — LOW (ref 150–400)
POTASSIUM SERPL-MCNC: 2.8 MMOL/L — CRITICAL LOW (ref 3.5–5.3)
POTASSIUM SERPL-MCNC: 3.4 MMOL/L — LOW (ref 3.5–5.3)
POTASSIUM SERPL-SCNC: 2.8 MMOL/L — CRITICAL LOW (ref 3.5–5.3)
POTASSIUM SERPL-SCNC: 3.4 MMOL/L — LOW (ref 3.5–5.3)
RBC # BLD: 3.78 M/UL — LOW (ref 4.4–5.2)
RBC # FLD: 26.6 % — HIGH (ref 11–15.6)
SODIUM SERPL-SCNC: 145 MMOL/L — SIGNIFICANT CHANGE UP (ref 135–145)
SODIUM SERPL-SCNC: 154 MMOL/L — HIGH (ref 135–145)
WBC # BLD: 4.7 K/UL — LOW (ref 4.8–10.8)
WBC # FLD AUTO: 4.7 K/UL — LOW (ref 4.8–10.8)

## 2017-07-05 PROCEDURE — 99291 CRITICAL CARE FIRST HOUR: CPT

## 2017-07-05 PROCEDURE — 99233 SBSQ HOSP IP/OBS HIGH 50: CPT

## 2017-07-05 RX ORDER — ACETAZOLAMIDE 250 MG/1
500 TABLET ORAL EVERY 12 HOURS
Qty: 0 | Refills: 0 | Status: COMPLETED | OUTPATIENT
Start: 2017-07-05 | End: 2017-07-06

## 2017-07-05 RX ORDER — POTASSIUM CHLORIDE 20 MEQ
10 PACKET (EA) ORAL
Qty: 0 | Refills: 0 | Status: COMPLETED | OUTPATIENT
Start: 2017-07-05 | End: 2017-07-05

## 2017-07-05 RX ORDER — POTASSIUM CHLORIDE 20 MEQ
20 PACKET (EA) ORAL ONCE
Qty: 0 | Refills: 0 | Status: COMPLETED | OUTPATIENT
Start: 2017-07-05 | End: 2017-07-05

## 2017-07-05 RX ORDER — IPRATROPIUM/ALBUTEROL SULFATE 18-103MCG
3 AEROSOL WITH ADAPTER (GRAM) INHALATION EVERY 6 HOURS
Qty: 0 | Refills: 0 | Status: DISCONTINUED | OUTPATIENT
Start: 2017-07-05 | End: 2017-07-20

## 2017-07-05 RX ORDER — ACETAMINOPHEN 500 MG
1000 TABLET ORAL ONCE
Qty: 0 | Refills: 0 | Status: COMPLETED | OUTPATIENT
Start: 2017-07-05 | End: 2017-07-05

## 2017-07-05 RX ORDER — LIDOCAINE 4 G/100G
2 CREAM TOPICAL DAILY
Qty: 0 | Refills: 0 | Status: DISCONTINUED | OUTPATIENT
Start: 2017-07-05 | End: 2017-07-05

## 2017-07-05 RX ADMIN — Medication 0.5 MILLIGRAM(S): at 08:22

## 2017-07-05 RX ADMIN — Medication 3 MILLILITER(S): at 03:46

## 2017-07-05 RX ADMIN — Medication 100 MILLIEQUIVALENT(S): at 08:52

## 2017-07-05 RX ADMIN — CLOPIDOGREL BISULFATE 75 MILLIGRAM(S): 75 TABLET, FILM COATED ORAL at 14:23

## 2017-07-05 RX ADMIN — Medication 1 MILLIGRAM(S): at 14:23

## 2017-07-05 RX ADMIN — Medication 100 MILLIEQUIVALENT(S): at 20:41

## 2017-07-05 RX ADMIN — Medication 100 MILLIEQUIVALENT(S): at 09:40

## 2017-07-05 RX ADMIN — Medication 100 MILLIEQUIVALENT(S): at 17:54

## 2017-07-05 RX ADMIN — Medication 20 MILLIGRAM(S): at 06:39

## 2017-07-05 RX ADMIN — SODIUM CHLORIDE 30 MILLILITER(S): 9 INJECTION, SOLUTION INTRAVENOUS at 22:16

## 2017-07-05 RX ADMIN — Medication 250 MILLIGRAM(S): at 14:23

## 2017-07-05 RX ADMIN — Medication 0.5 MILLIGRAM(S): at 19:58

## 2017-07-05 RX ADMIN — Medication 1 TABLET(S): at 07:54

## 2017-07-05 RX ADMIN — Medication 100 MILLIEQUIVALENT(S): at 10:43

## 2017-07-05 RX ADMIN — LISINOPRIL 5 MILLIGRAM(S): 2.5 TABLET ORAL at 06:39

## 2017-07-05 RX ADMIN — Medication 20 MILLIGRAM(S): at 17:48

## 2017-07-05 RX ADMIN — SODIUM CHLORIDE 3 MILLILITER(S): 9 INJECTION INTRAMUSCULAR; INTRAVENOUS; SUBCUTANEOUS at 06:25

## 2017-07-05 RX ADMIN — Medication 3 MILLILITER(S): at 08:22

## 2017-07-05 RX ADMIN — ACETAZOLAMIDE 110 MILLIGRAM(S): 250 TABLET ORAL at 22:14

## 2017-07-05 RX ADMIN — PANTOPRAZOLE SODIUM 40 MILLIGRAM(S): 20 TABLET, DELAYED RELEASE ORAL at 06:39

## 2017-07-05 RX ADMIN — Medication 1 TABLET(S): at 17:48

## 2017-07-05 RX ADMIN — SIMVASTATIN 20 MILLIGRAM(S): 20 TABLET, FILM COATED ORAL at 22:13

## 2017-07-05 RX ADMIN — Medication 1000 MILLIGRAM(S): at 10:10

## 2017-07-05 RX ADMIN — SODIUM CHLORIDE 3 MILLILITER(S): 9 INJECTION INTRAMUSCULAR; INTRAVENOUS; SUBCUTANEOUS at 14:24

## 2017-07-05 RX ADMIN — ACETAZOLAMIDE 110 MILLIGRAM(S): 250 TABLET ORAL at 10:43

## 2017-07-05 RX ADMIN — SODIUM CHLORIDE 3 MILLILITER(S): 9 INJECTION INTRAMUSCULAR; INTRAVENOUS; SUBCUTANEOUS at 22:15

## 2017-07-05 RX ADMIN — LATANOPROST 1 DROP(S): 0.05 SOLUTION/ DROPS OPHTHALMIC; TOPICAL at 22:13

## 2017-07-05 RX ADMIN — Medication 1 TABLET(S): at 14:23

## 2017-07-05 RX ADMIN — Medication 400 MILLIGRAM(S): at 09:40

## 2017-07-05 NOTE — CONSULT NOTE ADULT - NSHPATTENDINGPLANDISCUSS_GEN_ALL_CORE
Patient, daughter, CTICU team, all questions answered,
CTS TEAM AND SURGERY
ct icu team and family
Dr. Garcia

## 2017-07-05 NOTE — CONSULT NOTE ADULT - PROBLEM SELECTOR RECOMMENDATION 5
-lengthy conversation with daughter Mini. She understands overall guarded prognosis. She has had numerous conversations with mom, as well as her siblings (brother and sister in [place]), and mom would not want to proceed with a tracheostomy. She is hoping that the ICU can optimize some things, start giving her some nutrition, and help patient get off the BiPAP. She stated that mom did not have oxygen at home before admission, but she was being set up for oxygen, and a trilogy machine. Her overall goals -lengthy conversation with daughter Mini. She understands overall guarded prognosis. She has had numerous conversations with mom, as well as her siblings (brother and sister in [place]), and mom would not want to proceed with a tracheostomy. She is hoping that the ICU can optimize some things, start giving her some nutrition, and help patient get off the BiPAP. She stated that mom did not have oxygen at home before admission, but she was being set up for oxygen, and a trilogy machine. Her overall goals are to try to optimize what we can to see if mom can get off the bipap and go home. Mom would not want to be in a nursing home or facility, she would want to be home. I did discuss home hospice with daughter, but that we would have to get special approval for trilogy machine and I am not sure if we would get approval. I also discussed with daughter the potential that she may not wean off BiPAP and at that time we would have to have a more difficult conversation of elective removal of bipap and seeing how she does; with the understand that she may become sleepy and lethargic and we would not be replacing the bipap. At that time, I would recommend her sister and brother to come from [place] to say their goodbyes to mom before removing the bipap. She understands overall guarded prognosis. We did not talk specific details about " DNR/ DNI" but we did talk about the fact that if we are not proceeding with a tracheostomy, that we should not be entertaining the idea of intubating her. Daughter is struggling with putting these decisions on paper because she is the only child here, and she is the only child in the healthcare field, she does not want to disappoint her siblings. The other aspect of this case is their strong jimena. They as a family feel it would be a lapse in their jimena if they were to accept or discuss that mom may be dying. I spoke about this as a matter of how mom will live the rest of her live, not how she will die. Will readdress code status later

## 2017-07-05 NOTE — PROGRESS NOTE ADULT - SUBJECTIVE AND OBJECTIVE BOX
Patient seen and examined  Remains on bipap  SOB at rest    I&O's Summary    04 Jul 2017 07:01  -  04 Jul 2017 23:31  --------------------------------------------------------  IN: 460 mL / OUT: 0 mL / NET: 460 mL        REVIEW OF SYSTEMS:    CONSTITUTIONAL: No F/C  RESPIRATORY: No cough+ SOB  CARDIOVASCULAR: No CP/palpitations,    GASTROINTESTINAL: No abdominal pain , NVD   GENITOURINARY: No UTI sx  NEUROLOGICAL: No headaches/wk/numbness  MUSCULOSKELETAL:  No joint pain/swelling; No LBP  EXTREMITIES : + swelling,    Vital Signs Last 24 Hrs  T(C): 36.8 (04 Jul 2017 20:00), Max: 36.8 (04 Jul 2017 20:00)  T(F): 98.3 (04 Jul 2017 20:00), Max: 98.3 (04 Jul 2017 20:00)  HR: 61 (04 Jul 2017 23:00) (61 - 75)  BP: 131/59 (04 Jul 2017 23:00) (94/48 - 145/72)  BP(mean): 85 (04 Jul 2017 23:00) (54 - 111)  RR: 18 (04 Jul 2017 23:00) (13 - 55)  SpO2: 92% (04 Jul 2017 23:00) (89% - 100%)    PHYSICAL EXAM:    GENERAL: NAD,   EYES:  conjunctiva and sclera clear  NECK: Supple, Veins full upright  NERVOUS SYSTEM:  A/O x3,   CHEST: poor air movement bilaterally  HEART:  sternal scar, no rub  ABDOMEN: Soft, NT/ND BS+  EXTREMITIES:  compression devices  both lower legs  SKIN: No rashes   neg    LABS:                        8.5    7.0   )-----------( 170      ( 03 Jul 2017 04:02 )             29.1     07-04    159<H>  |  101  |  47.0<H>  ----------------------------<  82  3.7   |  >43.0<HH>  |  1.60<H>    Ca    9.7      04 Jul 2017 05:57  Phos  3.5     07-03  Mg     2.6     07-03    TPro  6.9  /  Alb  3.9  /  TBili  0.6  /  DBili  x   /  AST  28  /  ALT  24  /  AlkPhos  122<H>  07-03      MEDICATIONS  (STANDING):  sodium chloride 0.9% lock flush  latanoprost 0.005% Ophthalmic Solution  pantoprazole    Tablet  multivitamin  metoprolol succinate ER  ALBUTerol/ipratropium for Nebulization  buDESOnide   0.5 milliGRAM(s) Respule  clopidogrel Tablet  folic acid  ascorbic acid  lactobacillus acidophilus  psyllium Powder  loperamide PRN  simvastatin  aluminum hydroxide/magnesium hydroxide/simethicone Suspension PRN  hydrALAZINE Injectable PRN  torsemide  lisinopril  dextrose 5%.

## 2017-07-05 NOTE — PROGRESS NOTE ADULT - PROBLEM SELECTOR PLAN 1
- NIV dependent  - giving acetazolamide and replacing potassium aggressively.   - has tolerated some time off of NIV;

## 2017-07-05 NOTE — PROGRESS NOTE ADULT - ASSESSMENT
Hypercapnic resp failure   Multifactorial including MS, obesity, chest wall restriction, pulm Htn,VDH  now sig metabolic alkalosis, K replacement in progress  wean bipap as tolerated  not DNR  family now considering trach and peg with ventilator support  prognosis poor  unclear benefit of nebs

## 2017-07-05 NOTE — CONSULT NOTE ADULT - SUBJECTIVE AND OBJECTIVE BOX
HPI: 84F with PMH as listed admitted 6/10 with LLQ pain.     Recent MV clipping  with Dr. Owen.     PERTINENT PMH REVIEWED: Yes     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   r/o  out  stenosis  Cardiomyopathy  Osteoarthritis  Iron deficiency anemia  MI, old: 2008  Dyslipidemia  Acid reflux  Hx of CAB  CAD (coronary artery disease)  Heart failure  HTN (hypertension)  H/O tracheostomy: 2013 may  Cataract: b/l  2013  S/P CABG x 3:   [place]    SOCIAL HISTORY:                                     Admitted from:  home      Surrogate - daughter Mini - RN in ED     FAMILY HISTORY:  Family history of hypertension    Baseline ADLs (prior to admission):  Independent    Allergies    aspirin (Anaphylaxis)  NSAIDs (Other)    Intolerances    Lasix (Other)  [facility]HS PT (Unknown)    Present Symptoms:     Dyspnea: 2-3  Nausea/Vomiting: No  Anxiety:  No  Depression: Yes   Fatigue: Yes   Loss of appetite: No    Pain: yes            Character- constant            Duration- hours            Effect-             Factors- nothing specific            Frequency- often            Location- abdomen            Severity- 8/10    Review of Systems: Reviewed                      Positive: abdominal pain   All others negative    MEDICATIONS  (STANDING):  sodium chloride 0.9% lock flush 3 milliLiter(s) IV Push every 8 hours  latanoprost 0.005% Ophthalmic Solution 1 Drop(s) Both EYES at bedtime  pantoprazole    Tablet 40 milliGRAM(s) Oral before breakfast  multivitamin 1 Tablet(s) Oral daily  metoprolol succinate ER 50 milliGRAM(s) Oral daily  buDESOnide   0.5 milliGRAM(s) Respule 0.5 milliGRAM(s) Inhalation two times a day  clopidogrel Tablet 75 milliGRAM(s) Oral daily  folic acid 1 milliGRAM(s) Oral daily  ascorbic acid 250 milliGRAM(s) Oral daily  lactobacillus acidophilus 1 Tablet(s) Oral two times a day with meals  psyllium Powder 1 Packet(s) Oral two times a day  simvastatin 20 milliGRAM(s) Oral at bedtime  torsemide 20 milliGRAM(s) Oral two times a day  lisinopril 5 milliGRAM(s) Oral daily  dextrose 5%. 1000 milliLiter(s) (30 mL/Hr) IV Continuous <Continuous>  lidocaine   Patch 2 Patch Transdermal daily  acetazolamide  IVPB 500 milliGRAM(s) IV Intermittent every 12 hours  potassium chloride  10 mEq/100 mL IVPB 10 milliEquivalent(s) IV Intermittent every 1 hour  potassium chloride   Powder 20 milliEquivalent(s) Oral once    MEDICATIONS  (PRN):  loperamide 2 milliGRAM(s) Oral every 4 hours PRN Diarrhea  aluminum hydroxide/magnesium hydroxide/simethicone Suspension 30 milliLiter(s) Oral every 6 hours PRN Dyspepsia  hydrALAZINE Injectable 10 milliGRAM(s) IV Push every 3 hours PRN sbp > 150  ALBUTerol/ipratropium for Nebulization 3 milliLiter(s) Nebulizer every 6 hours PRN Shortness of Breath and/or Wheezing    PHYSICAL EXAM:    Vital Signs Last 24 Hrs  T(C): 36.5 (2017 11:40), Max: 36.8 (2017 20:00)  T(F): 97.7 (2017 11:40), Max: 98.3 (2017 20:00)  HR: 60 (2017 10:00) (57 - 75)  BP: 127/65 (2017 10:00) (106/55 - 148/63)  BP(mean): 83 (2017 10:00) (54 - 101)  RR: 19 (2017 10:00) (10 - 40)  SpO2: 76% (2017 10:00) (76% - 100%)    General: alert      Karnofsky:  30%    HEENT: normal     Lungs: comfortable     CV: normal      GI: normal      : normal  incontinent     MSK: weakness     Skin: no rash    LABS:                      8.0    4.7   )-----------( 149      ( 2017 05:55 )             29.1     07-05    154<H>  |  101  |  43.0<H>  ----------------------------<  97  2.8<LL>   |  43.0<H>  |  1.57<H>    Ca    9.6      2017 05:55  Phos  3.3     07-05  Mg     2.7     07-05    I&O's Summary    2017 07:01  -  2017 07:00  --------------------------------------------------------  IN: 670 mL / OUT: 0 mL / NET: 670 mL    2017 07:01  -  2017 11:52  --------------------------------------------------------  IN: 490 mL / OUT: 0 mL / NET: 490 mL    RADIOLOGY & ADDITIONAL STUDIES:    ADVANCE DIRECTIVES: Full code HPI: 84F with PMH as listed admitted 6/10 with LLQ pain. acute decompensated CHF, persistent hypercapnic respiratory failure BiPAP dependent.     Recent MV clipping  with Dr. Owen.     PERTINENT PMH REVIEWED: Yes     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   r/o  out  stenosis  Cardiomyopathy  Osteoarthritis  Iron deficiency anemia  MI, old: 2008  Dyslipidemia  Acid reflux  Hx of CAB  CAD (coronary artery disease)  Heart failure  HTN (hypertension)  H/O tracheostomy: 2013 may  Cataract: b/l  2013  S/P CABG x 3:   [place]    SOCIAL HISTORY:  no EtOH                                    Admitted from:  home      Surrogate - daughter Mini - RN in ED     FAMILY HISTORY:  Family history of hypertension    Baseline ADLs (prior to admission):  Independent    Allergies:   aspirin (Anaphylaxis)  NSAIDs (Other)    Intolerances:  Lasix (Other)  [facility]HS PT (Unknown)    Present Symptoms:     Dyspnea: 2-3  Nausea/Vomiting: No  Anxiety:  No  Depression: Yes   Fatigue: Yes   Loss of appetite: No    Pain: yes            Character- constant            Duration- hours            Effect-             Factors- nothing specific            Frequency- often            Location- abdomen            Severity- 8/10    Review of Systems: Reviewed                      Positive: abdominal pain   All others negative    MEDICATIONS  (STANDING):  sodium chloride 0.9% lock flush 3 milliLiter(s) IV Push every 8 hours  latanoprost 0.005% Ophthalmic Solution 1 Drop(s) Both EYES at bedtime  pantoprazole    Tablet 40 milliGRAM(s) Oral before breakfast  multivitamin 1 Tablet(s) Oral daily  metoprolol succinate ER 50 milliGRAM(s) Oral daily  buDESOnide   0.5 milliGRAM(s) Respule 0.5 milliGRAM(s) Inhalation two times a day  clopidogrel Tablet 75 milliGRAM(s) Oral daily  folic acid 1 milliGRAM(s) Oral daily  ascorbic acid 250 milliGRAM(s) Oral daily  lactobacillus acidophilus 1 Tablet(s) Oral two times a day with meals  psyllium Powder 1 Packet(s) Oral two times a day  simvastatin 20 milliGRAM(s) Oral at bedtime  torsemide 20 milliGRAM(s) Oral two times a day  lisinopril 5 milliGRAM(s) Oral daily  dextrose 5%. 1000 milliLiter(s) (30 mL/Hr) IV Continuous <Continuous>  lidocaine   Patch 2 Patch Transdermal daily  acetazolamide  IVPB 500 milliGRAM(s) IV Intermittent every 12 hours  potassium chloride  10 mEq/100 mL IVPB 10 milliEquivalent(s) IV Intermittent every 1 hour  potassium chloride   Powder 20 milliEquivalent(s) Oral once    MEDICATIONS  (PRN):  loperamide 2 milliGRAM(s) Oral every 4 hours PRN Diarrhea  aluminum hydroxide/magnesium hydroxide/simethicone Suspension 30 milliLiter(s) Oral every 6 hours PRN Dyspepsia  hydrALAZINE Injectable 10 milliGRAM(s) IV Push every 3 hours PRN sbp > 150  ALBUTerol/ipratropium for Nebulization 3 milliLiter(s) Nebulizer every 6 hours PRN Shortness of Breath and/or Wheezing    PHYSICAL EXAM:    Vital Signs Last 24 Hrs  T(C): 36.5 (2017 11:40), Max: 36.8 (2017 20:00)  T(F): 97.7 (2017 11:40), Max: 98.3 (2017 20:00)  HR: 60 (2017 10:00) (57 - 75)  BP: 127/65 (2017 10:00) (106/55 - 148/63)  BP(mean): 83 (2017 10:00) (54 - 101)  RR: 19 (2017 10:00) (10 - 40)  SpO2: 76% (2017 10:00) (76% - 100%)    General: alert      Karnofsky:  30%    HEENT: normal     Lungs: comfortable     CV: normal      GI: normal      : normal  incontinent     MSK: weakness     Skin: no rash    LABS:                      8.0    4.7   )-----------( 149      ( 2017 05:55 )             29.1     07-05    154<H>  |  101  |  43.0<H>  ----------------------------<  97  2.8<LL>   |  43.0<H>  |  1.57<H>    Ca    9.6      2017 05:55  Phos  3.3     07-05  Mg     2.7     07-05    I&O's Summary    2017 07:00  --------------------------------------------------------  IN: 670 mL / OUT: 0 mL / NET: 670 mL     11:52  --------------------------------------------------------  IN: 490 mL / OUT: 0 mL / NET: 490 mL    RADIOLOGY & ADDITIONAL STUDIES: Reviewed.     TTE:   1. Mildly decreased global left ventricular systolic function.   2. Left ventricular ejection fraction, by visual estimation, is 45 to 50%.   3. Mild to moderate right ventricular systolic dysfunction.   4. Severely enlarged left atrium.   5. Moderately dilated right atrium.   6. Patient is s/p Mitral Clip with severe regurgitation.   7. Moderate-severe tricuspid regurgitation.   8. Mild aortic regurgitation.   9. Estimated pulmonary artery systolic pressure is 49.7 mmHg assuming a right atrial pressure of 8 mmHg, which is consistent with mild pulmonary hypertension.  10. There is no evidence of pericardial effusion.    ADVANCE DIRECTIVES: Full code

## 2017-07-05 NOTE — CONSULT NOTE ADULT - PROBLEM SELECTOR RECOMMENDATION 4
- patient with kidney dysfunction, hypercapnia, lethargy, not many good options for pain, once NG tube is in place, would try Tylenol via NG tube Q 6 hours around the clock for 24 hours and see how that goes. If not beneficial, could try some oxycodone via NG tube.

## 2017-07-05 NOTE — PROGRESS NOTE ADULT - SUBJECTIVE AND OBJECTIVE BOX
PULMONARY PROGRESS NOTE      ANN FRANCESLEE [id number]    Patient is a 84y old  Female who presents with a chief complaint of Abdominal pain, insomnia, and increased shortness of breath (10 Murphy 2017 16:40)      INTERVAL HPI/OVERNIGHT EVENTS:  on bipap continuously  MEDICATIONS  (STANDING):  sodium chloride 0.9% lock flush 3 milliLiter(s) IV Push every 8 hours  latanoprost 0.005% Ophthalmic Solution 1 Drop(s) Both EYES at bedtime  pantoprazole    Tablet 40 milliGRAM(s) Oral before breakfast  multivitamin 1 Tablet(s) Oral daily  metoprolol succinate ER 50 milliGRAM(s) Oral daily  ALBUTerol/ipratropium for Nebulization 3 milliLiter(s) Nebulizer every 6 hours  buDESOnide   0.5 milliGRAM(s) Respule 0.5 milliGRAM(s) Inhalation two times a day  clopidogrel Tablet 75 milliGRAM(s) Oral daily  folic acid 1 milliGRAM(s) Oral daily  ascorbic acid 250 milliGRAM(s) Oral daily  lactobacillus acidophilus 1 Tablet(s) Oral two times a day with meals  psyllium Powder 1 Packet(s) Oral two times a day  simvastatin 20 milliGRAM(s) Oral at bedtime  torsemide 20 milliGRAM(s) Oral two times a day  lisinopril 5 milliGRAM(s) Oral daily  dextrose 5%. 1000 milliLiter(s) (30 mL/Hr) IV Continuous <Continuous>  acetaminophen  IVPB. 1000 milliGRAM(s) IV Intermittent once  acetazolamide  IVPB 500 milliGRAM(s) IV Intermittent every 12 hours  potassium chloride  10 mEq/100 mL IVPB 10 milliEquivalent(s) IV Intermittent every 1 hour  potassium chloride  10 mEq/100 mL IVPB 10 milliEquivalent(s) IV Intermittent every 1 hour  potassium chloride   Powder 20 milliEquivalent(s) Oral once      MEDICATIONS  (PRN):  loperamide 2 milliGRAM(s) Oral every 4 hours PRN Diarrhea  aluminum hydroxide/magnesium hydroxide/simethicone Suspension 30 milliLiter(s) Oral every 6 hours PRN Dyspepsia  hydrALAZINE Injectable 10 milliGRAM(s) IV Push every 3 hours PRN sbp > 150      Allergies    aspirin (Anaphylaxis)  NSAIDs (Other)    Intolerances    Lasix (Other)  [facility]HS PT (Unknown)      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   r/o  out  stenosis  Cardiomyopathy  Osteoarthritis  Iron deficiency anemia  MI, old: 2008  Dyslipidemia  Acid reflux  Hx of CAB  CAD (coronary artery disease)  Heart failure  HTN (hypertension)  H/O tracheostomy: 2013 may  Cataract: b/l  2013  S/P CABG x 3:   [place]      SOCIAL HISTORY  Smoking History:       REVIEW OF SYSTEMS:    remains on continuous bipap  Vital Signs Last 24 Hrs  T(C): 36.4 (2017 08:00), Max: 36.8 (2017 20:00)  T(F): 97.5 (2017 08:00), Max: 98.3 (2017 20:00)  HR: 59 (2017 08:23) (57 - 75)  BP: 115/57 (2017 08:00) (106/55 - 148/63)  BP(mean): 81 (2017 08:00) (54 - 101)  RR: 23 (2017 08:00) (10 - 40)  SpO2: 93% (2017 08:23) (77% - 100%)    PHYSICAL EXAMINATION:    GENERAL: The patient is awake and alert in no apparent distress.     HEENT: Head is normocephalic and atraumatic. Extraocular muscles are intact. Mucous membranes are moist.    NECK: Supple.    LUNGS: moderate air entry bilat without wheezing, rales or rhonchi; respirations unlabored    HEART: Regular rate and rhythm without murmur.    ABDOMEN: Soft, nontender, and nondistended.      EXTREMITIES: Without any cyanosis, clubbing, rash, lesions or edema.    NEUROLOGIC: Grossly intact.    LABS:                        8.0    4.7   )-----------( 149      ( 2017 05:55 )             29.1     07-05    154<H>  |  101  |  43.0<H>  ----------------------------<  97  2.8<LL>   |  43.0<H>  |  1.57<H>    Ca    9.6      2017 05:55  Phos  3.3     07-05  Mg     2.7     07-05          ABG - ( 2017 19:47 )  pH: 7.41  /  pCO2: 72    /  pO2: 28    / HCO3: 42    / Base Excess: 18.9  /  SaO2: 49                              MICROBIOLOGY:    RADIOLOGY & ADDITIONAL STUDIES:  CXR reviewed

## 2017-07-05 NOTE — CONSULT NOTE ADULT - PROBLEM SELECTOR RECOMMENDATION 9
-very guarded overall prognosis. ICU in process of trying to optimize what we can; unfortunately, little else we can do to reverse this issue. Lengthy conversation with daughter Mini, mom does not want tracheostomy (she has previously had trach and does not want to go through that procedure again). Given that mom does not want a tracheostomy, it would not be medically therapeutic to place her on a ventilator.

## 2017-07-05 NOTE — CONSULT NOTE ADULT - ASSESSMENT
84F with severe valvular heart disease despite attempted MV clipping, with persistent hypercapnic respiratory failure due to decompensated CHF, valvular disease, obesity hypoventilation, pulmonary hypertension, bipap dependent, acute kidney dysfunction.

## 2017-07-05 NOTE — CONSULT NOTE ADULT - PROBLEM SELECTOR RECOMMENDATION 3
-on maximal medical therapy. Unfortunately, aggressive treating her CHF, has resulted in other complications so it is a delicate balance of aggressively treating without harming other organs (kidney, alkalosis).

## 2017-07-05 NOTE — PROGRESS NOTE ADULT - SUBJECTIVE AND OBJECTIVE BOX
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 since that time from admission she has suffered from persistent hypercapnic respiratory failure with inability to wean from NIV now with a compensated respiratory acidosis and metabolic alkalosis and contraction alkalosis. Tranferred to the MICU service 7/3 for management of her NIV and metabolic disturbances. document written by Flakito Garcia DO     Events last 24 hours: none acute remains on NIV.     Review of systems:   no chest pain or sob, admits to some abdominal discomfort, otherwise no nausea. other ros negative.       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   r/o  out  stenosis  Cardiomyopathy  Osteoarthritis  Iron deficiency anemia  MI, old: 2008  Dyslipidemia  Acid reflux  Hx of CAB  CAD (coronary artery disease)  Heart failure  HTN (hypertension)  H/O tracheostomy: 2013 may  Cataract: b/l  2013  S/P CABG x 3:   [place]        MEDICATIONS  (STANDING):  sodium chloride 0.9% lock flush 3 milliLiter(s) IV Push every 8 hours  latanoprost 0.005% Ophthalmic Solution 1 Drop(s) Both EYES at bedtime  pantoprazole    Tablet 40 milliGRAM(s) Oral before breakfast  multivitamin 1 Tablet(s) Oral daily  metoprolol succinate ER 50 milliGRAM(s) Oral daily  buDESOnide   0.5 milliGRAM(s) Respule 0.5 milliGRAM(s) Inhalation two times a day  clopidogrel Tablet 75 milliGRAM(s) Oral daily  folic acid 1 milliGRAM(s) Oral daily  ascorbic acid 250 milliGRAM(s) Oral daily  lactobacillus acidophilus 1 Tablet(s) Oral two times a day with meals  psyllium Powder 1 Packet(s) Oral two times a day  simvastatin 20 milliGRAM(s) Oral at bedtime  torsemide 20 milliGRAM(s) Oral two times a day  lisinopril 5 milliGRAM(s) Oral daily  dextrose 5%. 1000 milliLiter(s) (30 mL/Hr) IV Continuous <Continuous>  lidocaine   Patch 2 Patch Transdermal daily  acetazolamide  IVPB 500 milliGRAM(s) IV Intermittent every 12 hours  potassium chloride  10 mEq/100 mL IVPB 10 milliEquivalent(s) IV Intermittent every 1 hour  potassium chloride   Powder 20 milliEquivalent(s) Oral once    MEDICATIONS  (PRN):  loperamide 2 milliGRAM(s) Oral every 4 hours PRN Diarrhea  aluminum hydroxide/magnesium hydroxide/simethicone Suspension 30 milliLiter(s) Oral every 6 hours PRN Dyspepsia  hydrALAZINE Injectable 10 milliGRAM(s) IV Push every 3 hours PRN sbp > 150  ALBUTerol/ipratropium for Nebulization 3 milliLiter(s) Nebulizer every 6 hours PRN Shortness of Breath and/or Wheezing    ICU Vital Signs Last 24 Hrs  T(C): 36.5 (2017 11:40), Max: 36.8 (2017 20:00)  T(F): 97.7 (2017 11:40), Max: 98.3 (2017 20:00)  HR: 65 (2017 12:06) (57 - 75)  BP: 138/65 (2017 12:00) (106/55 - 148/63)  BP(mean): 94 (2017 12:00) (54 - 99)  ABP: --  ABP(mean): --  RR: 13 (2017 12:00) (10 - 40)  SpO2: 97% (2017 12:06) (76% - 100%)          LABS:                        8.0    4.7   )-----------( 149      ( 2017 05:55 )             29.1     07-05    154<H>  |  101  |  43.0<H>  ----------------------------<  97  2.8<LL>   |  43.0<H>  |  1.57<H>    Ca    9.6      2017 05:55  Phos  3.3     07-05  Mg     2.7     07-05                 CULTURES:  C Diff by PCR Result: Susannah ( @ 12:00)      Physical Examination:    General: No acute distress.  Alert, Follows simple instructions    HEENT: Atraumatic, membranes dry, Pupils equal, reactive to light.  Symmetric. obese    PULM: reduced at bases, bibasliar crackles;     CVS: Regular rate and rhythm, 4/6 olegario rubs; S1/S2. + JVD/HJR    ABD: Soft, nondistended, nontender, normoactive bowel sounds, no masses    EXT: 2+ dependent pitting  Distal pulses 2+ equal bilaterally    SKIN: Warm and well perfused, no rashes noted.      RADIOLOGY:       CRITICAL CARE TIME SPENT:60 minutes

## 2017-07-06 LAB
ANION GAP SERPL CALC-SCNC: 9 MMOL/L — SIGNIFICANT CHANGE UP (ref 5–17)
BUN SERPL-MCNC: 40 MG/DL — HIGH (ref 8–20)
CALCIUM SERPL-MCNC: 8.9 MG/DL — SIGNIFICANT CHANGE UP (ref 8.6–10.2)
CHLORIDE SERPL-SCNC: 100 MMOL/L — SIGNIFICANT CHANGE UP (ref 98–107)
CO2 SERPL-SCNC: 39 MMOL/L — HIGH (ref 22–29)
CREAT SERPL-MCNC: 1.53 MG/DL — HIGH (ref 0.5–1.3)
GLUCOSE SERPL-MCNC: 107 MG/DL — SIGNIFICANT CHANGE UP (ref 70–115)
HCT VFR BLD CALC: 28.6 % — LOW (ref 37–47)
HGB BLD-MCNC: 8.3 G/DL — LOW (ref 12–16)
MAGNESIUM SERPL-MCNC: 2.5 MG/DL — SIGNIFICANT CHANGE UP (ref 1.6–2.6)
MCHC RBC-ENTMCNC: 21.6 PG — LOW (ref 27–31)
MCHC RBC-ENTMCNC: 29 G/DL — LOW (ref 32–36)
MCV RBC AUTO: 74.3 FL — LOW (ref 81–99)
PHOSPHATE SERPL-MCNC: 2.5 MG/DL — SIGNIFICANT CHANGE UP (ref 2.4–4.7)
PLATELET # BLD AUTO: 145 K/UL — LOW (ref 150–400)
POTASSIUM SERPL-MCNC: 3 MMOL/L — LOW (ref 3.5–5.3)
POTASSIUM SERPL-SCNC: 3 MMOL/L — LOW (ref 3.5–5.3)
RBC # BLD: 3.85 M/UL — LOW (ref 4.4–5.2)
RBC # FLD: 26.1 % — HIGH (ref 11–15.6)
SODIUM SERPL-SCNC: 148 MMOL/L — HIGH (ref 135–145)
WBC # BLD: 5 K/UL — SIGNIFICANT CHANGE UP (ref 4.8–10.8)
WBC # FLD AUTO: 5 K/UL — SIGNIFICANT CHANGE UP (ref 4.8–10.8)

## 2017-07-06 PROCEDURE — 99233 SBSQ HOSP IP/OBS HIGH 50: CPT

## 2017-07-06 RX ORDER — POTASSIUM CHLORIDE 20 MEQ
40 PACKET (EA) ORAL EVERY 4 HOURS
Qty: 0 | Refills: 0 | Status: COMPLETED | OUTPATIENT
Start: 2017-07-06 | End: 2017-07-06

## 2017-07-06 RX ADMIN — LISINOPRIL 5 MILLIGRAM(S): 2.5 TABLET ORAL at 06:16

## 2017-07-06 RX ADMIN — PANTOPRAZOLE SODIUM 40 MILLIGRAM(S): 20 TABLET, DELAYED RELEASE ORAL at 06:15

## 2017-07-06 RX ADMIN — CLOPIDOGREL BISULFATE 75 MILLIGRAM(S): 75 TABLET, FILM COATED ORAL at 18:10

## 2017-07-06 RX ADMIN — SIMVASTATIN 20 MILLIGRAM(S): 20 TABLET, FILM COATED ORAL at 23:03

## 2017-07-06 RX ADMIN — SODIUM CHLORIDE 3 MILLILITER(S): 9 INJECTION INTRAMUSCULAR; INTRAVENOUS; SUBCUTANEOUS at 18:11

## 2017-07-06 RX ADMIN — Medication 1 TABLET(S): at 18:16

## 2017-07-06 RX ADMIN — Medication 3 MILLILITER(S): at 20:16

## 2017-07-06 RX ADMIN — Medication 0.5 MILLIGRAM(S): at 20:16

## 2017-07-06 RX ADMIN — Medication 1 PACKET(S): at 06:15

## 2017-07-06 RX ADMIN — Medication 40 MILLIEQUIVALENT(S): at 19:20

## 2017-07-06 RX ADMIN — Medication 50 MILLIGRAM(S): at 06:16

## 2017-07-06 RX ADMIN — ACETAZOLAMIDE 110 MILLIGRAM(S): 250 TABLET ORAL at 23:02

## 2017-07-06 RX ADMIN — SODIUM CHLORIDE 3 MILLILITER(S): 9 INJECTION INTRAMUSCULAR; INTRAVENOUS; SUBCUTANEOUS at 21:49

## 2017-07-06 RX ADMIN — Medication 1 TABLET(S): at 18:10

## 2017-07-06 RX ADMIN — ACETAZOLAMIDE 110 MILLIGRAM(S): 250 TABLET ORAL at 18:10

## 2017-07-06 RX ADMIN — Medication 250 MILLIGRAM(S): at 18:10

## 2017-07-06 RX ADMIN — Medication 40 MILLIEQUIVALENT(S): at 19:21

## 2017-07-06 RX ADMIN — SODIUM CHLORIDE 3 MILLILITER(S): 9 INJECTION INTRAMUSCULAR; INTRAVENOUS; SUBCUTANEOUS at 06:16

## 2017-07-06 RX ADMIN — Medication 1 MILLIGRAM(S): at 18:10

## 2017-07-06 RX ADMIN — Medication 0.5 MILLIGRAM(S): at 09:17

## 2017-07-06 RX ADMIN — LATANOPROST 1 DROP(S): 0.05 SOLUTION/ DROPS OPHTHALMIC; TOPICAL at 23:02

## 2017-07-06 RX ADMIN — Medication 20 MILLIGRAM(S): at 06:15

## 2017-07-06 RX ADMIN — Medication 20 MILLIGRAM(S): at 18:16

## 2017-07-06 NOTE — DOWNTIME INTERRUPTION NOTE - WHICH MANUAL FORMS INITIATED?
down time forms
[facility] down for shift., down time forms used  orders, flowsheet, EMAR, I&O, see chart for flowsheet

## 2017-07-06 NOTE — PROGRESS NOTE ADULT - ASSESSMENT
Hypercapnic resp failure   Multifactorial including MS, obesity, chest wall restriction, pulm Htn,  now sig metabolic alkalosis, K replacement in progress, would hold on further diamox, use prn  off bipap currently, continue nocturnal and prn  long term prognosis extremely guarded  not DNR  unclear any benefit of nebs

## 2017-07-06 NOTE — PHYSICAL THERAPY INITIAL EVALUATION ADULT - ASR EQUIP NEEDS DISCH PT EVAL
3:1 commode/rolling walker (5 inch wheels)/shower chair
shower chair/3:1 commode/rolling walker (5 inch wheels)

## 2017-07-07 DIAGNOSIS — R06.89 OTHER ABNORMALITIES OF BREATHING: ICD-10-CM

## 2017-07-07 DIAGNOSIS — J96.90 RESPIRATORY FAILURE, UNSPECIFIED, UNSPECIFIED WHETHER WITH HYPOXIA OR HYPERCAPNIA: ICD-10-CM

## 2017-07-07 DIAGNOSIS — I48.91 UNSPECIFIED ATRIAL FIBRILLATION: ICD-10-CM

## 2017-07-07 DIAGNOSIS — R09.02 HYPOXEMIA: ICD-10-CM

## 2017-07-07 DIAGNOSIS — E66.9 OBESITY, UNSPECIFIED: ICD-10-CM

## 2017-07-07 DIAGNOSIS — N28.9 DISORDER OF KIDNEY AND URETER, UNSPECIFIED: ICD-10-CM

## 2017-07-07 LAB
ALBUMIN SERPL ELPH-MCNC: 3.4 G/DL — SIGNIFICANT CHANGE UP (ref 3.3–5.2)
ALP SERPL-CCNC: 119 U/L — SIGNIFICANT CHANGE UP (ref 40–120)
ALT FLD-CCNC: 21 U/L — SIGNIFICANT CHANGE UP
ANION GAP SERPL CALC-SCNC: 9 MMOL/L — SIGNIFICANT CHANGE UP (ref 5–17)
AST SERPL-CCNC: 20 U/L — SIGNIFICANT CHANGE UP
BASE EXCESS BLDA CALC-SCNC: 9.8 MMOL/L — HIGH (ref -3–3)
BILIRUB SERPL-MCNC: 0.4 MG/DL — SIGNIFICANT CHANGE UP (ref 0.4–2)
BLOOD GAS COMMENTS ARTERIAL: SIGNIFICANT CHANGE UP
BUN SERPL-MCNC: 38 MG/DL — HIGH (ref 8–20)
CALCIUM SERPL-MCNC: 9 MG/DL — SIGNIFICANT CHANGE UP (ref 8.6–10.2)
CHLORIDE SERPL-SCNC: 101 MMOL/L — SIGNIFICANT CHANGE UP (ref 98–107)
CO2 SERPL-SCNC: 37 MMOL/L — HIGH (ref 22–29)
CREAT SERPL-MCNC: 1.43 MG/DL — HIGH (ref 0.5–1.3)
GAS PNL BLDA: SIGNIFICANT CHANGE UP
GLUCOSE SERPL-MCNC: 94 MG/DL — SIGNIFICANT CHANGE UP (ref 70–115)
HCO3 BLDA-SCNC: 33 MMOL/L — HIGH (ref 20–26)
HCT VFR BLD CALC: 27.7 % — LOW (ref 37–47)
HGB BLD-MCNC: 8.2 G/DL — LOW (ref 12–16)
HOROWITZ INDEX BLDA+IHG-RTO: SIGNIFICANT CHANGE UP
MCHC RBC-ENTMCNC: 22 PG — LOW (ref 27–31)
MCHC RBC-ENTMCNC: 29.6 G/DL — LOW (ref 32–36)
MCV RBC AUTO: 74.3 FL — LOW (ref 81–99)
PCO2 BLDA: 56 MMHG — HIGH (ref 35–45)
PH BLDA: 7.42 — SIGNIFICANT CHANGE UP (ref 7.35–7.45)
PLATELET # BLD AUTO: 152 K/UL — SIGNIFICANT CHANGE UP (ref 150–400)
PO2 BLDA: 130 MMHG — HIGH (ref 83–108)
POTASSIUM SERPL-MCNC: 3.4 MMOL/L — LOW (ref 3.5–5.3)
POTASSIUM SERPL-SCNC: 3.4 MMOL/L — LOW (ref 3.5–5.3)
PROT SERPL-MCNC: 6.3 G/DL — LOW (ref 6.6–8.7)
RBC # BLD: 3.73 M/UL — LOW (ref 4.4–5.2)
RBC # FLD: 26 % — HIGH (ref 11–15.6)
SAO2 % BLDA: 94 % — LOW (ref 95–99)
SODIUM SERPL-SCNC: 147 MMOL/L — HIGH (ref 135–145)
WBC # BLD: 3.8 K/UL — LOW (ref 4.8–10.8)
WBC # FLD AUTO: 3.8 K/UL — LOW (ref 4.8–10.8)

## 2017-07-07 PROCEDURE — 99233 SBSQ HOSP IP/OBS HIGH 50: CPT

## 2017-07-07 PROCEDURE — 71010: CPT | Mod: 26

## 2017-07-07 RX ORDER — ACETAZOLAMIDE 250 MG/1
250 TABLET ORAL ONCE
Qty: 0 | Refills: 0 | Status: COMPLETED | OUTPATIENT
Start: 2017-07-07 | End: 2017-07-07

## 2017-07-07 RX ORDER — POTASSIUM CHLORIDE 20 MEQ
10 PACKET (EA) ORAL
Qty: 0 | Refills: 0 | Status: DISCONTINUED | OUTPATIENT
Start: 2017-07-07 | End: 2017-07-07

## 2017-07-07 RX ORDER — POTASSIUM CHLORIDE 20 MEQ
40 PACKET (EA) ORAL ONCE
Qty: 0 | Refills: 0 | Status: COMPLETED | OUTPATIENT
Start: 2017-07-07 | End: 2017-07-07

## 2017-07-07 RX ORDER — ACETAZOLAMIDE 250 MG/1
500 TABLET ORAL EVERY 8 HOURS
Qty: 0 | Refills: 0 | Status: COMPLETED | OUTPATIENT
Start: 2017-07-07 | End: 2017-07-08

## 2017-07-07 RX ADMIN — Medication 0.5 MILLIGRAM(S): at 20:10

## 2017-07-07 RX ADMIN — Medication 1 MILLIGRAM(S): at 11:19

## 2017-07-07 RX ADMIN — SODIUM CHLORIDE 3 MILLILITER(S): 9 INJECTION INTRAMUSCULAR; INTRAVENOUS; SUBCUTANEOUS at 05:36

## 2017-07-07 RX ADMIN — Medication 1 TABLET(S): at 17:13

## 2017-07-07 RX ADMIN — ACETAZOLAMIDE 105 MILLIGRAM(S): 250 TABLET ORAL at 11:23

## 2017-07-07 RX ADMIN — Medication 20 MILLIGRAM(S): at 17:13

## 2017-07-07 RX ADMIN — ACETAZOLAMIDE 110 MILLIGRAM(S): 250 TABLET ORAL at 18:24

## 2017-07-07 RX ADMIN — Medication 40 MILLIEQUIVALENT(S): at 10:12

## 2017-07-07 RX ADMIN — Medication 20 MILLIGRAM(S): at 11:19

## 2017-07-07 RX ADMIN — SIMVASTATIN 20 MILLIGRAM(S): 20 TABLET, FILM COATED ORAL at 23:19

## 2017-07-07 RX ADMIN — Medication 20 MILLIGRAM(S): at 05:35

## 2017-07-07 RX ADMIN — SODIUM CHLORIDE 3 MILLILITER(S): 9 INJECTION INTRAMUSCULAR; INTRAVENOUS; SUBCUTANEOUS at 23:18

## 2017-07-07 RX ADMIN — SODIUM CHLORIDE 3 MILLILITER(S): 9 INJECTION INTRAMUSCULAR; INTRAVENOUS; SUBCUTANEOUS at 14:27

## 2017-07-07 RX ADMIN — Medication 100 MILLIEQUIVALENT(S): at 08:35

## 2017-07-07 RX ADMIN — Medication 50 MILLIGRAM(S): at 05:35

## 2017-07-07 RX ADMIN — LISINOPRIL 5 MILLIGRAM(S): 2.5 TABLET ORAL at 05:35

## 2017-07-07 RX ADMIN — Medication 250 MILLIGRAM(S): at 11:20

## 2017-07-07 RX ADMIN — Medication 1 TABLET(S): at 07:20

## 2017-07-07 RX ADMIN — LATANOPROST 1 DROP(S): 0.05 SOLUTION/ DROPS OPHTHALMIC; TOPICAL at 23:19

## 2017-07-07 RX ADMIN — Medication 0.5 MILLIGRAM(S): at 08:35

## 2017-07-07 RX ADMIN — PANTOPRAZOLE SODIUM 40 MILLIGRAM(S): 20 TABLET, DELAYED RELEASE ORAL at 05:35

## 2017-07-07 RX ADMIN — CLOPIDOGREL BISULFATE 75 MILLIGRAM(S): 75 TABLET, FILM COATED ORAL at 11:20

## 2017-07-07 RX ADMIN — Medication 1 TABLET(S): at 11:20

## 2017-07-07 NOTE — PROGRESS NOTE ADULT - ASSESSMENT
84 yof with failed MV clip, MR, acute on chronic hypecarbic respiratory failure, rachel, encephalopathy

## 2017-07-07 NOTE — PROGRESS NOTE ADULT - ASSESSMENT
CKD-Pre renal azotemia/ Significant COPD/ Hypercarbia   Recent MV Clip   No hydro on renal imaging   on Torsemide   Will have to accept some degree of azotemia   treat exacerbated alkalosis as needed   AM labs

## 2017-07-07 NOTE — PROGRESS NOTE ADULT - SUBJECTIVE AND OBJECTIVE BOX
NEPHROLOGY INTERVAL HPI/OVERNIGHT EVENTS:    Examined earlier  more agitated today  on BiPaP    MEDICATIONS  (STANDING):  sodium chloride 0.9% lock flush 3 milliLiter(s) IV Push every 8 hours  latanoprost 0.005% Ophthalmic Solution 1 Drop(s) Both EYES at bedtime  pantoprazole    Tablet 40 milliGRAM(s) Oral before breakfast  multivitamin 1 Tablet(s) Oral daily  metoprolol succinate ER 50 milliGRAM(s) Oral daily  buDESOnide   0.5 milliGRAM(s) Respule 0.5 milliGRAM(s) Inhalation two times a day  clopidogrel Tablet 75 milliGRAM(s) Oral daily  folic acid 1 milliGRAM(s) Oral daily  ascorbic acid 250 milliGRAM(s) Oral daily  lactobacillus acidophilus 1 Tablet(s) Oral two times a day with meals  psyllium Powder 1 Packet(s) Oral two times a day  simvastatin 20 milliGRAM(s) Oral at bedtime  torsemide 20 milliGRAM(s) Oral two times a day  lisinopril 5 milliGRAM(s) Oral daily    MEDICATIONS  (PRN):  loperamide 2 milliGRAM(s) Oral every 4 hours PRN Diarrhea  aluminum hydroxide/magnesium hydroxide/simethicone Suspension 30 milliLiter(s) Oral every 6 hours PRN Dyspepsia  hydrALAZINE Injectable 10 milliGRAM(s) IV Push every 3 hours PRN sbp > 150  ALBUTerol/ipratropium for Nebulization 3 milliLiter(s) Nebulizer every 6 hours PRN Shortness of Breath and/or Wheezing      Allergies    aspirin (Anaphylaxis)  NSAIDs (Other)    Intolerances    Lasix (Other)  [facility] PT (Unknown)      Vital Signs Last 24 Hrs  T(C): 36.4 (2017 07:00), Max: 36.8 (2017 18:00)  T(F): 97.5 (2017 07:00), Max: 98.3 (2017 22:00)  HR: 62 (2017 11:00) (59 - 77)  BP: 112/57 (2017 11:00) (82/41 - 143/90)  BP(mean): 81 (2017 11:00) (57 - 109)  RR: 14 (2017 11:00) (13 - 37)  SpO2: 100% (2017 11:00) (83% - 100%)  Daily     Daily Weight in k (2017 04:00)    PHYSICAL EXAM:  GENERAL: NAD,   EYES:  conjunctiva and sclera clear  NECK: Supple, Veins full upright  NERVOUS SYSTEM:  A/O x3,   CHEST: poor air movement bilaterally  HEART:  sternal scar, no rub  ABDOMEN: Soft, NT/ND BS+  EXTREMITIES:  compression devices  both lower legs      LABS:                        8.2    3.8   )-----------( 152      ( 2017 05:25 )             27.7     07-    147<H>  |  101  |  38.0<H>  ----------------------------<  94  3.4<L>   |  37.0<H>  |  1.43<H>    Ca    9.0      2017 05:25  Phos  2.5     07-06  Mg     2.5     07-06    TPro  6.3<L>  /  Alb  3.4  /  TBili  0.4  /  DBili  x   /  AST  20  /  ALT  21  /  AlkPhos  119  07-07                RADIOLOGY & ADDITIONAL TESTS:

## 2017-07-07 NOTE — CHART NOTE - NSCHARTNOTEFT_GEN_A_CORE
Patient seen and evaluated bedside;   Chart reviewed with surgeon;   Patient to be medically managed at this time and peripherally followed.

## 2017-07-07 NOTE — PROGRESS NOTE ADULT - SUBJECTIVE AND OBJECTIVE BOX
PULMONARY PROGRESS NOTE      ANN FRANCES  MRN-[id number]    Patient is a 84y old  Female who presents with a chief complaint of Abdominal pain, insomnia, and increased shortness of breath (10 Murphy 2017 16:40)      INTERVAL HPI/OVERNIGHT EVENTS:    Patient is awake and alert  Comfortable in chair  Off of BiPAP   Had episode of desaturation and somnolence likely related to hypercarbia this AM; now better    MEDICATIONS  (STANDING):  sodium chloride 0.9% lock flush 3 milliLiter(s) IV Push every 8 hours  latanoprost 0.005% Ophthalmic Solution 1 Drop(s) Both EYES at bedtime  pantoprazole    Tablet 40 milliGRAM(s) Oral before breakfast  multivitamin 1 Tablet(s) Oral daily  metoprolol succinate ER 50 milliGRAM(s) Oral daily  buDESOnide   0.5 milliGRAM(s) Respule 0.5 milliGRAM(s) Inhalation two times a day  clopidogrel Tablet 75 milliGRAM(s) Oral daily  folic acid 1 milliGRAM(s) Oral daily  ascorbic acid 250 milliGRAM(s) Oral daily  lactobacillus acidophilus 1 Tablet(s) Oral two times a day with meals  psyllium Powder 1 Packet(s) Oral two times a day  simvastatin 20 milliGRAM(s) Oral at bedtime  torsemide 20 milliGRAM(s) Oral two times a day  lisinopril 5 milliGRAM(s) Oral daily  acetazolamide  IVPB 500 milliGRAM(s) IV Intermittent every 8 hours      MEDICATIONS  (PRN):  loperamide 2 milliGRAM(s) Oral every 4 hours PRN Diarrhea  aluminum hydroxide/magnesium hydroxide/simethicone Suspension 30 milliLiter(s) Oral every 6 hours PRN Dyspepsia  hydrALAZINE Injectable 10 milliGRAM(s) IV Push every 3 hours PRN sbp > 150  ALBUTerol/ipratropium for Nebulization 3 milliLiter(s) Nebulizer every 6 hours PRN Shortness of Breath and/or Wheezing      Allergies    aspirin (Anaphylaxis)  NSAIDs (Other)    Intolerances    Lasix (Other)  [facility] PT (Unknown)      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   r/o  out  stenosis  Cardiomyopathy  Osteoarthritis  Iron deficiency anemia  MI, old: 2008  Dyslipidemia  Acid reflux  Hx of CAB  CAD (coronary artery disease)  Heart failure  HTN (hypertension)  H/O tracheostomy: 2013 may  Cataract: b/l  2013  S/P CABG x 3:   [place]        REVIEW OF SYSTEMS:    CONSTITUTIONAL:  No distress    HEENT:  Eyes:  No diplopia or blurred vision. ENT:  No earache, sore throat or runny nose.    CARDIOVASCULAR:  No pressure, squeezing, tightness, heaviness or aching about the chest; no palpitations.    RESPIRATORY:  Improved cough, shortness of breath, no PND or orthopnea. Mild SOBOE    GASTROINTESTINAL:  No nausea, vomiting or diarrhea.    GENITOURINARY:  No dysuria, frequency or urgency.    NEUROLOGIC:  No paresthesias, fasciculations, seizures or weakness.    PSYCHIATRIC:  No disorder of thought or mood.    Vital Signs Last 24 Hrs  T(C): 36.3 (2017 16:00), Max: 36.8 (2017 18:00)  T(F): 97.4 (2017 16:00), Max: 98.3 (2017 22:00)  HR: 75 (2017 16:00) (59 - 77)  BP: 109/53 (2017 16:00) (82/41 - 143/90)  BP(mean): 76 (2017 16:00) (57 - 109)  RR: 22 (2017 16:00) (13 - 47)  SpO2: 98% (2017 16:00) (83% - 100%)    PHYSICAL EXAMINATION:    GENERAL: The patient is awake and alert in no apparent distress.     HEENT: Head is normocephalic and atraumatic. Extraocular muscles are intact. Mucous membranes are moist.    NECK: Supple.    LUNGS: Clear to auscultation without wheezing, rales or rhonchi; respirations unlabored    HEART: Regular rate and rhythm without murmur.    ABDOMEN: Soft, nontender, and nondistended.      EXTREMITIES: Without any cyanosis, clubbing, rash, lesions or edema.    NEUROLOGIC: Grossly intact.    LABS:                        8.2    3.8   )-----------( 152      ( 2017 05:25 )             27.7     07-07    147<H>  |  101  |  38.0<H>  ----------------------------<  94  3.4<L>   |  37.0<H>  |  1.43<H>    Ca    9.0      2017 05:25  Phos  2.5     -  Mg     2.5     -    TPro  6.3<L>  /  Alb  3.4  /  TBili  0.4  /  DBili  x   /  AST  20  /  ALT  21  /  AlkPhos  119          ABG - ( 2017 11:46 )  pH: 7.42  /  pCO2: 56    /  pO2: 130   / HCO3: 33    / Base Excess: 9.8   /  SaO2: 94            RADIOLOGY & ADDITIONAL STUDIES:     EXAM:  CHEST SINGLE VIEW FRONTAL                          PROCEDURE DATE:  2017          INTERPRETATION:  Portable chest radiograph        CLINICAL INFORMATION:   Hypoxia.    TECHNIQUE:  Portable  AP view of the chest was obtained.    COMPARISON: 7/3/2017 available for review.    FINDINGS:   The lungs  show ill-defined the cardiac borders are likely indicating   bilateral pleural effusions and/or infiltrates. Upper lobes clear. There   is mild vascular congestion.        There is gross cardiomegaly unchanged from prior examination. Status post   coronary artery bypass graft procedure. Visualized osseous structures are   intact.        IMPRESSION:   Gross cardiomegaly...   Persistent bibasilar infiltrates   and effusions.      INES MORTENSEN M.D., ATTENDING RADIOLOGIST  This document has been electronically signed. 2017  2:45PM      ECHO:    EXAM:  ECHO TRANSTHORACIC COMP W DOPP      PROCEDURE DATE:  2017   .      INTERPRETATION:  REPORT:    TRANSTHORACIC ECHOCARDIOGRAM REPORT           Patient Name:   ANN FRANCES Patient Location: Inpatient  Medical Rec #:  [id number]      Accession #:      [id number]  Account #:                      Height:           59.8 in 151.9 cm  YOB: 1933        Weight:           166.9 lb 75.71 kg  Patient Age:    84 years        BSA:              1.72 m²  Patient Gender: F    BP:               / mmHg        Date of Exam:        2017 9:04:34 AM  Sonographer:         USR  Referring Physician: Manas Smith MD     Procedure:     Follow up or Limited Echocardiogram.  Indications:   Cardiac tamponade - I31.4  Diagnosis:     Cardiac tamponade - I31.4  Study Details: Technically limited study. Study quality was adversely   affected                 due to active code stroke being called. Limited images.     SPECTRAL DOPPLER ANALYSIS (where applicable):  Tricuspid Valve and PA/RV Systolic Pressure: TR Max Velocity: 3.23 m/s RA   Pressure: 8 mmHg RVSP/PASP: 49.7 mmHg        PHYSICIAN INTERPRETATION:  Left Ventricle:  Global LV systolic function was mildly decreased. Left ventricular   ejection fraction, by visual estimation, is 45 to 50%.  Right Ventricle: The right ventricle was not well visualized. RV systolic   function is mildly reduced. Mild to moderate right ventricular systolic   dysfunction.  Left Atrium: Severely enlarged left atrium.  Right Atrium: The right atrium is moderately dilated.  Pericardium: There is no evidence of pericardial effusion. There is no   evidence of cardiac tamponade.  Mitral Valve: Moderate mitral valve regurgitation is seen. Patient is s/p   Mitral Clip with severe regurgitation.  Tricuspid Valve: Moderate-severe tricuspid regurgitation is visualized.   Estimated pulmonary artery systolic pressure is 49.7 mmHg assuming a   right atrial pressure of 8 mmHg, which is consistent with mild pulmonary   hypertension.  Aortic Valve: Mild aortic valve regurgitation is seen.        Summary:   1. Mildly decreased global left ventricular systolic function.   2. Left ventricular ejection fraction, by visual estimation, is 45 to   50%.   3. Mild to moderate right ventricular systolic dysfunction.   4. Severely enlarged left atrium.   5. Moderately dilated right atrium.   6. Patient is s/p Mitral Clip with severe regurgitation.   7. Moderate-severe tricuspid regurgitation.   8. Mild aortic regurgitation.   9. Estimated pulmonary artery systolic pressure is 49.7 mmHg assuming a   right atrial pressure of 8 mmHg, which is consistent with mild pulmonary   hypertension.  10. There is no evidence of pericardial effusion.     MD Gerardo Electronically signed on 2017 at 2:24:07 PM          *** Final ***        RAJ DÍAZ   This document has been electronically signed. 2017  9:04AM

## 2017-07-07 NOTE — PROGRESS NOTE ADULT - SUBJECTIVE AND OBJECTIVE BOX
Patient is a 84y old  Female who presents with a chief complaint of Abdominal pain, insomnia, and increased shortness of breath (10 Murphy 2017 16:40)      BRIEF HOSPITAL COURSE:  a/w decompensated combined systolic and diastolic CHF complicated s/p mitral valve clip ~2 weeks ago. since that time from admission she has suffered from persistent hypercapnic respiratory failure with inability to wean from NIV now with a compensated respiratory acidosis and metabolic alkalosis and contraction alkalosis. Tranferred to the MICU service 7/3 for management of her NIV and metabolic disturbances.    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   r/o  out  stenosis  Cardiomyopathy  Osteoarthritis  Iron deficiency anemia  MI, old: 2008  Dyslipidemia  Acid reflux  Hx of CAB  CAD (coronary artery disease)  Heart failure  HTN (hypertension)  H/O tracheostomy: 2013 may  Cataract: b/l  2013  S/P CABG x 3:   [place]      Medications:    metoprolol succinate ER 50 milliGRAM(s) Oral daily  hydrALAZINE Injectable 10 milliGRAM(s) IV Push every 3 hours PRN  torsemide 20 milliGRAM(s) Oral two times a day  lisinopril 5 milliGRAM(s) Oral daily  acetazolamide  IVPB 500 milliGRAM(s) IV Intermittent every 8 hours    buDESOnide   0.5 milliGRAM(s) Respule 0.5 milliGRAM(s) Inhalation two times a day  ALBUTerol/ipratropium for Nebulization 3 milliLiter(s) Nebulizer every 6 hours PRN        clopidogrel Tablet 75 milliGRAM(s) Oral daily    pantoprazole    Tablet 40 milliGRAM(s) Oral before breakfast  psyllium Powder 1 Packet(s) Oral two times a day  loperamide 2 milliGRAM(s) Oral every 4 hours PRN  aluminum hydroxide/magnesium hydroxide/simethicone Suspension 30 milliLiter(s) Oral every 6 hours PRN      simvastatin 20 milliGRAM(s) Oral at bedtime    multivitamin 1 Tablet(s) Oral daily  folic acid 1 milliGRAM(s) Oral daily  ascorbic acid 250 milliGRAM(s) Oral daily      latanoprost 0.005% Ophthalmic Solution 1 Drop(s) Both EYES at bedtime    sodium chloride 0.9% lock flush 3 milliLiter(s) IV Push every 8 hours  lactobacillus acidophilus 1 Tablet(s) Oral two times a day with meals          ICU Vital Signs Last 24 Hrs  T(C): 36.3 (2017 16:00), Max: 36.8 (2017 18:00)  T(F): 97.4 (2017 16:00), Max: 98.3 (2017 22:00)  HR: 75 (2017 16:00) (59 - 77)  BP: 109/53 (2017 16:00) (82/41 - 143/90)  BP(mean): 76 (2017 16:00) (57 - 109)  ABP: --  ABP(mean): --  RR: 22 (2017 16:00) (13 - 47)  SpO2: 98% (2017 16:00) (83% - 100%)      ABG - ( 2017 11:46 )  pH: 7.42  /  pCO2: 56    /  pO2: 130   / HCO3: 33    / Base Excess: 9.8   /  SaO2: 94                  I&O's Detail    2017 07:01  -  2017 07:00  --------------------------------------------------------  IN:    Oral Fluid: 350 mL    Solution: 50 mL  Total IN: 400 mL    OUT:  Total OUT: 0 mL    Total NET: 400 mL      2017 07:01  -  2017 17:16  --------------------------------------------------------  IN:  Total IN: 0 mL    OUT:    Voided: 300 mL  Total OUT: 300 mL    Total NET: -300 mL            LABS:                        8.2    3.8   )-----------( 152      ( 2017 05:25 )             27.7     07-07    147<H>  |  101  |  38.0<H>  ----------------------------<  94  3.4<L>   |  37.0<H>  |  1.43<H>    Ca    9.0      2017 05:25  Phos  2.5     07-  Mg     2.5     07-    TPro  6.3<L>  /  Alb  3.4  /  TBili  0.4  /  DBili  x   /  AST  20  /  ALT  21  /  AlkPhos  119  -          CAPILLARY BLOOD GLUCOSE            CULTURES:      Physical Examination:    General: No acute distress.  Alert, no complaints    HEENT: Pupils equal, reactive to light.  Symmetric.    PULM: Clear to auscultation bilaterally, no significant sputum production    CVS: Regular rate and rhythm, no murmurs, rubs, or gallops    ABD: Soft, nondistended, nontender, normoactive bowel sounds, no masses    EXT: No edema, nontender    SKIN: Warm and well perfused, no rashes noted.    RADIOLOGY: ***    CRITICAL CARE TIME SPENT: ***

## 2017-07-08 DIAGNOSIS — E87.3 ALKALOSIS: ICD-10-CM

## 2017-07-08 LAB
ANION GAP SERPL CALC-SCNC: 8 MMOL/L — SIGNIFICANT CHANGE UP (ref 5–17)
BUN SERPL-MCNC: 39 MG/DL — HIGH (ref 8–20)
CALCIUM SERPL-MCNC: 8.9 MG/DL — SIGNIFICANT CHANGE UP (ref 8.6–10.2)
CHLORIDE SERPL-SCNC: 101 MMOL/L — SIGNIFICANT CHANGE UP (ref 98–107)
CO2 SERPL-SCNC: 37 MMOL/L — HIGH (ref 22–29)
CREAT SERPL-MCNC: 1.42 MG/DL — HIGH (ref 0.5–1.3)
GLUCOSE SERPL-MCNC: 93 MG/DL — SIGNIFICANT CHANGE UP (ref 70–115)
HCT VFR BLD CALC: 28.4 % — LOW (ref 37–47)
HGB BLD-MCNC: 8.4 G/DL — LOW (ref 12–16)
MAGNESIUM SERPL-MCNC: 2.3 MG/DL — SIGNIFICANT CHANGE UP (ref 1.8–2.6)
MCHC RBC-ENTMCNC: 22.2 PG — LOW (ref 27–31)
MCHC RBC-ENTMCNC: 29.6 G/DL — LOW (ref 32–36)
MCV RBC AUTO: 74.9 FL — LOW (ref 81–99)
PHOSPHATE SERPL-MCNC: 1.8 MG/DL — LOW (ref 2.4–4.7)
PLATELET # BLD AUTO: 149 K/UL — LOW (ref 150–400)
POTASSIUM SERPL-MCNC: 3.5 MMOL/L — SIGNIFICANT CHANGE UP (ref 3.5–5.3)
POTASSIUM SERPL-SCNC: 3.5 MMOL/L — SIGNIFICANT CHANGE UP (ref 3.5–5.3)
RBC # BLD: 3.79 M/UL — LOW (ref 4.4–5.2)
RBC # FLD: 26.7 % — HIGH (ref 11–15.6)
SODIUM SERPL-SCNC: 146 MMOL/L — HIGH (ref 135–145)
WBC # BLD: 4.2 K/UL — LOW (ref 4.8–10.8)
WBC # FLD AUTO: 4.2 K/UL — LOW (ref 4.8–10.8)

## 2017-07-08 PROCEDURE — 99233 SBSQ HOSP IP/OBS HIGH 50: CPT

## 2017-07-08 RX ORDER — POTASSIUM PHOSPHATE, MONOBASIC POTASSIUM PHOSPHATE, DIBASIC 236; 224 MG/ML; MG/ML
15 INJECTION, SOLUTION INTRAVENOUS ONCE
Qty: 0 | Refills: 0 | Status: COMPLETED | OUTPATIENT
Start: 2017-07-08 | End: 2017-07-08

## 2017-07-08 RX ORDER — FERROUS SULFATE 325(65) MG
325 TABLET ORAL DAILY
Qty: 0 | Refills: 0 | Status: DISCONTINUED | OUTPATIENT
Start: 2017-07-08 | End: 2017-07-14

## 2017-07-08 RX ORDER — POTASSIUM CHLORIDE 20 MEQ
40 PACKET (EA) ORAL ONCE
Qty: 0 | Refills: 0 | Status: DISCONTINUED | OUTPATIENT
Start: 2017-07-08 | End: 2017-07-08

## 2017-07-08 RX ADMIN — SODIUM CHLORIDE 3 MILLILITER(S): 9 INJECTION INTRAMUSCULAR; INTRAVENOUS; SUBCUTANEOUS at 13:58

## 2017-07-08 RX ADMIN — Medication 0.5 MILLIGRAM(S): at 20:59

## 2017-07-08 RX ADMIN — Medication 1 TABLET(S): at 09:29

## 2017-07-08 RX ADMIN — LATANOPROST 1 DROP(S): 0.05 SOLUTION/ DROPS OPHTHALMIC; TOPICAL at 22:36

## 2017-07-08 RX ADMIN — Medication 20 MILLIGRAM(S): at 17:46

## 2017-07-08 RX ADMIN — CLOPIDOGREL BISULFATE 75 MILLIGRAM(S): 75 TABLET, FILM COATED ORAL at 12:03

## 2017-07-08 RX ADMIN — Medication 250 MILLIGRAM(S): at 12:03

## 2017-07-08 RX ADMIN — SIMVASTATIN 20 MILLIGRAM(S): 20 TABLET, FILM COATED ORAL at 22:36

## 2017-07-08 RX ADMIN — LISINOPRIL 5 MILLIGRAM(S): 2.5 TABLET ORAL at 05:29

## 2017-07-08 RX ADMIN — Medication 1 PACKET(S): at 17:46

## 2017-07-08 RX ADMIN — Medication 1 TABLET(S): at 17:46

## 2017-07-08 RX ADMIN — POTASSIUM PHOSPHATE, MONOBASIC POTASSIUM PHOSPHATE, DIBASIC 62.5 MILLIMOLE(S): 236; 224 INJECTION, SOLUTION INTRAVENOUS at 09:29

## 2017-07-08 RX ADMIN — Medication 50 MILLIGRAM(S): at 06:38

## 2017-07-08 RX ADMIN — Medication 1 TABLET(S): at 12:03

## 2017-07-08 RX ADMIN — SODIUM CHLORIDE 3 MILLILITER(S): 9 INJECTION INTRAMUSCULAR; INTRAVENOUS; SUBCUTANEOUS at 21:50

## 2017-07-08 RX ADMIN — Medication 20 MILLIGRAM(S): at 05:29

## 2017-07-08 RX ADMIN — Medication 0.5 MILLIGRAM(S): at 09:13

## 2017-07-08 RX ADMIN — PANTOPRAZOLE SODIUM 40 MILLIGRAM(S): 20 TABLET, DELAYED RELEASE ORAL at 05:29

## 2017-07-08 RX ADMIN — Medication 325 MILLIGRAM(S): at 12:03

## 2017-07-08 RX ADMIN — SODIUM CHLORIDE 3 MILLILITER(S): 9 INJECTION INTRAMUSCULAR; INTRAVENOUS; SUBCUTANEOUS at 05:30

## 2017-07-08 RX ADMIN — ACETAZOLAMIDE 110 MILLIGRAM(S): 250 TABLET ORAL at 04:03

## 2017-07-08 RX ADMIN — Medication 1 MILLIGRAM(S): at 12:03

## 2017-07-08 NOTE — PROGRESS NOTE ADULT - SUBJECTIVE AND OBJECTIVE BOX
PULMONARY PROGRESS NOTE      ANN FRANCES  MRN-[id number]    Patient is a 84y old  Female who presents with a chief complaint of Abdominal pain, insomnia, and increased shortness of breath (10 Murphy 2017 16:40)      INTERVAL HPI/OVERNIGHT EVENTS:    Patient awake and alert  Comfortable    MEDICATIONS  (STANDING):  sodium chloride 0.9% lock flush 3 milliLiter(s) IV Push every 8 hours  latanoprost 0.005% Ophthalmic Solution 1 Drop(s) Both EYES at bedtime  pantoprazole    Tablet 40 milliGRAM(s) Oral before breakfast  multivitamin 1 Tablet(s) Oral daily  metoprolol succinate ER 50 milliGRAM(s) Oral daily  buDESOnide   0.5 milliGRAM(s) Respule 0.5 milliGRAM(s) Inhalation two times a day  clopidogrel Tablet 75 milliGRAM(s) Oral daily  folic acid 1 milliGRAM(s) Oral daily  ascorbic acid 250 milliGRAM(s) Oral daily  lactobacillus acidophilus 1 Tablet(s) Oral two times a day with meals  psyllium Powder 1 Packet(s) Oral two times a day  simvastatin 20 milliGRAM(s) Oral at bedtime  torsemide 20 milliGRAM(s) Oral two times a day  lisinopril 5 milliGRAM(s) Oral daily  ferrous    sulfate 325 milliGRAM(s) Oral daily      MEDICATIONS  (PRN):  loperamide 2 milliGRAM(s) Oral every 4 hours PRN Diarrhea  aluminum hydroxide/magnesium hydroxide/simethicone Suspension 30 milliLiter(s) Oral every 6 hours PRN Dyspepsia  hydrALAZINE Injectable 10 milliGRAM(s) IV Push every 3 hours PRN sbp > 150  ALBUTerol/ipratropium for Nebulization 3 milliLiter(s) Nebulizer every 6 hours PRN Shortness of Breath and/or Wheezing      Allergies    aspirin (Anaphylaxis)  NSAIDs (Other)    Intolerances    Lasix (Other)  [facility] PT (Unknown)      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 ENT:  No earache, sore throat or runny nose.    CARDIOVASCULAR:  No pressure, squeezing, tightness, heaviness or aching about the chest; no palpitations.    RESPIRATORY:  Improved cough, shortness of breath, no PND or orthopnea. + SOBOE    GASTROINTESTINAL:  No nausea, vomiting or diarrhea.    GENITOURINARY:  No dysuria, frequency or urgency.    NEUROLOGIC:  No paresthesias, fasciculations, seizures or weakness.    PSYCHIATRIC:  No disorder of thought or mood.    Vital Signs Last 24 Hrs  T(C): 36.6 (2017 08:00), Max: 37.2 (2017 00:00)  T(F): 97.8 (2017 08:00), Max: 99 (2017 00:00)  HR: 66 (2017 10:00) (62 - 75)  BP: 146/52 (2017 10:00) (96/53 - 146/52)  BP(mean): 75 (2017 10:00) (69 - 90)  RR: 15 (2017 10:00) (14 - 47)  SpO2: 97% (2017 10:00) (84% - 100%)    PHYSICAL EXAMINATION:    GENERAL: The patient is awake and alert in no apparent distress.     HEENT: Head is normocephalic and atraumatic. Extraocular muscles are intact. Mucous membranes are moist.    NECK: Supple.    LUNGS: Decreased BS b/l otherwise CTA without wheezing, rales or rhonchi; respirations unlabored    HEART: Regular rate and rhythm without murmur.    ABDOMEN: Soft, nontender, and nondistended.      EXTREMITIES: Without any cyanosis, clubbing, rash, lesions or edema.    NEUROLOGIC: Grossly intact.    LABS:                        8.4    4.2   )-----------( 149      ( 2017 05:07 )             28.4     07-    146<H>  |  101  |  39.0<H>  ----------------------------<  93  3.5   |  37.0<H>  |  1.42<H>    Ca    8.9      2017 05:07  Phos  1.8     07-08  Mg     2.3     07-08    TPro  6.3<L>  /  Alb  3.4  /  TBili  0.4  /  DBili  x   /  AST  20  /  ALT  21  /  AlkPhos  119  07-        ABG - ( 2017 11:46 )  pH: 7.42  /  pCO2: 56    /  pO2: 130   / HCO3: 33    / Base Excess: 9.8   /  SaO2: 94          RADIOLOGY & ADDITIONAL STUDIES:    EXAM:  CHEST SINGLE VIEW FRONTAL                          PROCEDURE DATE:  2017          INTERPRETATION:  Portable chest radiograph        CLINICAL INFORMATION:   Hypoxia.    TECHNIQUE:  Portable  AP view of the chest was obtained.    COMPARISON: 7/3/2017 available for review.    FINDINGS:   The lungs  show ill-defined the cardiac borders are likely indicating   bilateral pleural effusions and/or infiltrates. Upper lobes clear. There   is mild vascular congestion.        There is gross cardiomegaly unchanged from prior examination. Status post   coronary artery bypass graft procedure. Visualized osseous structures are   intact.        IMPRESSION:   Gross cardiomegaly...   Persistent bibasilar infiltrates   and effusions.      INES MORTENSEN M.D., ATTENDING RADIOLOGIST  This document has been electronically signed. 2017  2:45PM

## 2017-07-08 NOTE — PROGRESS NOTE ADULT - SUBJECTIVE AND OBJECTIVE BOX
Patient is a 84y old  Female who presents with a chief complaint of Abdominal pain, insomnia, and increased shortness of breath (10 Murphy 2017 16:40)      BRIEF HOSPITAL COURSE:  83 y/o F with CHF, severe MR (s/p mitral valve clip ~2 weeks ago), CAD, HTN, obesity with persistent hypercapnic respiratory failure with inability to wean from NIV, developed compensated respiratory acidosis and contraction alkalosis. Transferred to the MICU service 7/3 for management of her NIV and metabolic disturbances.    Events last 24 hours:     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   r/o  out  stenosis  Cardiomyopathy  Osteoarthritis  Iron deficiency anemia  MI, old: 2008  Dyslipidemia  Acid reflux  Hx of CAB  CAD (coronary artery disease)  Heart failure  HTN (hypertension)  H/O tracheostomy: 2013 may  Cataract: b/l  2013  S/P CABG x 3:   [place]      Review of Systems:  CONSTITUTIONAL: No fever, chills, or fatigue  EYES: No eye pain, visual disturbances, or discharge  ENMT:  No difficulty hearing, tinnitus, vertigo; No sinus or throat pain  NECK: No pain or stiffness  RESPIRATORY: No cough, wheezing, chills or hemoptysis; No shortness of breath  CARDIOVASCULAR: No chest pain, palpitations, dizziness, or leg swelling  GASTROINTESTINAL: No abdominal or epigastric pain. No nausea, vomiting, or hematemesis; No diarrhea or constipation. No melena or hematochezia.  GENITOURINARY: No dysuria, frequency, hematuria, or incontinence  NEUROLOGICAL: No headaches, memory loss, loss of strength, numbness, or tremors  SKIN: No itching, burning, rashes, or lesions   MUSCULOSKELETAL: No joint pain or swelling; No muscle, back, or extremity pain  PSYCHIATRIC: No depression, anxiety, mood swings, or difficulty sleeping      Medications:    metoprolol succinate ER 50 milliGRAM(s) Oral daily  hydrALAZINE Injectable 10 milliGRAM(s) IV Push every 3 hours PRN  torsemide 20 milliGRAM(s) Oral two times a day  lisinopril 5 milliGRAM(s) Oral daily    buDESOnide   0.5 milliGRAM(s) Respule 0.5 milliGRAM(s) Inhalation two times a day  ALBUTerol/ipratropium for Nebulization 3 milliLiter(s) Nebulizer every 6 hours PRN        clopidogrel Tablet 75 milliGRAM(s) Oral daily    pantoprazole    Tablet 40 milliGRAM(s) Oral before breakfast  psyllium Powder 1 Packet(s) Oral two times a day  loperamide 2 milliGRAM(s) Oral every 4 hours PRN  aluminum hydroxide/magnesium hydroxide/simethicone Suspension 30 milliLiter(s) Oral every 6 hours PRN      simvastatin 20 milliGRAM(s) Oral at bedtime    multivitamin 1 Tablet(s) Oral daily  folic acid 1 milliGRAM(s) Oral daily  ascorbic acid 250 milliGRAM(s) Oral daily  ferrous    sulfate 325 milliGRAM(s) Oral daily      latanoprost 0.005% Ophthalmic Solution 1 Drop(s) Both EYES at bedtime    sodium chloride 0.9% lock flush 3 milliLiter(s) IV Push every 8 hours  lactobacillus acidophilus 1 Tablet(s) Oral two times a day with meals          ICU Vital Signs Last 24 Hrs  T(C): 36.4 (2017 12:00), Max: 37.2 (2017 00:00)  T(F): 97.5 (2017 12:00), Max: 99 (2017 00:00)  HR: 73 (2017 12:00) (62 - 75)  BP: 107/55 (2017 12:00) (92/52 - 146/52)  BP(mean): 77 (2017 12:00) (69 - 83)  ABP: --  ABP(mean): --  RR: 20 (2017 12:00) (15 - 39)  SpO2: 96% (2017 12:00) (84% - 100%)      ABG - ( 2017 11:46 )  pH: 7.42  /  pCO2: 56    /  pO2: 130   / HCO3: 33    / Base Excess: 9.8   /  SaO2: 94                  I&O's Detail    2017 07:01  -  2017 07:00  --------------------------------------------------------  IN:    Oral Fluid: 120 mL    Solution: 50 mL  Total IN: 170 mL    OUT:    Voided: 300 mL  Total OUT: 300 mL    Total NET: -130 mL      2017 07:01  -  2017 14:24  --------------------------------------------------------  IN:    Oral Fluid: 390 mL    Solution: 250 mL  Total IN: 640 mL    OUT:  Total OUT: 0 mL    Total NET: 640 mL            LABS:                        8.4    4.2   )-----------( 149      ( 2017 05:07 )             28.4     07-08    146<H>  |  101  |  39.0<H>  ----------------------------<  93  3.5   |  37.0<H>  |  1.42<H>    Ca    8.9      2017 05:07  Phos  1.8     07-08  Mg     2.3     07-08    TPro  6.3<L>  /  Alb  3.4  /  TBili  0.4  /  DBili  x   /  AST  20  /  ALT  21  /  AlkPhos  119  07-07          CAPILLARY BLOOD GLUCOSE            CULTURES:      Physical Examination:    General: No acute distress.  Alert, oriented, interactive, nonfocal    HEENT: Pupils equal, reactive to light.  Symmetric.    PULM: Clear to auscultation bilaterally, no significant sputum production    CVS: Regular rate and rhythm, no murmurs, rubs, or gallops    ABD: Soft, nondistended, nontender, normoactive bowel sounds, no masses    EXT: No edema, nontender    SKIN: Warm and well perfused, no rashes noted.    RADIOLOGY: ***    CRITICAL CARE TIME SPENT: *** Patient is a 84y old  Female who presents with a chief complaint of Abdominal pain, insomnia, and increased shortness of breath (10 Murphy 2017 16:40)      BRIEF HOSPITAL COURSE:  83 y/o F with CHF, severe MR (s/p mitral valve clip ~2 weeks ago), CAD, HTN, obesity with persistent hypercapnic respiratory failure with inability to wean from NIV, developed compensated respiratory acidosis and contraction alkalosis. Transferred to the MICU service 7/3 for management of her NIV and metabolic disturbances.    Events last 24 hours: Patient is awake and alert, reports that she is feeling better. Out of bed to the chair, without work of breathing, tolerating nasal cannula, O2 sat 96%. End tidal CO2: 56.     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   r/o  out  stenosis  Cardiomyopathy  Osteoarthritis  Iron deficiency anemia  MI, old: 2008  Dyslipidemia  Acid reflux  Hx of CAB  CAD (coronary artery disease)  Heart failure  HTN (hypertension)  H/O tracheostomy: 2013 may  Cataract: b/l  2013  S/P CABG x 3:   [place]      Review of Systems:  CONSTITUTIONAL: No fever, chills, or fatigue  EYES: No eye pain, visual disturbances, or discharge  ENMT:  No difficulty hearing, tinnitus, vertigo; No sinus or throat pain  NECK: No pain or stiffness  RESPIRATORY: No cough, wheezing, chills or hemoptysis; No shortness of breath  CARDIOVASCULAR: No chest pain, palpitations, dizziness, or leg swelling  GASTROINTESTINAL: No abdominal or epigastric pain. No nausea, vomiting, or hematemesis; No diarrhea or constipation. No melena or hematochezia.  GENITOURINARY: No dysuria, frequency, hematuria, or incontinence  NEUROLOGICAL: No headaches, memory loss, loss of strength, numbness, or tremors  SKIN: No itching, burning, rashes, or lesions   MUSCULOSKELETAL: No joint pain or swelling; No muscle, back, or extremity pain  PSYCHIATRIC: No depression, anxiety, mood swings, or difficulty sleeping      Medications:    metoprolol succinate ER 50 milliGRAM(s) Oral daily  hydrALAZINE Injectable 10 milliGRAM(s) IV Push every 3 hours PRN  torsemide 20 milliGRAM(s) Oral two times a day  lisinopril 5 milliGRAM(s) Oral daily  buDESOnide   0.5 milliGRAM(s) Respule 0.5 milliGRAM(s) Inhalation two times a day  ALBUTerol/ipratropium for Nebulization 3 milliLiter(s) Nebulizer every 6 hours PRN  clopidogrel Tablet 75 milliGRAM(s) Oral daily  pantoprazole    Tablet 40 milliGRAM(s) Oral before breakfast  psyllium Powder 1 Packet(s) Oral two times a day  loperamide 2 milliGRAM(s) Oral every 4 hours PRN  aluminum hydroxide/magnesium hydroxide/simethicone Suspension 30 milliLiter(s) Oral every 6 hours PRN  simvastatin 20 milliGRAM(s) Oral at bedtime  multivitamin 1 Tablet(s) Oral daily  folic acid 1 milliGRAM(s) Oral daily  ascorbic acid 250 milliGRAM(s) Oral daily  ferrous    sulfate 325 milliGRAM(s) Oral daily  latanoprost 0.005% Ophthalmic Solution 1 Drop(s) Both EYES at bedtime  sodium chloride 0.9% lock flush 3 milliLiter(s) IV Push every 8 hours  lactobacillus acidophilus 1 Tablet(s) Oral two times a day with meals          ICU Vital Signs Last 24 Hrs  T(C): 36.4 (2017 12:00), Max: 37.2 (2017 00:00)  T(F): 97.5 (2017 12:00), Max: 99 (2017 00:00)  HR: 73 (2017 12:00) (62 - 75)  BP: 107/55 (2017 12:00) (92/52 - 146/52)  BP(mean): 77 (2017 12:00) (69 - 83)  ABP: --  ABP(mean): --  RR: 20 (2017 12:00) (15 - 39)  SpO2: 96% (2017 12:00) (84% - 100%)      ABG - ( 2017 11:46 )  pH: 7.42  /  pCO2: 56    /  pO2: 130   / HCO3: 33    / Base Excess: 9.8   /  SaO2: 94                  I&O's Detail    2017 07:01  -  2017 07:00  --------------------------------------------------------  IN:    Oral Fluid: 120 mL    Solution: 50 mL  Total IN: 170 mL    OUT:    Voided: 300 mL  Total OUT: 300 mL    Total NET: -130 mL      2017 07:01  -  2017 14:24  --------------------------------------------------------  IN:    Oral Fluid: 390 mL    Solution: 250 mL  Total IN: 640 mL    OUT:  Total OUT: 0 mL    Total NET: 640 mL            LABS:                        8.4    4.2   )-----------( 149      ( 2017 05:07 )             28.4     07-08    146<H>  |  101  |  39.0<H>  ----------------------------<  93  3.5   |  37.0<H>  |  1.42<H>    Ca    8.9      2017 05:07  Phos  1.8     07-08  Mg     2.3     07-08    TPro  6.3<L>  /  Alb  3.4  /  TBili  0.4  /  DBili  x   /  AST  20  /  ALT  21  /  AlkPhos  119  07-07          CAPILLARY BLOOD GLUCOSE            CULTURES:      Physical Examination:    General: No acute distress.  Alert, oriented, interactive, nonfocal    HEENT: Pupils equal, reactive to light.  Symmetric.    PULM: diminished b/l, no significant sputum production    CVS: Regular rate and rhythm, no murmurs, rubs, or gallops    ABD: Soft, nondistended, nontender, normoactive bowel sounds, no masses    EXT: No edema, nontender    SKIN: Warm and well perfused, no rashes noted.    RADIOLOGY: < from: Xray Chest 1 View AP/PA. (17 @ 14:24) >  IMPRESSION:   Gross cardiomegaly...   Persistent bibasilar infiltrates   and effusions.        CRITICAL CARE TIME SPENT: 43 mins

## 2017-07-08 NOTE — PROGRESS NOTE ADULT - ASSESSMENT
83 y/o F with CHF, severe MR (s/p mitral valve clip ~2 weeks ago), CAD, HTN, obesity with persistent hypercapnic respiratory failure, contraction alkalosis.

## 2017-07-09 LAB
ANION GAP SERPL CALC-SCNC: 14 MMOL/L — SIGNIFICANT CHANGE UP (ref 5–17)
BUN SERPL-MCNC: 39 MG/DL — HIGH (ref 8–20)
CALCIUM SERPL-MCNC: 9 MG/DL — SIGNIFICANT CHANGE UP (ref 8.6–10.2)
CHLORIDE SERPL-SCNC: 103 MMOL/L — SIGNIFICANT CHANGE UP (ref 98–107)
CO2 SERPL-SCNC: 29 MMOL/L — SIGNIFICANT CHANGE UP (ref 22–29)
CREAT SERPL-MCNC: 1.3 MG/DL — SIGNIFICANT CHANGE UP (ref 0.5–1.3)
GLUCOSE SERPL-MCNC: 105 MG/DL — SIGNIFICANT CHANGE UP (ref 70–115)
HCT VFR BLD CALC: 30 % — LOW (ref 37–47)
HGB BLD-MCNC: 9.1 G/DL — LOW (ref 12–16)
MAGNESIUM SERPL-MCNC: 2.5 MG/DL — SIGNIFICANT CHANGE UP (ref 1.6–2.6)
MCHC RBC-ENTMCNC: 22.4 PG — LOW (ref 27–31)
MCHC RBC-ENTMCNC: 30.3 G/DL — LOW (ref 32–36)
MCV RBC AUTO: 73.7 FL — LOW (ref 81–99)
PHOSPHATE SERPL-MCNC: 2.4 MG/DL — SIGNIFICANT CHANGE UP (ref 2.4–4.7)
PLATELET # BLD AUTO: 183 K/UL — SIGNIFICANT CHANGE UP (ref 150–400)
POTASSIUM SERPL-MCNC: 3.8 MMOL/L — SIGNIFICANT CHANGE UP (ref 3.5–5.3)
POTASSIUM SERPL-SCNC: 3.8 MMOL/L — SIGNIFICANT CHANGE UP (ref 3.5–5.3)
RBC # BLD: 4.07 M/UL — LOW (ref 4.4–5.2)
RBC # FLD: 26.3 % — HIGH (ref 11–15.6)
SODIUM SERPL-SCNC: 146 MMOL/L — HIGH (ref 135–145)
WBC # BLD: 5.1 K/UL — SIGNIFICANT CHANGE UP (ref 4.8–10.8)
WBC # FLD AUTO: 5.1 K/UL — SIGNIFICANT CHANGE UP (ref 4.8–10.8)

## 2017-07-09 PROCEDURE — 99233 SBSQ HOSP IP/OBS HIGH 50: CPT

## 2017-07-09 RX ADMIN — CLOPIDOGREL BISULFATE 75 MILLIGRAM(S): 75 TABLET, FILM COATED ORAL at 11:27

## 2017-07-09 RX ADMIN — Medication 250 MILLIGRAM(S): at 11:27

## 2017-07-09 RX ADMIN — Medication 325 MILLIGRAM(S): at 11:27

## 2017-07-09 RX ADMIN — SODIUM CHLORIDE 3 MILLILITER(S): 9 INJECTION INTRAMUSCULAR; INTRAVENOUS; SUBCUTANEOUS at 13:30

## 2017-07-09 RX ADMIN — Medication 1 TABLET(S): at 07:56

## 2017-07-09 RX ADMIN — SIMVASTATIN 20 MILLIGRAM(S): 20 TABLET, FILM COATED ORAL at 22:03

## 2017-07-09 RX ADMIN — PANTOPRAZOLE SODIUM 40 MILLIGRAM(S): 20 TABLET, DELAYED RELEASE ORAL at 06:04

## 2017-07-09 RX ADMIN — SODIUM CHLORIDE 3 MILLILITER(S): 9 INJECTION INTRAMUSCULAR; INTRAVENOUS; SUBCUTANEOUS at 06:03

## 2017-07-09 RX ADMIN — Medication 0.5 MILLIGRAM(S): at 20:22

## 2017-07-09 RX ADMIN — Medication 1 MILLIGRAM(S): at 11:27

## 2017-07-09 RX ADMIN — Medication 20 MILLIGRAM(S): at 17:06

## 2017-07-09 RX ADMIN — Medication 1 PACKET(S): at 06:04

## 2017-07-09 RX ADMIN — Medication 10 MILLIGRAM(S): at 00:21

## 2017-07-09 RX ADMIN — Medication 0.5 MILLIGRAM(S): at 09:24

## 2017-07-09 RX ADMIN — Medication 1 TABLET(S): at 11:27

## 2017-07-09 RX ADMIN — Medication 50 MILLIGRAM(S): at 06:04

## 2017-07-09 RX ADMIN — LISINOPRIL 5 MILLIGRAM(S): 2.5 TABLET ORAL at 06:04

## 2017-07-09 RX ADMIN — Medication 20 MILLIGRAM(S): at 06:04

## 2017-07-09 RX ADMIN — SODIUM CHLORIDE 3 MILLILITER(S): 9 INJECTION INTRAMUSCULAR; INTRAVENOUS; SUBCUTANEOUS at 22:05

## 2017-07-09 RX ADMIN — LATANOPROST 1 DROP(S): 0.05 SOLUTION/ DROPS OPHTHALMIC; TOPICAL at 22:22

## 2017-07-09 NOTE — PROGRESS NOTE ADULT - SUBJECTIVE AND OBJECTIVE BOX
PULMONARY PROGRESS NOTE      ANN FRANCES  MRN-[id number]    Patient is a 84y old  Female who presents with a chief complaint of Abdominal pain, insomnia, and increased shortness of breath (10 Murphy 2017 16:40)      INTERVAL HPI/OVERNIGHT EVENTS:    Patient is awake and alert  Feels somewhat better  Decreased SOB    MEDICATIONS  (STANDING):  sodium chloride 0.9% lock flush 3 milliLiter(s) IV Push every 8 hours  latanoprost 0.005% Ophthalmic Solution 1 Drop(s) Both EYES at bedtime  pantoprazole    Tablet 40 milliGRAM(s) Oral before breakfast  multivitamin 1 Tablet(s) Oral daily  metoprolol succinate ER 50 milliGRAM(s) Oral daily  buDESOnide   0.5 milliGRAM(s) Respule 0.5 milliGRAM(s) Inhalation two times a day  clopidogrel Tablet 75 milliGRAM(s) Oral daily  folic acid 1 milliGRAM(s) Oral daily  ascorbic acid 250 milliGRAM(s) Oral daily  simvastatin 20 milliGRAM(s) Oral at bedtime  torsemide 20 milliGRAM(s) Oral two times a day  lisinopril 5 milliGRAM(s) Oral daily  ferrous    sulfate 325 milliGRAM(s) Oral daily      MEDICATIONS  (PRN):  aluminum hydroxide/magnesium hydroxide/simethicone Suspension 30 milliLiter(s) Oral every 6 hours PRN Dyspepsia  hydrALAZINE Injectable 10 milliGRAM(s) IV Push every 3 hours PRN sbp > 150  ALBUTerol/ipratropium for Nebulization 3 milliLiter(s) Nebulizer every 6 hours PRN Shortness of Breath and/or Wheezing      Allergies    aspirin (Anaphylaxis)  NSAIDs (Other)    Intolerances    Lasix (Other)  [facility] PT (Unknown)      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        REVIEW OF SYSTEMS:    CONSTITUTIONAL:  No distress    HEENT:  Eyes:  No diplopia or blurred vision. ENT:  No earache, sore throat or runny nose.    CARDIOVASCULAR:  No pressure, squeezing, tightness, heaviness or aching about the chest; no palpitations.    RESPIRATORY:  Improved cough, shortness of breath, no PND or orthopnea. Mild SOBOE    GASTROINTESTINAL:  No nausea, vomiting or diarrhea.    GENITOURINARY:  No dysuria, frequency or urgency.    NEUROLOGIC:  No paresthesias, fasciculations, seizures or weakness.    PSYCHIATRIC:  No disorder of thought or mood.    Vital Signs Last 24 Hrs  T(C): 36.6 (2017 12:00), Max: 37.1 (2017 05:39)  T(F): 97.8 (2017 12:00), Max: 98.7 (2017 05:39)  HR: 73 (2017 13:) (61 - 74)  BP: 119/54 (2017 13:00) (100/49 - 244/139)  BP(mean): 78 (2017 13:) (69 - 174)  RR: 21 (:) (14 - 27)  SpO2: 99% (2017 13:00) (95% - 100%)    PHYSICAL EXAMINATION:    GENERAL: The patient is awake and alert in no apparent distress.     HEENT: Head is normocephalic and atraumatic. Extraocular muscles are intact. Mucous membranes are moist.    NECK: Supple.    LUNGS: Decreased BS b/l without wheezing, rales or rhonchi; respirations unlabored    HEART: Regular rate and rhythm without murmur.    ABDOMEN: Soft, nontender, and nondistended.      EXTREMITIES: Without any cyanosis, clubbing, rash, lesions or edema.    NEUROLOGIC: Grossly intact.    LABS:                        9.1    5.1   )-----------( 183      ( 2017 07:52 )             30.0     07-    146<H>  |  103  |  39.0<H>  ----------------------------<  105  3.8   |  29.0  |  1.30    Ca    9.0      2017 07:52  Phos  2.4     07-  Mg     2.5     -      RADIOLOGY & ADDITIONAL STUDIES:     EXAM:  CHEST SINGLE VIEW FRONTAL                          PROCEDURE DATE:  2017          INTERPRETATION:  Portable chest radiograph        CLINICAL INFORMATION:   Hypoxia.    TECHNIQUE:  Portable  AP view of the chest was obtained.    COMPARISON: 7/3/2017 available for review.    FINDINGS:   The lungs  show ill-defined the cardiac borders are likely indicating   bilateral pleural effusions and/or infiltrates. Upper lobes clear. There   is mild vascular congestion.        There is gross cardiomegaly unchanged from prior examination. Status post   coronary artery bypass graft procedure. Visualized osseous structures are   intact.        IMPRESSION:   Gross cardiomegaly...   Persistent bibasilar infiltrates   and effusions.      INES MORTENSEN M.D., ATTENDING RADIOLOGIST

## 2017-07-09 NOTE — PROGRESS NOTE ADULT - SUBJECTIVE AND OBJECTIVE BOX
NEPHROLOGY INTERVAL HPI/OVERNIGHT EVENTS:    Examined earlier  clinically unchanged  nocturnal BiPaP    MEDICATIONS  (STANDING):  sodium chloride 0.9% lock flush 3 milliLiter(s) IV Push every 8 hours  latanoprost 0.005% Ophthalmic Solution 1 Drop(s) Both EYES at bedtime  pantoprazole    Tablet 40 milliGRAM(s) Oral before breakfast  multivitamin 1 Tablet(s) Oral daily  metoprolol succinate ER 50 milliGRAM(s) Oral daily  buDESOnide   0.5 milliGRAM(s) Respule 0.5 milliGRAM(s) Inhalation two times a day  clopidogrel Tablet 75 milliGRAM(s) Oral daily  folic acid 1 milliGRAM(s) Oral daily  ascorbic acid 250 milliGRAM(s) Oral daily  lactobacillus acidophilus 1 Tablet(s) Oral two times a day with meals  psyllium Powder 1 Packet(s) Oral two times a day  simvastatin 20 milliGRAM(s) Oral at bedtime  torsemide 20 milliGRAM(s) Oral two times a day  lisinopril 5 milliGRAM(s) Oral daily  ferrous    sulfate 325 milliGRAM(s) Oral daily    MEDICATIONS  (PRN):  loperamide 2 milliGRAM(s) Oral every 4 hours PRN Diarrhea  aluminum hydroxide/magnesium hydroxide/simethicone Suspension 30 milliLiter(s) Oral every 6 hours PRN Dyspepsia  hydrALAZINE Injectable 10 milliGRAM(s) IV Push every 3 hours PRN sbp > 150  ALBUTerol/ipratropium for Nebulization 3 milliLiter(s) Nebulizer every 6 hours PRN Shortness of Breath and/or Wheezing      Allergies    aspirin (Anaphylaxis)  NSAIDs (Other)    Intolerances    Lasix (Other)  [facility] PT (Unknown)      Vital Signs Last 24 Hrs  T(C): 37.1 (2017 05:39), Max: 37.1 (2017 05:39)  T(F): 98.7 (2017 05:39), Max: 98.7 (2017 05:39)  HR: 74 (2017 08:00) (61 - 74)  BP: 106/55 (2017 08:00) (92/52 - 244/139)  BP(mean): 79 (2017 08:00) (69 - 174)  RR: 24 (2017 08:00) (14 - 33)  SpO2: 100% (2017 08:00) (93% - 100%)  Daily     Daily Weight in k.5 (2017 08:00)    PHYSICAL EXAM:  GENERAL: NAD,   EYES:  conjunctiva and sclera clear  NECK: Supple, Veins full upright  NERVOUS SYSTEM:  A/O x3,   CHEST: poor air movement bilaterally  HEART:  sternal scar, no rub  ABDOMEN: Soft, NT/ND BS+  EXTREMITIES:  compression devices  both lower legs        LABS:                        8.4    4.2   )-----------( 149      ( 2017 05:07 )             28.4     07-08    146<H>  |  101  |  39.0<H>  ----------------------------<  93  3.5   |  37.0<H>  |  1.42<H>    Ca    8.9      2017 05:07  Phos  1.8     07-08  Mg     2.3     07-08            ABG - ( 2017 11:46 )  pH: 7.42  /  pCO2: 56    /  pO2: 130   / HCO3: 33    / Base Excess: 9.8   /  SaO2: 94                    RADIOLOGY & ADDITIONAL TESTS:

## 2017-07-09 NOTE — PROGRESS NOTE ADULT - SUBJECTIVE AND OBJECTIVE BOX
Patient is a 84y old  Female who presents with a chief complaint of Abdominal pain, insomnia, and increased shortness of breath (10 Murphy 2017 16:40)      BRIEF HOSPITAL COURSE:  85 y/o F with CHF, severe MR (s/p mitral valve clip ~2 weeks ago), CAD, HTN, obesity with persistent hypercapnic respiratory failure with inability to wean from NIV, developed compensated respiratory acidosis and contraction alkalosis. Transferred to the MICU service 7/3 for management of her NIV and metabolic disturbances.    Events last 24 hours: Doing well no adjustments to duiretics over the last 24 hours, OOB to chair. Talked with patient, son and daughter at bedside and discussed goals of care, Ms. Bowman's goal is to go home (per son it was to go to [facility]), discussed what she would want if her breathing    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   r/o  out  stenosis  Cardiomyopathy  Osteoarthritis  Iron deficiency anemia  MI, old: 2008  Dyslipidemia  Acid reflux  Hx of CAB  CAD (coronary artery disease)  Heart failure  HTN (hypertension)  H/O tracheostomy: 2013 may  Cataract: b/l  2013  S/P CABG x 3:   [place]      Review of Systems:  CONSTITUTIONAL: No fever, chills, or fatigue  EYES: No eye pain, visual disturbances, or discharge  ENMT:  No difficulty hearing, tinnitus, vertigo; No sinus or throat pain  NECK: No pain or stiffness  RESPIRATORY: No cough, wheezing, chills or hemoptysis; No shortness of breath  CARDIOVASCULAR: No chest pain, palpitations, dizziness, or leg swelling  GASTROINTESTINAL: No abdominal or epigastric pain. No nausea, vomiting, or hematemesis; No diarrhea or constipation. No melena or hematochezia.  GENITOURINARY: No dysuria, frequency, hematuria, or incontinence  NEUROLOGICAL: No headaches, memory loss, loss of strength, numbness, or tremors  SKIN: No itching, burning, rashes, or lesions   MUSCULOSKELETAL: No joint pain or swelling; No muscle, back, or extremity pain  PSYCHIATRIC: No depression, anxiety, mood swings, or difficulty sleeping      Medications:    metoprolol succinate ER 50 milliGRAM(s) Oral daily  hydrALAZINE Injectable 10 milliGRAM(s) IV Push every 3 hours PRN  torsemide 20 milliGRAM(s) Oral two times a day  lisinopril 5 milliGRAM(s) Oral daily    buDESOnide   0.5 milliGRAM(s) Respule 0.5 milliGRAM(s) Inhalation two times a day  ALBUTerol/ipratropium for Nebulization 3 milliLiter(s) Nebulizer every 6 hours PRN        clopidogrel Tablet 75 milliGRAM(s) Oral daily    pantoprazole    Tablet 40 milliGRAM(s) Oral before breakfast  aluminum hydroxide/magnesium hydroxide/simethicone Suspension 30 milliLiter(s) Oral every 6 hours PRN      simvastatin 20 milliGRAM(s) Oral at bedtime    multivitamin 1 Tablet(s) Oral daily  folic acid 1 milliGRAM(s) Oral daily  ascorbic acid 250 milliGRAM(s) Oral daily  ferrous    sulfate 325 milliGRAM(s) Oral daily      latanoprost 0.005% Ophthalmic Solution 1 Drop(s) Both EYES at bedtime    sodium chloride 0.9% lock flush 3 milliLiter(s) IV Push every 8 hours          ICU Vital Signs Last 24 Hrs  T(C): 36.6 (2017 12:00), Max: 37.1 (2017 05:39)  T(F): 97.8 (2017 12:00), Max: 98.7 (2017 05:39)  HR: 70 (2017 11:00) (61 - 74)  BP: 111/53 (2017 11:00) (100/49 - 244/139)  BP(mean): 76 (2017 11:00) (69 - 174)  ABP: --  ABP(mean): --  RR: 23 (2017 11:00) (14 - 27)  SpO2: 97% (2017 11:00) (95% - 100%)          I&O's Detail    2017 07:01  -  2017 07:00  --------------------------------------------------------  IN:    Oral Fluid: 490 mL    Solution: 250 mL  Total IN: 740 mL    OUT:  Total OUT: 0 mL    Total NET: 740 mL            LABS:                        9.1    5.1   )-----------( 183      ( 2017 07:52 )             30.0     07-    146<H>  |  103  |  39.0<H>  ----------------------------<  105  3.8   |  29.0  |  1.30    Ca    9.0      2017 07:52  Phos  2.4     -  Mg     2.5     -            CAPILLARY BLOOD GLUCOSE            CULTURES:      Physical Examination:    General: No acute distress.  Alert, oriented, interactive, nonfocal    HEENT: Pupils equal, reactive to light.  Symmetric.    PULM: Clear to auscultation bilaterally, no significant sputum production    CVS: Regular rate and rhythm, no murmurs, rubs, or gallops    ABD: Soft, nondistended, nontender, normoactive bowel sounds, no masses    EXT: No edema, nontender    SKIN: Warm and well perfused, no rashes noted.    RADIOLOGY: ***    CRITICAL CARE TIME SPENT: *** Patient is a 84y old  Female who presents with a chief complaint of Abdominal pain, insomnia, and increased shortness of breath (10 Murphy 2017 16:40)      BRIEF HOSPITAL COURSE:  85 y/o F with CHF, severe MR (s/p mitral valve clip ~2 weeks ago), CAD, HTN, obesity with persistent hypercapnic respiratory failure with inability to wean from NIV, developed compensated respiratory acidosis and contraction alkalosis. Transferred to the MICU service 7/3 for management of her NIV and metabolic disturbances.    Events last 24 hours: Doing well no adjustments to duiretics over the last 24 hours, OOB to chair. Talked with patient, son and daughter at bedside and discussed goals of care, Ms. Bowman's goal is to go home (per son it was to go to [facility]), discussed what she would want if her breathing were bad enough that she need to be put on machines,she voiced that she would never want a trach and what was gods will was his will. Discussed that if she didn't want a trach due to her chronic health problems being put on a ventilator and getting CPR would likely not benefit her long term goals of going home, she agreed and what was gods will is his will. Daughter and son at bedside for conversation. Family and patietn would like to see if rehab is still possible again with goal to go home.    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   r/o  out  stenosis  Cardiomyopathy  Osteoarthritis  Iron deficiency anemia  MI, old: 2008  Dyslipidemia  Acid reflux  Hx of CAB  CAD (coronary artery disease)  Heart failure  HTN (hypertension)  H/O tracheostomy: 2013 may  Cataract: b/l  2013  S/P CABG x 3:   [place]      Review of Systems:  CONSTITUTIONAL: No fever, chills, or fatigue  EYES: No eye pain, visual disturbances, or discharge  ENMT:  No difficulty hearing, tinnitus, vertigo; No sinus or throat pain  NECK: No pain or stiffness  RESPIRATORY: No cough, wheezing, chills or hemoptysis; No shortness of breath  CARDIOVASCULAR: No chest pain, palpitations, dizziness, or leg swelling  GASTROINTESTINAL: No abdominal or epigastric pain. No nausea, vomiting, or hematemesis; No diarrhea or constipation. No melena or hematochezia.  GENITOURINARY: No dysuria, frequency, hematuria, or incontinence  NEUROLOGICAL: No headaches, memory loss, loss of strength, numbness, or tremors  SKIN: No itching, burning, rashes, or lesions   MUSCULOSKELETAL: No joint pain or swelling; No muscle, back, or extremity pain  PSYCHIATRIC: No depression, anxiety, mood swings, or difficulty sleeping      Medications:    metoprolol succinate ER 50 milliGRAM(s) Oral daily  hydrALAZINE Injectable 10 milliGRAM(s) IV Push every 3 hours PRN  torsemide 20 milliGRAM(s) Oral two times a day  lisinopril 5 milliGRAM(s) Oral daily    buDESOnide   0.5 milliGRAM(s) Respule 0.5 milliGRAM(s) Inhalation two times a day  ALBUTerol/ipratropium for Nebulization 3 milliLiter(s) Nebulizer every 6 hours PRN        clopidogrel Tablet 75 milliGRAM(s) Oral daily    pantoprazole    Tablet 40 milliGRAM(s) Oral before breakfast  aluminum hydroxide/magnesium hydroxide/simethicone Suspension 30 milliLiter(s) Oral every 6 hours PRN      simvastatin 20 milliGRAM(s) Oral at bedtime    multivitamin 1 Tablet(s) Oral daily  folic acid 1 milliGRAM(s) Oral daily  ascorbic acid 250 milliGRAM(s) Oral daily  ferrous    sulfate 325 milliGRAM(s) Oral daily      latanoprost 0.005% Ophthalmic Solution 1 Drop(s) Both EYES at bedtime    sodium chloride 0.9% lock flush 3 milliLiter(s) IV Push every 8 hours          ICU Vital Signs Last 24 Hrs  T(C): 36.6 (2017 12:00), Max: 37.1 (2017 05:39)  T(F): 97.8 (2017 12:00), Max: 98.7 (2017 05:39)  HR: 70 (2017 11:00) (61 - 74)  BP: 111/53 (2017 11:00) (100/49 - 244/139)  BP(mean): 76 (2017 11:00) (69 - 174)  ABP: --  ABP(mean): --  RR: 23 (2017 11:00) (14 - 27)  SpO2: 97% (2017 11:00) (95% - 100%)          I&O's Detail    2017 07:01  -  2017 07:00  --------------------------------------------------------  IN:    Oral Fluid: 490 mL    Solution: 250 mL  Total IN: 740 mL    OUT:  Total OUT: 0 mL    Total NET: 740 mL            LABS:                        9.1    5.1   )-----------( 183      ( 2017 07:52 )             30.0     07-    146<H>  |  103  |  39.0<H>  ----------------------------<  105  3.8   |  29.0  |  1.30    Ca    9.0      2017 07:52  Phos  2.4     -  Mg     2.5     -            CAPILLARY BLOOD GLUCOSE      CULTURES:      Physical Examination:    General: No acute distress.  Alert, oriented, interactive, nonfocal    HEENT: Pupils equal, reactive to light.  Symmetric.    PULM: Diminshed    CVS: Regular rate and rhythm, no murmurs, rubs, or gallops    ABD: Soft, nondistended, nontender, normoactive bowel sounds, no masses    EXT: trace edema, nontender    SKIN: Warm and well perfused, no rashes noted.

## 2017-07-09 NOTE — PROGRESS NOTE ADULT - ASSESSMENT
85 y/o F with CHF, severe MR (s/p mitral valve clip ~2 weeks ago), CAD, HTN, obesity with persistent hypercapnic respiratory failure, contraction alkalosis.    > Acute on chronic systolic and diastolic CHF with severe MR and mod to severe TR - no further surgical plans per CT Surgery.  Continue diuresis.  Palliative care followup regarding goals of care, ? hospice.  > Acute on chronic respiratory failure with hypercapnia - Pulmonary followup, continue Nocturnal BIPAP.    > Microcytic Anemia - H/H stable - repeat Fe studies, stool OB.   > Metabolic Acidosis - renal followup appreciated.  careful diuresis, Diamox.  > CHRISTOPHER - Cr stable.  Avoid nephrotoxic agents.   > CAD - continue Plavix, BBlocker.  > HLD - continue Statin  > Glaucoma - continue eyedrops.  > DVT Prophylaxis - Lower extremity intermittent compression devices.

## 2017-07-09 NOTE — PROGRESS NOTE ADULT - SUBJECTIVE AND OBJECTIVE BOX
Patient is a 84y old  Female who presents with a chief complaint of Abdominal pain, insomnia, and increased shortness of breath (10 Murphy 2017 16:40)                               Patient: ANN FRANCES [id number] 84y Female                 Internal Medicine Hospitalist Progress Note - Dr. Errol Lovett    Chief Complaint: Patient is a 84y old  Female who presents with a chief complaint of Abdominal pain, insomnia, and increased shortness of breath (10 Murphy 2017 16:40)    BRIEF HOSPITAL COURSE:  85 y/o F with CHF, severe MR s/p mitral clip 2 weeks prior to admission, CAD, HTN, obesity, admitted to CT Surgery service due to altered mental status, found to have persistent hypercapnic respiratory failure with inability to wean from NIV, developed compensated respiratory acidosis and contraction alkalosis.  PCO2 87.  Transferred to the MICU service 7/3 for management of her NIV and metabolic disturbances.  In ICU, she was successfully weaned from NIV, requiring BIPAP at night.  TTE:  1. Mildly decreased global left ventricular systolic function.  2. Left ventricular ejection fraction, by visual estimation, is 45 to 50%.  3. Mild to moderate right ventricular systolic dysfunction.  4. Severely enlarged left atrium.  5. Moderately dilated right atrium.  6. Patient is s/p Mitral Clip with severe regurgitation.  7. Moderate-severe tricuspid regurgitation.  8. Mild aortic regurgitation.  9. Estimated pulmonary artery systolic pressure is 49.7 mmHg assuming a  right atrial pressure of 8 mmHg, which is consistent with mild pulmonary  hypertension. 10. There is no evidence of pericardial effusion.    Events last 24 hours: Pt offers no complaints.  Denies SOB.  Denies pain.      ____________________PHYSICAL EXAM:  Vitals reviewed as indicated below  GENERAL:  NAD Alert, slightly confused.    HEENT: NCAT  CARDIOVASCULAR:  S1, S2  LUNGS: coarse BS b/l  ABDOMEN:  soft, (-) tenderness, (-) distension, (+) bowel sounds, (-) guarding, (-) rebound (-) rigidity  EXTREMITIES:  no cyanosis / clubbing / edema.   ____________________    BACKGROUND:  HEALTH ISSUES - PROBLEM Dx:  Metabolic alkalosis: Metabolic alkalosis  Renal insufficiency: Renal insufficiency  Obesity: Obesity  Afib: Afib  Respiratory failure: Respiratory failure  Hypoxia: Hypoxia  Hypercarbia: Hypercarbia  Pain: Pain  Acute on chronic congestive heart failure, unspecified congestive heart failure type: Acute on chronic congestive heart failure, unspecified congestive heart failure type  Valvular heart disease: Valvular heart disease  CHRISTOPHER (acute kidney injury): CHRISTOPHER (acute kidney injury)  Morbid obesity due to excess calories: Morbid obesity due to excess calories  Chronic systolic (congestive) heart failure: Chronic systolic (congestive) heart failure  Dyslipidemia: Dyslipidemia  Cerebrovascular accident (CVA) due to other mechanism: Cerebrovascular accident (CVA) due to other mechanism  Acalculous cholecystitis: Acalculous cholecystitis  Acute on chronic systolic heart failure: Acute on chronic systolic heart failure  Coronary artery disease involving native coronary artery of native heart without angina pectoris: Coronary artery disease involving native coronary artery of native heart without angina pectoris  Acute on chronic respiratory failure with hypercapnia: Acute on chronic respiratory failure with hypercapnia  Obesity, unspecified obesity severity, unspecified obesity type: Obesity, unspecified obesity severity, unspecified obesity type  Hypercapnic respiratory failure, chronic: Hypercapnic respiratory failure, chronic  On home oxygen therapy: On home oxygen therapy  Palliative care encounter: Palliative care encounter  Prophylactic measure: Prophylactic measure  Polyp of ascending colon, unspecified type: Polyp of ascending colon, unspecified type  Delirium: Delirium  Iron deficiency anemia, unspecified iron deficiency anemia type: Iron deficiency anemia, unspecified iron deficiency anemia type  CKD (chronic kidney disease) stage 3, GFR 30-59 ml/min: CKD (chronic kidney disease) stage 3, GFR 30-59 ml/min  Non-rheumatic mitral regurgitation: Non-rheumatic mitral regurgitation  Non-ST elevation (NSTEMI) myocardial infarction: Non-ST elevation (NSTEMI) myocardial infarction  Atrial fibrillation with RVR: Atrial fibrillation with RVR  Encephalopathy, unspecified: Encephalopathy, unspecified  Left lower quadrant pain: Left lower quadrant pain  Chronic obstructive pulmonary disease, unspecified COPD type: Chronic obstructive pulmonary disease, unspecified COPD type  Acute on chronic systolic congestive heart failure: Acute on chronic systolic congestive heart failure        MEDICATIONS  (STANDING):  sodium chloride 0.9% lock flush 3 milliLiter(s) IV Push every 8 hours  latanoprost 0.005% Ophthalmic Solution 1 Drop(s) Both EYES at bedtime  pantoprazole    Tablet 40 milliGRAM(s) Oral before breakfast  multivitamin 1 Tablet(s) Oral daily  metoprolol succinate ER 50 milliGRAM(s) Oral daily  buDESOnide   0.5 milliGRAM(s) Respule 0.5 milliGRAM(s) Inhalation two times a day  clopidogrel Tablet 75 milliGRAM(s) Oral daily  folic acid 1 milliGRAM(s) Oral daily  ascorbic acid 250 milliGRAM(s) Oral daily  lactobacillus acidophilus 1 Tablet(s) Oral two times a day with meals  psyllium Powder 1 Packet(s) Oral two times a day  simvastatin 20 milliGRAM(s) Oral at bedtime  torsemide 20 milliGRAM(s) Oral two times a day  lisinopril 5 milliGRAM(s) Oral daily  ferrous    sulfate 325 milliGRAM(s) Oral daily    MEDICATIONS  (PRN):  loperamide 2 milliGRAM(s) Oral every 4 hours PRN Diarrhea  aluminum hydroxide/magnesium hydroxide/simethicone Suspension 30 milliLiter(s) Oral every 6 hours PRN Dyspepsia  hydrALAZINE Injectable 10 milliGRAM(s) IV Push every 3 hours PRN sbp > 150  ALBUTerol/ipratropium for Nebulization 3 milliLiter(s) Nebulizer every 6 hours PRN Shortness of Breath and/or Wheezing    Allergies    aspirin (Anaphylaxis)  NSAIDs (Other)    Intolerances    Lasix (Other)  [facility] PT (Unknown)    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   r/o  out  stenosis  Cardiomyopathy  Osteoarthritis  Iron deficiency anemia  MI, old: 2008  Dyslipidemia  Acid reflux  Hx of CAB  CAD (coronary artery disease)  Heart failure  HTN (hypertension)  H/O tracheostomy: 2013 may  Cataract: b/l  2013  S/P CABG x 3:   [place]      VITALS:  Vital Signs Last 24 Hrs  T(C): 37.1 (2017 05:39), Max: 37.1 (2017 05:39)  T(F): 98.7 (2017 05:39), Max: 98.7 (2017 05:39)  HR: 67 (2017 10:00) (61 - 74)  BP: 106/55 (2017 08:00) (92/52 - 244/139)  BP(mean): 79 (2017 08:00) (69 - 174)  RR: 24 (2017 08:00) (14 - 33)  SpO2: 100% (2017 10:00) (95% - 100%) Daily     Daily Weight in k.5 (2017 08:00)  CAPILLARY BLOOD GLUCOSE        I&O's Summary    2017 07:01  -  2017 07:00  --------------------------------------------------------  IN: 740 mL / OUT: 0 mL / NET: 740 mL        LABS:                        8.4    4.2   )-----------( 149      ( 2017 05:07 )             28.4     07-09    146<H>  |  103  |  39.0<H>  ----------------------------<  105  3.8   |  29.0  |  1.30    Ca    9.0      2017 07:52  Phos  2.4     07-  Mg     2.5     -        RECENT CULTURES:

## 2017-07-09 NOTE — PROGRESS NOTE ADULT - ASSESSMENT
85 yo f with MR, failed mitral valve clip, hypercarbic respiratory failure, fluid overload, metabolic alkolosis

## 2017-07-09 NOTE — PROGRESS NOTE ADULT - ASSESSMENT
CKD-Pre renal azotemia/ Significant COPD/ Hypercarbia   No hydro on renal imaging   Agree w diuretics on Torsemide 20 mg q12hr watch K+  Will have to accept some degree of azotemia cr better 1.4 yest  treat exacerbated alkalosis as needed improving  AM labs

## 2017-07-10 LAB
ANION GAP SERPL CALC-SCNC: 12 MMOL/L — SIGNIFICANT CHANGE UP (ref 5–17)
BUN SERPL-MCNC: 37 MG/DL — HIGH (ref 8–20)
CALCIUM SERPL-MCNC: 9 MG/DL — SIGNIFICANT CHANGE UP (ref 8.6–10.2)
CHLORIDE SERPL-SCNC: 103 MMOL/L — SIGNIFICANT CHANGE UP (ref 98–107)
CO2 SERPL-SCNC: 32 MMOL/L — HIGH (ref 22–29)
CREAT SERPL-MCNC: 1.37 MG/DL — HIGH (ref 0.5–1.3)
FERRITIN SERPL-MCNC: 532.4 NG/ML — HIGH (ref 11–306)
GLUCOSE SERPL-MCNC: 85 MG/DL — SIGNIFICANT CHANGE UP (ref 70–115)
HCT VFR BLD CALC: 29.8 % — LOW (ref 37–47)
HGB BLD-MCNC: 8.8 G/DL — LOW (ref 12–16)
IRON SATN MFR SERPL: 14 % — SIGNIFICANT CHANGE UP (ref 14–50)
IRON SATN MFR SERPL: 39 UG/DL — SIGNIFICANT CHANGE UP (ref 37–145)
MCHC RBC-ENTMCNC: 22 PG — LOW (ref 27–31)
MCHC RBC-ENTMCNC: 29.5 G/DL — LOW (ref 32–36)
MCV RBC AUTO: 74.5 FL — LOW (ref 81–99)
PLATELET # BLD AUTO: 160 K/UL — SIGNIFICANT CHANGE UP (ref 150–400)
POTASSIUM SERPL-MCNC: 3.6 MMOL/L — SIGNIFICANT CHANGE UP (ref 3.5–5.3)
POTASSIUM SERPL-SCNC: 3.6 MMOL/L — SIGNIFICANT CHANGE UP (ref 3.5–5.3)
RBC # BLD: 4 M/UL — LOW (ref 4.4–5.2)
RBC # BLD: 4.1 M/UL — LOW (ref 4.4–5.2)
RBC # FLD: 26.6 % — HIGH (ref 11–15.6)
RETICS #: 84.5 K/UL — SIGNIFICANT CHANGE UP (ref 25–125)
RETICS/RBC NFR: 2.1 % — SIGNIFICANT CHANGE UP (ref 0.5–2.6)
SODIUM SERPL-SCNC: 147 MMOL/L — HIGH (ref 135–145)
TIBC SERPL-MCNC: 273 UG/DL — SIGNIFICANT CHANGE UP (ref 220–430)
TRANSFERRIN SERPL-MCNC: 191 MG/DL — LOW (ref 192–382)
VIT B12 SERPL-MCNC: 925 PG/ML — HIGH (ref 180–914)
WBC # BLD: 4.3 K/UL — LOW (ref 4.8–10.8)
WBC # FLD AUTO: 4.3 K/UL — LOW (ref 4.8–10.8)

## 2017-07-10 PROCEDURE — 99233 SBSQ HOSP IP/OBS HIGH 50: CPT

## 2017-07-10 RX ADMIN — Medication 3 MILLILITER(S): at 08:14

## 2017-07-10 RX ADMIN — Medication 0.5 MILLIGRAM(S): at 20:14

## 2017-07-10 RX ADMIN — Medication 1 TABLET(S): at 11:51

## 2017-07-10 RX ADMIN — Medication 250 MILLIGRAM(S): at 11:56

## 2017-07-10 RX ADMIN — LATANOPROST 1 DROP(S): 0.05 SOLUTION/ DROPS OPHTHALMIC; TOPICAL at 22:29

## 2017-07-10 RX ADMIN — SIMVASTATIN 20 MILLIGRAM(S): 20 TABLET, FILM COATED ORAL at 22:29

## 2017-07-10 RX ADMIN — Medication 20 MILLIGRAM(S): at 06:03

## 2017-07-10 RX ADMIN — CLOPIDOGREL BISULFATE 75 MILLIGRAM(S): 75 TABLET, FILM COATED ORAL at 11:51

## 2017-07-10 RX ADMIN — Medication 0.5 MILLIGRAM(S): at 08:14

## 2017-07-10 RX ADMIN — LISINOPRIL 5 MILLIGRAM(S): 2.5 TABLET ORAL at 06:03

## 2017-07-10 RX ADMIN — Medication 50 MILLIGRAM(S): at 06:03

## 2017-07-10 RX ADMIN — SODIUM CHLORIDE 3 MILLILITER(S): 9 INJECTION INTRAMUSCULAR; INTRAVENOUS; SUBCUTANEOUS at 06:05

## 2017-07-10 RX ADMIN — Medication 1 MILLIGRAM(S): at 11:51

## 2017-07-10 RX ADMIN — SODIUM CHLORIDE 3 MILLILITER(S): 9 INJECTION INTRAMUSCULAR; INTRAVENOUS; SUBCUTANEOUS at 22:15

## 2017-07-10 RX ADMIN — PANTOPRAZOLE SODIUM 40 MILLIGRAM(S): 20 TABLET, DELAYED RELEASE ORAL at 06:03

## 2017-07-10 RX ADMIN — Medication 325 MILLIGRAM(S): at 11:51

## 2017-07-10 RX ADMIN — SODIUM CHLORIDE 3 MILLILITER(S): 9 INJECTION INTRAMUSCULAR; INTRAVENOUS; SUBCUTANEOUS at 14:59

## 2017-07-10 RX ADMIN — Medication 20 MILLIGRAM(S): at 17:47

## 2017-07-10 NOTE — PROGRESS NOTE ADULT - PROBLEM SELECTOR PLAN 2
-very guarded overall prognosis. Explained to daughter that she is doing better now, but her status is very tenuous. Explained that anything can happen at anytime like it did a few days ago when she went to ICU.

## 2017-07-10 NOTE — PROGRESS NOTE ADULT - SUBJECTIVE AND OBJECTIVE BOX
Patient: ANN FRANCES [id number] 84y Female                 Internal Medicine Hospitalist Progress Note - Dr. Errol Lovett    Chief Complaint: Patient is a 84y old  Female who presents with a chief complaint of Abdominal pain, insomnia, and increased shortness of breath (10 Murphy 2017 16:40)    BRIEF HOSPITAL COURSE:  83 y/o F with CHF, severe MR s/p mitral clip 2 weeks prior to admission, CAD, HTN, obesity, admitted to CT Surgery service due to altered mental status, found to have persistent hypercapnic respiratory failure with inability to wean from NIV, developed compensated respiratory acidosis and contraction alkalosis.  PCO2 87.  Transferred to the MICU service 7/3 for management of her NIV and metabolic disturbances.  In ICU, she was successfully weaned from NIV, requiring BIPAP at night.  TTE:  1. Mildly decreased global left ventricular systolic function.  2. Left ventricular ejection fraction, by visual estimation, is 45 to 50%.  3. Mild to moderate right ventricular systolic dysfunction.  4. Severely enlarged left atrium.  5. Moderately dilated right atrium.  6. Patient is s/p Mitral Clip with severe regurgitation.  7. Moderate-severe tricuspid regurgitation.  8. Mild aortic regurgitation.  9. Estimated pulmonary artery systolic pressure is 49.7 mmHg assuming a  right atrial pressure of 8 mmHg, which is consistent with mild pulmonary  hypertension. 10. There is no evidence of pericardial effusion.      Remains off BIPAP in daytime.  Offers no complaints.  No SOB / Chest pain / palpitations.  No additional complaints.    ____________________PHYSICAL EXAM:  Vitals reviewed as indicated below  GENERAL:  NAD Alert, slightly confused.    HEENT: NCAT  CARDIOVASCULAR:  S1, S2  LUNGS: coarse BS b/l  ABDOMEN:  soft, (-) tenderness, (-) distension, (+) bowel sounds, (-) guarding, (-) rebound (-) rigidity  EXTREMITIES:  no cyanosis / clubbing / edema.   ____________________      MEDICATIONS:  sodium chloride 0.9% lock flush 3 milliLiter(s) IV Push every 8 hours  latanoprost 0.005% Ophthalmic Solution 1 Drop(s) Both EYES at bedtime  pantoprazole    Tablet 40 milliGRAM(s) Oral before breakfast  multivitamin 1 Tablet(s) Oral daily  metoprolol succinate ER 50 milliGRAM(s) Oral daily  buDESOnide   0.5 milliGRAM(s) Respule 0.5 milliGRAM(s) Inhalation two times a day  clopidogrel Tablet 75 milliGRAM(s) Oral daily  folic acid 1 milliGRAM(s) Oral daily  ascorbic acid 250 milliGRAM(s) Oral daily  simvastatin 20 milliGRAM(s) Oral at bedtime  aluminum hydroxide/magnesium hydroxide/simethicone Suspension 30 milliLiter(s) Oral every 6 hours PRN  hydrALAZINE Injectable 10 milliGRAM(s) IV Push every 3 hours PRN  torsemide 20 milliGRAM(s) Oral two times a day  lisinopril 5 milliGRAM(s) Oral daily  ALBUTerol/ipratropium for Nebulization 3 milliLiter(s) Nebulizer every 6 hours PRN  ferrous    sulfate 325 milliGRAM(s) Oral daily      VITALS:  Vital Signs Last 24 Hrs  T(C): 36.4 (10 Jul 2017 07:50), Max: 36.5 (09 Jul 2017 17:00)  T(F): 97.5 (10 Jul 2017 07:50), Max: 97.7 (09 Jul 2017 17:00)  HR: 68 (10 Jul 2017 07:50) (68 - 75)  BP: 109/52 (10 Jul 2017 07:50) (109/52 - 123/58)  BP(mean): 83 (09 Jul 2017 15:00) (78 - 83)  RR: 18 (10 Jul 2017 07:50) (16 - 33)  SpO2: 99% (10 Jul 2017 08:20) (93% - 100%) Daily     Daily   CAPILLARY BLOOD GLUCOSE        I&O's Summary      LABS:                        8.8    4.3   )-----------( 160      ( 10 Jul 2017 06:51 )             29.8     07-10  MEDICATIONS:  sodium chloride 0.9% lock flush 3 milliLiter(s) IV Push every 8 hours  latanoprost 0.005% Ophthalmic Solution 1 Drop(s) Both EYES at bedtime  pantoprazole    Tablet 40 milliGRAM(s) Oral before breakfast  multivitamin 1 Tablet(s) Oral daily  metoprolol succinate ER 50 milliGRAM(s) Oral daily  buDESOnide   0.5 milliGRAM(s) Respule 0.5 milliGRAM(s) Inhalation two times a day  clopidogrel Tablet 75 milliGRAM(s) Oral daily  folic acid 1 milliGRAM(s) Oral daily  ascorbic acid 250 milliGRAM(s) Oral daily  simvastatin 20 milliGRAM(s) Oral at bedtime  aluminum hydroxide/magnesium hydroxide/simethicone Suspension 30 milliLiter(s) Oral every 6 hours PRN  hydrALAZINE Injectable 10 milliGRAM(s) IV Push every 3 hours PRN  torsemide 20 milliGRAM(s) Oral two times a day  lisinopril 5 milliGRAM(s) Oral daily  ALBUTerol/ipratropium for Nebulization 3 milliLiter(s) Nebulizer every 6 hours PRN  ferrous    sulfate 325 milliGRAM(s) Oral daily      VITALS:  Vital Signs Last 24 Hrs  T(C): 36.4 (10 Jul 2017 07:50), Max: 36.5 (09 Jul 2017 17:00)  T(F): 97.5 (10 Jul 2017 07:50), Max: 97.7 (09 Jul 2017 17:00)  HR: 68 (10 Jul 2017 07:50) (68 - 75)  BP: 109/52 (10 Jul 2017 07:50) (109/52 - 123/58)  BP(mean): 83 (09 Jul 2017 15:00) (78 - 83)  RR: 18 (10 Jul 2017 07:50) (16 - 33)  SpO2: 99% (10 Jul 2017 08:20) (93% - 100%) Daily     Daily   CAPILLARY BLOOD GLUCOSE        I&O's Summary      LABS:                        8.8    4.3   )-----------( 160      ( 10 Jul 2017 06:51 )             29.8     07-10    147<H>  |  103  |  37.0<H>  ----------------------------<  85  3.6   |  32.0<H>  |  1.37<H>    Ca    9.0      10 Jul 2017 06:51  Phos  2.4     07-09  Mg     2.5     07-09        RECENT CULTURES:                147<H>  |  103  |  37.0<H>  ----------------------------<  85  3.6   |  32.0<H>  |  1.37<H>    Ca    9.0      10 Jul 2017 06:51  Phos  2.4     07-09  Mg     2.5     07-09        RECENT CULTURES:

## 2017-07-10 NOTE — PROGRESS NOTE ADULT - SUBJECTIVE AND OBJECTIVE BOX
NEPHROLOGY INTERVAL HPI/OVERNIGHT EVENTS:    Examined earlier  clinically unchanged  nocturnal BiPaP      MEDICATIONS  (STANDING):  sodium chloride 0.9% lock flush 3 milliLiter(s) IV Push every 8 hours  latanoprost 0.005% Ophthalmic Solution 1 Drop(s) Both EYES at bedtime  pantoprazole    Tablet 40 milliGRAM(s) Oral before breakfast  multivitamin 1 Tablet(s) Oral daily  metoprolol succinate ER 50 milliGRAM(s) Oral daily  buDESOnide   0.5 milliGRAM(s) Respule 0.5 milliGRAM(s) Inhalation two times a day  clopidogrel Tablet 75 milliGRAM(s) Oral daily  folic acid 1 milliGRAM(s) Oral daily  ascorbic acid 250 milliGRAM(s) Oral daily  simvastatin 20 milliGRAM(s) Oral at bedtime  torsemide 20 milliGRAM(s) Oral two times a day  lisinopril 5 milliGRAM(s) Oral daily  ferrous    sulfate 325 milliGRAM(s) Oral daily    MEDICATIONS  (PRN):  aluminum hydroxide/magnesium hydroxide/simethicone Suspension 30 milliLiter(s) Oral every 6 hours PRN Dyspepsia  hydrALAZINE Injectable 10 milliGRAM(s) IV Push every 3 hours PRN sbp > 150  ALBUTerol/ipratropium for Nebulization 3 milliLiter(s) Nebulizer every 6 hours PRN Shortness of Breath and/or Wheezing      Allergies    aspirin (Anaphylaxis)  NSAIDs (Other)    Intolerances    Lasix (Other)  [facility]HS PT (Unknown)      Vital Signs Last 24 Hrs  T(C): 36.7 (10 Jul 2017 16:28), Max: 36.7 (10 Jul 2017 16:28)  T(F): 98.1 (10 Jul 2017 16:28), Max: 98.1 (10 Jul 2017 16:28)  HR: 66 (10 Jul 2017 16:28) (66 - 75)  BP: 100/64 (10 Jul 2017 16:28) (100/64 - 117/68)  BP(mean): --  RR: 20 (10 Jul 2017 16:28) (16 - 20)  SpO2: 99% (10 Jul 2017 08:20) (93% - 100%)  Daily     Daily     PHYSICAL EXAM:  GENERAL: NAD,   EYES:  conjunctiva and sclera clear  NECK: Supple, Veins full upright  NERVOUS SYSTEM:  A/O x3,   CHEST: poor air movement bilaterally  HEART:  sternal scar, no rub  ABDOMEN: Soft, NT/ND BS+  EXTREMITIES:  compression devices  both lower     LABS:                        8.8    4.3   )-----------( 160      ( 10 Jul 2017 06:51 )             29.8     07-10    147<H>  |  103  |  37.0<H>  ----------------------------<  85  3.6   |  32.0<H>  |  1.37<H>    Ca    9.0      10 Jul 2017 06:51  Phos  2.4     07-09  Mg     2.5     07-09                  RADIOLOGY & ADDITIONAL TESTS:

## 2017-07-10 NOTE — PROGRESS NOTE ADULT - ASSESSMENT
CKD-Pre renal azotemia/ Significant COPD/ Hypercarbia   No hydro on renal imaging   Agree w diuretics on Torsemide 20 mg q12hr watch K+  Will have to accept some degree of azotemia cr better 1.3 today  treat exacerbated alkalosis as needed overall improving  Pulmonary / Palliative f/u noted  AM labs

## 2017-07-10 NOTE — PROGRESS NOTE ADULT - SUBJECTIVE AND OBJECTIVE BOX
OVERNIGHT EVENTS: doing well and better.     Present Symptoms:     Dyspnea: 0   Nausea/Vomiting: No  Anxiety:  No  Depression: No  Fatigue: Yes   Loss of appetite: No    Pain: none            Character-            Duration-            Effect-            Factors-            Frequency-            Location-            Severity-    Review of Systems: Reviewed                  no pain  All others negative    MEDICATIONS  (STANDING):  sodium chloride 0.9% lock flush 3 milliLiter(s) IV Push every 8 hours  latanoprost 0.005% Ophthalmic Solution 1 Drop(s) Both EYES at bedtime  pantoprazole    Tablet 40 milliGRAM(s) Oral before breakfast  multivitamin 1 Tablet(s) Oral daily  metoprolol succinate ER 50 milliGRAM(s) Oral daily  buDESOnide   0.5 milliGRAM(s) Respule 0.5 milliGRAM(s) Inhalation two times a day  clopidogrel Tablet 75 milliGRAM(s) Oral daily  folic acid 1 milliGRAM(s) Oral daily  ascorbic acid 250 milliGRAM(s) Oral daily  simvastatin 20 milliGRAM(s) Oral at bedtime  torsemide 20 milliGRAM(s) Oral two times a day  lisinopril 5 milliGRAM(s) Oral daily  ferrous    sulfate 325 milliGRAM(s) Oral daily    MEDICATIONS  (PRN):  aluminum hydroxide/magnesium hydroxide/simethicone Suspension 30 milliLiter(s) Oral every 6 hours PRN Dyspepsia  hydrALAZINE Injectable 10 milliGRAM(s) IV Push every 3 hours PRN sbp > 150  ALBUTerol/ipratropium for Nebulization 3 milliLiter(s) Nebulizer every 6 hours PRN Shortness of Breath and/or Wheezing    PHYSICAL EXAM:    Vital Signs Last 24 Hrs  T(C): 36.7 (10 Jul 2017 16:28), Max: 36.7 (10 Jul 2017 16:28)  T(F): 98.1 (10 Jul 2017 16:28), Max: 98.1 (10 Jul 2017 16:28)  HR: 66 (10 Jul 2017 16:28) (66 - 75)  BP: 100/64 (10 Jul 2017 16:28) (100/64 - 117/68)  BP(mean): --  RR: 20 (10 Jul 2017 16:28) (16 - 20)  SpO2: 99% (10 Jul 2017 08:20) (93% - 100%)    General: alert  oriented  x 3    Karnofsky:  50%    HEENT: normal      Lungs: normal     CV: normal      GI: normal       : normal      MSK: weakness      Skin: no rash    LABS:                      8.8    4.3   )-----------( 160      ( 10 Jul 2017 06:51 )             29.8     07-10    147<H>  |  103  |  37.0<H>  ----------------------------<  85  3.6   |  32.0<H>  |  1.37<H>    Ca    9.0      10 Jul 2017 06:51  Phos  2.4     07-09  Mg     2.5     07-09    RADIOLOGY & ADDITIONAL STUDIES:    TTE: 6/16:   1. Mildly decreased global left ventricular systolic function.   2. Left ventricular ejection fraction, by visual estimation, is 45 to 50%.   3. Mild to moderate right ventricular systolic dysfunction.   4. Severely enlarged left atrium.   5. Moderately dilated right atrium.   6. Patient is s/p Mitral Clip with severe regurgitation.   7. Moderate-severe tricuspid regurgitation.   8. Mild aortic regurgitation.   9. Estimated pulmonary artery systolic pressure is 49.7 mmHg assuming a right atrial pressure of 8 mmHg, which is consistent with mild pulmonary hypertension.  10. There is no evidence of pericardial effusion.    ADVANCE DIRECTIVES: DNR/I

## 2017-07-10 NOTE — PROGRESS NOTE ADULT - ASSESSMENT
84F with severe valvular heart disease despite attempted MV clipping, with chronic hypercapnic respiratory failure due to decompensated diastolic and moderate systolic CHF, valvular disease, obesity hypoventilation, mild pulmonary hypertension, now improved on Bipap QHS, nasal cannula during the day.

## 2017-07-10 NOTE — PROGRESS NOTE ADULT - ASSESSMENT
85 y/o F with CHF, severe MR (s/p mitral valve clip ~2 weeks ago), CAD, HTN, obesity with persistent hypercapnic respiratory failure, contraction alkalosis.    > Acute on chronic systolic and diastolic CHF with severe MR and mod to severe TR - no further surgical plans per CT Surgery.  Continue diuresis.  Palliative care followup regarding goals of care, ? hospice.  PT evaluation.    > Acute on chronic respiratory failure with hypercapnia - Pulmonary followup, continue Nocturnal BIPAP.    > Microcytic Anemia - H/H stable - repeat Fe studies, stool OB.   > Metabolic Acidosis - renal followup appreciated.  careful diuresis, Diamox.  > CHRISTOPHER - Cr stable.  Avoid nephrotoxic agents.   > CAD - continue Plavix, BBlocker.  > HLD - continue Statin  > Glaucoma - continue eyedrops.  > DVT Prophylaxis - Lower extremity intermittent compression devices.

## 2017-07-10 NOTE — PROGRESS NOTE ADULT - PROBLEM SELECTOR PLAN 5
-lengthy conversation with daughter Mini regarding overall goals. We talked a bit about hospice. She asked me what moms "terminal diagnosis" is. I explained that she has two organs with chronic illnesses that can result in death at anytime. I discussed with her chronic illness and the trajectory of both her chronic illnesses, in that they can be really good for certain periods, and then all of a sudden (much like what happened a few days ago) they can be in the hospital dying, and that at any point during an exacerbation she can be sick to the point of nearing an end of life situation. I spoke to her regarding hospice, that it is an extra layer of support generally for people with a prognosis of < 6 months; but that does not mean mom definitely has less than 6 months, and that sometimes people live longer and people can live shorter. For now, after she discussed with her siblings, they would like to continue on the path of rehab if it is an option, and then take mom home with home care to follow up with her regular primary doctor and cardiologist Dr. Clemens. I did discuss that mom is very high risk for readmission, and if we start seeing the pattern of recurrent rehospitalization, that we should definitely consider hospice at that time. She understands.

## 2017-07-11 LAB
ANION GAP SERPL CALC-SCNC: 10 MMOL/L — SIGNIFICANT CHANGE UP (ref 5–17)
BUN SERPL-MCNC: 37 MG/DL — HIGH (ref 8–20)
CALCIUM SERPL-MCNC: 8.9 MG/DL — SIGNIFICANT CHANGE UP (ref 8.6–10.2)
CHLORIDE SERPL-SCNC: 107 MMOL/L — SIGNIFICANT CHANGE UP (ref 98–107)
CO2 SERPL-SCNC: 34 MMOL/L — HIGH (ref 22–29)
CREAT SERPL-MCNC: 1.25 MG/DL — SIGNIFICANT CHANGE UP (ref 0.5–1.3)
GLUCOSE SERPL-MCNC: 95 MG/DL — SIGNIFICANT CHANGE UP (ref 70–115)
HCT VFR BLD CALC: 27.6 % — LOW (ref 37–47)
HGB BLD-MCNC: 8.2 G/DL — LOW (ref 12–16)
MCHC RBC-ENTMCNC: 22.2 PG — LOW (ref 27–31)
MCHC RBC-ENTMCNC: 29.7 G/DL — LOW (ref 32–36)
MCV RBC AUTO: 74.8 FL — LOW (ref 81–99)
PLATELET # BLD AUTO: 137 K/UL — LOW (ref 150–400)
POTASSIUM SERPL-MCNC: 3.6 MMOL/L — SIGNIFICANT CHANGE UP (ref 3.5–5.3)
POTASSIUM SERPL-SCNC: 3.6 MMOL/L — SIGNIFICANT CHANGE UP (ref 3.5–5.3)
RBC # BLD: 3.69 M/UL — LOW (ref 4.4–5.2)
RBC # FLD: 26.1 % — HIGH (ref 11–15.6)
SODIUM SERPL-SCNC: 151 MMOL/L — HIGH (ref 135–145)
WBC # BLD: 4.1 K/UL — LOW (ref 4.8–10.8)
WBC # FLD AUTO: 4.1 K/UL — LOW (ref 4.8–10.8)

## 2017-07-11 PROCEDURE — 99231 SBSQ HOSP IP/OBS SF/LOW 25: CPT

## 2017-07-11 PROCEDURE — 99233 SBSQ HOSP IP/OBS HIGH 50: CPT

## 2017-07-11 RX ORDER — SODIUM CHLORIDE 9 MG/ML
1000 INJECTION, SOLUTION INTRAVENOUS
Qty: 0 | Refills: 0 | Status: DISCONTINUED | OUTPATIENT
Start: 2017-07-11 | End: 2017-07-19

## 2017-07-11 RX ADMIN — CLOPIDOGREL BISULFATE 75 MILLIGRAM(S): 75 TABLET, FILM COATED ORAL at 11:05

## 2017-07-11 RX ADMIN — Medication 250 MILLIGRAM(S): at 11:05

## 2017-07-11 RX ADMIN — SODIUM CHLORIDE 3 MILLILITER(S): 9 INJECTION INTRAMUSCULAR; INTRAVENOUS; SUBCUTANEOUS at 05:35

## 2017-07-11 RX ADMIN — SODIUM CHLORIDE 3 MILLILITER(S): 9 INJECTION INTRAMUSCULAR; INTRAVENOUS; SUBCUTANEOUS at 20:49

## 2017-07-11 RX ADMIN — PANTOPRAZOLE SODIUM 40 MILLIGRAM(S): 20 TABLET, DELAYED RELEASE ORAL at 05:37

## 2017-07-11 RX ADMIN — SODIUM CHLORIDE 50 MILLILITER(S): 9 INJECTION, SOLUTION INTRAVENOUS at 09:00

## 2017-07-11 RX ADMIN — Medication 325 MILLIGRAM(S): at 11:05

## 2017-07-11 RX ADMIN — Medication 20 MILLIGRAM(S): at 05:36

## 2017-07-11 RX ADMIN — SODIUM CHLORIDE 3 MILLILITER(S): 9 INJECTION INTRAMUSCULAR; INTRAVENOUS; SUBCUTANEOUS at 13:04

## 2017-07-11 RX ADMIN — Medication 1 TABLET(S): at 11:05

## 2017-07-11 RX ADMIN — LATANOPROST 1 DROP(S): 0.05 SOLUTION/ DROPS OPHTHALMIC; TOPICAL at 20:51

## 2017-07-11 RX ADMIN — Medication 1 MILLIGRAM(S): at 11:05

## 2017-07-11 RX ADMIN — LISINOPRIL 5 MILLIGRAM(S): 2.5 TABLET ORAL at 05:36

## 2017-07-11 RX ADMIN — Medication 50 MILLIGRAM(S): at 05:36

## 2017-07-11 RX ADMIN — Medication 0.5 MILLIGRAM(S): at 21:33

## 2017-07-11 RX ADMIN — SIMVASTATIN 20 MILLIGRAM(S): 20 TABLET, FILM COATED ORAL at 20:51

## 2017-07-11 NOTE — PROGRESS NOTE ADULT - SUBJECTIVE AND OBJECTIVE BOX
Patient: ANN FRANCES [id number] 84y Female                 Internal Medicine Hospitalist Progress Note - Dr. Errol Lovett    Chief Complaint: Patient is a 84y old  Female who presents with a chief complaint of Abdominal pain, insomnia, and increased shortness of breath (10 Murphy 2017 16:40)    BRIEF HOSPITAL COURSE:  83 y/o F with CHF, severe MR s/p mitral clip 2 weeks prior to admission, CAD, HTN, obesity, admitted to CT Surgery service due to altered mental status, found to have persistent hypercapnic respiratory failure with inability to wean from NIV, developed compensated respiratory acidosis and contraction alkalosis.  PCO2 87.  Transferred to the MICU service 7/3 for management of her NIV and metabolic disturbances.  In ICU, she was successfully weaned from NIV, requiring BIPAP at night.  TTE:  1. Mildly decreased global left ventricular systolic function.  2. Left ventricular ejection fraction, by visual estimation, is 45 to 50%.  3. Mild to moderate right ventricular systolic dysfunction.  4. Severely enlarged left atrium.  5. Moderately dilated right atrium.  6. Patient is s/p Mitral Clip with severe regurgitation.  7. Moderate-severe tricuspid regurgitation.  8. Mild aortic regurgitation.  9. Estimated pulmonary artery systolic pressure is 49.7 mmHg assuming a  right atrial pressure of 8 mmHg, which is consistent with mild pulmonary  hypertension. 10. There is no evidence of pericardial effusion.      Remains off BIPAP in daytime.  Offers no complaints.  No SOB / Chest pain / palpitations.  No additional complaints.    ____________________PHYSICAL EXAM:  Vitals reviewed as indicated below  GENERAL:  NAD Alert, slightly confused.    HEENT: NCAT  CARDIOVASCULAR:  S1, S2  LUNGS: coarse BS b/l  ABDOMEN:  soft, (-) tenderness, (-) distension, (+) bowel sounds, (-) guarding, (-) rebound (-) rigidity  EXTREMITIES:  no cyanosis / clubbing / edema.   ____________________      MEDICATIONS:  sodium chloride 0.9% lock flush 3 milliLiter(s) IV Push every 8 hours  latanoprost 0.005% Ophthalmic Solution 1 Drop(s) Both EYES at bedtime  pantoprazole    Tablet 40 milliGRAM(s) Oral before breakfast  multivitamin 1 Tablet(s) Oral daily  metoprolol succinate ER 50 milliGRAM(s) Oral daily  buDESOnide   0.5 milliGRAM(s) Respule 0.5 milliGRAM(s) Inhalation two times a day  clopidogrel Tablet 75 milliGRAM(s) Oral daily  folic acid 1 milliGRAM(s) Oral daily  ascorbic acid 250 milliGRAM(s) Oral daily  simvastatin 20 milliGRAM(s) Oral at bedtime  aluminum hydroxide/magnesium hydroxide/simethicone Suspension 30 milliLiter(s) Oral every 6 hours PRN  hydrALAZINE Injectable 10 milliGRAM(s) IV Push every 3 hours PRN  torsemide 20 milliGRAM(s) Oral two times a day  lisinopril 5 milliGRAM(s) Oral daily  ALBUTerol/ipratropium for Nebulization 3 milliLiter(s) Nebulizer every 6 hours PRN  ferrous    sulfate 325 milliGRAM(s) Oral daily  sodium chloride 0.45%. 1000 milliLiter(s) IV Continuous <Continuous>      VITALS:  Vital Signs Last 24 Hrs  T(C): 37.1 (11 Jul 2017 08:00), Max: 37.1 (11 Jul 2017 08:00)  T(F): 98.7 (11 Jul 2017 08:00), Max: 98.7 (11 Jul 2017 08:00)  HR: 67 (11 Jul 2017 08:00) (60 - 69)  BP: 114/62 (11 Jul 2017 08:00) (100/64 - 119/52)  BP(mean): --  RR: 18 (11 Jul 2017 08:00) (18 - 20)  SpO2: 98% (11 Jul 2017 08:00) (98% - 98%) Daily     Daily   CAPILLARY BLOOD GLUCOSE        I&O's Summary    10 Jul 2017 07:01  -  11 Jul 2017 07:00  --------------------------------------------------------  IN: 340 mL / OUT: 0 mL / NET: 340 mL        LABS:                        8.2    4.1   )-----------( 137      ( 11 Jul 2017 05:58 )             27.6     07-11    151<H>  |  107  |  37.0<H>  ----------------------------<  95  3.6   |  34.0<H>  |  1.25    Ca    8.9      11 Jul 2017 05:58        RECENT CULTURES:

## 2017-07-11 NOTE — PROGRESS NOTE ADULT - ASSESSMENT
Acute on chronic respiratory failure on nocturnal NIV and maintaining stability.    Plan:  Continue present Rx

## 2017-07-11 NOTE — PROGRESS NOTE ADULT - ASSESSMENT
83 y/o F with CHF, severe MR (s/p mitral valve clip ~2 weeks ago), CAD, HTN, obesity with persistent hypercapnic respiratory failure, contraction alkalosis.    > Acute on chronic systolic and diastolic CHF with severe MR and mod to severe TR - no further surgical plans per CT Surgery.  Continue diuresis.  Palliative care followup noted.  Family wishes not to pursue hospice at present.  PT evaluation to consider LISSY placement.   > Hyponatremia - likely due to diuresis.  Wt 75kg.  Encourage po free water, hypotonic saline.  Renal followup.    > Acute on chronic respiratory failure with hypercapnia - Pulmonary followup, continue Nocturnal BIPAP.    > Microcytic Anemia - H/H stable - repeat Fe studies, stool OB.   > Metabolic Acidosis - renal followup appreciated.  careful diuresis, Diamox.  > CHRISTOPHER - Cr stable.  Avoid nephrotoxic agents.   > CAD - continue Plavix, BBlocker.  > HLD - continue Statin  > Glaucoma - continue eyedrops.  > DVT Prophylaxis - Lower extremity intermittent compression devices.

## 2017-07-11 NOTE — PROGRESS NOTE ADULT - SUBJECTIVE AND OBJECTIVE BOX
NEPHROLOGY INTERVAL HPI/OVERNIGHT EVENTS:    OOB to chair   comfortable   + Loop diuretic and ACE     MEDICATIONS  (STANDING):  sodium chloride 0.9% lock flush 3 milliLiter(s) IV Push every 8 hours  latanoprost 0.005% Ophthalmic Solution 1 Drop(s) Both EYES at bedtime  pantoprazole    Tablet 40 milliGRAM(s) Oral before breakfast  multivitamin 1 Tablet(s) Oral daily  metoprolol succinate ER 50 milliGRAM(s) Oral daily  buDESOnide   0.5 milliGRAM(s) Respule 0.5 milliGRAM(s) Inhalation two times a day  clopidogrel Tablet 75 milliGRAM(s) Oral daily  folic acid 1 milliGRAM(s) Oral daily  ascorbic acid 250 milliGRAM(s) Oral daily  simvastatin 20 milliGRAM(s) Oral at bedtime  torsemide 20 milliGRAM(s) Oral two times a day  lisinopril 5 milliGRAM(s) Oral daily  ferrous    sulfate 325 milliGRAM(s) Oral daily  sodium chloride 0.45%. 1000 milliLiter(s) (50 mL/Hr) IV Continuous <Continuous>    MEDICATIONS  (PRN):  aluminum hydroxide/magnesium hydroxide/simethicone Suspension 30 milliLiter(s) Oral every 6 hours PRN Dyspepsia  hydrALAZINE Injectable 10 milliGRAM(s) IV Push every 3 hours PRN sbp > 150  ALBUTerol/ipratropium for Nebulization 3 milliLiter(s) Nebulizer every 6 hours PRN Shortness of Breath and/or Wheezing      Allergies    aspirin (Anaphylaxis)  NSAIDs (Other)    Intolerances    Lasix (Other)  [facility]HS PT (Unknown)        Vital Signs Last 24 Hrs  T(C): 36.7 (11 Jul 2017 15:37), Max: 37.1 (11 Jul 2017 08:00)  T(F): 98.1 (11 Jul 2017 15:37), Max: 98.7 (11 Jul 2017 08:00)  HR: 69 (11 Jul 2017 15:37) (60 - 69)  BP: 102/50 (11 Jul 2017 15:37) (102/50 - 119/52)  BP(mean): --  RR: 18 (11 Jul 2017 15:37) (18 - 20)  SpO2: 98% (11 Jul 2017 08:00) (98% - 98%)  Daily     Daily   I&O's Detail    10 Jul 2017 07:01 - 11 Jul 2017 07:00  --------------------------------------------------------  IN:    Oral Fluid: 340 mL  Total IN: 340 mL    OUT:  Total OUT: 0 mL    Total NET: 340 mL        I&O's Summary    10 Jul 2017 07:01  -  11 Jul 2017 07:00  --------------------------------------------------------  IN: 340 mL / OUT: 0 mL / NET: 340 mL    GENERAL: NAD,   EYES:  conjunctiva and sclera clear  NECK: Supple, Veins full upright  NERVOUS SYSTEM:  A/O x3,   CHEST: poor air movement bilaterally  HEART:  sternal scar, no rub  ABDOMEN: Soft, NT/ND BS+  EXTREMITIES: less edema       LABS:                        8.2    4.1   )-----------( 137      ( 11 Jul 2017 05:58 )             27.6     07-11    151<H>  |  107  |  37.0<H>  ----------------------------<  95  3.6   |  34.0<H>  |  1.25    Ca    8.9      11 Jul 2017 05:58                  RADIOLOGY & ADDITIONAL TESTS:

## 2017-07-11 NOTE — CHART NOTE - NSCHARTNOTEFT_GEN_A_CORE
As discussed with Dr Owen, no need for further CT surgical intervention at this time. Will sign off. Please reconsult if needed.

## 2017-07-11 NOTE — PROGRESS NOTE ADULT - SUBJECTIVE AND OBJECTIVE BOX
PULMONARY PROGRESS NOTE      ANN FRANCESLEE [id number]    Patient is a 84y old  Female who presents with a chief complaint of Abdominal pain, insomnia, and increased shortness of breath (10 Murphy 2017 16:40)      INTERVAL HPI/OVERNIGHT EVENTS:  No new changes    MEDICATIONS  (STANDING):  sodium chloride 0.9% lock flush 3 milliLiter(s) IV Push every 8 hours  latanoprost 0.005% Ophthalmic Solution 1 Drop(s) Both EYES at bedtime  pantoprazole    Tablet 40 milliGRAM(s) Oral before breakfast  multivitamin 1 Tablet(s) Oral daily  metoprolol succinate ER 50 milliGRAM(s) Oral daily  buDESOnide   0.5 milliGRAM(s) Respule 0.5 milliGRAM(s) Inhalation two times a day  clopidogrel Tablet 75 milliGRAM(s) Oral daily  folic acid 1 milliGRAM(s) Oral daily  ascorbic acid 250 milliGRAM(s) Oral daily  simvastatin 20 milliGRAM(s) Oral at bedtime  lisinopril 5 milliGRAM(s) Oral daily  ferrous    sulfate 325 milliGRAM(s) Oral daily  sodium chloride 0.45%. 1000 milliLiter(s) (50 mL/Hr) IV Continuous <Continuous>  torsemide 20 milliGRAM(s) Oral daily      MEDICATIONS  (PRN):  aluminum hydroxide/magnesium hydroxide/simethicone Suspension 30 milliLiter(s) Oral every 6 hours PRN Dyspepsia  hydrALAZINE Injectable 10 milliGRAM(s) IV Push every 3 hours PRN sbp > 150  ALBUTerol/ipratropium for Nebulization 3 milliLiter(s) Nebulizer every 6 hours PRN Shortness of Breath and/or Wheezing      Allergies    aspirin (Anaphylaxis)  NSAIDs (Other)    Intolerances    Lasix (Other)  [facility] PT (Unknown)      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 ENT:  No earache, sore throat or runny nose.    CARDIOVASCULAR:  No pressure, squeezing, tightness, heaviness or aching about the chest; no palpitations.    RESPIRATORY:  + dyspnea    GASTROINTESTINAL:  No nausea, vomiting or diarrhea.    GENITOURINARY:  No dysuria, frequency or urgency.    MUSCULOSKELETAL:  No joint pain    SKIN:  No new lesions.    NEUROLOGIC:  No paresthesias, fasciculations, seizures or weakness.    PSYCHIATRIC:  No disorder of thought or mood.    ENDOCRINE:  No heat or cold intolerance, polyuria or polydipsia.    HEMATOLOGICAL:  No easy bruising or bleeding.     Vital Signs Last 24 Hrs  T(C): 36.7 (2017 15:37), Max: 37.1 (2017 08:00)  T(F): 98.1 (2017 15:37), Max: 98.7 (2017 08:00)  HR: 69 (2017 15:37) (60 - 69)  BP: 102/50 (2017 15:37) (102/50 - 119/52)  BP(mean): --  RR: 18 (2017 15:37) (18 - 20)  SpO2: 98% (2017 08:00) (98% - 98%)    PHYSICAL EXAMINATION:    GENERAL: The patient is awake and alert in no apparent distress.     HEENT: Head is normocephalic and atraumatic. Extraocular muscles are intact. Mucous membranes are moist.    NECK: Supple.    LUNGS: Clear to auscultation without wheezing, rales or rhonchi; respirations unlabored,decreased at bases    HEART: Regular rate and rhythm without murmur.    ABDOMEN: Soft, nontender, and nondistended.      EXTREMITIES: Without any cyanosis, clubbing, rash, lesions or edema.    NEUROLOGIC: Grossly intact.    SKIN: No ulceration or induration present.      LABS:                        8.2    4.1   )-----------( 137      ( 2017 05:58 )             27.6     07-    151<H>  |  107  |  37.0<H>  ----------------------------<  95  3.6   |  34.0<H>  |  1.25    Ca    8.9      2017 05:58                          MICROBIOLOGY:    RADIOLOGY & ADDITIONAL STUDIES:

## 2017-07-11 NOTE — PROGRESS NOTE ADULT - ASSESSMENT
Renal insufficiency-Pre renal azotemia  , Sig COPD , hypercarbia   Recent MV Clip   No hydro on renal imaging   Will have to accept some degree of augmented azotemia and treat exacerbated alkalosis as needed   Continue ACE and current loop diuretic

## 2017-07-12 LAB
ANION GAP SERPL CALC-SCNC: 10 MMOL/L — SIGNIFICANT CHANGE UP (ref 5–17)
BUN SERPL-MCNC: 39 MG/DL — HIGH (ref 8–20)
CALCIUM SERPL-MCNC: 8.8 MG/DL — SIGNIFICANT CHANGE UP (ref 8.6–10.2)
CHLORIDE SERPL-SCNC: 104 MMOL/L — SIGNIFICANT CHANGE UP (ref 98–107)
CO2 SERPL-SCNC: 32 MMOL/L — HIGH (ref 22–29)
CREAT SERPL-MCNC: 1.19 MG/DL — SIGNIFICANT CHANGE UP (ref 0.5–1.3)
GLUCOSE SERPL-MCNC: 91 MG/DL — SIGNIFICANT CHANGE UP (ref 70–115)
HCT VFR BLD CALC: 34.5 % — LOW (ref 37–47)
HGB BLD-MCNC: 10.3 G/DL — LOW (ref 12–16)
MCHC RBC-ENTMCNC: 22.2 PG — LOW (ref 27–31)
MCHC RBC-ENTMCNC: 29.9 G/DL — LOW (ref 32–36)
MCV RBC AUTO: 74.4 FL — LOW (ref 81–99)
PLATELET # BLD AUTO: 140 K/UL — LOW (ref 150–400)
POTASSIUM SERPL-MCNC: 4.3 MMOL/L — SIGNIFICANT CHANGE UP (ref 3.5–5.3)
POTASSIUM SERPL-SCNC: 4.3 MMOL/L — SIGNIFICANT CHANGE UP (ref 3.5–5.3)
RBC # BLD: 4.64 M/UL — SIGNIFICANT CHANGE UP (ref 4.4–5.2)
RBC # FLD: 26.4 % — HIGH (ref 11–15.6)
SODIUM SERPL-SCNC: 146 MMOL/L — HIGH (ref 135–145)
WBC # BLD: 4.8 K/UL — SIGNIFICANT CHANGE UP (ref 4.8–10.8)
WBC # FLD AUTO: 4.8 K/UL — SIGNIFICANT CHANGE UP (ref 4.8–10.8)

## 2017-07-12 PROCEDURE — 99233 SBSQ HOSP IP/OBS HIGH 50: CPT

## 2017-07-12 RX ADMIN — SODIUM CHLORIDE 3 MILLILITER(S): 9 INJECTION INTRAMUSCULAR; INTRAVENOUS; SUBCUTANEOUS at 14:01

## 2017-07-12 RX ADMIN — Medication 1 MILLIGRAM(S): at 11:29

## 2017-07-12 RX ADMIN — Medication 1 TABLET(S): at 11:29

## 2017-07-12 RX ADMIN — Medication 250 MILLIGRAM(S): at 11:29

## 2017-07-12 RX ADMIN — CLOPIDOGREL BISULFATE 75 MILLIGRAM(S): 75 TABLET, FILM COATED ORAL at 11:29

## 2017-07-12 RX ADMIN — SIMVASTATIN 20 MILLIGRAM(S): 20 TABLET, FILM COATED ORAL at 22:53

## 2017-07-12 RX ADMIN — Medication 0.5 MILLIGRAM(S): at 21:16

## 2017-07-12 RX ADMIN — Medication 3 MILLILITER(S): at 08:16

## 2017-07-12 RX ADMIN — PANTOPRAZOLE SODIUM 40 MILLIGRAM(S): 20 TABLET, DELAYED RELEASE ORAL at 06:55

## 2017-07-12 RX ADMIN — SODIUM CHLORIDE 3 MILLILITER(S): 9 INJECTION INTRAMUSCULAR; INTRAVENOUS; SUBCUTANEOUS at 22:51

## 2017-07-12 RX ADMIN — Medication 0.5 MILLIGRAM(S): at 08:16

## 2017-07-12 RX ADMIN — Medication 325 MILLIGRAM(S): at 11:29

## 2017-07-12 RX ADMIN — LATANOPROST 1 DROP(S): 0.05 SOLUTION/ DROPS OPHTHALMIC; TOPICAL at 22:53

## 2017-07-12 NOTE — PROGRESS NOTE ADULT - SUBJECTIVE AND OBJECTIVE BOX
PULMONARY PROGRESS NOTE      ANN FRANCES  MRN-[id number]    Patient is a 84y old  Female who presents with a chief complaint of Abdominal pain, insomnia, and increased shortness of breath (10 Murphy 2017 16:40)      INTERVAL HPI/OVERNIGHT EVENTS:    Patient comfortable with decreased SOB    MEDICATIONS  (STANDING):  sodium chloride 0.9% lock flush 3 milliLiter(s) IV Push every 8 hours  latanoprost 0.005% Ophthalmic Solution 1 Drop(s) Both EYES at bedtime  pantoprazole    Tablet 40 milliGRAM(s) Oral before breakfast  multivitamin 1 Tablet(s) Oral daily  metoprolol succinate ER 50 milliGRAM(s) Oral daily  buDESOnide   0.5 milliGRAM(s) Respule 0.5 milliGRAM(s) Inhalation two times a day  clopidogrel Tablet 75 milliGRAM(s) Oral daily  folic acid 1 milliGRAM(s) Oral daily  ascorbic acid 250 milliGRAM(s) Oral daily  simvastatin 20 milliGRAM(s) Oral at bedtime  lisinopril 5 milliGRAM(s) Oral daily  ferrous    sulfate 325 milliGRAM(s) Oral daily  sodium chloride 0.45%. 1000 milliLiter(s) (50 mL/Hr) IV Continuous <Continuous>  torsemide 20 milliGRAM(s) Oral daily      MEDICATIONS  (PRN):  aluminum hydroxide/magnesium hydroxide/simethicone Suspension 30 milliLiter(s) Oral every 6 hours PRN Dyspepsia  hydrALAZINE Injectable 10 milliGRAM(s) IV Push every 3 hours PRN sbp > 150  ALBUTerol/ipratropium for Nebulization 3 milliLiter(s) Nebulizer every 6 hours PRN Shortness of Breath and/or Wheezing      Allergies    aspirin (Anaphylaxis)  NSAIDs (Other)    Intolerances    Lasix (Other)  [facility] PT (Unknown)      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 ENT:  No earache, sore throat or runny nose.    CARDIOVASCULAR:  No pressure, squeezing, tightness, heaviness or aching about the chest; no palpitations.    RESPIRATORY:  Improved cough, shortness of breath, no PND or orthopnea.    GASTROINTESTINAL:  No nausea, vomiting or diarrhea.    GENITOURINARY:  No dysuria, frequency or urgency.    NEUROLOGIC:  No paresthesias, fasciculations, seizures or weakness.    PSYCHIATRIC:  No disorder of thought or mood.    Vital Signs Last 24 Hrs  T(C): 36.8 (2017 16:11), Max: 36.8 (2017 16:11)  T(F): 98.3 (2017 16:11), Max: 98.3 (2017 16:11)  HR: 71 (2017 16:11) (67 - 82)  BP: 122/69 (2017 16:11) (97/52 - 122/69)  BP(mean): --  RR: 18 (2017 16:11) (18 - 18)  SpO2: 99% (2017 16:11) (98% - 99%)    PHYSICAL EXAMINATION:    GENERAL: The patient is awake and alert in no apparent distress.     HEENT: Head is normocephalic and atraumatic. Extraocular muscles are intact. Mucous membranes are moist.    NECK: Supple.    LUNGS: Decreased BS b/l without wheeze, rales, or rhonchi; respirations unlabored    HEART: Regular rate and rhythm without murmur.    ABDOMEN: Soft, nontender, and nondistended.      EXTREMITIES: Without any cyanosis, clubbing, rash, lesions or edema.    NEUROLOGIC: Grossly intact.    LABS:                        10.3   4.8   )-----------( 140      ( 2017 11:14 )             34.5     07-    146<H>  |  104  |  39.0<H>  ----------------------------<  91  4.3   |  32.0<H>  |  1.19    Ca    8.8      2017 05:51          RADIOLOGY & ADDITIONAL STUDIES:     EXAM:  CHEST SINGLE VIEW FRONTAL                          PROCEDURE DATE:  2017          INTERPRETATION:  Portable chest radiograph        CLINICAL INFORMATION:   Hypoxia.    TECHNIQUE:  Portable  AP view of the chest was obtained.    COMPARISON: 7/3/2017 available for review.    FINDINGS:   The lungs  show ill-defined the cardiac borders are likely indicating   bilateral pleural effusions and/or infiltrates. Upper lobes clear. There   is mild vascular congestion.        There is gross cardiomegaly unchanged from prior examination. Status post   coronary artery bypass graft procedure. Visualized osseous structures are   intact.        IMPRESSION:   Gross cardiomegaly...   Persistent bibasilar infiltrates   and effusions.      INES MORTENSEN M.D., ATTENDING RADIOLOGIST  This document has been electronically signed. 2017  2:45PM      ECHO:     Summary:   1. Mildly decreased global left ventricular systolic function.   2. Left ventricular ejection fraction, by visual estimation, is 45 to   50%.   3. Mild to moderate right ventricular systolic dysfunction.   4. Severely enlarged left atrium.   5. Moderately dilated right atrium.   6. Patient is s/p Mitral Clip with severe regurgitation.   7. Moderate-severe tricuspid regurgitation.   8. Mild aortic regurgitation.   9. Estimated pulmonary artery systolic pressure is 49.7 mmHg assuming a   right atrial pressure of 8 mmHg, which is consistent with mild pulmonary   hypertension.  10. There is no evidence of pericardial effusion.     MD Gerardo Electronically signed on 2017 at 2:24:07 PM

## 2017-07-12 NOTE — PROGRESS NOTE ADULT - SUBJECTIVE AND OBJECTIVE BOX
Patient: ANN FRANCES [id number] 84y Female                 Internal Medicine Hospitalist Progress Note - Dr. Errol Lovett    Chief Complaint: Patient is a 84y old  Female who presents with a chief complaint of Abdominal pain, insomnia, and increased shortness of breath (10 Murphy 2017 16:40)    BRIEF HOSPITAL COURSE:  83 y/o F with CHF, severe MR s/p mitral clip 2 weeks prior to admission, CAD, HTN, obesity, admitted to CT Surgery service due to altered mental status, found to have persistent hypercapnic respiratory failure with inability to wean from NIV, developed compensated respiratory acidosis and contraction alkalosis.  PCO2 87.  Transferred to the MICU service 7/3 for management of her NIV and metabolic disturbances.  In ICU, she was successfully weaned from NIV, requiring BIPAP at night.  TTE:  1. Mildly decreased global left ventricular systolic function.  2. Left ventricular ejection fraction, by visual estimation, is 45 to 50%.  3. Mild to moderate right ventricular systolic dysfunction.  4. Severely enlarged left atrium.  5. Moderately dilated right atrium.  6. Patient is s/p Mitral Clip with severe regurgitation.  7. Moderate-severe tricuspid regurgitation.  8. Mild aortic regurgitation.  9. Estimated pulmonary artery systolic pressure is 49.7 mmHg assuming a  right atrial pressure of 8 mmHg, which is consistent with mild pulmonary  hypertension. 10. There is no evidence of pericardial effusion.      Offers no complaints.  No SOB / Chest pain / palpitations.  No additional complaints.    ____________________PHYSICAL EXAM:  Vitals reviewed as indicated below  GENERAL:  NAD Alert, slightly confused.    HEENT: NCAT  CARDIOVASCULAR:  S1, S2  LUNGS: coarse BS b/l  ABDOMEN:  soft, (-) tenderness, (-) distension, (+) bowel sounds, (-) guarding, (-) rebound (-) rigidity  EXTREMITIES:  no cyanosis / clubbing / edema.   ____________________      MEDICATIONS:  sodium chloride 0.9% lock flush 3 milliLiter(s) IV Push every 8 hours  latanoprost 0.005% Ophthalmic Solution 1 Drop(s) Both EYES at bedtime  pantoprazole    Tablet 40 milliGRAM(s) Oral before breakfast  multivitamin 1 Tablet(s) Oral daily  metoprolol succinate ER 50 milliGRAM(s) Oral daily  buDESOnide   0.5 milliGRAM(s) Respule 0.5 milliGRAM(s) Inhalation two times a day  clopidogrel Tablet 75 milliGRAM(s) Oral daily  folic acid 1 milliGRAM(s) Oral daily  ascorbic acid 250 milliGRAM(s) Oral daily  simvastatin 20 milliGRAM(s) Oral at bedtime  aluminum hydroxide/magnesium hydroxide/simethicone Suspension 30 milliLiter(s) Oral every 6 hours PRN  hydrALAZINE Injectable 10 milliGRAM(s) IV Push every 3 hours PRN  lisinopril 5 milliGRAM(s) Oral daily  ALBUTerol/ipratropium for Nebulization 3 milliLiter(s) Nebulizer every 6 hours PRN  ferrous    sulfate 325 milliGRAM(s) Oral daily  sodium chloride 0.45%. 1000 milliLiter(s) IV Continuous <Continuous>  torsemide 20 milliGRAM(s) Oral daily      VITALS:  Vital Signs Last 24 Hrs  T(C): 36.6 (12 Jul 2017 08:00), Max: 36.7 (11 Jul 2017 15:37)  T(F): 97.8 (12 Jul 2017 08:00), Max: 98.1 (11 Jul 2017 15:37)  HR: 67 (12 Jul 2017 08:00) (67 - 82)  BP: 104/55 (12 Jul 2017 08:00) (97/52 - 104/55)  BP(mean): --  RR: 18 (12 Jul 2017 08:00) (18 - 18)  SpO2: 99% (12 Jul 2017 08:00) (98% - 99%) Daily     Daily   CAPILLARY BLOOD GLUCOSE        I&O's Summary      LABS:                        8.2    4.1   )-----------( 137      ( 11 Jul 2017 05:58 )             27.6     07-12    146<H>  |  104  |  39.0<H>  ----------------------------<  91  4.3   |  32.0<H>  |  1.19    Ca    8.8      12 Jul 2017 05:51        RECENT CULTURES:

## 2017-07-12 NOTE — PROGRESS NOTE ADULT - ASSESSMENT
85 y/o F with CHF, severe MR (s/p mitral valve clip ~2 weeks ago), CAD, HTN, obesity with persistent hypercapnic respiratory failure, contraction alkalosis.    > Acute on chronic systolic and diastolic CHF with severe MR and mod to severe TR - no further surgical plans per CT Surgery.  Continue diuresis.  Palliative care followup noted.  Family wishes not to pursue hospice at present.  Likely LISSY placement.   > Hyponatremia - Improving after IVF.  Encourage po free water.  Renal followup.    > Acute on chronic respiratory failure with hypercapnia - Pulmonary followup, continue Nocturnal BIPAP.    > Microcytic Anemia, Iron deficiency - outpatient followup.  Venofer.     > Metabolic Acidosis - renal followup appreciated.  careful diuresis, Diamox.  > CHRISTOPHER - Cr stable.  Avoid nephrotoxic agents.   > CAD - continue Plavix, BBlocker.  > HLD - continue Statin  > Glaucoma - continue eyedrops.  > DVT Prophylaxis - Lower extremity intermittent compression devices. 85 y/o F with CHF, severe MR (s/p mitral valve clip ~2 weeks ago), CAD, HTN, obesity with persistent hypercapnic respiratory failure, contraction alkalosis.    > Acute on chronic systolic and diastolic CHF with severe MR and mod to severe TR - no further surgical plans per CT Surgery.  Continue diuresis.  Palliative care followup noted.  Family wishes not to pursue hospice at present.  Likely LISSY placement.   > Hypernatremia - Improving after IVF.  Encourage po free water.  Renal followup.    > Acute on chronic respiratory failure with hypercapnia - Pulmonary followup, continue Nocturnal BIPAP.    > Microcytic Anemia, Iron deficiency - outpatient followup.  Venofer.     > Metabolic Acidosis - renal followup appreciated.  careful diuresis, Diamox.  > CHRISTOPHER - Cr stable.  Avoid nephrotoxic agents.   > CAD - continue Plavix, BBlocker.  > HLD - continue Statin  > Glaucoma - continue eyedrops.  > DVT Prophylaxis - Lower extremity intermittent compression devices.

## 2017-07-12 NOTE — PROGRESS NOTE ADULT - ASSESSMENT
Renal insufficiency-Pre renal azotemia  , Sig COPD , hypercarbia   Recent MV Clip   No hydro on renal imaging   Will have to accept some degree of augmented azotemia and treat exacerbated alkalosis as needed   Continue ACE and current loop diuretic   Watch labs   Hospice has been refused

## 2017-07-13 ENCOUNTER — MEDICATION RENEWAL (OUTPATIENT)
Age: 82
End: 2017-07-13

## 2017-07-13 DIAGNOSIS — I48.2 CHRONIC ATRIAL FIBRILLATION: ICD-10-CM

## 2017-07-13 DIAGNOSIS — I21.11 ST ELEVATION (STEMI) MYOCARDIAL INFARCTION INVOLVING RIGHT CORONARY ARTERY: ICD-10-CM

## 2017-07-13 LAB
ANION GAP SERPL CALC-SCNC: 11 MMOL/L — SIGNIFICANT CHANGE UP (ref 5–17)
BUN SERPL-MCNC: 30 MG/DL — HIGH (ref 8–20)
CALCIUM SERPL-MCNC: 8.9 MG/DL — SIGNIFICANT CHANGE UP (ref 8.6–10.2)
CHLORIDE SERPL-SCNC: 100 MMOL/L — SIGNIFICANT CHANGE UP (ref 98–107)
CO2 SERPL-SCNC: 35 MMOL/L — HIGH (ref 22–29)
CREAT SERPL-MCNC: 1.02 MG/DL — SIGNIFICANT CHANGE UP (ref 0.5–1.3)
GLUCOSE SERPL-MCNC: 91 MG/DL — SIGNIFICANT CHANGE UP (ref 70–115)
HCT VFR BLD CALC: 26.3 % — LOW (ref 37–47)
HCT VFR BLD CALC: 28.3 % — LOW (ref 37–47)
HGB BLD-MCNC: 7.8 G/DL — LOW (ref 12–16)
HGB BLD-MCNC: 8.4 G/DL — LOW (ref 12–16)
LACTATE BLDV-MCNC: 1.9 MMOL/L — SIGNIFICANT CHANGE UP (ref 0.5–2)
MCHC RBC-ENTMCNC: 21.8 PG — LOW (ref 27–31)
MCHC RBC-ENTMCNC: 22 PG — LOW (ref 27–31)
MCHC RBC-ENTMCNC: 29.7 G/DL — LOW (ref 32–36)
MCHC RBC-ENTMCNC: 29.7 G/DL — LOW (ref 32–36)
MCV RBC AUTO: 73.7 FL — LOW (ref 81–99)
MCV RBC AUTO: 74.1 FL — LOW (ref 81–99)
PLATELET # BLD AUTO: 157 K/UL — SIGNIFICANT CHANGE UP (ref 150–400)
PLATELET # BLD AUTO: 170 K/UL — SIGNIFICANT CHANGE UP (ref 150–400)
POTASSIUM SERPL-MCNC: 3.8 MMOL/L — SIGNIFICANT CHANGE UP (ref 3.5–5.3)
POTASSIUM SERPL-SCNC: 3.8 MMOL/L — SIGNIFICANT CHANGE UP (ref 3.5–5.3)
RBC # BLD: 3.57 M/UL — LOW (ref 4.4–5.2)
RBC # BLD: 3.82 M/UL — LOW (ref 4.4–5.2)
RBC # FLD: 25 % — HIGH (ref 11–15.6)
RBC # FLD: 25.3 % — HIGH (ref 11–15.6)
SODIUM SERPL-SCNC: 146 MMOL/L — HIGH (ref 135–145)
TROPONIN T SERPL-MCNC: 0.02 NG/ML — SIGNIFICANT CHANGE UP (ref 0–0.06)
WBC # BLD: 3.6 K/UL — LOW (ref 4.8–10.8)
WBC # BLD: 5 K/UL — SIGNIFICANT CHANGE UP (ref 4.8–10.8)
WBC # FLD AUTO: 3.6 K/UL — LOW (ref 4.8–10.8)
WBC # FLD AUTO: 5 K/UL — SIGNIFICANT CHANGE UP (ref 4.8–10.8)

## 2017-07-13 PROCEDURE — 71010: CPT | Mod: 26

## 2017-07-13 PROCEDURE — 99233 SBSQ HOSP IP/OBS HIGH 50: CPT

## 2017-07-13 PROCEDURE — 99291 CRITICAL CARE FIRST HOUR: CPT

## 2017-07-13 PROCEDURE — 93010 ELECTROCARDIOGRAM REPORT: CPT

## 2017-07-13 RX ORDER — MORPHINE SULFATE 50 MG/1
2 CAPSULE, EXTENDED RELEASE ORAL ONCE
Qty: 0 | Refills: 0 | Status: DISCONTINUED | OUTPATIENT
Start: 2017-07-13 | End: 2017-07-13

## 2017-07-13 RX ORDER — HEPARIN SODIUM 5000 [USP'U]/ML
6000 INJECTION INTRAVENOUS; SUBCUTANEOUS EVERY 6 HOURS
Qty: 0 | Refills: 0 | Status: DISCONTINUED | OUTPATIENT
Start: 2017-07-13 | End: 2017-07-14

## 2017-07-13 RX ORDER — HEPARIN SODIUM 5000 [USP'U]/ML
3000 INJECTION INTRAVENOUS; SUBCUTANEOUS EVERY 6 HOURS
Qty: 0 | Refills: 0 | Status: DISCONTINUED | OUTPATIENT
Start: 2017-07-13 | End: 2017-07-14

## 2017-07-13 RX ORDER — FUROSEMIDE 40 MG
40 TABLET ORAL EVERY 12 HOURS
Qty: 0 | Refills: 0 | Status: DISCONTINUED | OUTPATIENT
Start: 2017-07-13 | End: 2017-07-15

## 2017-07-13 RX ORDER — FUROSEMIDE 40 MG
40 TABLET ORAL ONCE
Qty: 0 | Refills: 0 | Status: COMPLETED | OUTPATIENT
Start: 2017-07-13 | End: 2017-07-13

## 2017-07-13 RX ORDER — DIGOXIN 250 MCG
0.12 TABLET ORAL DAILY
Qty: 0 | Refills: 0 | Status: DISCONTINUED | OUTPATIENT
Start: 2017-07-13 | End: 2017-07-20

## 2017-07-13 RX ORDER — HEPARIN SODIUM 5000 [USP'U]/ML
INJECTION INTRAVENOUS; SUBCUTANEOUS
Qty: 25000 | Refills: 0 | Status: DISCONTINUED | OUTPATIENT
Start: 2017-07-13 | End: 2017-07-14

## 2017-07-13 RX ORDER — IRON SUCROSE 20 MG/ML
200 INJECTION, SOLUTION INTRAVENOUS EVERY 24 HOURS
Qty: 0 | Refills: 0 | Status: COMPLETED | OUTPATIENT
Start: 2017-07-13 | End: 2017-07-16

## 2017-07-13 RX ORDER — HEPARIN SODIUM 5000 [USP'U]/ML
6000 INJECTION INTRAVENOUS; SUBCUTANEOUS ONCE
Qty: 0 | Refills: 0 | Status: COMPLETED | OUTPATIENT
Start: 2017-07-13 | End: 2017-07-13

## 2017-07-13 RX ADMIN — HEPARIN SODIUM 1300 UNIT(S)/HR: 5000 INJECTION INTRAVENOUS; SUBCUTANEOUS at 21:16

## 2017-07-13 RX ADMIN — SODIUM CHLORIDE 3 MILLILITER(S): 9 INJECTION INTRAMUSCULAR; INTRAVENOUS; SUBCUTANEOUS at 06:18

## 2017-07-13 RX ADMIN — Medication 0.5 MILLIGRAM(S): at 21:21

## 2017-07-13 RX ADMIN — PANTOPRAZOLE SODIUM 40 MILLIGRAM(S): 20 TABLET, DELAYED RELEASE ORAL at 06:31

## 2017-07-13 RX ADMIN — Medication 10 MILLIGRAM(S): at 17:10

## 2017-07-13 RX ADMIN — Medication 0.5 MILLIGRAM(S): at 09:11

## 2017-07-13 RX ADMIN — Medication 1 MILLIGRAM(S): at 12:26

## 2017-07-13 RX ADMIN — CLOPIDOGREL BISULFATE 75 MILLIGRAM(S): 75 TABLET, FILM COATED ORAL at 12:26

## 2017-07-13 RX ADMIN — MORPHINE SULFATE 2 MILLIGRAM(S): 50 CAPSULE, EXTENDED RELEASE ORAL at 20:10

## 2017-07-13 RX ADMIN — SIMVASTATIN 20 MILLIGRAM(S): 20 TABLET, FILM COATED ORAL at 22:12

## 2017-07-13 RX ADMIN — Medication 250 MILLIGRAM(S): at 12:25

## 2017-07-13 RX ADMIN — IRON SUCROSE 110 MILLIGRAM(S): 20 INJECTION, SOLUTION INTRAVENOUS at 16:43

## 2017-07-13 RX ADMIN — HEPARIN SODIUM 6000 UNIT(S): 5000 INJECTION INTRAVENOUS; SUBCUTANEOUS at 20:00

## 2017-07-13 RX ADMIN — SODIUM CHLORIDE 3 MILLILITER(S): 9 INJECTION INTRAMUSCULAR; INTRAVENOUS; SUBCUTANEOUS at 21:08

## 2017-07-13 RX ADMIN — MORPHINE SULFATE 2 MILLIGRAM(S): 50 CAPSULE, EXTENDED RELEASE ORAL at 20:30

## 2017-07-13 RX ADMIN — SODIUM CHLORIDE 3 MILLILITER(S): 9 INJECTION INTRAMUSCULAR; INTRAVENOUS; SUBCUTANEOUS at 13:55

## 2017-07-13 RX ADMIN — Medication 1 TABLET(S): at 12:26

## 2017-07-13 RX ADMIN — Medication 325 MILLIGRAM(S): at 12:26

## 2017-07-13 RX ADMIN — LATANOPROST 1 DROP(S): 0.05 SOLUTION/ DROPS OPHTHALMIC; TOPICAL at 22:12

## 2017-07-13 RX ADMIN — Medication 40 MILLIGRAM(S): at 20:10

## 2017-07-13 NOTE — PROGRESS NOTE ADULT - ASSESSMENT
Renal insufficiency-Pre renal azotemia  , Sig COPD , hypercarbia   Recent MV Clip   No hydro on renal imaging   Will have to accept some degree of augmented azotemia and treat exacerbated alkalosis as needed   Continue ACE and current loop diuretic Torsemide   Watch labs   Hospice has been refused

## 2017-07-13 NOTE — PROGRESS NOTE ADULT - PROBLEM SELECTOR PLAN 7
PPI
case management   patient education
Continue supplemental O2 PRN by day and standing nocturnal CPAP at night.  + hypercapneic but compensated with essentially normal pH
Continue toprol XL.  midodrine weaned off, No ace at this time due to SBP low 100's.  Torsemide daily> may need to increased for increased PVC and leg edema.  Discussed with Dr. Newman who will review the chart and decide the appropriate plan.  Daughter requesting to speak to cardiology.  Dr. Martinez made aware.
PPI
completed zosyn 7 days per ID   Daily abdominal exams
completed zosyn 7 days per ID   Daily abdominal exams
completed zosyn 7 days restarted yest for possible worsening cholecystitis   Daily abdominal exam
encourage ambulation   encourage IS and deep breathing  lovenox held permanently per patient's daughter request  protonix for GI prophylaxis
no acute pathology on CT scan  + sigmoid polyp, will follow up as out pt
zosyn x 5-7 days per ID  d/w Dr. Owen  serial abdominal exams
zosyn x 5-7 days per ID  serial abdominal exams
zosyn x 5-7 days per ID  serial abdominal exams
zosyn x 5-7 days per ID> will keep for severe as d/w Dr. Owen  serial abdominal exams
gi following   continue to monitor for signs of sepsis   continue abx as per DI
reorient as needed  encourage family presence  stable for transfer from ICU which is probably exacerbating pt condition

## 2017-07-13 NOTE — CONSULT NOTE ADULT - PROBLEM SELECTOR RECOMMENDATION 2
Biventricular failure ,. Moderate RV dysfunction.   lasix 40 mg IV Q12. Add aldactone 25 mg if BP tolerates.   D/c torsemide (due to RV involvement and haptic congestion probably low absorbtion of oral meds)  ct beta-blocker . ct Angiotensin converting enzyme inhibitor   Add low dose Dig 0.125 mg daily. Ct BIPAP for now,.

## 2017-07-13 NOTE — CONSULT NOTE ADULT - SUBJECTIVE AND OBJECTIVE BOX
Patient is a 84y old  Female who presents with a chief complaint of Abdominal pain, insomnia, and increased shortness of breath (10 Murphy 2017 16:40)      HPI:  84 year old with multiple medical problems presents to the ED with complaints of LLQ pain since yesterday.  Patient is s/p  mitral valve clipping done on  with Dr. Owen.  On plavix. They stopped her IMDUR and Beta blocker due to relative hypotension, but continued on hydralazine to reduce afterload. Daughter states she has stage III heart failure. She states her mother is always somewhat short of breath.  Patient was discharged on  s/p mitral clip. (10 Murphy 2017 16:40)  .  There was residual MR.  patient came back to the ER with abdominal pain, and confusion and back and forth from ICU with dyspnea. CHF. persistent hypercapneic respiratory failure was made DNR/DNI recentrly.   Today she was about to be discharged, had substernal chest pain. EKG showed STEMI. STEMI code was called.   Cardiology consulted to for further evaluation.       Review of symptoms: Cardiac:  + chest pain. +  dyspnea. No palpitations.  Respiratory: no cough. No dyspnea  Gastrointestinal: No diarrhea. No abdominal pain. No bleeding.     Allergies   aspirin (Anaphylaxis)  NSAIDs (Other)    Intolerances  Lasix (Other)  [facility] PT (Unknown)        MEDICATIONS  (STANDING):  sodium chloride 0.9% lock flush 3 milliLiter(s) IV Push every 8 hours  latanoprost 0.005% Ophthalmic Solution 1 Drop(s) Both EYES at bedtime  pantoprazole    Tablet 40 milliGRAM(s) Oral before breakfast  multivitamin 1 Tablet(s) Oral daily  metoprolol succinate ER 50 milliGRAM(s) Oral daily  buDESOnide   0.5 milliGRAM(s) Respule 0.5 milliGRAM(s) Inhalation two times a day  clopidogrel Tablet 75 milliGRAM(s) Oral daily  folic acid 1 milliGRAM(s) Oral daily  ascorbic acid 250 milliGRAM(s) Oral daily  simvastatin 20 milliGRAM(s) Oral at bedtime  lisinopril 5 milliGRAM(s) Oral daily  ferrous    sulfate 325 milliGRAM(s) Oral daily  sodium chloride 0.45%. 1000 milliLiter(s) (50 mL/Hr) IV Continuous <Continuous>  torsemide 20 milliGRAM(s) Oral daily  iron sucrose IVPB 200 milliGRAM(s) IV Intermittent every 24 hours  heparin  Infusion.  Unit(s)/Hr (13 mL/Hr) IV Continuous <Continuous>  heparin  Injectable 6000 Unit(s) IV Push once  morphine  - Injectable 2 milliGRAM(s) IV Push once  furosemide   Injectable 40 milliGRAM(s) IV Push once    MEDICATIONS  (PRN):  aluminum hydroxide/magnesium hydroxide/simethicone Suspension 30 milliLiter(s) Oral every 6 hours PRN Dyspepsia  hydrALAZINE Injectable 10 milliGRAM(s) IV Push every 3 hours PRN sbp > 150  ALBUTerol/ipratropium for Nebulization 3 milliLiter(s) Nebulizer every 6 hours PRN Shortness of Breath and/or Wheezing  heparin  Injectable 6000 Unit(s) IV Push every 6 hours PRN For aPTT less than 40  heparin  Injectable 3000 Unit(s) IV Push every 6 hours PRN For aPTT between 40 - 57      Drug Dosing Weight  Height (cm): 152.4 (10 Murphy 2017 08:55)  Weight (kg): 75.4 (2017 08:10)  BMI (kg/m2): 32.5 (2017 08:10)  BSA (m2): 1.73 (2017 08:10)    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   r/o  out  stenosis  Cardiomyopathy  Osteoarthritis  Iron deficiency anemia  MI, old: 2008  Dyslipidemia  Acid reflux  Hx of CAB  CAD (coronary artery disease)  Heart failure  HTN (hypertension)  H/O tracheostomy: 2013 may  Cataract: b/l  2013  S/P CABG x 3:   [place]      FAMILY HISTORY:  Family history of hypertension      SOCIAL HISTORY: not available     ADVANCE DIRECTIVES: DNR/DNI         ICU Vital Signs Last 24 Hrs  T(C): 36.3 (2017 16:57), Max: 36.8 (2017 05:00)  T(F): 97.4 (2017 16:57), Max: 98.3 (2017 05:00)  HR: 100 (2017 18:55) (72 - 100)  BP: 133/76 (2017 18:55) (93/65 - 152/70)  BP(mean): --  ABP: --  ABP(mean): --  RR: 18 (2017 16:57) (18 - 18)  SpO2: 99% (2017 18:55) (93% - 100%)          I&O's Detail: I&O's Summary        PHYSICAL EXAM:  Appearance: Comfortable. No acute distress  HEENT:  Head and neck: Atraumatic. Normocephalic.  Normal oral mucosa,, Neck is supple. +  JVD,    Neurologic: A & O x2 , , Cranial nerves not evaluated. t. hard of hearing./   Lymphatic: No cervical lymphadenopathy  Cardiovascular: Normal S1 S2, No murmur, rubs/gallopws. + JVD,2+  edema  Respiratory: decrease breath douns in the bases. Bilateral rhonchoruis and crackles  Gastrointestinal:  Soft, Non-tender, Obese.   Lower Extremities: 1+ bilateral  edema  Psychiatry: Patient is calm. No agitation.   Skin: No rashes, No ecchymoses, No cyanosis      LABS:      146<H>  |  100  |  30.0<H>  ----------------------------<  91  3.8   |  35.0<H>  |  1.02    Ca    8.9      2017 07:00      CAPILLARY BLOOD GLUCOSE  X    EKG: Sinus tachycardia. Inferior infarct. Acute inferior ST elevation myocardial infarction.     ECHO: LVEF 50%. S/p Mitral clip. Severe MR. Moderate to severe TR. Mild AI> Mild PAH.   Mild to moderate RV dysfunction.     RADIOLOGY:   X-ray: CHF improved from last time.   CT-head:      CRITICAL CARE TIME SPENT: >45

## 2017-07-13 NOTE — CONSULT NOTE ADULT - ASSESSMENT
84 F with multiple cardiac comorbities, CHF systolic heart failure with RV involvement biventricular failure severe MR s/p mitral clip with residual MR with CHF and poor response to treatment now with STEMI.

## 2017-07-13 NOTE — PROVIDER CONTACT NOTE (EICU) - ASSESSMENT
Acute Coronoayr syndrome/ STEMI/. HX CAD. Bolus heparin and start drip, nitro 0.4 SL x2 with moderate relief., ASA allergy anaphylasix so not given, on plavix. Placed on Bipap for SOB, Code STEMI called. daughter asks for no IV or transdermal nitroglycerine as this has caused severe hypotension in past. ICU Lai Molina and Cards Pa called to bedside urgently. Jesse and ALEX had conversation with daughter and HCP re ACS, she agrees to rescind DNR for urgent cath

## 2017-07-13 NOTE — PROGRESS NOTE ADULT - ASSESSMENT
83 y/o F with CHF, severe MR (s/p mitral valve clip ~2 weeks ago), CAD, HTN, obesity with persistent hypercapnic respiratory failure, contraction alkalosis.    > Acute on chronic systolic and diastolic CHF with severe MR and mod to severe TR - no further surgical plans per CT Surgery.  Continue diuresis.  Palliative care followup noted.  Family wishes not to pursue hospice at present.  Likely LISSY placement.   > Hypernatremia - Improving after IVF.  Encourage po free water.  Renal followup.    > Acute on chronic respiratory failure with hypercapnia - Pulmonary followup, continue Nocturnal BIPAP.    > Microcytic Anemia, Iron deficiency - Venofer.  Offered GI evaluation, which daughter wishes to defer as outpatient given patient's comorbid conditions.     > Metabolic Acidosis - renal followup appreciated.  careful diuresis, Diamox.  > CHRISTOPHER - Cr stable.  Avoid nephrotoxic agents.   > CAD - continue Plavix, BBlocker.  > HLD - continue Statin  > Glaucoma - continue eyedrops.  > DVT Prophylaxis - Lower extremity intermittent compression devices.

## 2017-07-13 NOTE — PROGRESS NOTE ADULT - PROBLEM SELECTOR PROBLEM 6
Iron deficiency anemia, unspecified iron deficiency anemia type
Obesity
Acalculous cholecystitis
CKD (chronic kidney disease) stage 3, GFR 30-59 ml/min
Dyslipidemia
Iron deficiency anemia, unspecified iron deficiency anemia type
Obesity
Dyslipidemia

## 2017-07-13 NOTE — PROGRESS NOTE ADULT - PROBLEM SELECTOR PROBLEM 8
Polyp of ascending colon, unspecified type
Renal insufficiency
Acute on chronic systolic heart failure
Chronic systolic (congestive) heart failure
On home oxygen therapy
Prophylactic measure
Renal insufficiency
Prophylactic measure

## 2017-07-13 NOTE — CHART NOTE - NSCHARTNOTEFT_GEN_A_CORE
Rapid response/ CODE STEMI   Time called: 19:10  Arrival time 19:12    Subjective:  Rapid response was called for an 84 F with multiple cardiac comorbities, CHF systolic heart failure with RV involvement biventricular failure severe MR s/p mitral clip with residual MR with CHF and poor response to treatment complaining of "heavy" substernal chest pain.         HPI:  84 year old with multiple medical problems presents to the ED with complaints of LLQ pain since yesterday. She had 3 BMs today. No fever, vomiting or change in urination. Patient is s/p  mitral valve clipping done on  with Dr. Owen.  On plavix. They stopped her IMDUR and Beta blocker due to relative hypotension, but continued on hydralazine to reduce afterload. Daughter states she has stage III heart failure. She states her mother is always somewhat short of breath.    17 patient had mitral clip but was unsuccesful (10 Murphy 2017 16:40)    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   r/o  out  stenosis  Cardiomyopathy  Osteoarthritis  Iron deficiency anemia  MI, old: 2008  Dyslipidemia  Acid reflux  Hx of CAB  CAD (coronary artery disease)  Heart failure  HTN (hypertension)  H/O tracheostomy: 2013 may  Cataract: b/l  2013  S/P CABG x 3:   [place]    FAMILY HISTORY:  Family history of hypertension    sodium chloride 0.9% lock flush 3 milliLiter(s) IV Push every 8 hours  latanoprost 0.005% Ophthalmic Solution 1 Drop(s) Both EYES at bedtime  pantoprazole    Tablet 40 milliGRAM(s) Oral before breakfast  multivitamin 1 Tablet(s) Oral daily  metoprolol succinate ER 50 milliGRAM(s) Oral daily  buDESOnide   0.5 milliGRAM(s) Respule 0.5 milliGRAM(s) Inhalation two times a day  clopidogrel Tablet 75 milliGRAM(s) Oral daily  folic acid 1 milliGRAM(s) Oral daily  ascorbic acid 250 milliGRAM(s) Oral daily  simvastatin 20 milliGRAM(s) Oral at bedtime  aluminum hydroxide/magnesium hydroxide/simethicone Suspension 30 milliLiter(s) Oral every 6 hours PRN  hydrALAZINE Injectable 10 milliGRAM(s) IV Push every 3 hours PRN  lisinopril 5 milliGRAM(s) Oral daily  ALBUTerol/ipratropium for Nebulization 3 milliLiter(s) Nebulizer every 6 hours PRN  ferrous    sulfate 325 milliGRAM(s) Oral daily  sodium chloride 0.45%. 1000 milliLiter(s) IV Continuous <Continuous>  torsemide 20 milliGRAM(s) Oral daily  iron sucrose IVPB 200 milliGRAM(s) IV Intermittent every 24 hours  heparin  Infusion.  Unit(s)/Hr IV Continuous <Continuous>  heparin  Injectable 6000 Unit(s) IV Push once  heparin  Injectable 6000 Unit(s) IV Push every 6 hours PRN  heparin  Injectable 3000 Unit(s) IV Push every 6 hours PRN  furosemide   Injectable 40 milliGRAM(s) IV Push once  digoxin     Tablet 0.125 milliGRAM(s) Oral daily  furosemide   Injectable 40 milliGRAM(s) IV Push every 12 hours    aspirin (Anaphylaxis)  Lasix (Other)  NSAIDs (Other)  OHS PT (Unknown)      Objective:  Temp 97.8  HR: 120  BP: 126/76  RR: 22      General: Awake, alert, moderate distress secondary to chest pain as well as labored breathing.   Neuro: A&OX3  Chest: CTA, S1, S2, no murmur, RRR  Abdomen: Soft  Groin: No bleeding, no hematoma  Ext: + distal pulses  EKG:   Labs:                         8.4    5.0   )-----------( 170      ( 2017 20:01 )             28.3     07-13    146<H>  |  100  |  30.0<H>  ----------------------------<  91  3.8   |  35.0<H>  |  1.02    Ca    8.9      2017 07:00    Troponin 0.02      EICU was called prior to my arrival at the rapid response    After initial evaluation, I discussed the case with EICU attending Dr. Griffith.   STAT EKG was ordered, Troponin, and cbc.  EKG showed STEMI most apparent in leads III and AVF, and also showed Q waves in leads V1, V2, V3  all this findings were new compared to an EKG from 2017   Code STEMI was called  Nitro 0.4 sublingual x 2 given with frequent blood pressure checks.   patient responded well to Nitro    Patient's Daughter and HCP Mini was at bedside, and was very involved in the Patient's care. States that her mother has anaphylaxis to aspirin, so patient was not given any. She also mentioned that her mother is very sensitive to narcotics and asked if they could be avoided initially.    ICU PA Jesse was also present and agreed with Calling CODE STEMI and current management.      I initially spoke with the on call cardiologist Dr. Whipple over the phone who also discussed with case with CT surgery who was at bedside. The result of the conversation is that [facility] should be consulted since they are more familiar with the case.   Dr. Vasquez was called and he came to bedside to assess patient. The resulting decision is that patient will be treated medically with full anticoagulation heparin and 2mg morphine for STEMI as well as 40mg IV lasix for CHF.  Patient was also placed on BiPAP     Patient was stabilized, and transferred to 4CTU     case discussed with Dr. Patricio,   Dr. Banda, hospitalist was made aware of the case.

## 2017-07-13 NOTE — CONSULT NOTE ADULT - ATTENDING COMMENTS
84 year old female with h/o severe mitral stenosis and also mitral regurgitation underwent mitral valve clip 6/7. Was discharged home and admitted for complaints of abdominal pain, increased shortness of breath. Patient with acute on chronic systolic failure. Also seen by Pulmonlogy for respiratory failure and on  nocturnal Bipap.  She states right lower quadrant abdominal pain, without any tenderness on palpation.  Is doing well with  bedside PT and more appropriate for subacute rehab program at this time.  Thank you
Patient seen and examined.  Images reviewed, she does have a positive Loya sign on exam.  Agree with percutaneous drainage.  I discuss with daughter the indication for perc drainage and answered all her questions.
Urine lytes, anemia work up, renal sono. I spoke with daughter at bedside.
84y year old Female  h/o stage 3 CKD, CAD s/p remote MI & CABG x 3 (2008), mixed cardiomyopathy, NYHA class III chronic systolic HFrEF, LVEF 45-50% (TTE 6/16/17), non-rheumatic severe MR s/p MV clip 6/7/17,  readmitted POD #3 for abdominal pain, worsening confusion, neutropenia, hypotension and found to have acalculous cholecystitis and chronic CO2 retention (nocturnal)- improved w/ PM BiPAP. Also noted to have brief episode of AF 6/11, treated w/ amiodarone & beta blockade; maintaining sinus rhythm to date w/ amiodarone tx now d/c'd. Diuresis now improving with activity level & daytime supplemental O2 demand also improving. .    Agree with assessment plan as above currently remains in NSR agree with trial of Coreg 12.5 mg po bid as bp tolerates; diuretics, strict I/Os, keep K>4 mg >2.  Sotalol is relatively contra-indicated given underlying renal dysfunction.  Can consider Multaq 400mg po bid for break-thru symptomatic AF with RVR   [id number] : 2(AGE) 1(F) 1(HTN) (1) HF total =5. Consider Warfarin goal INR 2-3 for stroke prophylaxis unless contra-indicated
Critical care cardiology. Time spent > 45 mins including discuss with patient, family and primary team and referring  DNR/DNI.   Conservative management.
Thank you for the opportunity to assist with the care of this patient.   [facility] [phone].

## 2017-07-13 NOTE — PROGRESS NOTE ADULT - SUBJECTIVE AND OBJECTIVE BOX
Patient: ANN FRANCES [id number] 84y Female                 Internal Medicine Hospitalist Progress Note - Dr. Errol Lovett    Chief Complaint: Patient is a 84y old  Female who presents with a chief complaint of Abdominal pain, insomnia, and increased shortness of breath (10 Murphy 2017 16:40)    BRIEF HOSPITAL COURSE:  83 y/o F with CHF, severe MR s/p mitral clip 2 weeks prior to admission, CAD, HTN, obesity, admitted to CT Surgery service due to altered mental status, found to have persistent hypercapnic respiratory failure with inability to wean from NIV, developed compensated respiratory acidosis and contraction alkalosis.  PCO2 87.  Transferred to the MICU service 7/3 for management of her NIV and metabolic disturbances.  In ICU, she was successfully weaned from NIV, requiring BIPAP at night.  TTE:  1. Mildly decreased global left ventricular systolic function.  2. Left ventricular ejection fraction, by visual estimation, is 45 to 50%.  3. Mild to moderate right ventricular systolic dysfunction.  4. Severely enlarged left atrium.  5. Moderately dilated right atrium.  6. Patient is s/p Mitral Clip with severe regurgitation.  7. Moderate-severe tricuspid regurgitation.  8. Mild aortic regurgitation.  9. Estimated pulmonary artery systolic pressure is 49.7 mmHg assuming a  right atrial pressure of 8 mmHg, which is consistent with mild pulmonary  hypertension. 10. There is no evidence of pericardial effusion.      Offers no complaints.  No SOB / Chest pain / palpitations.  No additional complaints.  Denies bleeding.   Seen with daughter at bedside.      ____________________PHYSICAL EXAM:  Vitals reviewed as indicated below  GENERAL:  NAD Alert, slightly confused.    HEENT: NCAT  CARDIOVASCULAR:  S1, S2  LUNGS: basilar crackles, R>L  ABDOMEN:  soft, (-) tenderness, (-) distension, (+) bowel sounds, (-) guarding, (-) rebound (-) rigidity  EXTREMITIES:  no cyanosis / clubbing / edema.   ____________________      MEDICATIONS:  sodium chloride 0.9% lock flush 3 milliLiter(s) IV Push every 8 hours  latanoprost 0.005% Ophthalmic Solution 1 Drop(s) Both EYES at bedtime  pantoprazole    Tablet 40 milliGRAM(s) Oral before breakfast  multivitamin 1 Tablet(s) Oral daily  metoprolol succinate ER 50 milliGRAM(s) Oral daily  buDESOnide   0.5 milliGRAM(s) Respule 0.5 milliGRAM(s) Inhalation two times a day  clopidogrel Tablet 75 milliGRAM(s) Oral daily  folic acid 1 milliGRAM(s) Oral daily  ascorbic acid 250 milliGRAM(s) Oral daily  simvastatin 20 milliGRAM(s) Oral at bedtime  aluminum hydroxide/magnesium hydroxide/simethicone Suspension 30 milliLiter(s) Oral every 6 hours PRN  hydrALAZINE Injectable 10 milliGRAM(s) IV Push every 3 hours PRN  lisinopril 5 milliGRAM(s) Oral daily  ALBUTerol/ipratropium for Nebulization 3 milliLiter(s) Nebulizer every 6 hours PRN  ferrous    sulfate 325 milliGRAM(s) Oral daily  sodium chloride 0.45%. 1000 milliLiter(s) IV Continuous <Continuous>  torsemide 20 milliGRAM(s) Oral daily  iron sucrose IVPB 200 milliGRAM(s) IV Intermittent every 24 hours      VITALS:  Vital Signs Last 24 Hrs  T(C): 36.3 (13 Jul 2017 07:18), Max: 36.8 (12 Jul 2017 16:11)  T(F): 97.4 (13 Jul 2017 07:18), Max: 98.3 (12 Jul 2017 16:11)  HR: 72 (13 Jul 2017 07:18) (71 - 100)  BP: 119/69 (13 Jul 2017 07:18) (93/65 - 140/88)  BP(mean): --  RR: 18 (13 Jul 2017 07:18) (18 - 18)  SpO2: 99% (13 Jul 2017 10:19) (97% - 100%) Daily     Daily   CAPILLARY BLOOD GLUCOSE        I&O's Summary      LABS:                        7.8    3.6   )-----------( 157      ( 13 Jul 2017 07:00 )             26.3     07-13    146<H>  |  100  |  30.0<H>  ----------------------------<  91  3.8   |  35.0<H>  |  1.02    Ca    8.9      13 Jul 2017 07:00        RECENT CULTURES:

## 2017-07-13 NOTE — PROGRESS NOTE ADULT - SUBJECTIVE AND OBJECTIVE BOX
NEPHROLOGY INTERVAL HPI/OVERNIGHT EVENTS:    No new events   OOB to chair   SOB the same   Meds reviewed     MEDICATIONS  (STANDING):  sodium chloride 0.9% lock flush 3 milliLiter(s) IV Push every 8 hours  latanoprost 0.005% Ophthalmic Solution 1 Drop(s) Both EYES at bedtime  pantoprazole    Tablet 40 milliGRAM(s) Oral before breakfast  multivitamin 1 Tablet(s) Oral daily  metoprolol succinate ER 50 milliGRAM(s) Oral daily  buDESOnide   0.5 milliGRAM(s) Respule 0.5 milliGRAM(s) Inhalation two times a day  clopidogrel Tablet 75 milliGRAM(s) Oral daily  folic acid 1 milliGRAM(s) Oral daily  ascorbic acid 250 milliGRAM(s) Oral daily  simvastatin 20 milliGRAM(s) Oral at bedtime  lisinopril 5 milliGRAM(s) Oral daily  ferrous    sulfate 325 milliGRAM(s) Oral daily  sodium chloride 0.45%. 1000 milliLiter(s) (50 mL/Hr) IV Continuous <Continuous>  torsemide 20 milliGRAM(s) Oral daily  iron sucrose IVPB 200 milliGRAM(s) IV Intermittent every 24 hours    MEDICATIONS  (PRN):  aluminum hydroxide/magnesium hydroxide/simethicone Suspension 30 milliLiter(s) Oral every 6 hours PRN Dyspepsia  hydrALAZINE Injectable 10 milliGRAM(s) IV Push every 3 hours PRN sbp > 150  ALBUTerol/ipratropium for Nebulization 3 milliLiter(s) Nebulizer every 6 hours PRN Shortness of Breath and/or Wheezing      Allergies    aspirin (Anaphylaxis)  NSAIDs (Other)    Intolerances    Lasix (Other)  OHS PT (Unknown)        Vital Signs Last 24 Hrs  T(C): 36.3 (13 Jul 2017 07:18), Max: 36.8 (12 Jul 2017 16:11)  T(F): 97.4 (13 Jul 2017 07:18), Max: 98.3 (12 Jul 2017 16:11)  HR: 72 (13 Jul 2017 07:18) (71 - 100)  BP: 119/69 (13 Jul 2017 07:18) (93/65 - 140/88)  BP(mean): --  RR: 18 (13 Jul 2017 07:18) (18 - 18)  SpO2: 99% (13 Jul 2017 10:19) (97% - 100%)  Daily     Daily   I&O's Detail    I&O's Summary      PHYSICAL EXAM:  GENERAL: NAD,   EYES:  conjunctiva and sclera clear  NECK: Supple, Veins full upright  NERVOUS SYSTEM:  A/O x3,   CHEST: poor air movement bilaterally  HEART:  sternal scar, no rub  ABDOMEN: Soft, NT/ND BS+  EXTREMITIES: less edema     LABS:                        7.8    3.6   )-----------( 157      ( 13 Jul 2017 07:00 )             26.3     07-13    146<H>  |  100  |  30.0<H>  ----------------------------<  91  3.8   |  35.0<H>  |  1.02    Ca    8.9      13 Jul 2017 07:00                  RADIOLOGY & ADDITIONAL TESTS:

## 2017-07-13 NOTE — CONSULT NOTE ADULT - PROBLEM SELECTOR RECOMMENDATION 9
Significant improvement with Nitroglycerin.   Morphine ordered.  cautious use of SL nitro as RV dysfunction and RCA involvement with low blood pressure ot begin with. If Chest pain not controlled with morphine then may need ICU for nitro drip and careful titration.   beta-blocker (AV conduction is normal limits).  Lasix 40mg IV Q12.  Strict I and O.   Plavix 75 mg daily. Heparin drip for MI .   Repeat H/h tomorrow. if low, then 1 U PRBC with IV lasix.   Discussed with family about poor overall prognosis. Even if we treat this MI with revasc, overall patient has poor prognosis due to multiple other comorbidities and valvular dysfunction and recent respiratory failure.  Ct BIPAP.

## 2017-07-13 NOTE — PROGRESS NOTE ADULT - SUBJECTIVE AND OBJECTIVE BOX
PULMONARY PROGRESS NOTE      ANN FRANCES  MRN-[id number]    Patient is a 84y old  Female who presents with a chief complaint of Abdominal pain, insomnia, and increased shortness of breath (10 Murphy 2017 16:40)      INTERVAL HPI/OVERNIGHT EVENTS:    Patient without significant change  Remains mildly dyspneic though overall improved    MEDICATIONS  (STANDING):  sodium chloride 0.9% lock flush 3 milliLiter(s) IV Push every 8 hours  latanoprost 0.005% Ophthalmic Solution 1 Drop(s) Both EYES at bedtime  pantoprazole    Tablet 40 milliGRAM(s) Oral before breakfast  multivitamin 1 Tablet(s) Oral daily  metoprolol succinate ER 50 milliGRAM(s) Oral daily  buDESOnide   0.5 milliGRAM(s) Respule 0.5 milliGRAM(s) Inhalation two times a day  clopidogrel Tablet 75 milliGRAM(s) Oral daily  folic acid 1 milliGRAM(s) Oral daily  ascorbic acid 250 milliGRAM(s) Oral daily  simvastatin 20 milliGRAM(s) Oral at bedtime  lisinopril 5 milliGRAM(s) Oral daily  ferrous    sulfate 325 milliGRAM(s) Oral daily  sodium chloride 0.45%. 1000 milliLiter(s) (50 mL/Hr) IV Continuous <Continuous>  torsemide 20 milliGRAM(s) Oral daily      MEDICATIONS  (PRN):  aluminum hydroxide/magnesium hydroxide/simethicone Suspension 30 milliLiter(s) Oral every 6 hours PRN Dyspepsia  hydrALAZINE Injectable 10 milliGRAM(s) IV Push every 3 hours PRN sbp > 150  ALBUTerol/ipratropium for Nebulization 3 milliLiter(s) Nebulizer every 6 hours PRN Shortness of Breath and/or Wheezing      Allergies    aspirin (Anaphylaxis)  NSAIDs (Other)    Intolerances    Lasix (Other)  OHS PT (Unknown)      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   r/o  out  stenosis  Cardiomyopathy  Osteoarthritis  Iron deficiency anemia  MI, old: 2008  Dyslipidemia  Acid reflux  Hx of CAB  CAD (coronary artery disease)  Heart failure  HTN (hypertension)  H/O tracheostomy: 2013 may  Cataract: b/l  2013  S/P CABG x 3:   [place]        REVIEW OF SYSTEMS:    CONSTITUTIONAL:  No distress    HEENT:  Eyes:  No diplopia or blurred vision. ENT:  No earache, sore throat or runny nose.    CARDIOVASCULAR:  No pressure, squeezing, tightness, heaviness or aching about the chest; no palpitations.    RESPIRATORY:  No cough, + shortness of breath, no PND or orthopnea. + SOBOE    GASTROINTESTINAL:  No nausea, vomiting or diarrhea.    GENITOURINARY:  No dysuria, frequency or urgency.    NEUROLOGIC:  No paresthesias, fasciculations, seizures or weakness.    PSYCHIATRIC:  No disorder of thought or mood.    Vital Signs Last 24 Hrs  T(C): 36.3 (2017 07:18), Max: 36.8 (2017 16:11)  T(F): 97.4 (2017 07:18), Max: 98.3 (2017 16:11)  HR: 72 (2017 07:18) (71 - 100)  BP: 119/69 (2017 07:18) (93/65 - 140/88)  BP(mean): --  RR: 18 (2017 07:18) (18 - 18)  SpO2: 98% (2017 09:11) (97% - 100%)    PHYSICAL EXAMINATION:    GENERAL: The patient is awake and alert in no apparent distress.     HEENT: Head is normocephalic and atraumatic. Extraocular muscles are intact. Mucous membranes are moist.    NECK: Supple.    LUNGS: Decreased BS b/l with isolated wheeze, rales or rhonchi; respirations unlabored    HEART: Regular rate and rhythm without murmur.    ABDOMEN: Soft, nontender, and nondistended.      EXTREMITIES: Without any cyanosis, clubbing, with mild b/l edema.    NEUROLOGIC: Grossly intact.    LABS:                        7.8    3.6   )-----------( 157      ( 2017 07:00 )             26.3     07-13    146<H>  |  100  |  30.0<H>  ----------------------------<  91  3.8   |  35.0<H>  |  1.02    Ca    8.9      2017 07:00        RADIOLOGY & ADDITIONAL STUDIES:     EXAM:  CHEST SINGLE VIEW FRONTAL                          PROCEDURE DATE:  2017          INTERPRETATION:  Portable chest radiograph        CLINICAL INFORMATION:   Hypoxia.    TECHNIQUE:  Portable  AP view of the chest was obtained.    COMPARISON: 7/3/2017 available for review.    FINDINGS:   The lungs  show ill-defined the cardiac borders are likely indicating   bilateral pleural effusions and/or infiltrates. Upper lobes clear. There   is mild vascular congestion.        There is gross cardiomegaly unchanged from prior examination. Status post   coronary artery bypass graft procedure. Visualized osseous structures are   intact.        IMPRESSION:   Gross cardiomegaly...   Persistent bibasilar infiltrates   and effusions.      INES MORTENSEN M.D., ATTENDING RADIOLOGIST  This document has been electronically signed. 2017  2:45PM      ECHO:     Summary:   1. Mildly decreased global left ventricular systolic function.   2. Left ventricular ejection fraction, by visual estimation, is 45 to   50%.   3. Mild to moderate right ventricular systolic dysfunction.   4. Severely enlarged left atrium.   5. Moderately dilated right atrium.   6. Patient is s/p Mitral Clip with severe regurgitation.   7. Moderate-severe tricuspid regurgitation.   8. Mild aortic regurgitation.   9. Estimated pulmonary artery systolic pressure is 49.7 mmHg assuming a   right atrial pressure of 8 mmHg, which is consistent with mild pulmonary   hypertension.  10. There is no evidence of pericardial effusion.     MD Gerardo Electronically signed on 2017 at 2:24:07 PM

## 2017-07-13 NOTE — PROVIDER CONTACT NOTE (EICU) - BACKGROUND
known CAD, post CABG with SSCP 6/10 non radiating. afebrile, , 126/76. patient seen on camera and exam by resident AAO 3 no jvd no rales, no edema. EKG with inferior elevations , I asked for Right sided leads which did not show elevations

## 2017-07-13 NOTE — PROVIDER CONTACT NOTE (EICU) - RECOMMENDATIONS
F/u cards recs on CODE STEMI. c/w full dose heparin, nitro SL. ICU Pa and Cards PA and resident in room.  CCM time 56 min, majority of which was on the camera.

## 2017-07-13 NOTE — PROGRESS NOTE ADULT - PROBLEM SELECTOR PROBLEM 7
Delirium
Prophylactic measure
Acalculous cholecystitis
Chronic systolic (congestive) heart failure
Left lower quadrant pain
On home oxygen therapy
Prophylactic measure
Acalculous cholecystitis

## 2017-07-13 NOTE — CHART NOTE - NSCHARTNOTEFT_GEN_A_CORE
Follow up from code STEMI    Patient is resting comfortably in bed on Nasal cannula 4L  No chest pain.  No acute bleed noted.    Vitals stable    Vital Signs Last 24 Hrs  T(C): 37.1 (13 Jul 2017 22:08), Max: 37.1 (13 Jul 2017 22:08)  T(F): 98.7 (13 Jul 2017 22:08), Max: 98.7 (13 Jul 2017 22:08)  HR: 92 (13 Jul 2017 22:08) (72 - 115)  BP: 118/56 (13 Jul 2017 22:08) (93/65 - 152/70)  BP(mean): --  RR: 18 (13 Jul 2017 22:08) (18 - 22)  SpO2: 99% (13 Jul 2017 22:08) (93% - 100%)    Galen Thompson MD PGY3

## 2017-07-14 DIAGNOSIS — K92.2 GASTROINTESTINAL HEMORRHAGE, UNSPECIFIED: ICD-10-CM

## 2017-07-14 DIAGNOSIS — D50.0 IRON DEFICIENCY ANEMIA SECONDARY TO BLOOD LOSS (CHRONIC): ICD-10-CM

## 2017-07-14 LAB
ANION GAP SERPL CALC-SCNC: 7 MMOL/L — SIGNIFICANT CHANGE UP (ref 5–17)
ANISOCYTOSIS BLD QL: SLIGHT — SIGNIFICANT CHANGE UP
APTT BLD: 108.6 SEC — HIGH (ref 27.5–37.4)
APTT BLD: 160.5 SEC — CRITICAL HIGH (ref 27.5–37.4)
APTT BLD: 30.7 SEC — SIGNIFICANT CHANGE UP (ref 27.5–37.4)
BASOPHILS NFR BLD AUTO: 1 % — SIGNIFICANT CHANGE UP (ref 0–2)
BLD GP AB SCN SERPL QL: SIGNIFICANT CHANGE UP
BUN SERPL-MCNC: 31 MG/DL — HIGH (ref 8–20)
CALCIUM SERPL-MCNC: 8.6 MG/DL — SIGNIFICANT CHANGE UP (ref 8.6–10.2)
CHLORIDE SERPL-SCNC: 98 MMOL/L — SIGNIFICANT CHANGE UP (ref 98–107)
CO2 SERPL-SCNC: 37 MMOL/L — HIGH (ref 22–29)
CREAT SERPL-MCNC: 0.98 MG/DL — SIGNIFICANT CHANGE UP (ref 0.5–1.3)
EOSINOPHIL # BLD AUTO: 0.1 K/UL — SIGNIFICANT CHANGE UP (ref 0–0.5)
GLUCOSE SERPL-MCNC: 96 MG/DL — SIGNIFICANT CHANGE UP (ref 70–115)
HCT VFR BLD CALC: 24.4 % — LOW (ref 37–47)
HCT VFR BLD CALC: 26.5 % — LOW (ref 37–47)
HGB BLD-MCNC: 7.4 G/DL — LOW (ref 12–16)
HGB BLD-MCNC: 7.9 G/DL — LOW (ref 12–16)
HYPOCHROMIA BLD QL: SLIGHT — SIGNIFICANT CHANGE UP
LYMPHOCYTES # BLD AUTO: 0.7 K/UL — LOW (ref 1–4.8)
LYMPHOCYTES # BLD AUTO: 25 % — SIGNIFICANT CHANGE UP (ref 20–55)
MACROCYTES BLD QL: SLIGHT — SIGNIFICANT CHANGE UP
MCHC RBC-ENTMCNC: 22 PG — LOW (ref 27–31)
MCHC RBC-ENTMCNC: 22.2 PG — LOW (ref 27–31)
MCHC RBC-ENTMCNC: 29.8 G/DL — LOW (ref 32–36)
MCHC RBC-ENTMCNC: 30.3 G/DL — LOW (ref 32–36)
MCV RBC AUTO: 73.1 FL — LOW (ref 81–99)
MCV RBC AUTO: 73.8 FL — LOW (ref 81–99)
MICROCYTES BLD QL: SLIGHT — SIGNIFICANT CHANGE UP
MONOCYTES # BLD AUTO: 0.7 K/UL — SIGNIFICANT CHANGE UP (ref 0–0.8)
MONOCYTES NFR BLD AUTO: 12 % — HIGH (ref 3–10)
NEUTROPHILS # BLD AUTO: 1.8 K/UL — SIGNIFICANT CHANGE UP (ref 1.8–8)
NEUTROPHILS NFR BLD AUTO: 62 % — SIGNIFICANT CHANGE UP (ref 37–73)
OB PNL STL: POSITIVE
PLAT MORPH BLD: NORMAL — SIGNIFICANT CHANGE UP
PLATELET # BLD AUTO: 135 K/UL — LOW (ref 150–400)
PLATELET # BLD AUTO: 147 K/UL — LOW (ref 150–400)
POIKILOCYTOSIS BLD QL AUTO: SLIGHT — SIGNIFICANT CHANGE UP
POTASSIUM SERPL-MCNC: 3.8 MMOL/L — SIGNIFICANT CHANGE UP (ref 3.5–5.3)
POTASSIUM SERPL-SCNC: 3.8 MMOL/L — SIGNIFICANT CHANGE UP (ref 3.5–5.3)
RBC # BLD: 3.34 M/UL — LOW (ref 4.4–5.2)
RBC # BLD: 3.59 M/UL — LOW (ref 4.4–5.2)
RBC # FLD: 24.4 % — HIGH (ref 11–15.6)
RBC # FLD: 24.4 % — HIGH (ref 11–15.6)
RBC BLD AUTO: ABNORMAL
SODIUM SERPL-SCNC: 142 MMOL/L — SIGNIFICANT CHANGE UP (ref 135–145)
TROPONIN T SERPL-MCNC: 0.11 NG/ML — HIGH (ref 0–0.06)
TYPE + AB SCN PNL BLD: SIGNIFICANT CHANGE UP
WBC # BLD: 3.3 K/UL — LOW (ref 4.8–10.8)
WBC # BLD: 4.1 K/UL — LOW (ref 4.8–10.8)
WBC # FLD AUTO: 3.3 K/UL — LOW (ref 4.8–10.8)
WBC # FLD AUTO: 4.1 K/UL — LOW (ref 4.8–10.8)

## 2017-07-14 PROCEDURE — 93010 ELECTROCARDIOGRAM REPORT: CPT

## 2017-07-14 PROCEDURE — 99233 SBSQ HOSP IP/OBS HIGH 50: CPT

## 2017-07-14 PROCEDURE — 99222 1ST HOSP IP/OBS MODERATE 55: CPT

## 2017-07-14 RX ORDER — PANTOPRAZOLE SODIUM 20 MG/1
40 TABLET, DELAYED RELEASE ORAL
Qty: 0 | Refills: 0 | Status: DISCONTINUED | OUTPATIENT
Start: 2017-07-14 | End: 2017-07-17

## 2017-07-14 RX ORDER — FUROSEMIDE 40 MG
20 TABLET ORAL ONCE
Qty: 0 | Refills: 0 | Status: DISCONTINUED | OUTPATIENT
Start: 2017-07-14 | End: 2017-07-16

## 2017-07-14 RX ADMIN — Medication 20 MILLIGRAM(S): at 05:22

## 2017-07-14 RX ADMIN — SIMVASTATIN 20 MILLIGRAM(S): 20 TABLET, FILM COATED ORAL at 23:33

## 2017-07-14 RX ADMIN — Medication 0.12 MILLIGRAM(S): at 05:22

## 2017-07-14 RX ADMIN — LATANOPROST 1 DROP(S): 0.05 SOLUTION/ DROPS OPHTHALMIC; TOPICAL at 23:33

## 2017-07-14 RX ADMIN — SODIUM CHLORIDE 3 MILLILITER(S): 9 INJECTION INTRAMUSCULAR; INTRAVENOUS; SUBCUTANEOUS at 05:22

## 2017-07-14 RX ADMIN — HEPARIN SODIUM 1100 UNIT(S)/HR: 5000 INJECTION INTRAVENOUS; SUBCUTANEOUS at 04:09

## 2017-07-14 RX ADMIN — SODIUM CHLORIDE 3 MILLILITER(S): 9 INJECTION INTRAMUSCULAR; INTRAVENOUS; SUBCUTANEOUS at 14:17

## 2017-07-14 RX ADMIN — Medication 40 MILLIGRAM(S): at 18:33

## 2017-07-14 RX ADMIN — PANTOPRAZOLE SODIUM 40 MILLIGRAM(S): 20 TABLET, DELAYED RELEASE ORAL at 18:42

## 2017-07-14 RX ADMIN — HEPARIN SODIUM 0 UNIT(S)/HR: 5000 INJECTION INTRAVENOUS; SUBCUTANEOUS at 03:07

## 2017-07-14 RX ADMIN — Medication 250 MILLIGRAM(S): at 12:42

## 2017-07-14 RX ADMIN — SODIUM CHLORIDE 3 MILLILITER(S): 9 INJECTION INTRAMUSCULAR; INTRAVENOUS; SUBCUTANEOUS at 23:31

## 2017-07-14 RX ADMIN — Medication 1 MILLIGRAM(S): at 12:42

## 2017-07-14 RX ADMIN — IRON SUCROSE 110 MILLIGRAM(S): 20 INJECTION, SOLUTION INTRAVENOUS at 23:36

## 2017-07-14 RX ADMIN — Medication 1 TABLET(S): at 12:42

## 2017-07-14 RX ADMIN — LISINOPRIL 5 MILLIGRAM(S): 2.5 TABLET ORAL at 05:21

## 2017-07-14 RX ADMIN — Medication 50 MILLIGRAM(S): at 05:22

## 2017-07-14 RX ADMIN — Medication 0.5 MILLIGRAM(S): at 20:30

## 2017-07-14 RX ADMIN — Medication 40 MILLIGRAM(S): at 11:35

## 2017-07-14 RX ADMIN — PANTOPRAZOLE SODIUM 40 MILLIGRAM(S): 20 TABLET, DELAYED RELEASE ORAL at 05:22

## 2017-07-14 NOTE — PROGRESS NOTE ADULT - SUBJECTIVE AND OBJECTIVE BOX
PULMONARY PROGRESS NOTE      ANN FRANCESLEE [id number]    Patient is a 84y old  Female who presents with a chief complaint of Abdominal pain, insomnia, and increased shortness of breath (10 Murphy 2017 16:40)      INTERVAL HPI/OVERNIGHT EVENTS:  Events of last PM reviewed. Pt now pain free. On heparin drip but course now complicated by GI bleed  No SOB. Used BIPAP 20/6 last night. Daughter at bedside    MEDICATIONS  (STANDING):  sodium chloride 0.9% lock flush 3 milliLiter(s) IV Push every 8 hours  latanoprost 0.005% Ophthalmic Solution 1 Drop(s) Both EYES at bedtime  pantoprazole    Tablet 40 milliGRAM(s) Oral before breakfast  multivitamin 1 Tablet(s) Oral daily  metoprolol succinate ER 50 milliGRAM(s) Oral daily  buDESOnide   0.5 milliGRAM(s) Respule 0.5 milliGRAM(s) Inhalation two times a day  folic acid 1 milliGRAM(s) Oral daily  ascorbic acid 250 milliGRAM(s) Oral daily  simvastatin 20 milliGRAM(s) Oral at bedtime  lisinopril 5 milliGRAM(s) Oral daily  ferrous    sulfate 325 milliGRAM(s) Oral daily  sodium chloride 0.45%. 1000 milliLiter(s) (50 mL/Hr) IV Continuous <Continuous>  iron sucrose IVPB 200 milliGRAM(s) IV Intermittent every 24 hours  digoxin     Tablet 0.125 milliGRAM(s) Oral daily  furosemide   Injectable 40 milliGRAM(s) IV Push every 12 hours  furosemide   Injectable 20 milliGRAM(s) IV Push once      MEDICATIONS  (PRN):  aluminum hydroxide/magnesium hydroxide/simethicone Suspension 30 milliLiter(s) Oral every 6 hours PRN Dyspepsia  hydrALAZINE Injectable 10 milliGRAM(s) IV Push every 3 hours PRN sbp > 150  ALBUTerol/ipratropium for Nebulization 3 milliLiter(s) Nebulizer every 6 hours PRN Shortness of Breath and/or Wheezing      Allergies    aspirin (Anaphylaxis)  NSAIDs (Other)    Intolerances    Lasix (Other)  OHS PT (Unknown)      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   r/o  out  stenosis  Cardiomyopathy  Osteoarthritis  Iron deficiency anemia  MI, old: 2008  Dyslipidemia  Acid reflux  Hx of CAB  CAD (coronary artery disease)  Heart failure  HTN (hypertension)  H/O tracheostomy: 2013 may  Cataract: b/l  2013  S/P CABG x 3:   [facility]      SOCIAL HISTORY  Smoking History:       REVIEW OF SYSTEMS:    CONSTITUTIONAL:  No distress    HEENT:  Eyes:  No diplopia or blurred vision. ENT:  No earache, sore throat or runny nose.    CARDIOVASCULAR:  No pressure, squeezing, tightness, heaviness or aching about the chest; no palpitations.    RESPIRATORY: per hpi    GASTROINTESTINAL: per HPI    GENITOURINARY:  No dysuria, frequency or urgency.    NEUROLOGIC:  No paresthesias, fasciculations, seizures or weakness.    Extremities: No cyanosis, clubbing or edema    PSYCHIATRIC:  No disorder of thought or mood.    Vital Signs Last 24 Hrs  T(C): 36.8 (2017 05:17), Max: 37.1 (2017 22:08)  T(F): 98.3 (2017 05:17), Max: 98.7 (2017 22:08)  HR: 82 (2017 07:50) (74 - 120)  BP: 104/52 (2017 07:50) (104/52 - 152/70)  BP(mean): --  RR: 18 (2017 07:50) (17 - 35)  SpO2: 99% (2017 07:50) (93% - 100%)    PHYSICAL EXAMINATION:    GENERAL: The patient is awake and alert in no apparent distress on NCO    HEENT: Head is normocephalic and atraumatic. Extraocular muscles are intact. Mucous membranes are moist.    NECK: Supple.    LUNGS: Clear to auscultation without wheezing, rales or rhonchi; respirations unlabored    HEART: Regular rate and rhythm without murmur.    ABDOMEN: Soft, nontender, and nondistended.      EXTREMITIES: Without any cyanosis, clubbing, rash, lesions or edema.    NEUROLOGIC: Grossly intact.    LABS:                        7.4    4.1   )-----------( 147      ( 2017 05:17 )             24.4     07-14    142  |  98  |  31.0<H>  ----------------------------<  96  3.8   |  37.0<H>  |  0.98    Ca    8.6      2017 05:17      PTT - ( 2017 05:17 )  PTT:108.6 sec      CARDIAC MARKERS ( 2017 01:24 )  x     / 0.11 ng/mL / x     / x     / x      CARDIAC MARKERS ( 2017 20:01 )  x     / 0.02 ng/mL / x     / x     / x                    MICROBIOLOGY:    RADIOLOGY & ADDITIONAL STUDIES:  < from: Xray Chest 1 View AP/PA. (17 @ 15:47) >   EXAM:  CHEST SINGLE VIEW FRONTAL                          PROCEDURE DATE:  2017          INTERPRETATION:  History: CHF. Follow-up study    Technique:  AP portable    Comparisons:  Chest x-ray dated 2017    Findings: Significant decrease in pulmonic congestion. Small bilateral   pleural effusions remain at the lung bases.   The pulmonary vasculature   and aorta are normal for age. Cardiomegaly unchanged. Status post CABG    Impression: CHF improved since prior study. Status post CABG                BENJIE BAKER M.D., ATTENDING RADIOLOGIST  This document has been electronically signed. 2017  3:54PM    < end of copied text >

## 2017-07-14 NOTE — PROGRESS NOTE ADULT - PROBLEM SELECTOR PLAN 4
Rate control
continue plavix, and toporol   continue cardiac monitoring
improved  Likely hypercapnic induced
- will follow closely  - pre-renal combined with diuresis at this time  -
Improved; Likely hypercapnic induced.
Rate control
continue plavix  Hold Toprol XL for use of midrorine and bradycardoa in the 50s  Continue Zocor  continue protonix and colace for GI PPX  encourage coughing and deep breathing   encourage ambulation   continue lovenox
continue plavix, and Toprol XL  Continue Zocor
continue plavix, and toporol   continue cardiac monitoring
improved
improved  Likely hypercapnic induced
improved  hypercapnic induced?
s/p mitral clip  meds as above
- will follow closely  - pre-renal combined with diuresis at this time  -
Trend BUN/Cr. Avoid nephrotoxic agents.
bipap as needed, noctural case managemtn consult to discuss trilogy ventilator
discussion with Mini need goals of care addressed mother would not want trach, CPR and intubation would not be benficial either, two other sibling that Mini has been trying to discuss with will try for family meeting this weekend
monitor ammonia level   continue lactulose  monitor neuro status
-improved.
Patient intermittently confused and agitated.  Patient was more calm overnight after her daughter came back to hospital and visited her, and is now resting and on close supervision.    Will continue to monitor mental status.   Daughter states that her mother has not slept
-will speak to daughter when she comes in. Very guarded overall prognosis.
Due to CHF and COPD.  ct lasix. nebs as needed. Morphine for pain and pulm HTN.

## 2017-07-14 NOTE — PROGRESS NOTE ADULT - SUBJECTIVE AND OBJECTIVE BOX
[place] [facility]                                                        Office: [address] [address]                                                       Telephone: [phone]. Fax:[phone]                                                                             PROGRESS NOTE   Reason for follow up: STEMI, Biventricular CHF                            Overnight: melena, and drop in H/h. Stopped heparin.   Update: Chest pain free.     Subjective: " i feel better "   Complains of: Dark color stools. passing a lot of urine.   Review of symptoms: Cardiac:  No chest pain. + dyspnea. No palpitations.  Respiratory: no cough. + dyspnea  Gastrointestinal: No diarrhea. No abdominal pain. No bleeding.     Past medical history: No updates.   Chronic conditions:  Hypertension: controlled. CHF: improved.. Angina: Improved.   	  Vitals:  T(C): 97.8 (07-14-17 @ 12:15), Max: 97.8 (07-14-17 @ 12:15)  HR: 88 (07-14-17 @ 12:15) (74 - 120)  BP: 110/68 (07-14-17 @ 12:15) (104/52 - 152/70)  RR: 22 (07-14-17 @ 12:15) (17 - 35)  SpO2: 100% (07-14-17 @ 12:15) (93% - 100%)  Wt(kg): --  I&O's Summary    13 Jul 2017 07:01  -  14 Jul 2017 07:00  --------------------------------------------------------  IN: 362 mL / OUT: 120 mL / NET: 242 mL      Weight (kg): 75.4 (07-11 @ 08:10)    PHYSICAL EXAM:  Appearance: Comfortable. No acute distress  HEENT:  Head and neck: Atraumatic. Normocephalic.  Normal oral mucosa,, Neck is supple. +  JVD,    Neurologic: A & O x2 , , Cranial nerves not evaluated. t. hard of hearing./   Lymphatic: No cervical lymphadenopathy  Cardiovascular: Normal S1 S2, No murmur, rubs/gallopws. + JVD,2+  edema  Respiratory: decrease breath douns in the bases. Bilateral rhonchoruis and crackles  Gastrointestinal:  Soft, Non-tender, Obese.   Lower Extremities: 1+ bilateral  edema  Psychiatry: Patient is calm. No agitation.   Skin: No rashes, No ecchymoses, No cyanosis      CURRENT MEDICATIONS:  metoprolol succinate ER 50 milliGRAM(s) Oral daily  hydrALAZINE Injectable 10 milliGRAM(s) IV Push every 3 hours PRN  lisinopril 5 milliGRAM(s) Oral daily  digoxin     Tablet 0.125 milliGRAM(s) Oral daily  furosemide   Injectable 40 milliGRAM(s) IV Push every 12 hours  furosemide   Injectable 20 milliGRAM(s) IV Push once    buDESOnide   0.5 milliGRAM(s) Respule  pantoprazole    Tablet  simvastatin  latanoprost 0.005% Ophthalmic Solution  multivitamin  folic acid  ascorbic acid  sodium chloride 0.45%.  iron sucrose IVPB      LABS:	 	  CARDIAC MARKERS ( 14 Jul 2017 01:24 )  x     / 0.11 ng/mL / x     / x     / x      p-BNP 14 Jul 2017 01:24: x    , CARDIAC MARKERS ( 13 Jul 2017 20:01 )  x     / 0.02 ng/mL / x     / x     / x      p-BNP 13 Jul 2017 20:01: x                              7.9    3.3   )-----------( 135      ( 14 Jul 2017 14:07 )             26.5     07-14    142  |  98  |  31.0<H>  ----------------------------<  96  3.8   |  37.0<H>  |  0.98    Ca    8.6      14 Jul 2017 05:17      proBNP:   Lipid Profile:   HgA1c: TSH:     TELEMETRY: Reviewed    ECG:  Reviewed by me. 	    EKG: Sinus tachycardia. Inferior infarct. Acute inferior ST elevation myocardial infarction.     ECHO: LVEF 50%. S/p Mitral clip. Severe MR. Moderate to severe TR. Mild AI> Mild PAH.   Mild to moderate RV dysfunction.     RADIOLOGY:   X-ray: CHF improved from last time. [place] [facility]                                                        Office: [address] [address]                                                       Telephone: [phone]. Fax:[phone]                                                                             PROGRESS NOTE   Reason for follow up: STEMI, Biventricular CHF                            Overnight: melena, and drop in H/h. Stopped heparin.   Update: Chest pain free.     Subjective: " i feel better "   Complains of: Dark color stools. passing a lot of urine.   Review of symptoms: Cardiac:  No chest pain. + dyspnea. No palpitations.  Respiratory: no cough. + dyspnea  Gastrointestinal: No diarrhea. No abdominal pain. No bleeding.     Past medical history: No updates.   Chronic conditions:  Hypertension: controlled. CHF: improved.. Angina: Improved.   	  Vitals:  T(C): 97.8 (07-14-17 @ 12:15), Max: 97.8 (07-14-17 @ 12:15)  HR: 88 (07-14-17 @ 12:15) (74 - 120)  BP: 110/68 (07-14-17 @ 12:15) (104/52 - 152/70)  RR: 22 (07-14-17 @ 12:15) (17 - 35)  SpO2: 100% (07-14-17 @ 12:15) (93% - 100%)  Wt(kg): --  I&O's Summary    13 Jul 2017 07:01  -  14 Jul 2017 07:00  --------------------------------------------------------  IN: 362 mL / OUT: 120 mL / NET: 242 mL      Weight (kg): 75.4 (07-11 @ 08:10)    PHYSICAL EXAM:  Appearance: Comfortable. No acute distress  HEENT:  Head and neck: Atraumatic. Normocephalic.  Normal oral mucosa,, Neck is supple. +  JVD,    Neurologic: A & O x2 , , Cranial nerves not evaluated. t. hard of hearing./   Lymphatic: No cervical lymphadenopathy  Cardiovascular: Normal S1 S2, No murmur, rubs/gallopws. + JVD,2+  edema  Respiratory: decrease breath douns in the bases. Bilateral rhonchoruis and crackles  Gastrointestinal:  Soft, Non-tender, Obese.   Lower Extremities: 1+ bilateral  edema  Psychiatry: Patient is calm. No agitation.   Skin: No rashes, No ecchymoses, No cyanosis      CURRENT MEDICATIONS:  metoprolol succinate ER 50 milliGRAM(s) Oral daily  hydrALAZINE Injectable 10 milliGRAM(s) IV Push every 3 hours PRN  lisinopril 5 milliGRAM(s) Oral daily  digoxin     Tablet 0.125 milliGRAM(s) Oral daily  furosemide   Injectable 40 milliGRAM(s) IV Push every 12 hours  furosemide   Injectable 20 milliGRAM(s) IV Push once    buDESOnide   0.5 milliGRAM(s) Respule  pantoprazole    Tablet  simvastatin  latanoprost 0.005% Ophthalmic Solution  multivitamin  folic acid  ascorbic acid  sodium chloride 0.45%.  iron sucrose IVPB      LABS:	 	  CARDIAC MARKERS ( 14 Jul 2017 01:24 )  x     / 0.11 ng/mL / x     / x     / x      p-BNP 14 Jul 2017 01:24: x    , CARDIAC MARKERS ( 13 Jul 2017 20:01 )  x     / 0.02 ng/mL / x     / x     / x      p-BNP 13 Jul 2017 20:01: x                              7.9    3.3   )-----------( 135      ( 14 Jul 2017 14:07 )             26.5     07-14    142  |  98  |  31.0<H>  ----------------------------<  96  3.8   |  37.0<H>  |  0.98    Ca    8.6      14 Jul 2017 05:17      proBNP:   Lipid Profile:   HgA1c: TSH:     TELEMETRY: Reviewed  Sinus Rhythm. 1st degree Av block,. Multiform PVCs. 3 beat NSVT.  ECG:  Reviewed by me. 	< from: 12 Lead ECG (07.13.17 @ 19:30) >  Sinus tachycardia  Inferior infarct , possibly acute  Anterolateral infarct , age undetermined  Consider right ventricular involvement in acute inferior infarct  Abnormal ECG    EKG: Sinus tachycardia. Inferior infarct. Acute inferior ST elevation myocardial infarction.     ECHO: LVEF 50%. S/p Mitral clip. Severe MR. Moderate to severe TR. Mild AI> Mild PAH.   Mild to moderate RV dysfunction.     RADIOLOGY:   X-ray: CHF improved from last time.

## 2017-07-14 NOTE — PROGRESS NOTE ADULT - ASSESSMENT
Recurrent unstable angina resolved but complicated by GI bleed from heparin  OHS controlled on BIPAP  CHF improved

## 2017-07-14 NOTE — PROVIDER CONTACT NOTE (OTHER) - SITUATION
Pt chart reviewed, contents noted, pt transferred to 4CTU due to STEMI, please enter new P.T. orders when deemed appropriate by team, P.T. to sign off pending receipt of new orders.

## 2017-07-14 NOTE — PROGRESS NOTE ADULT - SUBJECTIVE AND OBJECTIVE BOX
NEPHROLOGY INTERVAL HPI/OVERNIGHT EVENTS:    No new events.    MEDICATIONS  (STANDING):  sodium chloride 0.9% lock flush 3 milliLiter(s) IV Push every 8 hours  latanoprost 0.005% Ophthalmic Solution 1 Drop(s) Both EYES at bedtime  pantoprazole    Tablet 40 milliGRAM(s) Oral before breakfast  multivitamin 1 Tablet(s) Oral daily  metoprolol succinate ER 50 milliGRAM(s) Oral daily  buDESOnide   0.5 milliGRAM(s) Respule 0.5 milliGRAM(s) Inhalation two times a day  clopidogrel Tablet 75 milliGRAM(s) Oral daily  folic acid 1 milliGRAM(s) Oral daily  ascorbic acid 250 milliGRAM(s) Oral daily  simvastatin 20 milliGRAM(s) Oral at bedtime  lisinopril 5 milliGRAM(s) Oral daily  ferrous    sulfate 325 milliGRAM(s) Oral daily  sodium chloride 0.45%. 1000 milliLiter(s) (50 mL/Hr) IV Continuous <Continuous>  torsemide 20 milliGRAM(s) Oral daily  iron sucrose IVPB 200 milliGRAM(s) IV Intermittent every 24 hours    MEDICATIONS  (PRN):  aluminum hydroxide/magnesium hydroxide/simethicone Suspension 30 milliLiter(s) Oral every 6 hours PRN Dyspepsia  hydrALAZINE Injectable 10 milliGRAM(s) IV Push every 3 hours PRN sbp > 150  ALBUTerol/ipratropium for Nebulization 3 milliLiter(s) Nebulizer every 6 hours PRN Shortness of Breath and/or Wheezing      Allergies    aspirin (Anaphylaxis)  NSAIDs (Other)      Lasix (Other)  OHS PT (Unknown)        Vital Signs Last 24 Hrs  T(C): 36.3 (13 Jul 2017 07:18), Max: 36.8 (12 Jul 2017 16:11)  T(F): 97.4 (13 Jul 2017 07:18), Max: 98.3 (12 Jul 2017 16:11)  HR: 72 (13 Jul 2017 07:18) (71 - 100)  BP: 119/69 (13 Jul 2017 07:18) (93/65 - 140/88)  BP(mean): --  RR: 18 (13 Jul 2017 07:18) (18 - 18)  SpO2: 99% (13 Jul 2017 10:19) (97% - 100%)  Daily     Daily   I&O's Detail    I&O's Summary      PHYSICAL EXAM:  GENERAL: NAD,   EYES:  conjunctiva and sclera clear  NECK: Supple, Veins full upright  NERVOUS SYSTEM: Same,   CHEST: poor air movement bilaterally  HEART:  sternal scar, no rub  ABDOMEN: Soft, NT/ND BS+  EXTREMITIES: legs same   :  neg  LABS:                        7.8    3.6   )-----------( 157      ( 13 Jul 2017 07:00 )             26.3     07-13    146<H>  |  100  |  30.0<H>  ----------------------------<  91  3.8   |  35.0<H>  |  1.02    Ca    8.9      13 Jul 2017 07:00                  RADIOLOGY & ADDITIONAL TESTS:

## 2017-07-14 NOTE — PROGRESS NOTE ADULT - SUBJECTIVE AND OBJECTIVE BOX
Patient: ANN FRANCES [id number] 84y Female                 Internal Medicine Hospitalist Progress Note - Dr. Errol Lovett    Chief Complaint: Patient is a 84y old  Female who presents with a chief complaint of Abdominal pain, insomnia, and increased shortness of breath (10 Murphy 2017 16:40)    BRIEF HOSPITAL COURSE:  83 y/o F with CHF, severe MR s/p mitral clip 2 weeks prior to admission, CAD, HTN, obesity, admitted to CT Surgery service due to altered mental status, found to have persistent hypercapnic respiratory failure with inability to wean from NIV, developed compensated respiratory acidosis and contraction alkalosis.  PCO2 87.  Transferred to the MICU service 7/3 for management of her NIV and metabolic disturbances.  In ICU, she was successfully weaned from NIV, requiring BIPAP at night.  TTE:  1. Mildly decreased global left ventricular systolic function.  2. Left ventricular ejection fraction, by visual estimation, is 45 to 50%.  3. Mild to moderate right ventricular systolic dysfunction.  4. Severely enlarged left atrium.  5. Moderately dilated right atrium.  6. Patient is s/p Mitral Clip with severe regurgitation.  7. Moderate-severe tricuspid regurgitation.  8. Mild aortic regurgitation.  9. Estimated pulmonary artery systolic pressure is 49.7 mmHg assuming a  right atrial pressure of 8 mmHg, which is consistent with mild pulmonary  hypertension. 10. There is no evidence of pericardial effusion.  On , patient developed chest pain. EKG showed inferior ST elevations. Seen by cardiology and after discussion with family, opted for medical management due to patient's comorbidities.  Started on anticoagulation, however, pt then experienced a bloody BM with Hgb dropping to 7.4.      Pt seen with daughter at bedside, reports "not feeling well".  Notes dull ache in chest.  No SOB.  Denies weakness / numbness.  No additional complaints.    ____________________PHYSICAL EXAM:  Vitals reviewed as indicated below  GENERAL:  NAD Alert, slightly confused.    HEENT: NCAT  CARDIOVASCULAR:  S1, S2  LUNGS: basilar crackles, R>L  ABDOMEN:  soft, (-) tenderness, (-) distension, (+) bowel sounds, (-) guarding, (-) rebound (-) rigidity  EXTREMITIES:  no cyanosis / clubbing / edema.   ____________________      MEDICATIONS:  sodium chloride 0.9% lock flush 3 milliLiter(s) IV Push every 8 hours  latanoprost 0.005% Ophthalmic Solution 1 Drop(s) Both EYES at bedtime  pantoprazole    Tablet 40 milliGRAM(s) Oral before breakfast  multivitamin 1 Tablet(s) Oral daily  metoprolol succinate ER 50 milliGRAM(s) Oral daily  buDESOnide   0.5 milliGRAM(s) Respule 0.5 milliGRAM(s) Inhalation two times a day  folic acid 1 milliGRAM(s) Oral daily  ascorbic acid 250 milliGRAM(s) Oral daily  simvastatin 20 milliGRAM(s) Oral at bedtime  aluminum hydroxide/magnesium hydroxide/simethicone Suspension 30 milliLiter(s) Oral every 6 hours PRN  hydrALAZINE Injectable 10 milliGRAM(s) IV Push every 3 hours PRN  lisinopril 5 milliGRAM(s) Oral daily  ALBUTerol/ipratropium for Nebulization 3 milliLiter(s) Nebulizer every 6 hours PRN  ferrous    sulfate 325 milliGRAM(s) Oral daily  sodium chloride 0.45%. 1000 milliLiter(s) IV Continuous <Continuous>  iron sucrose IVPB 200 milliGRAM(s) IV Intermittent every 24 hours  digoxin     Tablet 0.125 milliGRAM(s) Oral daily  furosemide   Injectable 40 milliGRAM(s) IV Push every 12 hours  furosemide   Injectable 20 milliGRAM(s) IV Push once      VITALS:  Vital Signs Last 24 Hrs  T(C): 36.8 (2017 05:17), Max: 37.1 (2017 22:08)  T(F): 98.3 (2017 05:17), Max: 98.7 (2017 22:08)  HR: 82 (2017 07:50) (74 - 120)  BP: 104/52 (2017 07:50) (104/52 - 152/70)  BP(mean): --  RR: 18 (2017 07:50) (17 - 35)  SpO2: 99% (2017 07:50) (93% - 100%) Daily     Daily Weight in k.8 (2017 06:39)  CAPILLARY BLOOD GLUCOSE        I&O's Summary    2017 07:01  -  2017 07:00  --------------------------------------------------------  IN: 362 mL / OUT: 120 mL / NET: 242 mL        LABS:                        7.4    4.1   )-----------( 147      ( 2017 05:17 )             24.4     07-14    142  |  98  |  31.0<H>  ----------------------------<  96  3.8   |  37.0<H>  |  0.98    Ca    8.6      2017 05:17      PTT - ( 2017 05:17 )  PTT:108.6 sec  RECENT CULTURES:          CARDIAC MARKERS ( 2017 01:24 )  x     / 0.11 ng/mL / x     / x     / x      CARDIAC MARKERS ( 2017 20:01 )  x     / 0.02 ng/mL / x     / x     / x

## 2017-07-14 NOTE — CONSULT NOTE ADULT - SUBJECTIVE AND OBJECTIVE BOX
Patient is a 84y old  Female admitted  with a chief complaint of Abdominal pain, insomnia, and increased shortness of breath (10 Murphy 2017 16:40)      HPI:  84 year old with multiple medical problems who presented to the ED with complaints of LLQ pain for one day PTA. No fever, vomiting or change in urination. Patient is s/p  mitral valve clipping done on  with Dr. Owen.  On plavix. They stopped her IMDUR and Beta blocker due to relative hypotension, but continued on hydralazine to reduce afterload. Daughter states she has stage III heart failure. She states her mother is always somewhat short of breath. Initial imaging suggested acalculus cholecystitis for which she treated with antibiotics for two weeks during which her stools were loose. After the antibiotics were stopped the diarrhea resolved. In addition the abdominal pain she had on admission has resolved. However during her prolonged hospital stay she was noted to still have severe mitral regurgitation and the clipping appeared to have failed. Her hospital course was complicated by episodes of altered mental status and some episodes of A fib. Also with severe hypercapnea requiring ICU stay with NIV from which she was weaned and now on Bipap at [facility], Pat still with SOB but no abdominal pain. She has had occasional nausea and vomiting, nonbloody but non within the past 2 days. Yesterday she was diagnosed as having a non Q wave MI and placed on full dose heparin. Today she has had several large bloody BMs. The heparin and plavix have been stopped. She is allergic to aspirin. She also has an element of mild renal insufficiency. She has never smoked or consumed alcohol heavily. Cat scan on 6/10 of this year showed a 2.6cm polyp in the ascending colon near the valve.        REVIEW OF SYSTEMS:    CONSTITUTIONAL: No fever, weight loss, or fatigue  EYES: No eye pain, visual disturbances, or discharge  ENMT:  No difficulty hearing, tinnitus, vertigo; No sinus or throat pain  NECK: No pain or stiffness  RESPIRATORY: No cough, wheezing, chills or hemoptysis; + SOB  CARDIOVASCULAR: + recent chest painschest  palpitations, dizziness, or leg swelling  GASTROINTESTINAL: as above  NEUROLOGICAL: No headaches, memory loss, loss of strength, numbness, or tremors  SKIN: No itching, burning, rashes, or lesions   LYMPH NODES: No enlarged glands  MUSCULOSKELETAL: No joint pain or swelling; No muscle, back, or extremity pain  PSYCHIATRIC: No depression, anxiety, mood swings, or difficulty sleeping  HEME/LYMPH: No easy bruising, or bleeding gums  ALLERY AND IMMUNOLOGIC: No hives or eczema      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   r/o  out  stenosis  Cardiomyopathy  Osteoarthritis  Iron deficiency anemia  MI, old: 2008  Dyslipidemia  Acid reflux  Hx of CAB  CAD (coronary artery disease)  Heart failure  HTN (hypertension)  H/O tracheostomy: 2013 may  Cataract: b/l  2013  S/P CABG x 3:   [place]      FAMILY HISTORY:  Family history of hypertension      SOCIAL HISTORY:  Smoking Status: [ ] Current, [ ] Former, [ x] Never  Pack Years:  Alcohol Use:rare    MEDICATIONS:  MEDICATIONS  (STANDING):  sodium chloride 0.9% lock flush 3 milliLiter(s) IV Push every 8 hours  latanoprost 0.005% Ophthalmic Solution 1 Drop(s) Both EYES at bedtime  pantoprazole    Tablet 40 milliGRAM(s) Oral before breakfast  multivitamin 1 Tablet(s) Oral daily  metoprolol succinate ER 50 milliGRAM(s) Oral daily  buDESOnide   0.5 milliGRAM(s) Respule 0.5 milliGRAM(s) Inhalation two times a day  folic acid 1 milliGRAM(s) Oral daily  ascorbic acid 250 milliGRAM(s) Oral daily  simvastatin 20 milliGRAM(s) Oral at bedtime  lisinopril 5 milliGRAM(s) Oral daily  sodium chloride 0.45%. 1000 milliLiter(s) (50 mL/Hr) IV Continuous <Continuous>  iron sucrose IVPB 200 milliGRAM(s) IV Intermittent every 24 hours  digoxin     Tablet 0.125 milliGRAM(s) Oral daily  furosemide   Injectable 40 milliGRAM(s) IV Push every 12 hours  furosemide   Injectable 20 milliGRAM(s) IV Push once    MEDICATIONS  (PRN):  aluminum hydroxide/magnesium hydroxide/simethicone Suspension 30 milliLiter(s) Oral every 6 hours PRN Dyspepsia  hydrALAZINE Injectable 10 milliGRAM(s) IV Push every 3 hours PRN sbp > 150  ALBUTerol/ipratropium for Nebulization 3 milliLiter(s) Nebulizer every 6 hours PRN Shortness of Breath and/or Wheezing      Allergies    aspirin (Anaphylaxis)  NSAIDs (Other)    Intolerances    Lasix (Other)  [facility]HS PT (Unknown)      Vital Signs Last 24 Hrs  T(C): 97.8 (2017 12:15), Max: 97.8 (2017 12:15)  T(F): 208 (2017 12:15), Max: 208 (2017 12:15)  HR: 82 (2017 13:48) (74 - 120)  BP: 84/52 (2017 13:48) (84/52 - 152/70)  BP(mean): --  RR: 26 (2017 13:48) (17 - 35)  SpO2: 90% (2017 13:48) (90% - 100%)     @ 07:01  -  -14 @ 07:00  --------------------------------------------------------  IN: 362 mL / OUT: 120 mL / NET: 242 mL          PHYSICAL EXAM:    General: overweight black female, somnolent but in NAD  HEENT: MMM, conjunctiva and sclera clear, upper and lower denture  Lungs: markedly decreased breath sounds throughout with scattered rales and a few soft exp. wheezes on the left  Heart: Rhythm Regular, Sounds distant  Gastrointestinal: Soft, non-tender non-distended; Normal bowel sounds; No rebound or guarding; No Organomegaly & No Masses  Extremities: Normal range of motion, No clubbing, 1+pedal edema  Neurological: Alert but quite somnloent  Skin: Warm and dry. No obvious rash  Rectal: No Masses, maroon stool      LABS:                        7.9    3.3   )-----------( 135      ( 2017 14:07 )             26.5     07-    142  |  98  |  31.0<H>  ----------------------------<  96  3.8   |  37.0<H>  |  0.98    Ca    8.6      2017 05:17            RADIOLOGY & ADDITIONAL STUDIES:     < from: CT Abdomen and Pelvis w/ Oral Cont and w/ IV Cont (06.10.17 @ 12:03) >  PROCEDURE:   CT of the Abdomen and Pelvis was performed with intravenous contrast.   Intravenous contrast: 90 ml Omnipaque 350. 10 ml discarded.  Oral contrast: positive contrast was administered.  Sagittal and coronal reformats were performed.    FINDINGS:    LOWER CHEST: Cardiomegaly. Sternotomy. Right pleural effusion. Extensive   native coronary artery calcification.    LIVER: Within normal limits.  SPLEEN: Within normal limits.  PANCREAS: Within normal limits.  GALLBLADDER: Within normal limits.  BILE DUCTS: Normal caliber.  ADRENALS: Within normal limits.  KIDNEYS/URETERS: Bilateral renal cysts. No hydronephrosis.    RETROPERITONEUM: No lymphadenopathy.    VESSELS:  Severe diffuse atherosclerotic calcification of the aorta and   major branch vessels. Normal caliber aorta.    BOWEL: No bowel obstruction.Appendix is normal. 2.6 cm intraluminal   polypoid density in the ascending colon, just proximal to the ileocecal   junction.  PERITONEUM: Trace ascites. No free intraperitoneal air.    REPRODUCTIVE ORGANS: Multiple uterine fibroids.  BLADDER: Withinnormal limits.    ABDOMINAL WALL: Small amount of hemorrhage in the right groin. Please   correlate for recent procedure at this site. Tiny fat-containing   umbilical hernia.  BONES: No acute bony abnormality.    IMPRESSION:     No acute abdominal pathology.  Suspicion for large ascending colon polyp. Recommend GI consultation.      < end of copied text >

## 2017-07-14 NOTE — CONSULT NOTE ADULT - CONSULT REQUESTED DATE/TIME
05-Jul-2017 11:50
12-Jun-2017 16:03
13-Jul-2017 20:16
14-Jul-2017 14:45
16-Jun-2017 10:58
17-Jun-2017
17-Jun-2017
18-Jun-2017 11:15
26-Jun-2017 10:01
15-Murphy-2017 17:00

## 2017-07-14 NOTE — CONSULT NOTE ADULT - ASSESSMENT
Rectal bleed on anticoagulation-possibly bleeding from polyp or from a diverticulum. Cant exclude an UGI bleed but daughter prefers that NG tube not be placed.

## 2017-07-14 NOTE — PROGRESS NOTE ADULT - PROBLEM SELECTOR PLAN 2
Biventricular failure ,. Moderate RV dysfunction.   lasix 40 mg IV Q12.   ct beta-blocker . ct Angiotensin converting enzyme inhibitor   ct dose Dig 0.125 mg daily. off BIPAP for now,. nasal canula.

## 2017-07-14 NOTE — CONSULT NOTE ADULT - CONSULT REASON
ARF
CHF, severe MR, hypercapnic respiratory failure.
HF tx - cardiology input requested by family.
RUQ Pain
SEPSIS
acalculous cholecystitis
acute STEMI
acute on chronic hypercapnic vent failure
rectal bleed
Rehab eval

## 2017-07-14 NOTE — PROGRESS NOTE ADULT - SUBJECTIVE AND OBJECTIVE BOX
OVERNIGHT EVENTS:    BRIEF HOSPITAL COURSE:    Present Symptoms:     Dyspnea: 0 1 2 3   Nausea/Vomiting: Yes No  Anxiety:  Yes No  Depression: Yes No  Fatigue: Yes No  Loss of appetite: Yes No    Pain:             Character-            Duration-            Effect-            Factors-            Frequency-            Location-            Severity-    Review of Systems: Reviewed                     Negative:                     Positive:  Unable to obtain due to poor mentation   All others negative    MEDICATIONS  (STANDING):  sodium chloride 0.9% lock flush 3 milliLiter(s) IV Push every 8 hours  latanoprost 0.005% Ophthalmic Solution 1 Drop(s) Both EYES at bedtime  pantoprazole    Tablet 40 milliGRAM(s) Oral before breakfast  multivitamin 1 Tablet(s) Oral daily  metoprolol succinate ER 50 milliGRAM(s) Oral daily  buDESOnide   0.5 milliGRAM(s) Respule 0.5 milliGRAM(s) Inhalation two times a day  folic acid 1 milliGRAM(s) Oral daily  ascorbic acid 250 milliGRAM(s) Oral daily  simvastatin 20 milliGRAM(s) Oral at bedtime  lisinopril 5 milliGRAM(s) Oral daily  sodium chloride 0.45%. 1000 milliLiter(s) (50 mL/Hr) IV Continuous <Continuous>  iron sucrose IVPB 200 milliGRAM(s) IV Intermittent every 24 hours  digoxin     Tablet 0.125 milliGRAM(s) Oral daily  furosemide   Injectable 40 milliGRAM(s) IV Push every 12 hours  furosemide   Injectable 20 milliGRAM(s) IV Push once    MEDICATIONS  (PRN):  aluminum hydroxide/magnesium hydroxide/simethicone Suspension 30 milliLiter(s) Oral every 6 hours PRN Dyspepsia  hydrALAZINE Injectable 10 milliGRAM(s) IV Push every 3 hours PRN sbp > 150  ALBUTerol/ipratropium for Nebulization 3 milliLiter(s) Nebulizer every 6 hours PRN Shortness of Breath and/or Wheezing    PHYSICAL EXAM:    Vital Signs Last 24 Hrs  T(C): 97.8 (14 Jul 2017 12:15), Max: 97.8 (14 Jul 2017 12:15)  T(F): 208 (14 Jul 2017 12:15), Max: 208 (14 Jul 2017 12:15)  HR: 82 (14 Jul 2017 13:48) (74 - 120)  BP: 84/52 (14 Jul 2017 13:48) (84/52 - 152/70)  BP(mean): --  RR: 26 (14 Jul 2017 13:48) (17 - 35)  SpO2: 90% (14 Jul 2017 13:48) (90% - 100%)    General: alert  oriented x 3    Karnofsky:  30-40%    HEENT: dry mouth      Lungs: comfortable    CV: tachycardia    GI: tender      : normal      MSK: weakness      Skin: no rash    LABS:                      7.9    3.3   )-----------( 135      ( 14 Jul 2017 14:07 )             26.5     07-14    142  |  98  |  31.0<H>  ----------------------------<  96  3.8   |  37.0<H>  |  0.98    Ca    8.6      14 Jul 2017 05:17      PTT - ( 14 Jul 2017 12:06 )  PTT:30.7 sec    I&O's Summary    13 Jul 2017 07:01  -  14 Jul 2017 07:00  --------------------------------------------------------  IN: 362 mL / OUT: 120 mL / NET: 242 mL    RADIOLOGY & ADDITIONAL STUDIES:    ADVANCE DIRECTIVES: DNR/I OVERNIGHT EVENTS: s/p acute MI last night;     Present Symptoms:     Dyspnea: 0   Nausea/Vomiting: No  Anxiety:  No  Depression: No  Fatigue: Yes   Loss of appetite: No     Pain: none            Character-            Duration-            Effect-            Factors-            Frequency-            Location-            Severity-    Review of Systems: Reviewed                   Positive - fatigue     MEDICATIONS  (STANDING):  sodium chloride 0.9% lock flush 3 milliLiter(s) IV Push every 8 hours  latanoprost 0.005% Ophthalmic Solution 1 Drop(s) Both EYES at bedtime  pantoprazole    Tablet 40 milliGRAM(s) Oral before breakfast  multivitamin 1 Tablet(s) Oral daily  metoprolol succinate ER 50 milliGRAM(s) Oral daily  buDESOnide   0.5 milliGRAM(s) Respule 0.5 milliGRAM(s) Inhalation two times a day  folic acid 1 milliGRAM(s) Oral daily  ascorbic acid 250 milliGRAM(s) Oral daily  simvastatin 20 milliGRAM(s) Oral at bedtime  lisinopril 5 milliGRAM(s) Oral daily  sodium chloride 0.45%. 1000 milliLiter(s) (50 mL/Hr) IV Continuous <Continuous>  iron sucrose IVPB 200 milliGRAM(s) IV Intermittent every 24 hours  digoxin     Tablet 0.125 milliGRAM(s) Oral daily  furosemide   Injectable 40 milliGRAM(s) IV Push every 12 hours  furosemide   Injectable 20 milliGRAM(s) IV Push once    MEDICATIONS  (PRN):  aluminum hydroxide/magnesium hydroxide/simethicone Suspension 30 milliLiter(s) Oral every 6 hours PRN Dyspepsia  hydrALAZINE Injectable 10 milliGRAM(s) IV Push every 3 hours PRN sbp > 150  ALBUTerol/ipratropium for Nebulization 3 milliLiter(s) Nebulizer every 6 hours PRN Shortness of Breath and/or Wheezing    PHYSICAL EXAM:    Vital Signs Last 24 Hrs  T(C): 97.8 (14 Jul 2017 12:15), Max: 97.8 (14 Jul 2017 12:15)  T(F): 208 (14 Jul 2017 12:15), Max: 208 (14 Jul 2017 12:15)  HR: 82 (14 Jul 2017 13:48) (74 - 120)  BP: 84/52 (14 Jul 2017 13:48) (84/52 - 152/70)  BP(mean): --  RR: 26 (14 Jul 2017 13:48) (17 - 35)  SpO2: 90% (14 Jul 2017 13:48) (90% - 100%)    General: alert  oriented x 3    Karnofsky:  30-40%    HEENT: dry mouth      Lungs: comfortable    CV: tachycardia    GI: tender      : normal      MSK: weakness      Skin: no rash    LABS:                      7.9    3.3   )-----------( 135      ( 14 Jul 2017 14:07 )             26.5     07-14    142  |  98  |  31.0<H>  ----------------------------<  96  3.8   |  37.0<H>  |  0.98    Ca    8.6      14 Jul 2017 05:17    PTT - ( 14 Jul 2017 12:06 )  PTT:30.7 sec    I&O's Summary    13 Jul 2017 07:01  -  14 Jul 2017 07:00  --------------------------------------------------------  IN: 362 mL / OUT: 120 mL / NET: 242 mL    RADIOLOGY & ADDITIONAL STUDIES:    ADVANCE DIRECTIVES: DNR/I OVERNIGHT EVENTS: s/p acute MI last night; now with rapid response due to hypotension with acute gi bleeding and anemia.     Present Symptoms:     Dyspnea: 0   Nausea/Vomiting: No  Anxiety:  No  Depression: No  Fatigue: Yes   Loss of appetite: No     Pain: none            Character-            Duration-            Effect-            Factors-            Frequency-            Location-            Severity-    Review of Systems: Reviewed                   Positive - fatigue     MEDICATIONS  (STANDING):  sodium chloride 0.9% lock flush 3 milliLiter(s) IV Push every 8 hours  latanoprost 0.005% Ophthalmic Solution 1 Drop(s) Both EYES at bedtime  pantoprazole    Tablet 40 milliGRAM(s) Oral before breakfast  multivitamin 1 Tablet(s) Oral daily  metoprolol succinate ER 50 milliGRAM(s) Oral daily  buDESOnide   0.5 milliGRAM(s) Respule 0.5 milliGRAM(s) Inhalation two times a day  folic acid 1 milliGRAM(s) Oral daily  ascorbic acid 250 milliGRAM(s) Oral daily  simvastatin 20 milliGRAM(s) Oral at bedtime  lisinopril 5 milliGRAM(s) Oral daily  sodium chloride 0.45%. 1000 milliLiter(s) (50 mL/Hr) IV Continuous <Continuous>  iron sucrose IVPB 200 milliGRAM(s) IV Intermittent every 24 hours  digoxin     Tablet 0.125 milliGRAM(s) Oral daily  furosemide   Injectable 40 milliGRAM(s) IV Push every 12 hours  furosemide   Injectable 20 milliGRAM(s) IV Push once    MEDICATIONS  (PRN):  aluminum hydroxide/magnesium hydroxide/simethicone Suspension 30 milliLiter(s) Oral every 6 hours PRN Dyspepsia  hydrALAZINE Injectable 10 milliGRAM(s) IV Push every 3 hours PRN sbp > 150  ALBUTerol/ipratropium for Nebulization 3 milliLiter(s) Nebulizer every 6 hours PRN Shortness of Breath and/or Wheezing    PHYSICAL EXAM:    Vital Signs Last 24 Hrs  T(C): 97.8 (14 Jul 2017 12:15), Max: 97.8 (14 Jul 2017 12:15)  T(F): 208 (14 Jul 2017 12:15), Max: 208 (14 Jul 2017 12:15)  HR: 82 (14 Jul 2017 13:48) (74 - 120)  BP: 84/52 (14 Jul 2017 13:48) (84/52 - 152/70)  BP(mean): --  RR: 26 (14 Jul 2017 13:48) (17 - 35)  SpO2: 90% (14 Jul 2017 13:48) (90% - 100%)    General: alert  oriented x 3    Karnofsky:  30-40%    HEENT: dry mouth      Lungs: comfortable    CV: tachycardia    GI: tender      : normal      MSK: weakness      Skin: no rash    LABS:                      7.9    3.3   )-----------( 135      ( 14 Jul 2017 14:07 )             26.5     07-14    142  |  98  |  31.0<H>  ----------------------------<  96  3.8   |  37.0<H>  |  0.98    Ca    8.6      14 Jul 2017 05:17    PTT - ( 14 Jul 2017 12:06 )  PTT:30.7 sec    I&O's Summary    13 Jul 2017 07:01  -  14 Jul 2017 07:00  --------------------------------------------------------  IN: 362 mL / OUT: 120 mL / NET: 242 mL    RADIOLOGY & ADDITIONAL STUDIES:    ADVANCE DIRECTIVES: DNR/I

## 2017-07-14 NOTE — CONSULT NOTE ADULT - CONSULT REQUESTED BY NAME
CT Surgery
CT surgery
CT surgery Team , and Rapid response team
Daughter
Dr Lovett
Dr. Garcia
Dr. Owen
GURDEEP
MD Brayden
Dr. Smith

## 2017-07-14 NOTE — PROGRESS NOTE ADULT - PROBLEM SELECTOR PROBLEM 4
Pain
On home oxygen therapy
On home oxygen therapy
Afib
CHRISTOPHER (acute kidney injury)
Coronary artery disease involving native coronary artery of native heart without angina pectoris
Encephalopathy, unspecified
Non-rheumatic mitral regurgitation
Afib
Encephalopathy, unspecified
CHRISTOPHER (acute kidney injury)
CHRISTOPHER (acute kidney injury)
Encephalopathy, unspecified
Palliative care encounter
Respiratory failure
On home oxygen therapy
Iron deficiency anemia, unspecified iron deficiency anemia type
Acute on chronic respiratory failure with hypercapnia
Palliative care encounter

## 2017-07-14 NOTE — PROVIDER CONTACT NOTE (CRITICAL VALUE NOTIFICATION) - ACTION/TREATMENT ORDERED:
CCPA made aware.
Continue to monitor patient. No new orders at this time.
MD made aware, Will order potassium.
Stop heparin for 60 minutes and follow heparin nomogram.

## 2017-07-14 NOTE — PROGRESS NOTE ADULT - PROBLEM SELECTOR PROBLEM 5
Palliative care encounter
Valvular heart disease
Acalculous cholecystitis
CKD (chronic kidney disease) stage 3, GFR 30-59 ml/min
Dyslipidemia
Left lower quadrant pain
Non-rheumatic mitral regurgitation
Left lower quadrant pain
Valvular heart disease
Delirium
Left lower quadrant pain
Palliative care encounter
On home oxygen therapy
Prophylactic measure

## 2017-07-14 NOTE — CONSULT NOTE ADULT - PROBLEM SELECTOR RECOMMENDATION 9
as above-hopefully the bleeding will resolve with holding of anticoagulants. If continues would get bleeding scan and if positive a cat scan angio. She is not a candidate for EGD or colonoscopy at this time. Would keep npo for now and ho H/H with IV pantoprazole as well. Will follow.

## 2017-07-14 NOTE — PROGRESS NOTE ADULT - ASSESSMENT
84 F with multiple cardiac comorbities, CHF systolic heart failure with RV involvement biventricular failure severe MR s/p mitral clip with residual MR with CHF and poor response to treatment now with STEMI. 84 F with multiple cardiac comorbities, CHF systolic heart failure with RV involvement biventricular failure severe MR s/p mitral clip with residual MR with CHF and poor response to treatment now with STEMI conservative management with Acute GI bleeding with heparin

## 2017-07-14 NOTE — PROGRESS NOTE ADULT - PROBLEM SELECTOR PLAN 1
-nothing -gi evaluation appreciated.  -not a candidate for EGD or colonscopy.   -hold AC, continue to monitor Hgb, and PRBCs

## 2017-07-14 NOTE — PROGRESS NOTE ADULT - ASSESSMENT
84F with severe valvular heart disease despite attempted MV clipping, with chronic hypercapnic respiratory failure due to decompensated diastolic and moderate systolic CHF, valvular disease, obesity hypoventilation, mild pulmonary hypertension, now s/p acute MI last night, now with acute blood loss anemia.

## 2017-07-14 NOTE — PROGRESS NOTE ADULT - PROBLEM SELECTOR PLAN 1
Plan for conservative management.  D/c heparin. due to GI bleeding.  Hold antiplatelet. Let her complete her MI. No more chest pain. h/h keep Hemoglobin above 8. PRBCs.   cautious use of SL nitro as RV dysfunction and RCA involvement with low blood pressure ot begin with.  beta-blocker (AV conduction is normal limits).  Lasix 40mg IV Q12.  Strict I and O.   , overall patient has poor prognosis due to multiple other comorbidities and valvular dysfunction and recent respiratory failure. recetn MI and now with GI bleeding.   off bipap. Palliative and hospice care.

## 2017-07-14 NOTE — CHART NOTE - NSCHARTNOTEFT_GEN_A_CORE
Patient seen at bedside, appear to be doing well.  She is comfortable and states she is feeling ok.   Daughter at bedside, agreeable with current management and denies any questions at this time.     Vital Signs Last 24 Hrs  HR: 76   BP: 118/54   RR: 22   SpO2: 100%     Continue with current management.

## 2017-07-14 NOTE — PROGRESS NOTE ADULT - ASSESSMENT
85 y/o F with CHF, severe MR (s/p mitral valve clip ~2 weeks ago), CAD, HTN, obesity with persistent hypercapnic respiratory failure, contraction alkalosis.    > Acute blood loss anemia due to GI Bleed - holding anticoagulation.  Transfuse 2 units pRBCs.  GI evaluation.  PPI.  Likely conservative GI workup due to comorbid conditions.  > STEMI - conservative management per cardiology.  Continue BBlocker.  Stopped antiplatelets and anticoagulation due to bleeding.  reviewed with Dr. Vasquez and pt's daughter.    > Acute on chronic systolic and diastolic CHF with severe MR and mod to severe TR - no further surgical plans per CT Surgery.  Continue diuretics.    > Chronic Afib - rate control with Digoxin.  Not on anticoagulation due to bleeding risk.   > Hypernatremia - resolved.    > Acute on chronic respiratory failure with hypercapnia - continue Nocturnal BIPAP.    > Metabolic Acidosis - renal followup appreciated.  careful diuresis, Diamox.  > CHRISTOPHER - Cr stable.  Avoid nephrotoxic agents.   > CAD - continue Plavix, BBlocker.  > HLD - continue Statin  > Glaucoma - continue eyedrops.  > DVT Prophylaxis - Lower extremity intermittent compression devices.

## 2017-07-14 NOTE — CHART NOTE - NSCHARTNOTEFT_GEN_A_CORE
Palliative Care Attending:    Met with daughter Mini with Dr. Lovett. We explained the overall poor medical condition given acute MI, acute gastrointestinal bleeding, CHF, and respiratory concerns. We explained that there are concerns of worsening of some symptoms with some of the therapies we need to give ( i.e. blood transfusions, resulting in potential volume overload, giving anticoagulation with active gi bleeding), and that there are certain things we cannot treat right now that are major medical concerns (her acute MI, and valvular heart disease). Daughter understands poor overall prognosis, she is leaving it up to the Lord. Her and her siblings have come to the resolution that whatever God العلي is what will happen, they have accepted that if the Lord takes her, than that is His will. Her siblings are coming tomorrow. She wants to avoid aggressive interventions at this time, and give time to see how things will go. Hopeful that bleeding will stop off of the blood thinners and hopeful that she can stabilize a bit. Ongoing support and discussions with Mini.

## 2017-07-14 NOTE — CHART NOTE - NSCHARTNOTEFT_GEN_A_CORE
PGY3 Rapid Response Note    Rapid Response called for hypotension and hypoxia in the setting of GI bleed.    83 yo female with PMH of multiple cardiac comorbidities, CHF systolic heart failure with RV involvement biventricular failure severe MR s/p mitral clip with residual MR with CHF and poor response to treatment with recent rapid response 7/13/17 for chest pain and EKG changes concerning for myocardial infarction. Plan at that time with daughter was to be aggressive with conservative management, patient is DNR/DNI, currently patient has been experiencing lower GI bleeding and was undergoing blood transfusion. Patient had 3 episodes of bloody BMs in 1 hour and developed hypotension and hypoxia.   At Bedside patient was calm in bed, NAD, AAOx2, able to maintain airway, not in respiratory distress and no longer complaining of chest pain.     VS:   BP- 88/62  HR- 74  RR-20  O2 Sat- 88%  Blood sugar 81    A/P:  Hypotension and hypoxia in the setting of GI bleed likely Due to Hypovolemia.  -Patient was undergoing transfusion, rate was increased to tolerable rate of 200cc/hr  -Vital signs improved, BP- 94/60 HR-70  -Plan is to continue transfusions for total of 2 units PRBCs  -Rpt CBC stable with hgb 7.9  -Possible protonix drip, plan for second IV line however patient is refusing at this time.    Dr Iverson and Dr Lovett at bedside during rapid response, Case was discussed with Dr Lovett and agrees with management and will consult ICU if needed pending cbc.

## 2017-07-15 LAB
ANION GAP SERPL CALC-SCNC: 8 MMOL/L — SIGNIFICANT CHANGE UP (ref 5–17)
APTT BLD: 27.9 SEC — SIGNIFICANT CHANGE UP (ref 27.5–37.4)
BUN SERPL-MCNC: 29 MG/DL — HIGH (ref 8–20)
CALCIUM SERPL-MCNC: 8.9 MG/DL — SIGNIFICANT CHANGE UP (ref 8.6–10.2)
CHLORIDE SERPL-SCNC: 99 MMOL/L — SIGNIFICANT CHANGE UP (ref 98–107)
CK SERPL-CCNC: 44 U/L — SIGNIFICANT CHANGE UP (ref 25–170)
CO2 SERPL-SCNC: 38 MMOL/L — HIGH (ref 22–29)
CREAT SERPL-MCNC: 1.08 MG/DL — SIGNIFICANT CHANGE UP (ref 0.5–1.3)
GLUCOSE SERPL-MCNC: 75 MG/DL — SIGNIFICANT CHANGE UP (ref 70–115)
HCT VFR BLD CALC: 29 % — LOW (ref 37–47)
HGB BLD-MCNC: 8.9 G/DL — LOW (ref 12–16)
INR BLD: 1.17 RATIO — HIGH (ref 0.88–1.16)
MCHC RBC-ENTMCNC: 23.7 PG — LOW (ref 27–31)
MCHC RBC-ENTMCNC: 30.7 G/DL — LOW (ref 32–36)
MCV RBC AUTO: 77.1 FL — LOW (ref 81–99)
PLATELET # BLD AUTO: 141 K/UL — LOW (ref 150–400)
POTASSIUM SERPL-MCNC: 3.9 MMOL/L — SIGNIFICANT CHANGE UP (ref 3.5–5.3)
POTASSIUM SERPL-SCNC: 3.9 MMOL/L — SIGNIFICANT CHANGE UP (ref 3.5–5.3)
PROTHROM AB SERPL-ACNC: 12.9 SEC — HIGH (ref 9.8–12.7)
RBC # BLD: 3.76 M/UL — LOW (ref 4.4–5.2)
RBC # FLD: 23.6 % — HIGH (ref 11–15.6)
SODIUM SERPL-SCNC: 145 MMOL/L — SIGNIFICANT CHANGE UP (ref 135–145)
TROPONIN T SERPL-MCNC: 0.05 NG/ML — SIGNIFICANT CHANGE UP (ref 0–0.06)
WBC # BLD: 3.8 K/UL — LOW (ref 4.8–10.8)
WBC # FLD AUTO: 3.8 K/UL — LOW (ref 4.8–10.8)

## 2017-07-15 PROCEDURE — 99232 SBSQ HOSP IP/OBS MODERATE 35: CPT

## 2017-07-15 PROCEDURE — 99233 SBSQ HOSP IP/OBS HIGH 50: CPT

## 2017-07-15 RX ADMIN — IRON SUCROSE 110 MILLIGRAM(S): 20 INJECTION, SOLUTION INTRAVENOUS at 15:32

## 2017-07-15 RX ADMIN — PANTOPRAZOLE SODIUM 40 MILLIGRAM(S): 20 TABLET, DELAYED RELEASE ORAL at 06:08

## 2017-07-15 RX ADMIN — SODIUM CHLORIDE 3 MILLILITER(S): 9 INJECTION INTRAMUSCULAR; INTRAVENOUS; SUBCUTANEOUS at 18:05

## 2017-07-15 RX ADMIN — Medication 1 TABLET(S): at 12:48

## 2017-07-15 RX ADMIN — Medication 40 MILLIGRAM(S): at 06:08

## 2017-07-15 RX ADMIN — Medication 0.12 MILLIGRAM(S): at 06:08

## 2017-07-15 RX ADMIN — Medication 3 MILLILITER(S): at 20:47

## 2017-07-15 RX ADMIN — LISINOPRIL 5 MILLIGRAM(S): 2.5 TABLET ORAL at 06:08

## 2017-07-15 RX ADMIN — PANTOPRAZOLE SODIUM 40 MILLIGRAM(S): 20 TABLET, DELAYED RELEASE ORAL at 18:07

## 2017-07-15 RX ADMIN — Medication 250 MILLIGRAM(S): at 12:48

## 2017-07-15 RX ADMIN — SIMVASTATIN 20 MILLIGRAM(S): 20 TABLET, FILM COATED ORAL at 21:47

## 2017-07-15 RX ADMIN — LATANOPROST 1 DROP(S): 0.05 SOLUTION/ DROPS OPHTHALMIC; TOPICAL at 21:51

## 2017-07-15 RX ADMIN — SODIUM CHLORIDE 3 MILLILITER(S): 9 INJECTION INTRAMUSCULAR; INTRAVENOUS; SUBCUTANEOUS at 21:46

## 2017-07-15 RX ADMIN — Medication 1 MILLIGRAM(S): at 12:48

## 2017-07-15 RX ADMIN — SODIUM CHLORIDE 3 MILLILITER(S): 9 INJECTION INTRAMUSCULAR; INTRAVENOUS; SUBCUTANEOUS at 05:40

## 2017-07-15 RX ADMIN — Medication 0.5 MILLIGRAM(S): at 08:36

## 2017-07-15 RX ADMIN — Medication 0.5 MILLIGRAM(S): at 20:46

## 2017-07-15 RX ADMIN — Medication 50 MILLIGRAM(S): at 06:08

## 2017-07-15 NOTE — PROGRESS NOTE ADULT - SUBJECTIVE AND OBJECTIVE BOX
Patient: ANN FRANCES [id number] 84y Female                 Internal Medicine Hospitalist Progress Note - Dr. Errol Lovett    Chief Complaint: Patient is a 84y old  Female who presents with a chief complaint of Abdominal pain, insomnia, and increased shortness of breath (10 Murphy 2017 16:40)    BRIEF HOSPITAL COURSE:  85 y/o F with CHF, severe MR s/p mitral clip 2 weeks prior to admission, CAD, HTN, obesity, admitted to CT Surgery service due to altered mental status, found to have persistent hypercapnic respiratory failure with inability to wean from NIV, developed compensated respiratory acidosis and contraction alkalosis.  PCO2 87.  Transferred to the MICU service 7/3 for management of her NIV and metabolic disturbances.  In ICU, she was successfully weaned from NIV, requiring BIPAP at night.  TTE:  1. Mildly decreased global left ventricular systolic function.  2. Left ventricular ejection fraction, by visual estimation, is 45 to 50%.  3. Mild to moderate right ventricular systolic dysfunction.  4. Severely enlarged left atrium.  5. Moderately dilated right atrium.  6. Patient is s/p Mitral Clip with severe regurgitation.  7. Moderate-severe tricuspid regurgitation.  8. Mild aortic regurgitation.  9. Estimated pulmonary artery systolic pressure is 49.7 mmHg assuming a  right atrial pressure of 8 mmHg, which is consistent with mild pulmonary  hypertension. 10. There is no evidence of pericardial effusion.  On 7/13, patient developed chest pain. EKG showed inferior ST elevations. Seen by cardiology and after discussion with family, opted for medical management due to patient's comorbidities.  Started on anticoagulation, however, pt then experienced a bloody BM with Hgb dropping to 7.4.      No complaints.  Denies bleeding.  No abdominal pain.  No chest pain / SOB    ____________________PHYSICAL EXAM:  Vitals reviewed as indicated below  GENERAL:  NAD Alert, slightly confused.    HEENT: NCAT  CARDIOVASCULAR:  S1, S2  LUNGS: basilar crackles, R>L  ABDOMEN:  soft, (-) tenderness, (-) distension, (+) bowel sounds, (-) guarding, (-) rebound (-) rigidity  EXTREMITIES:  no cyanosis / clubbing / edema.   ____________________      MEDICATIONS:  sodium chloride 0.9% lock flush 3 milliLiter(s) IV Push every 8 hours  latanoprost 0.005% Ophthalmic Solution 1 Drop(s) Both EYES at bedtime  multivitamin 1 Tablet(s) Oral daily  metoprolol succinate ER 50 milliGRAM(s) Oral daily  buDESOnide   0.5 milliGRAM(s) Respule 0.5 milliGRAM(s) Inhalation two times a day  folic acid 1 milliGRAM(s) Oral daily  ascorbic acid 250 milliGRAM(s) Oral daily  simvastatin 20 milliGRAM(s) Oral at bedtime  aluminum hydroxide/magnesium hydroxide/simethicone Suspension 30 milliLiter(s) Oral every 6 hours PRN  hydrALAZINE Injectable 10 milliGRAM(s) IV Push every 3 hours PRN  lisinopril 5 milliGRAM(s) Oral daily  ALBUTerol/ipratropium for Nebulization 3 milliLiter(s) Nebulizer every 6 hours PRN  sodium chloride 0.45%. 1000 milliLiter(s) IV Continuous <Continuous>  iron sucrose IVPB 200 milliGRAM(s) IV Intermittent every 24 hours  digoxin     Tablet 0.125 milliGRAM(s) Oral daily  furosemide   Injectable 40 milliGRAM(s) IV Push every 12 hours  furosemide   Injectable 20 milliGRAM(s) IV Push once  pantoprazole  Injectable 40 milliGRAM(s) IV Push two times a day      VITALS:  Vital Signs Last 24 Hrs  T(C): 37.1 (15 Jul 2017 09:00), Max: 37.1 (15 Jul 2017 09:00)  T(F): 98.7 (15 Jul 2017 09:00), Max: 98.7 (15 Jul 2017 09:00)  HR: 90 (15 Jul 2017 09:00) (68 - 90)  BP: 98/56 (15 Jul 2017 09:00) (84/52 - 118/54)  BP(mean): --  RR: 20 (15 Jul 2017 09:00) (18 - 26)  SpO2: 96% (15 Jul 2017 06:00) (90% - 100%) Daily     Daily   CAPILLARY BLOOD GLUCOSE        I&O's Summary    14 Jul 2017 07:01  -  15 Jul 2017 07:00  --------------------------------------------------------  IN: 220 mL / OUT: 400 mL / NET: -180 mL        LABS:                        8.9    3.8   )-----------( 141      ( 15 Jul 2017 05:12 )             29.0     07-15    145  |  99  |  29.0<H>  ----------------------------<  75  3.9   |  38.0<H>  |  1.08    Ca    8.9      15 Jul 2017 05:12      PT/INR - ( 15 Jul 2017 05:12 )   PT: 12.9 sec;   INR: 1.17 ratio         PTT - ( 15 Jul 2017 05:12 )  PTT:27.9 sec  RECENT CULTURES:          CARDIAC MARKERS ( 15 Jul 2017 05:12 )  x     / 0.05 ng/mL / 44 U/L / x     / x      CARDIAC MARKERS ( 14 Jul 2017 01:24 )  x     / 0.11 ng/mL / x     / x     / x      CARDIAC MARKERS ( 13 Jul 2017 20:01 )  x     / 0.02 ng/mL / x     / x     / x

## 2017-07-15 NOTE — PROGRESS NOTE ADULT - SUBJECTIVE AND OBJECTIVE BOX
PULMONARY PROGRESS NOTE      ANN FRANCESLEE [id number]    Patient is a 84y old  Female who presents with a chief complaint of Abdominal pain, insomnia, and increased shortness of breath (10 Murphy 2017 16:40)      INTERVAL HPI/OVERNIGHT EVENTS:    S/p STEMI treated with heparin. GI bleed resolved . Pt now pain free.  No SOB. Used BIPAP 20/6 3hrs  last night. No cough or SOB    MEDICATIONS  (STANDING):  sodium chloride 0.9% lock flush 3 milliLiter(s) IV Push every 8 hours  latanoprost 0.005% Ophthalmic Solution 1 Drop(s) Both EYES at bedtime  pantoprazole    Tablet 40 milliGRAM(s) Oral before breakfast  multivitamin 1 Tablet(s) Oral daily  metoprolol succinate ER 50 milliGRAM(s) Oral daily  buDESOnide   0.5 milliGRAM(s) Respule 0.5 milliGRAM(s) Inhalation two times a day  folic acid 1 milliGRAM(s) Oral daily  ascorbic acid 250 milliGRAM(s) Oral daily  simvastatin 20 milliGRAM(s) Oral at bedtime  lisinopril 5 milliGRAM(s) Oral daily  ferrous    sulfate 325 milliGRAM(s) Oral daily  sodium chloride 0.45%. 1000 milliLiter(s) (50 mL/Hr) IV Continuous <Continuous>  iron sucrose IVPB 200 milliGRAM(s) IV Intermittent every 24 hours  digoxin     Tablet 0.125 milliGRAM(s) Oral daily  furosemide   Injectable 40 milliGRAM(s) IV Push every 12 hours  furosemide   Injectable 20 milliGRAM(s) IV Push once      MEDICATIONS  (PRN):  aluminum hydroxide/magnesium hydroxide/simethicone Suspension 30 milliLiter(s) Oral every 6 hours PRN Dyspepsia  hydrALAZINE Injectable 10 milliGRAM(s) IV Push every 3 hours PRN sbp > 150  ALBUTerol/ipratropium for Nebulization 3 milliLiter(s) Nebulizer every 6 hours PRN Shortness of Breath and/or Wheezing      Allergies    aspirin (Anaphylaxis)  NSAIDs (Other)    Intolerances    Lasix (Other)  OHS PT (Unknown)      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   r/o  out  stenosis  Cardiomyopathy  Osteoarthritis  Iron deficiency anemia  MI, old: 2008  Dyslipidemia  Acid reflux  Hx of CAB  CAD (coronary artery disease)  Heart failure  HTN (hypertension)  H/O tracheostomy: 2013 may  Cataract: b/l  2013  S/P CABG x 3:   [place]      SOCIAL HISTORY  Smoking History:       REVIEW OF SYSTEMS:    CONSTITUTIONAL:  No distress    HEENT:  Eyes:  No diplopia or blurred vision. ENT:  No earache, sore throat or runny nose.    CARDIOVASCULAR:  No pressure, squeezing, tightness, heaviness or aching about the chest; no palpitations.    RESPIRATORY: per hpi    GASTROINTESTINAL: per HPI    GENITOURINARY:  No dysuria, frequency or urgency.    NEUROLOGIC:  No paresthesias, fasciculations, seizures or weakness.    Extremities: No cyanosis, clubbing or edema    PSYCHIATRIC:  No disorder of thought or mood.    Vital Signs Last 24 Hrs  T(C): 36.8 (2017 05:17), Max: 37.1 (2017 22:08)  T(F): 98.3 (2017 05:17), Max: 98.7 (2017 22:08)  HR: 82 (2017 07:50) (74 - 120)  BP: 104/52 (2017 07:50) (104/52 - 152/70)  BP(mean): --  RR: 18 (2017 07:50) (17 - 35)  SpO2: 99% (2017 07:50) (93% - 100%)    PHYSICAL EXAMINATION:    GENERAL: The patient is awake and alert in no apparent distress on NCO    HEENT: Head is normocephalic and atraumatic. Extraocular muscles are intact. Mucous membranes are moist.    NECK: Supple.    LUNGS: Clear to auscultation without wheezing, rales or rhonchi; respirations unlabored    HEART: Regular rate and rhythm without murmur.    ABDOMEN: Soft, nontender, and nondistended.      EXTREMITIES: Without any cyanosis, clubbing, rash, lesions or edema.    NEUROLOGIC: Grossly intact.    LABS:                        7.4    4.1   )-----------( 147      ( 2017 05:17 )             24.4     Complete Blood Count (07.15.17 @ 05:12)    WBC Count: 3.8 K/uL    RBC Count: 3.76 M/uL    Hemoglobin: 8.9 g/dL    Hematocrit: 29.0 %    Mean Cell Volume: 77.1 fl    Mean Cell Hemoglobin: 23.7 pg    Mean Cell Hemoglobin Conc: 30.7 g/dL    Red Cell Distrib Width: 23.6 %    Platelet Count - Automated: 141 K/uL            142  |  98  |  31.0<H>  ----------------------------<  96  3.8   |  37.0<H>  |  0.98    Ca    8.6      2017 05:17    Basic Metabolic Panel in AM (07.15.17 @ 05:12)    Sodium, Serum: 145 mmol/L    Potassium, Serum: 3.9 mmol/L    Chloride, Serum: 99 mmol/L    Carbon Dioxide, Serum: 38.0 mmol/L    Anion Gap, Serum: 8 mmol/L    Blood Urea Nitrogen, Serum: 29.0 mg/dL    Creatinine, Serum: 1.08 mg/dL    Glucose, Serum: 75 mg/dL    Calcium, Total Serum: 8.9 mg/dL    eGFR if Non : 47: Interpretative comment  The units for eGFR are ml/min/1.73m2 (normalized body surface area). The  eGFR is calculated from a serum creatinine using the CKD-EPI equation.  Other variables required for calculation are race, age and sex. Among  patients w47: ith chronic kidney disease (CKD), the eGFR is useful in  determining the stage of disease according to KDOQI CKD classification.  All eGFR results are reported numerically with the following  interpretation.          GFR                    With47:                  Without     (ml/min/1.73 m2)    Kidney Damage       Kidney Damage        >= 90                    Stage 1                     Normal        60-89                    Stage 2                     Decreased GFR        :      Stage 3                     Stage 3        15-29                    Stage 4                     Stage 4        < 15                      Stage 5                     Stage 5  Each stage of CKD assumes that the associated GFR level has been in  eff47: ect for at least 3 months. Determination of stages one and two (with  eGFR > 59 ml/min/m2) requires estimation of kidney damage for at least 3  months as defined by structural or functional abnormalities.  Limitations: All estimates of GFR will be les47: s accurate for patients at  extremes of muscle mass (including but not limited to frail elderly,  critically ill, or cancer patients), those with unusual diets, and those  with conditions associated with reduced secretion or extrarenal  elimination of47:  creatinine. The eGFR equation is not recommended for use  in patients with unstable creatinine levels. mL/min/1.73M2    eGFR if African American: 55 mL/min/1.73M2          PTT - ( 2017 05:17 )  PTT:108.6 sec      CARDIAC MARKERS ( 2017 01:24 )  x     / 0.11 ng/mL / x     / x     / x      CARDIAC MARKERS ( 2017 20:01 )  x     / 0.02 ng/mL / x     / x     / x                    MICROBIOLOGY:    RADIOLOGY & ADDITIONAL STUDIES:  < from: Xray Chest 1 View AP/PA. (17 @ 15:47) >   EXAM:  CHEST SINGLE VIEW FRONTAL                          PROCEDURE DATE:  2017          INTERPRETATION:  History: CHF. Follow-up study    Technique:  AP portable    Comparisons:  Chest x-ray dated 2017    Findings: Significant decrease in pulmonic congestion. Small bilateral   pleural effusions remain at the lung bases.   The pulmonary vasculature   and aorta are normal for age. Cardiomegaly unchanged. Status post CABG    Impression: CHF improved since prior study. Status post CABG                BENJIE BAKER M.D., ATTENDING RADIOLOGIST  This document has been electronically signed. 2017  3:54PM    < end of copied text >

## 2017-07-15 NOTE — PROGRESS NOTE ADULT - PROBLEM SELECTOR PLAN 1
Possibly resolved now that heparin was stopped. Will advance to clear liquid diet and continue pantoprazole and to moniter H/H

## 2017-07-15 NOTE — PROGRESS NOTE ADULT - SUBJECTIVE AND OBJECTIVE BOX
Pt seen and examined f/u of rectal bleeding with maroon stools  This morning nurses report no BMs over nite. Hemoglobin up to 8.9 after 2 units PRBC. Patient herself has no complaints but is unreliable.    REVIEW OF SYSTEMS:    CONSTITUTIONAL: No fever, weight loss, or fatigue  EYES: No eye pain, visual disturbances, or discharge  ENMT:  No difficulty hearing, tinnitus, vertigo; No sinus or throat pain  RESPIRATORY: No cough, wheezing, chills or hemoptysis; No shortness of breath  CARDIOVASCULAR: No chest pain, palpitations, dizziness, or leg swelling  GASTROINTESTINAL: No abdominal or epigastric pain. No nausea, vomiting, or hematemesis; No diarrhea or constipation. No melena or hematochezia.    MEDICATIONS:  MEDICATIONS  (STANDING):  sodium chloride 0.9% lock flush 3 milliLiter(s) IV Push every 8 hours  latanoprost 0.005% Ophthalmic Solution 1 Drop(s) Both EYES at bedtime  multivitamin 1 Tablet(s) Oral daily  metoprolol succinate ER 50 milliGRAM(s) Oral daily  buDESOnide   0.5 milliGRAM(s) Respule 0.5 milliGRAM(s) Inhalation two times a day  folic acid 1 milliGRAM(s) Oral daily  ascorbic acid 250 milliGRAM(s) Oral daily  simvastatin 20 milliGRAM(s) Oral at bedtime  lisinopril 5 milliGRAM(s) Oral daily  sodium chloride 0.45%. 1000 milliLiter(s) (50 mL/Hr) IV Continuous <Continuous>  iron sucrose IVPB 200 milliGRAM(s) IV Intermittent every 24 hours  digoxin     Tablet 0.125 milliGRAM(s) Oral daily  furosemide   Injectable 40 milliGRAM(s) IV Push every 12 hours  furosemide   Injectable 20 milliGRAM(s) IV Push once  pantoprazole  Injectable 40 milliGRAM(s) IV Push two times a day    MEDICATIONS  (PRN):  aluminum hydroxide/magnesium hydroxide/simethicone Suspension 30 milliLiter(s) Oral every 6 hours PRN Dyspepsia  hydrALAZINE Injectable 10 milliGRAM(s) IV Push every 3 hours PRN sbp > 150  ALBUTerol/ipratropium for Nebulization 3 milliLiter(s) Nebulizer every 6 hours PRN Shortness of Breath and/or Wheezing      Allergies    aspirin (Anaphylaxis)  NSAIDs (Other)    Intolerances    Lasix (Other)  OHS PT (Unknown)      Vital Signs Last 24 Hrs  T(C): 36.7 (15 Jul 2017 06:00), Max: 97.8 (14 Jul 2017 12:15)  T(F): 98.1 (15 Jul 2017 06:00), Max: 208 (14 Jul 2017 12:15)  HR: 68 (15 Jul 2017 06:00) (68 - 88)  BP: 108/68 (15 Jul 2017 06:00) (84/52 - 118/54)  BP(mean): --  RR: 18 (15 Jul 2017 06:00) (18 - 26)  SpO2: 96% (15 Jul 2017 06:00) (90% - 100%)    07-14 @ 07:01  -  07-15 @ 07:00  --------------------------------------------------------  IN: 220 mL / OUT: 400 mL / NET: -180 mL        PHYSICAL EXAM:    General: Obese black female well nourished; in no acute distress  HEENT: MMM, conjunctiva and sclera clear  Gastrointestinal:Abdomen: Soft non-tender non-distended; Normal bowel sounds; No hepatosplenomegaly  Extremities: no cyanosis, clubbing or edema.  Skin: Warm and dry. No obvious rash    LABS:      CBC Full  -  ( 15 Jul 2017 05:12 )  WBC Count : 3.8 K/uL  Hemoglobin : 8.9 g/dL  Hematocrit : 29.0 %  Platelet Count - Automated : 141 K/uL  Mean Cell Volume : 77.1 fl  Mean Cell Hemoglobin : 23.7 pg  Mean Cell Hemoglobin Concentration : 30.7 g/dL  Auto Neutrophil # : x  Auto Lymphocyte # : x  Auto Monocyte # : x  Auto Eosinophil # : x  Auto Basophil # : x  Auto Neutrophil % : x  Auto Lymphocyte % : x  Auto Monocyte % : x  Auto Eosinophil % : x  Auto Basophil % : x    07-15    145  |  99  |  29.0<H>  ----------------------------<  75  3.9   |  38.0<H>  |  1.08    Ca    8.9      15 Jul 2017 05:12      PT/INR - ( 15 Jul 2017 05:12 )   PT: 12.9 sec;   INR: 1.17 ratio         PTT - ( 15 Jul 2017 05:12 )  PTT:27.9 sec                  RADIOLOGY & ADDITIONAL STUDIES (The following images were personally reviewed):

## 2017-07-16 LAB
ANION GAP SERPL CALC-SCNC: 9 MMOL/L — SIGNIFICANT CHANGE UP (ref 5–17)
BUN SERPL-MCNC: 30 MG/DL — HIGH (ref 8–20)
CALCIUM SERPL-MCNC: 9 MG/DL — SIGNIFICANT CHANGE UP (ref 8.6–10.2)
CHLORIDE SERPL-SCNC: 99 MMOL/L — SIGNIFICANT CHANGE UP (ref 98–107)
CO2 SERPL-SCNC: 39 MMOL/L — HIGH (ref 22–29)
CREAT SERPL-MCNC: 1.14 MG/DL — SIGNIFICANT CHANGE UP (ref 0.5–1.3)
GLUCOSE SERPL-MCNC: 88 MG/DL — SIGNIFICANT CHANGE UP (ref 70–115)
HCT VFR BLD CALC: 28.5 % — LOW (ref 37–47)
HCT VFR BLD CALC: 30.3 % — LOW (ref 37–47)
HGB BLD-MCNC: 8.6 G/DL — LOW (ref 12–16)
HGB BLD-MCNC: 9 G/DL — LOW (ref 12–16)
MCHC RBC-ENTMCNC: 23.2 PG — LOW (ref 27–31)
MCHC RBC-ENTMCNC: 30.2 G/DL — LOW (ref 32–36)
MCV RBC AUTO: 77 FL — LOW (ref 81–99)
PLATELET # BLD AUTO: 147 K/UL — LOW (ref 150–400)
POTASSIUM SERPL-MCNC: 3.8 MMOL/L — SIGNIFICANT CHANGE UP (ref 3.5–5.3)
POTASSIUM SERPL-SCNC: 3.8 MMOL/L — SIGNIFICANT CHANGE UP (ref 3.5–5.3)
RBC # BLD: 3.7 M/UL — LOW (ref 4.4–5.2)
RBC # FLD: 24 % — HIGH (ref 11–15.6)
SODIUM SERPL-SCNC: 147 MMOL/L — HIGH (ref 135–145)
WBC # BLD: 3.6 K/UL — LOW (ref 4.8–10.8)
WBC # FLD AUTO: 3.6 K/UL — LOW (ref 4.8–10.8)

## 2017-07-16 PROCEDURE — 99233 SBSQ HOSP IP/OBS HIGH 50: CPT

## 2017-07-16 PROCEDURE — 99232 SBSQ HOSP IP/OBS MODERATE 35: CPT

## 2017-07-16 RX ADMIN — SODIUM CHLORIDE 3 MILLILITER(S): 9 INJECTION INTRAMUSCULAR; INTRAVENOUS; SUBCUTANEOUS at 13:23

## 2017-07-16 RX ADMIN — SIMVASTATIN 20 MILLIGRAM(S): 20 TABLET, FILM COATED ORAL at 20:36

## 2017-07-16 RX ADMIN — Medication 3 MILLILITER(S): at 20:41

## 2017-07-16 RX ADMIN — SODIUM CHLORIDE 3 MILLILITER(S): 9 INJECTION INTRAMUSCULAR; INTRAVENOUS; SUBCUTANEOUS at 21:22

## 2017-07-16 RX ADMIN — Medication 20 MILLIGRAM(S): at 20:36

## 2017-07-16 RX ADMIN — PANTOPRAZOLE SODIUM 40 MILLIGRAM(S): 20 TABLET, DELAYED RELEASE ORAL at 18:19

## 2017-07-16 RX ADMIN — Medication 20 MILLIGRAM(S): at 06:55

## 2017-07-16 RX ADMIN — LISINOPRIL 5 MILLIGRAM(S): 2.5 TABLET ORAL at 06:55

## 2017-07-16 RX ADMIN — Medication 1 MILLIGRAM(S): at 12:00

## 2017-07-16 RX ADMIN — PANTOPRAZOLE SODIUM 40 MILLIGRAM(S): 20 TABLET, DELAYED RELEASE ORAL at 06:55

## 2017-07-16 RX ADMIN — Medication 0.12 MILLIGRAM(S): at 06:55

## 2017-07-16 RX ADMIN — Medication 50 MILLIGRAM(S): at 06:55

## 2017-07-16 RX ADMIN — Medication 0.5 MILLIGRAM(S): at 09:43

## 2017-07-16 RX ADMIN — Medication 1 TABLET(S): at 12:00

## 2017-07-16 RX ADMIN — LATANOPROST 1 DROP(S): 0.05 SOLUTION/ DROPS OPHTHALMIC; TOPICAL at 20:47

## 2017-07-16 RX ADMIN — SODIUM CHLORIDE 3 MILLILITER(S): 9 INJECTION INTRAMUSCULAR; INTRAVENOUS; SUBCUTANEOUS at 06:50

## 2017-07-16 RX ADMIN — Medication 250 MILLIGRAM(S): at 12:00

## 2017-07-16 RX ADMIN — IRON SUCROSE 110 MILLIGRAM(S): 20 INJECTION, SOLUTION INTRAVENOUS at 18:18

## 2017-07-16 RX ADMIN — Medication 0.5 MILLIGRAM(S): at 20:41

## 2017-07-16 NOTE — PROGRESS NOTE ADULT - PROBLEM SELECTOR PLAN 1
Plan for conservative management.  Holding antiplatelet. No more chest pain, H/H stable, GI following  beta-blocker (AV conduction is normal limits).  can switch to lasix 40 mg Po twice daily starting from tomorrow  , overall patient has poor prognosis due to multiple other comorbidities and valvular dysfunction and recent respiratory failure. recent MI and now with GI bleeding.   off bipap. Palliative and hospice care.

## 2017-07-16 NOTE — PROGRESS NOTE ADULT - SUBJECTIVE AND OBJECTIVE BOX
Patient: ANN FRANCES [id number] 84y Female                 Internal Medicine Hospitalist Progress Note - Dr. Errol Lovett    Chief Complaint: Patient is a 84y old  Female who presents with a chief complaint of Abdominal pain, insomnia, and increased shortness of breath (10 Murphy 2017 16:40)    BRIEF HOSPITAL COURSE:  85 y/o F with CHF, severe MR s/p mitral clip 2 weeks prior to admission, CAD, HTN, obesity, admitted to CT Surgery service due to altered mental status, found to have persistent hypercapnic respiratory failure with inability to wean from NIV, developed compensated respiratory acidosis and contraction alkalosis.  PCO2 87.  Transferred to the MICU service 7/3 for management of her NIV and metabolic disturbances.  In ICU, she was successfully weaned from NIV, requiring BIPAP at night.  TTE:  1. Mildly decreased global left ventricular systolic function.  2. Left ventricular ejection fraction, by visual estimation, is 45 to 50%.  3. Mild to moderate right ventricular systolic dysfunction.  4. Severely enlarged left atrium.  5. Moderately dilated right atrium.  6. Patient is s/p Mitral Clip with severe regurgitation.  7. Moderate-severe tricuspid regurgitation.  8. Mild aortic regurgitation.  9. Estimated pulmonary artery systolic pressure is 49.7 mmHg assuming a  right atrial pressure of 8 mmHg, which is consistent with mild pulmonary  hypertension. 10. There is no evidence of pericardial effusion.  On 7/13, patient developed chest pain. EKG showed inferior ST elevations. Seen by cardiology and after discussion with family, opted for medical management due to patient's comorbidities.  Started on anticoagulation, however, pt then experienced a bloody BM with Hgb dropping to 7.4.  Repeat Hgb stable after 2 units pRBCs.    Seen with daughter, sister, brother at bedside.  No complaints.  Some confusion yesterday evening, better this am.  Daughter notes pt had large black BM this am    ____________________PHYSICAL EXAM:  Vitals reviewed as indicated below  GENERAL:  NAD Alert and Oriented x 3   HEENT: NCAT  CARDIOVASCULAR:  S1, S2  LUNGS: basilar crackles, R>L  ABDOMEN:  soft, (-) tenderness, (-) distension, (+) bowel sounds, (-) guarding, (-) rebound (-) rigidity  EXTREMITIES:  no cyanosis / clubbing / edema.   ____________________      MEDICATIONS:  sodium chloride 0.9% lock flush 3 milliLiter(s) IV Push every 8 hours  latanoprost 0.005% Ophthalmic Solution 1 Drop(s) Both EYES at bedtime  multivitamin 1 Tablet(s) Oral daily  metoprolol succinate ER 50 milliGRAM(s) Oral daily  buDESOnide   0.5 milliGRAM(s) Respule 0.5 milliGRAM(s) Inhalation two times a day  folic acid 1 milliGRAM(s) Oral daily  ascorbic acid 250 milliGRAM(s) Oral daily  simvastatin 20 milliGRAM(s) Oral at bedtime  aluminum hydroxide/magnesium hydroxide/simethicone Suspension 30 milliLiter(s) Oral every 6 hours PRN  hydrALAZINE Injectable 10 milliGRAM(s) IV Push every 3 hours PRN  lisinopril 5 milliGRAM(s) Oral daily  ALBUTerol/ipratropium for Nebulization 3 milliLiter(s) Nebulizer every 6 hours PRN  sodium chloride 0.45%. 1000 milliLiter(s) IV Continuous <Continuous>  iron sucrose IVPB 200 milliGRAM(s) IV Intermittent every 24 hours  digoxin     Tablet 0.125 milliGRAM(s) Oral daily  furosemide   Injectable 20 milliGRAM(s) IV Push once  pantoprazole  Injectable 40 milliGRAM(s) IV Push two times a day  torsemide 20 milliGRAM(s) Oral two times a day      VITALS:  Vital Signs Last 24 Hrs  T(C): 36.7 (16 Jul 2017 06:47), Max: 36.9 (15 Jul 2017 15:41)  T(F): 98.1 (16 Jul 2017 06:47), Max: 98.4 (15 Jul 2017 15:41)  HR: 65 (16 Jul 2017 08:25) (65 - 69)  BP: 106/58 (16 Jul 2017 06:47) (102/50 - 114/64)  BP(mean): --  RR: 18 (16 Jul 2017 08:25) (18 - 20)  SpO2: 100% (16 Jul 2017 08:25) (94% - 100%) Daily     Daily   CAPILLARY BLOOD GLUCOSE        I&O's Summary    15 Jul 2017 07:01  -  16 Jul 2017 07:00  --------------------------------------------------------  IN: 340 mL / OUT: 0 mL / NET: 340 mL        LABS:                        8.6    3.6   )-----------( 147      ( 16 Jul 2017 06:35 )             28.5     07-16    147<H>  |  99  |  30.0<H>  ----------------------------<  88  3.8   |  39.0<H>  |  1.14    Ca    9.0      16 Jul 2017 06:35      PT/INR - ( 15 Jul 2017 05:12 )   PT: 12.9 sec;   INR: 1.17 ratio         PTT - ( 15 Jul 2017 05:12 )  PTT:27.9 sec  RECENT CULTURES:          CARDIAC MARKERS ( 15 Jul 2017 05:12 )  x     / 0.05 ng/mL / 44 U/L / x     / x

## 2017-07-16 NOTE — PROGRESS NOTE ADULT - SUBJECTIVE AND OBJECTIVE BOX
[place] [facility] Faculty Practice                                                        Office: [address] [facility], [place] [address]                                                       Telephone: [phone]. Fax:[phone]                                                                             PROGRESS NOTE   Reason for follow up: STEMI, Biventricular CHF                            Overnight: Patient disoriented at times , restless, received blood transfusion  Update: Chest pain free.     Subjective: sleeping    Complains of: dark stoos  Review of symptoms: Cardiac:  No chest pain. + dyspnea. No palpitations.  Respiratory: no cough. + dyspnea  Gastrointestinal: No diarrhea. No abdominal pain. No bleeding.     Past medical history: No updates.   Chronic conditions:  Hypertension: controlled. CHF: improved.. Angina: Improved.   	  Vitals:  Vital Signs Last 24 Hrs  T(C): 36.7 (07-16-17 @ 06:47), Max: 36.9 (07-15-17 @ 15:41)  T(F): 98.1 (07-16-17 @ 06:47), Max: 98.4 (07-15-17 @ 15:41)  HR: 65 (07-16-17 @ 08:25) (65 - 68)  BP: 106/58 (07-16-17 @ 06:47) (102/50 - 106/58)  BP(mean): --  RR: 18 (07-16-17 @ 08:25) (18 - 20)  SpO2: 100% (07-16-17 @ 08:25) (98% - 100%)    13 Jul 2017 07:01  -  14 Jul 2017 07:00  --------------------------------------------------------  IN: 362 mL / OUT: 120 mL / NET: 242 mL      Weight (kg): 75.4 (07-11 @ 08:10)    PHYSICAL EXAM:  Appearance: Comfortable. No acute distress  HEENT:  Head and neck: Atraumatic. Normocephalic.  Normal oral mucosa,, Neck is supple. +  JVD,    Neurologic: not oriented , , , Cranial nerves not evaluated.  Lymphatic: No cervical lymphadenopathy  Cardiovascular: Normal S1 S2, No murmur, rubs/gallopws. + JVD,2+  edema  Respiratory: decrease breath douns in the bases. no rales rhonchi anteriorly  Gastrointestinal:  Soft, Non-tender, Obese.   Lower Extremities: 1+ bilateral  edema  Psychiatry: Patient is calm. No agitation.   Skin: No rashes, No ecchymoses, No cyanosis      CURRENT MEDICATIONS:  MEDICATIONS  (STANDING):  metoprolol succinate ER 50 milliGRAM(s) Oral daily  hydrALAZINE Injectable 10 milliGRAM(s) IV Push every 3 hours PRN  lisinopril 5 milliGRAM(s) Oral daily  digoxin     Tablet 0.125 milliGRAM(s) Oral daily  torsemide 20 milliGRAM(s) Oral two times a day    buDESOnide   0.5 milliGRAM(s) Respule  pantoprazole  Injectable  sodium chloride 0.9% lock flush  latanoprost 0.005% Ophthalmic Solution  multivitamin  folic acid  ascorbic acid  simvastatin  sodium chloride 0.45%.  iron sucrose IVPB    PRN: aluminum hydroxide/magnesium hydroxide/simethicone Suspension PRN  hydrALAZINE Injectable PRN  ALBUTerol/ipratropium for Nebulization PRN        LABS:	 	                        8.6    3.6   )-----------( 147      ( 16 Jul 2017 06:35 )             28.5   N=x    ; L=x        16 Jul 2017 06:35    147    |  99     |  30.0   ----------------------------<  88     3.8     |  39.0   |  1.14     Ca    9.0        16 Jul 2017 06:35            TELEMETRY: Reviewed  Sinus Rhythm. 1st degree Av block,. Multiform PVCs.   EKG 7/14 reviewed by me NSR, inferior MI, lateral ischemia    ECHO: LVEF 50%. S/p Mitral clip. Severe MR. Moderate to severe TR. Mild AI> Mild PAH.   Mild to moderate RV dysfunction.     RADIOLOGY:   X-ray: CHF improved from last time.

## 2017-07-16 NOTE — PROGRESS NOTE ADULT - PROBLEM SELECTOR PLAN 2
Biventricular failure ,. Moderate RV dysfunction.   on po torsemide, i/o, weight daily  ct beta-blocker . ct Angiotensin converting enzyme inhibitor   ct dose Dig 0.125 mg daily. nasal canula.

## 2017-07-16 NOTE — PROGRESS NOTE ADULT - ASSESSMENT
85 y/o F with CHF, severe MR (s/p mitral valve clip ~2 weeks ago), CAD, HTN, obesity with persistent hypercapnic respiratory failure, contraction alkalosis.    > Acute blood loss anemia due to GI Bleed - H/H stable.  Monitor q12h.  holding anticoagulation.  s/p transfusion.  Continue PPI.  advance diet.  GI followup appreciated.   > STEMI - conservative management per cardiology.  Continue BBlocker.  Stopped antiplatelets and anticoagulation due to bleeding.  Discussed risks / benefits / alternatives extensively with daughter, in agreement   > Delirium - improved.  D/c 1:1 obs.   > Acute on chronic systolic and diastolic CHF with severe MR and mod to severe TR - no further surgical plans per CT Surgery.  Continue diuretics.    > Chronic Afib - rate control with Digoxin.  Not on anticoagulation due to bleeding risk.   > Hypernatremia - resolved.    > Acute on chronic respiratory failure with hypercapnia - continue Nocturnal BIPAP.    > Metabolic Acidosis - renal followup appreciated.  careful diuresis, Diamox.  > CHRISTOPHER - Cr stable.  Avoid nephrotoxic agents.   > CAD - continue Plavix, BBlocker.  > HLD - continue Statin  > Glaucoma - continue eyedrops.  > DVT Prophylaxis - Lower extremity intermittent compression devices.

## 2017-07-16 NOTE — PROGRESS NOTE ADULT - PROBLEM SELECTOR PLAN 1
probably resolved- possibly due to large polyp in ascending colon or AVMs. Cant exclude UGI source but less likely. Not candidate for endoscopy or colonoscopy due to cardiac condition unless the bleed were to become life,threatening. Will advance to full liquids. Continue pantoprazole and moniter for rectal bleed and H/H.

## 2017-07-16 NOTE — PROGRESS NOTE ADULT - ASSESSMENT
84 F with multiple cardiac comorbities, CHF systolic heart failure with RV involvement biventricular failure severe MR s/p mitral clip with residual MR with CHF and poor response to treatment , recent STEMI conservative management with recent Acute GI bleeding and discontinuation of antiocoagulation and antiplatelets

## 2017-07-16 NOTE — PROGRESS NOTE ADULT - SUBJECTIVE AND OBJECTIVE BOX
Pt seen and examined f/u rectal bleed on full dose heparin  This morning she tolerated a clear liquid breakfast. No BM for past two days. After 2 unit PRBC hemoglobin 8.9 and given another unit last nite with repeat blood count pending. No abdominal pain, nausea or vomiting.    REVIEW OF SYSTEMS:    CONSTITUTIONAL: No fever, weight loss, or fatigue  EYES: No eye pain, visual disturbances, or discharge  ENMT:  No difficulty hearing, tinnitus, vertigo; No sinus or throat pain  RESPIRATORY: No cough, wheezing, chills or hemoptysis; No shortness of breath  CARDIOVASCULAR: No chest pain, palpitations, dizziness, or leg swelling  GASTROINTESTINAL: No abdominal or epigastric pain. No nausea, vomiting, or hematemesis; No diarrhea or constipation. No melena or hematochezia.    MEDICATIONS:  MEDICATIONS  (STANDING):  sodium chloride 0.9% lock flush 3 milliLiter(s) IV Push every 8 hours  latanoprost 0.005% Ophthalmic Solution 1 Drop(s) Both EYES at bedtime  multivitamin 1 Tablet(s) Oral daily  metoprolol succinate ER 50 milliGRAM(s) Oral daily  buDESOnide   0.5 milliGRAM(s) Respule 0.5 milliGRAM(s) Inhalation two times a day  folic acid 1 milliGRAM(s) Oral daily  ascorbic acid 250 milliGRAM(s) Oral daily  simvastatin 20 milliGRAM(s) Oral at bedtime  lisinopril 5 milliGRAM(s) Oral daily  sodium chloride 0.45%. 1000 milliLiter(s) (50 mL/Hr) IV Continuous <Continuous>  iron sucrose IVPB 200 milliGRAM(s) IV Intermittent every 24 hours  digoxin     Tablet 0.125 milliGRAM(s) Oral daily  furosemide   Injectable 20 milliGRAM(s) IV Push once  pantoprazole  Injectable 40 milliGRAM(s) IV Push two times a day  torsemide 20 milliGRAM(s) Oral two times a day    MEDICATIONS  (PRN):  aluminum hydroxide/magnesium hydroxide/simethicone Suspension 30 milliLiter(s) Oral every 6 hours PRN Dyspepsia  hydrALAZINE Injectable 10 milliGRAM(s) IV Push every 3 hours PRN sbp > 150  ALBUTerol/ipratropium for Nebulization 3 milliLiter(s) Nebulizer every 6 hours PRN Shortness of Breath and/or Wheezing      Allergies    aspirin (Anaphylaxis)  NSAIDs (Other)    Intolerances    Lasix (Other)  OHS PT (Unknown)      Vital Signs Last 24 Hrs  T(C): 36.7 (16 Jul 2017 06:47), Max: 36.9 (15 Jul 2017 15:41)  T(F): 98.1 (16 Jul 2017 06:47), Max: 98.4 (15 Jul 2017 15:41)  HR: 65 (16 Jul 2017 08:25) (65 - 69)  BP: 106/58 (16 Jul 2017 06:47) (102/50 - 114/64)  BP(mean): --  RR: 18 (16 Jul 2017 08:25) (18 - 20)  SpO2: 100% (16 Jul 2017 08:25) (94% - 100%)    07-15 @ 07:01  -  07-16 @ 07:00  --------------------------------------------------------  IN: 340 mL / OUT: 0 mL / NET: 340 mL        PHYSICAL EXAM:    General: obese black female, somnolent this morning  HEENT: MMM, conjunctiva pale and sclera clear  Gastrointestinal:Abdomen: Soft non-tender non-distended; Normal bowel sounds; No hepatosplenomegaly  Extremities: no cyanosis, clubbing or edema.  Skin: Warm and dry. No obvious rash    LABS:      CBC Full  -  ( 15 Jul 2017 05:12 )  WBC Count : 3.8 K/uL  Hemoglobin : 8.9 g/dL  Hematocrit : 29.0 %  Platelet Count - Automated : 141 K/uL  Mean Cell Volume : 77.1 fl  Mean Cell Hemoglobin : 23.7 pg  Mean Cell Hemoglobin Concentration : 30.7 g/dL  Auto Neutrophil # : x  Auto Lymphocyte # : x  Auto Monocyte # : x  Auto Eosinophil # : x  Auto Basophil # : x  Auto Neutrophil % : x  Auto Lymphocyte % : x  Auto Monocyte % : x  Auto Eosinophil % : x  Auto Basophil % : x    07-16    147<H>  |  99  |  30.0<H>  ----------------------------<  88  3.8   |  39.0<H>  |  1.14    Ca    9.0      16 Jul 2017 06:35      PT/INR - ( 15 Jul 2017 05:12 )   PT: 12.9 sec;   INR: 1.17 ratio         PTT - ( 15 Jul 2017 05:12 )  PTT:27.9 sec                  RADIOLOGY & ADDITIONAL STUDIES (The following images were personally reviewed):

## 2017-07-17 LAB
ANION GAP SERPL CALC-SCNC: 10 MMOL/L — SIGNIFICANT CHANGE UP (ref 5–17)
BUN SERPL-MCNC: 24 MG/DL — HIGH (ref 8–20)
CALCIUM SERPL-MCNC: 8.9 MG/DL — SIGNIFICANT CHANGE UP (ref 8.6–10.2)
CHLORIDE SERPL-SCNC: 98 MMOL/L — SIGNIFICANT CHANGE UP (ref 98–107)
CO2 SERPL-SCNC: 38 MMOL/L — HIGH (ref 22–29)
CREAT SERPL-MCNC: 1.07 MG/DL — SIGNIFICANT CHANGE UP (ref 0.5–1.3)
GLUCOSE SERPL-MCNC: 94 MG/DL — SIGNIFICANT CHANGE UP (ref 70–115)
HCT VFR BLD CALC: 29.3 % — LOW (ref 37–47)
HGB BLD-MCNC: 9.1 G/DL — LOW (ref 12–16)
MCHC RBC-ENTMCNC: 23.8 PG — LOW (ref 27–31)
MCHC RBC-ENTMCNC: 31.1 G/DL — LOW (ref 32–36)
MCV RBC AUTO: 76.7 FL — LOW (ref 81–99)
PLATELET # BLD AUTO: 170 K/UL — SIGNIFICANT CHANGE UP (ref 150–400)
POTASSIUM SERPL-MCNC: 3.8 MMOL/L — SIGNIFICANT CHANGE UP (ref 3.5–5.3)
POTASSIUM SERPL-SCNC: 3.8 MMOL/L — SIGNIFICANT CHANGE UP (ref 3.5–5.3)
RBC # BLD: 3.82 M/UL — LOW (ref 4.4–5.2)
RBC # FLD: 25 % — HIGH (ref 11–15.6)
SODIUM SERPL-SCNC: 146 MMOL/L — HIGH (ref 135–145)
WBC # BLD: 4.4 K/UL — LOW (ref 4.8–10.8)
WBC # FLD AUTO: 4.4 K/UL — LOW (ref 4.8–10.8)

## 2017-07-17 PROCEDURE — 99233 SBSQ HOSP IP/OBS HIGH 50: CPT

## 2017-07-17 PROCEDURE — 99232 SBSQ HOSP IP/OBS MODERATE 35: CPT

## 2017-07-17 RX ORDER — PANTOPRAZOLE SODIUM 20 MG/1
40 TABLET, DELAYED RELEASE ORAL
Qty: 0 | Refills: 0 | Status: DISCONTINUED | OUTPATIENT
Start: 2017-07-17 | End: 2017-07-20

## 2017-07-17 RX ORDER — FERROUS SULFATE 325(65) MG
325 TABLET ORAL DAILY
Qty: 0 | Refills: 0 | Status: DISCONTINUED | OUTPATIENT
Start: 2017-07-17 | End: 2017-07-20

## 2017-07-17 RX ADMIN — Medication 0.5 MILLIGRAM(S): at 09:38

## 2017-07-17 RX ADMIN — SODIUM CHLORIDE 3 MILLILITER(S): 9 INJECTION INTRAMUSCULAR; INTRAVENOUS; SUBCUTANEOUS at 05:06

## 2017-07-17 RX ADMIN — Medication 1 MILLIGRAM(S): at 11:50

## 2017-07-17 RX ADMIN — SIMVASTATIN 20 MILLIGRAM(S): 20 TABLET, FILM COATED ORAL at 21:42

## 2017-07-17 RX ADMIN — LATANOPROST 1 DROP(S): 0.05 SOLUTION/ DROPS OPHTHALMIC; TOPICAL at 21:42

## 2017-07-17 RX ADMIN — SODIUM CHLORIDE 3 MILLILITER(S): 9 INJECTION INTRAMUSCULAR; INTRAVENOUS; SUBCUTANEOUS at 11:50

## 2017-07-17 RX ADMIN — Medication 3 MILLILITER(S): at 09:38

## 2017-07-17 RX ADMIN — Medication 20 MILLIGRAM(S): at 05:10

## 2017-07-17 RX ADMIN — Medication 0.12 MILLIGRAM(S): at 05:10

## 2017-07-17 RX ADMIN — Medication 1 TABLET(S): at 11:50

## 2017-07-17 RX ADMIN — Medication 20 MILLIGRAM(S): at 17:22

## 2017-07-17 RX ADMIN — PANTOPRAZOLE SODIUM 40 MILLIGRAM(S): 20 TABLET, DELAYED RELEASE ORAL at 05:10

## 2017-07-17 RX ADMIN — Medication 325 MILLIGRAM(S): at 21:44

## 2017-07-17 RX ADMIN — LISINOPRIL 5 MILLIGRAM(S): 2.5 TABLET ORAL at 05:10

## 2017-07-17 RX ADMIN — Medication 250 MILLIGRAM(S): at 11:50

## 2017-07-17 RX ADMIN — PANTOPRAZOLE SODIUM 40 MILLIGRAM(S): 20 TABLET, DELAYED RELEASE ORAL at 17:22

## 2017-07-17 RX ADMIN — Medication 50 MILLIGRAM(S): at 05:10

## 2017-07-17 RX ADMIN — Medication 0.5 MILLIGRAM(S): at 21:03

## 2017-07-17 RX ADMIN — SODIUM CHLORIDE 3 MILLILITER(S): 9 INJECTION INTRAMUSCULAR; INTRAVENOUS; SUBCUTANEOUS at 22:00

## 2017-07-17 NOTE — PROGRESS NOTE ADULT - SUBJECTIVE AND OBJECTIVE BOX
PULMONARY PROGRESS NOTE      ANN FRANCESLEE ANN-[id number]    Patient is a 84y old  Female who presents with a chief complaint of Abdominal pain, insomnia, and increased shortness of breath (10 Murphy 2017 16:40)      INTERVAL HPI/OVERNIGHT EVENTS:  Currently without complaints on nasal O2    MEDICATIONS  (STANDING):  sodium chloride 0.9% lock flush 3 milliLiter(s) IV Push every 8 hours  latanoprost 0.005% Ophthalmic Solution 1 Drop(s) Both EYES at bedtime  multivitamin 1 Tablet(s) Oral daily  metoprolol succinate ER 50 milliGRAM(s) Oral daily  buDESOnide   0.5 milliGRAM(s) Respule 0.5 milliGRAM(s) Inhalation two times a day  folic acid 1 milliGRAM(s) Oral daily  ascorbic acid 250 milliGRAM(s) Oral daily  simvastatin 20 milliGRAM(s) Oral at bedtime  lisinopril 5 milliGRAM(s) Oral daily  sodium chloride 0.45%. 1000 milliLiter(s) (50 mL/Hr) IV Continuous <Continuous>  digoxin     Tablet 0.125 milliGRAM(s) Oral daily  torsemide 20 milliGRAM(s) Oral two times a day  pantoprazole    Tablet 40 milliGRAM(s) Oral two times a day before meals      MEDICATIONS  (PRN):  aluminum hydroxide/magnesium hydroxide/simethicone Suspension 30 milliLiter(s) Oral every 6 hours PRN Dyspepsia  hydrALAZINE Injectable 10 milliGRAM(s) IV Push every 3 hours PRN sbp > 150  ALBUTerol/ipratropium for Nebulization 3 milliLiter(s) Nebulizer every 6 hours PRN Shortness of Breath and/or Wheezing      Allergies    aspirin (Anaphylaxis)  NSAIDs (Other)    Intolerances    Lasix (Other)  [facility] PT (Unknown)      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   r/o  out  stenosis  Cardiomyopathy  Osteoarthritis  Iron deficiency anemia  MI, old: 2008  Dyslipidemia  Acid reflux  Hx of CAB  CAD (coronary artery disease)  Heart failure  HTN (hypertension)  H/O tracheostomy: 2013 may  Cataract: b/l  2013  S/P CABG x 3:   [place]      SOCIAL HISTORY  Smoking History:       REVIEW OF SYSTEMS:    CONSTITUTIONAL:  No distress    HEENT:  Eyes:  No diplopia or blurred vision. ENT:  No earache, sore throat or runny nose.    CARDIOVASCULAR:  No pressure, squeezing, tightness, heaviness or aching about the chest; no palpitations.    RESPIRATORY:  No cough, shortness of breath, PND or orthopnea. Mild SOBOE    GASTROINTESTINAL:  No nausea, vomiting or diarrhea.    GENITOURINARY:  No dysuria, frequency or urgency.    MUSCULOSKELETAL:  No joint pain    SKIN:  No new lesions.    NEUROLOGIC:  No paresthesias, fasciculations, seizures or weakness.    PSYCHIATRIC:  No disorder of thought or mood.    ENDOCRINE:  No heat or cold intolerance, polyuria or polydipsia.    HEMATOLOGICAL:  No easy bruising or bleeding.     Vital Signs Last 24 Hrs  T(C): 36.4 (2017 08:53), Max: 36.5 (2017 20:10)  T(F): 97.6 (2017 08:53), Max: 97.7 (2017 20:10)  HR: 63 (2017 09:38) (61 - 75)  BP: 112/66 (2017 08:53) (112/66 - 138/70)  BP(mean): --  RR: 18 (2017 08:53) (18 - 18)  SpO2: 99% (2017 09:38) (95% - 100%)    PHYSICAL EXAMINATION:    GENERAL: The patient is awake and alert in no apparent distress.     HEENT: Head is normocephalic and atraumatic. Extraocular muscles are intact. Mucous membranes are moist.    NECK: Supple.    LUNGS: Diminished at bases bilaterally otherwise clear    HEART: Regular rate and rhythm without murmur.    ABDOMEN: Soft, nontender, and nondistended.      EXTREMITIES: Without any cyanosis, clubbing, rash, lesions or edema.    NEUROLOGIC: Grossly intact.    SKIN: No ulceration or induration present.      LABS:                        9.1    4.4   )-----------( 170      ( 2017 05:28 )             29.3     07-17    146<H>  |  98  |  24.0<H>  ----------------------------<  94  3.8   |  38.0<H>  |  1.07    Ca    8.9      2017 05:28                          MICROBIOLOGY:    RADIOLOGY & ADDITIONAL STUDIES:

## 2017-07-17 NOTE — PROGRESS NOTE ADULT - SUBJECTIVE AND OBJECTIVE BOX
Patient: ANN FRANCES [id number] 84y Female                 Internal Medicine Hospitalist Progress Note - Dr. Errol Lovett    Chief Complaint: Patient is a 84y old  Female who presents with a chief complaint of Abdominal pain, insomnia, and increased shortness of breath (10 Murphy 2017 16:40)    BRIEF HOSPITAL COURSE:  83 y/o F with CHF, severe MR s/p mitral clip 2 weeks prior to admission, CAD, HTN, obesity, admitted to CT Surgery service due to altered mental status, found to have persistent hypercapnic respiratory failure with inability to wean from NIV, developed compensated respiratory acidosis and contraction alkalosis.  PCO2 87.  Transferred to the MICU service 7/3 for management of her NIV and metabolic disturbances.  In ICU, she was successfully weaned from NIV, requiring BIPAP at night.  TTE:  1. Mildly decreased global left ventricular systolic function.  2. Left ventricular ejection fraction, by visual estimation, is 45 to 50%.  3. Mild to moderate right ventricular systolic dysfunction.  4. Severely enlarged left atrium.  5. Moderately dilated right atrium.  6. Patient is s/p Mitral Clip with severe regurgitation.  7. Moderate-severe tricuspid regurgitation.  8. Mild aortic regurgitation.  9. Estimated pulmonary artery systolic pressure is 49.7 mmHg assuming a  right atrial pressure of 8 mmHg, which is consistent with mild pulmonary  hypertension. 10. There is no evidence of pericardial effusion.  On , patient developed chest pain. EKG showed inferior ST elevations. Seen by cardiology and after discussion with family, opted for medical management due to patient's comorbidities.  Started on anticoagulation, however, pt then experienced a bloody BM with Hgb dropping to 7.4.  Repeat Hgb stable after 2 units pRBCs.    No reported bleeding.  Confusion improved.  Denies complaints.  Taking po.      ____________________PHYSICAL EXAM:  Vitals reviewed as indicated below  GENERAL:  NAD Alert and Oriented x 3   HEENT: NCAT  CARDIOVASCULAR:  S1, S2  LUNGS: basilar crackles, R>L  ABDOMEN:  soft, (-) tenderness, (-) distension, (+) bowel sounds, (-) guarding, (-) rebound (-) rigidity  EXTREMITIES:  no cyanosis / clubbing / edema.   ____________________      MEDICATIONS:  sodium chloride 0.9% lock flush 3 milliLiter(s) IV Push every 8 hours  latanoprost 0.005% Ophthalmic Solution 1 Drop(s) Both EYES at bedtime  multivitamin 1 Tablet(s) Oral daily  metoprolol succinate ER 50 milliGRAM(s) Oral daily  buDESOnide   0.5 milliGRAM(s) Respule 0.5 milliGRAM(s) Inhalation two times a day  folic acid 1 milliGRAM(s) Oral daily  ascorbic acid 250 milliGRAM(s) Oral daily  simvastatin 20 milliGRAM(s) Oral at bedtime  aluminum hydroxide/magnesium hydroxide/simethicone Suspension 30 milliLiter(s) Oral every 6 hours PRN  hydrALAZINE Injectable 10 milliGRAM(s) IV Push every 3 hours PRN  lisinopril 5 milliGRAM(s) Oral daily  ALBUTerol/ipratropium for Nebulization 3 milliLiter(s) Nebulizer every 6 hours PRN  sodium chloride 0.45%. 1000 milliLiter(s) IV Continuous <Continuous>  digoxin     Tablet 0.125 milliGRAM(s) Oral daily  torsemide 20 milliGRAM(s) Oral two times a day  pantoprazole    Tablet 40 milliGRAM(s) Oral two times a day before meals      VITALS:  Vital Signs Last 24 Hrs  T(C): 36.4 (2017 08:53), Max: 36.5 (2017 20:10)  T(F): 97.6 (2017 08:53), Max: 97.7 (2017 20:10)  HR: 63 (2017 09:38) (61 - 75)  BP: 112/66 (2017 08:53) (112/66 - 138/70)  BP(mean): --  RR: 18 (2017 08:53) (18 - 18)  SpO2: 99% (2017 09:38) (95% - 100%) Daily     Daily Weight in k.5 (2017 06:36)  CAPILLARY BLOOD GLUCOSE        I&O's Summary    2017 07:01  -  2017 07:00  --------------------------------------------------------  IN: 480 mL / OUT: 0 mL / NET: 480 mL    2017 07:01  -  2017 13:32  --------------------------------------------------------  IN: 360 mL / OUT: 0 mL / NET: 360 mL        LABS:                        9.1    4.4   )-----------( 170      ( 2017 05:28 )             29.3     07-17    146<H>  |  98  |  24.0<H>  ----------------------------<  94  3.8   |  38.0<H>  |  1.07    Ca    8.9      2017 05:28        RECENT CULTURES:

## 2017-07-17 NOTE — PROGRESS NOTE ADULT - ASSESSMENT
85 y/o F with CHF, severe MR (s/p mitral valve clip ~2 weeks ago), CAD, HTN, obesity with persistent hypercapnic respiratory failure, contraction alkalosis.    > Acute blood loss anemia due to GI Bleed - H/H stable.  Monitor CBC.  Off anticoagulation.  Advance diet per GI.  PPI.    > STEMI - conservative management per cardiology.  Continue BBlocker.  Stopped antiplatelets and anticoagulation due to bleeding.    > Delirium - improved.  Observation.   > Acute on chronic systolic and diastolic CHF with severe MR and mod to severe TR - no further surgical plans per CT Surgery.  Continue diuretics.    > Chronic Afib - rate control with Digoxin.  Not on anticoagulation due to bleeding risk.   > Hypernatremia - resolved.    > Acute on chronic respiratory failure with hypercapnia - continue Nocturnal BIPAP.    > Metabolic Acidosis - renal followup appreciated.  careful diuresis, Diamox.  > CHRISTOPHER - Cr stable.  Avoid nephrotoxic agents.   > CAD - continue Plavix, BBlocker.  > HLD - continue Statin  > Glaucoma - continue eyedrops.  > DVT Prophylaxis - Lower extremity intermittent compression devices.  Possible d/c in 24-48h.

## 2017-07-17 NOTE — PROGRESS NOTE ADULT - PROBLEM SELECTOR PLAN 1
While on anticoagulation which has been stopped. In all probability the bleeding has stopped as well but need to follow H/H and continue to withold anticoagulation. Will change pantoprazole to oral BID

## 2017-07-17 NOTE — PROGRESS NOTE ADULT - SUBJECTIVE AND OBJECTIVE BOX
Pt seen and examined f/u rectal bleed  This morning she is semirecumbant in a chair, appears comfortable and is awake and alert with no compliants. According to nurses the daughter noted a large somewhat bloody BM yesterday morning but no BM since. Her H/H yesterday was stable but she was given a third unit of PRBC. Tolerating diet. No abdominal pain, nausea or vomiting reported.    REVIEW OF SYSTEMS:    CONSTITUTIONAL: No fever, weight loss, or fatigue  EYES: No eye pain, visual disturbances, or discharge  ENMT:  No difficulty hearing, tinnitus, vertigo; No sinus or throat pain  RESPIRATORY: No cough, wheezing, chills or hemoptysis; No shortness of breath  CARDIOVASCULAR: No chest pain, palpitations, dizziness, or leg swelling  GASTROINTESTINAL: No abdominal or epigastric pain. No nausea, vomiting, or hematemesis; No diarrhea or constipation. No melena or hematochezia.    MEDICATIONS:  MEDICATIONS  (STANDING):  sodium chloride 0.9% lock flush 3 milliLiter(s) IV Push every 8 hours  latanoprost 0.005% Ophthalmic Solution 1 Drop(s) Both EYES at bedtime  multivitamin 1 Tablet(s) Oral daily  metoprolol succinate ER 50 milliGRAM(s) Oral daily  buDESOnide   0.5 milliGRAM(s) Respule 0.5 milliGRAM(s) Inhalation two times a day  folic acid 1 milliGRAM(s) Oral daily  ascorbic acid 250 milliGRAM(s) Oral daily  simvastatin 20 milliGRAM(s) Oral at bedtime  lisinopril 5 milliGRAM(s) Oral daily  sodium chloride 0.45%. 1000 milliLiter(s) (50 mL/Hr) IV Continuous <Continuous>  digoxin     Tablet 0.125 milliGRAM(s) Oral daily  torsemide 20 milliGRAM(s) Oral two times a day  pantoprazole    Tablet 40 milliGRAM(s) Oral two times a day before meals    MEDICATIONS  (PRN):  aluminum hydroxide/magnesium hydroxide/simethicone Suspension 30 milliLiter(s) Oral every 6 hours PRN Dyspepsia  hydrALAZINE Injectable 10 milliGRAM(s) IV Push every 3 hours PRN sbp > 150  ALBUTerol/ipratropium for Nebulization 3 milliLiter(s) Nebulizer every 6 hours PRN Shortness of Breath and/or Wheezing      Allergies    aspirin (Anaphylaxis)  NSAIDs (Other)    Intolerances    Lasix (Other)  [facility] PT (Unknown)      Vital Signs Last 24 Hrs  T(C): 36.1 (17 Jul 2017 05:00), Max: 36.5 (16 Jul 2017 20:10)  T(F): 97 (17 Jul 2017 05:00), Max: 97.7 (16 Jul 2017 20:10)  HR: 61 (17 Jul 2017 07:56) (61 - 75)  BP: 123/64 (17 Jul 2017 05:00) (123/64 - 138/70)  BP(mean): --  RR: 18 (17 Jul 2017 07:56) (18 - 18)  SpO2: 100% (17 Jul 2017 07:56) (98% - 100%)    07-16 @ 07:01  -  07-17 @ 07:00  --------------------------------------------------------  IN: 480 mL / OUT: 0 mL / NET: 480 mL        PHYSICAL EXAM:    General: somewhat obese ivy female in no acute distress, awake and alert and conversant  HEENT: MMM, conjunctiva and sclera clear  Gastrointestinal:Abdomen: Soft non-tender non-distended; Normal bowel sounds; No hepatosplenomegaly  Extremities: no cyanosis, clubbing  Skin: Warm and dry. No obvious rash    LABS:      CBC Full  -  ( 16 Jul 2017 16:19 )  WBC Count : x  Hemoglobin : 9.0 g/dL  Hematocrit : 30.3 %  Platelet Count - Automated : x  Mean Cell Volume : x  Mean Cell Hemoglobin : x  Mean Cell Hemoglobin Concentration : x  Auto Neutrophil # : x  Auto Lymphocyte # : x  Auto Monocyte # : x  Auto Eosinophil # : x  Auto Basophil # : x  Auto Neutrophil % : x  Auto Lymphocyte % : x  Auto Monocyte % : x  Auto Eosinophil % : x  Auto Basophil % : x    07-17    146<H>  |  98  |  24.0<H>  ----------------------------<  94  3.8   |  38.0<H>  |  1.07    Ca    8.9      17 Jul 2017 05:28                        RADIOLOGY & ADDITIONAL STUDIES (The following images were personally reviewed):

## 2017-07-17 NOTE — PROGRESS NOTE ADULT - ASSESSMENT
Multiple medical issues but currently stable on nocturnal BIPAP for [facility].  S/P valvular surgery appears stable    Plan:  Continue nocturnal BIPAP  As per cardiology

## 2017-07-18 ENCOUNTER — TRANSCRIPTION ENCOUNTER (OUTPATIENT)
Age: 82
End: 2017-07-18

## 2017-07-18 LAB
ANION GAP SERPL CALC-SCNC: 10 MMOL/L — SIGNIFICANT CHANGE UP (ref 5–17)
BUN SERPL-MCNC: 19 MG/DL — SIGNIFICANT CHANGE UP (ref 8–20)
CALCIUM SERPL-MCNC: 8.9 MG/DL — SIGNIFICANT CHANGE UP (ref 8.6–10.2)
CHLORIDE SERPL-SCNC: 94 MMOL/L — LOW (ref 98–107)
CO2 SERPL-SCNC: 38 MMOL/L — HIGH (ref 22–29)
CREAT SERPL-MCNC: 1.1 MG/DL — SIGNIFICANT CHANGE UP (ref 0.5–1.3)
GLUCOSE SERPL-MCNC: 89 MG/DL — SIGNIFICANT CHANGE UP (ref 70–115)
HCT VFR BLD CALC: 32 % — LOW (ref 37–47)
HGB BLD-MCNC: 10.2 G/DL — LOW (ref 12–16)
MCHC RBC-ENTMCNC: 24.5 PG — LOW (ref 27–31)
MCHC RBC-ENTMCNC: 31.9 G/DL — LOW (ref 32–36)
MCV RBC AUTO: 76.7 FL — LOW (ref 81–99)
PLATELET # BLD AUTO: 159 K/UL — SIGNIFICANT CHANGE UP (ref 150–400)
POTASSIUM SERPL-MCNC: 3.4 MMOL/L — LOW (ref 3.5–5.3)
POTASSIUM SERPL-SCNC: 3.4 MMOL/L — LOW (ref 3.5–5.3)
RBC # BLD: 4.17 M/UL — LOW (ref 4.4–5.2)
RBC # FLD: 24.9 % — HIGH (ref 11–15.6)
SODIUM SERPL-SCNC: 142 MMOL/L — SIGNIFICANT CHANGE UP (ref 135–145)
WBC # BLD: 4.3 K/UL — LOW (ref 4.8–10.8)
WBC # FLD AUTO: 4.3 K/UL — LOW (ref 4.8–10.8)

## 2017-07-18 PROCEDURE — 99233 SBSQ HOSP IP/OBS HIGH 50: CPT

## 2017-07-18 PROCEDURE — 99232 SBSQ HOSP IP/OBS MODERATE 35: CPT

## 2017-07-18 RX ORDER — LISINOPRIL 2.5 MG/1
1 TABLET ORAL
Qty: 0 | Refills: 0 | COMMUNITY
Start: 2017-07-18

## 2017-07-18 RX ORDER — METOPROLOL TARTRATE 50 MG
1 TABLET ORAL
Qty: 0 | Refills: 0 | COMMUNITY
Start: 2017-07-18

## 2017-07-18 RX ORDER — ASCORBIC ACID 60 MG
1 TABLET,CHEWABLE ORAL
Qty: 0 | Refills: 0 | COMMUNITY
Start: 2017-07-18

## 2017-07-18 RX ORDER — FOLIC ACID 0.8 MG
1 TABLET ORAL
Qty: 0 | Refills: 0 | COMMUNITY
Start: 2017-07-18

## 2017-07-18 RX ORDER — POTASSIUM CHLORIDE 20 MEQ
40 PACKET (EA) ORAL ONCE
Qty: 0 | Refills: 0 | Status: COMPLETED | OUTPATIENT
Start: 2017-07-18 | End: 2017-07-18

## 2017-07-18 RX ORDER — IPRATROPIUM/ALBUTEROL SULFATE 18-103MCG
3 AEROSOL WITH ADAPTER (GRAM) INHALATION
Qty: 0 | Refills: 0 | COMMUNITY
Start: 2017-07-18

## 2017-07-18 RX ORDER — CLOPIDOGREL BISULFATE 75 MG/1
1 TABLET, FILM COATED ORAL
Qty: 0 | Refills: 0 | COMMUNITY

## 2017-07-18 RX ORDER — DIGOXIN 250 MCG
1 TABLET ORAL
Qty: 0 | Refills: 0 | COMMUNITY
Start: 2017-07-18

## 2017-07-18 RX ORDER — BUDESONIDE, MICRONIZED 100 %
1 POWDER (GRAM) MISCELLANEOUS
Qty: 0 | Refills: 0 | COMMUNITY
Start: 2017-07-18

## 2017-07-18 RX ORDER — NITROGLYCERIN 6.5 MG
1 CAPSULE, EXTENDED RELEASE ORAL
Qty: 0 | Refills: 0 | COMMUNITY

## 2017-07-18 RX ADMIN — Medication 20 MILLIGRAM(S): at 05:39

## 2017-07-18 RX ADMIN — Medication 1 TABLET(S): at 11:25

## 2017-07-18 RX ADMIN — Medication 20 MILLIGRAM(S): at 17:35

## 2017-07-18 RX ADMIN — SODIUM CHLORIDE 3 MILLILITER(S): 9 INJECTION INTRAMUSCULAR; INTRAVENOUS; SUBCUTANEOUS at 09:47

## 2017-07-18 RX ADMIN — SIMVASTATIN 20 MILLIGRAM(S): 20 TABLET, FILM COATED ORAL at 21:18

## 2017-07-18 RX ADMIN — Medication 325 MILLIGRAM(S): at 11:25

## 2017-07-18 RX ADMIN — LATANOPROST 1 DROP(S): 0.05 SOLUTION/ DROPS OPHTHALMIC; TOPICAL at 21:18

## 2017-07-18 RX ADMIN — PANTOPRAZOLE SODIUM 40 MILLIGRAM(S): 20 TABLET, DELAYED RELEASE ORAL at 05:39

## 2017-07-18 RX ADMIN — Medication 3 MILLILITER(S): at 08:10

## 2017-07-18 RX ADMIN — Medication 1 MILLIGRAM(S): at 11:25

## 2017-07-18 RX ADMIN — LISINOPRIL 5 MILLIGRAM(S): 2.5 TABLET ORAL at 05:39

## 2017-07-18 RX ADMIN — Medication 40 MILLIEQUIVALENT(S): at 11:28

## 2017-07-18 RX ADMIN — PANTOPRAZOLE SODIUM 40 MILLIGRAM(S): 20 TABLET, DELAYED RELEASE ORAL at 17:35

## 2017-07-18 RX ADMIN — SODIUM CHLORIDE 3 MILLILITER(S): 9 INJECTION INTRAMUSCULAR; INTRAVENOUS; SUBCUTANEOUS at 05:46

## 2017-07-18 RX ADMIN — Medication 0.12 MILLIGRAM(S): at 05:39

## 2017-07-18 RX ADMIN — Medication 0.5 MILLIGRAM(S): at 08:09

## 2017-07-18 RX ADMIN — Medication 250 MILLIGRAM(S): at 11:25

## 2017-07-18 RX ADMIN — Medication 50 MILLIGRAM(S): at 05:39

## 2017-07-18 RX ADMIN — SODIUM CHLORIDE 3 MILLILITER(S): 9 INJECTION INTRAMUSCULAR; INTRAVENOUS; SUBCUTANEOUS at 21:17

## 2017-07-18 NOTE — DISCHARGE NOTE ADULT - HOSPITAL COURSE
Chief Complaint: Patient is a 84y old  Female who presents with a chief complaint of Abdominal pain, insomnia, and increased shortness of breath (10 Murphy 2017 16:40)    BRIEF HOSPITAL COURSE:  83 y/o F with CHF, severe MR s/p mitral clip 2 weeks prior to admission, CAD, HTN, obesity, admitted to CT Surgery service due to altered mental status, found to have persistent hypercapnic respiratory failure with inability to wean from NIV, developed compensated respiratory acidosis and contraction alkalosis.  PCO2 87.  Transferred to the MICU service 7/3 for management of her NIV and metabolic disturbances.  In ICU, she was successfully weaned from NIV, requiring BIPAP at night.  TTE:  1. Mildly decreased global left ventricular systolic function.  2. Left ventricular ejection fraction, by visual estimation, is 45 to 50%.  3. Mild to moderate right ventricular systolic dysfunction.  4. Severely enlarged left atrium.  5. Moderately dilated right atrium.  6. Patient is s/p Mitral Clip with severe regurgitation.  7. Moderate-severe tricuspid regurgitation.  8. Mild aortic regurgitation.  9. Estimated pulmonary artery systolic pressure is 49.7 mmHg assuming a  right atrial pressure of 8 mmHg, which is consistent with mild pulmonary  hypertension. 10. There is no evidence of pericardial effusion.  On 7/13, patient developed chest pain. EKG showed inferior ST elevations. Seen by cardiology and after discussion with family, opted for medical management due to patient's comorbidities.  Started on anticoagulation, however, pt then experienced a bloody BM with Hgb dropping to 7.4.  Repeat Hgb stable after 2 units pRBCs. Chief Complaint: Patient is a 84y old  Female who presents with a chief complaint of Abdominal pain, insomnia, and increased shortness of breath (10 Murphy 2017 16:40)    BRIEF HOSPITAL COURSE:  85 y/o F with CHF, severe MR s/p mitral clip 2 weeks prior to admission, CAD, HTN, obesity, admitted to CT Surgery service due to altered mental status, found to have persistent hypercapnic respiratory failure with inability to wean from NIV, developed compensated respiratory acidosis and contraction alkalosis.  PCO2 87.  Transferred to the MICU service 7/3 for management of her NIV and metabolic disturbances.  In ICU, she was successfully weaned from NIV, requiring BIPAP at night.  TTE:  1. Mildly decreased global left ventricular systolic function.  2. Left ventricular ejection fraction, by visual estimation, is 45 to 50%.  3. Mild to moderate right ventricular systolic dysfunction.  4. Severely enlarged left atrium.  5. Moderately dilated right atrium.  6. Patient is s/p Mitral Clip with severe regurgitation.  7. Moderate-severe tricuspid regurgitation.  8. Mild aortic regurgitation.  9. Estimated pulmonary artery systolic pressure is 49.7 mmHg assuming a  right atrial pressure of 8 mmHg, which is consistent with mild pulmonary  hypertension. 10. There is no evidence of pericardial effusion.  On 7/13, patient developed chest pain. EKG showed inferior ST elevations. Seen by cardiology and after discussion with family, opted for medical management due to patient's comorbidities.  Started on anticoagulation, however, pt then experienced a bloody BM with Hgb dropping to 7.4.  Repeat Hgb stable after 2 units pRBCs.    No further confusion.  Denies bleeding.  No chest pain / sob / palpitations.  Tolerates PO, + DALTON, no BMs.    Vital Signs Last 24 Hrs  T(C): 36.6 (20 Jul 2017 11:46), Max: 36.9 (19 Jul 2017 20:29)  T(F): 97.8 (20 Jul 2017 11:46), Max: 98.5 (20 Jul 2017 05:52)  HR: 76 (20 Jul 2017 11:46) (64 - 76)  BP: 102/58 (20 Jul 2017 11:46) (98/52 - 112/54)  RR: 17 (20 Jul 2017 07:15) (16 - 17)  SpO2: 100% (20 Jul 2017 07:15) (97% - 100%)                        9.0    3.8   )-----------( 172      ( 20 Jul 2017 06:04 )             29.4     20 Jul 2017 06:04    142    |  94     |  14.0   ----------------------------<  83     3.4     |  37.0   |  1.08     Ca    8.8        20 Jul 2017 06:04    MEDICATIONS reviewed    Radiology: personally visualized    PHYSICAL EXAM:  General: obese eldely female n no acute distress  Eyes: PERRLA, EOMI; conjunctiva and sclera clear  Head: Normocephalic; atraumatic  ENMT: No nasal discharge; airway clear  Neck: Supple; non tender; no masses  Respiratory: No wheezes, rales or rhonchi on deep inspiration, + bibasilar crackles  Cardiovascular: irregular rate and rhythm. S1 and S2, II/VI OSWALDO  Gastrointestinal: Soft non-tender non-distended; Normal bowel sounds  Genitourinary: No costovertebral angle tenderness  Extremities: decreased range of motion, No clubbing, cyanosis or edema  Vascular: Peripheral pulses palpable 2+ bilaterally  Neurological: Alert and oriented x4  Skin: Warm and dry.   Musculoskeletal: Normal tone, without deformities  Psychiatric: Cooperative and appropriate    Assessment and Plan:   · Assessment	   85 y/o Female with diastolic/systolic CHF, severe MR (s/p mitral valve clip ~2 weeks ago), CAD, HTN, obesity with persistent hypercapnic respiratory failure, contraction alkalosis.    > Acute blood loss anemia due to GI Bleed - H/H stable. Off anticoagulation.  Tolerates PO.  PPI.  Cont PO iron, f/u with cardiology in 1 week and if stable HH resume plavix  > STEMI - conservative management per cardiology.  Continue BBlocker.  Off antiplatelets and anticoagulation due to bleeding.    > Delirium - resolved.  Observation.   > Acute on chronic systolic and diastolic CHF with severe MR and mod to severe TR - no further surgical plans per CT Surgery.  Continue diuretics + ACEI.    > Chronic Afib - rate control with Digoxin + toprol.  Not on anticoagulation due to bleeding risk.   > Hypernatremia - resolved.    > Acute on chronic respiratory failure with hypercapnia - continue Nocturnal BIPAP, f/u with pulmonary.    > Metabolic Acidosis - renal followup appreciated.  careful diuresis,cont torsemide as tolerated.  > CHRISTOPHER - resolved.  Avoid nephrotoxic agents.   > CAD - off Plavix, cont BBlocker.  > HLD - continue Statin  > Glaucoma - continue eyedrops.  > Hypokalemia due to diuresis - repleted  > DVT Prophylaxis - Lower extremity intermittent compression devices.    DC to rehab  Discussed with Dr. Patricio  Time spent 65 min Chief Complaint: Patient is a 84y old  Female who presents with a chief complaint of Abdominal pain, insomnia, and increased shortness of breath (10 Murphy 2017 16:40)    BRIEF HOSPITAL COURSE:  83 y/o F with CHF, severe MR s/p mitral clip 2 weeks prior to admission, CAD, HTN, obesity, admitted to CT Surgery service due to altered mental status, found to have persistent hypercapnic respiratory failure with inability to wean from NIV, developed compensated respiratory acidosis and contraction alkalosis.  PCO2 87.  Transferred to the MICU service 7/3 for management of her NIV and metabolic disturbances.  In ICU, she was successfully weaned from NIV, requiring BIPAP at night.  TTE:  1. Mildly decreased global left ventricular systolic function.  2. Left ventricular ejection fraction, by visual estimation, is 45 to 50%.  3. Mild to moderate right ventricular systolic dysfunction.  4. Severely enlarged left atrium.  5. Moderately dilated right atrium.  6. Patient is s/p Mitral Clip with severe regurgitation.  7. Moderate-severe tricuspid regurgitation.  8. Mild aortic regurgitation.  9. Estimated pulmonary artery systolic pressure is 49.7 mmHg assuming a  right atrial pressure of 8 mmHg, which is consistent with mild pulmonary  hypertension. 10. There is no evidence of pericardial effusion.  On 7/13, patient developed chest pain. EKG showed inferior ST elevations. Seen by cardiology and after discussion with family, opted for medical management due to patient's comorbidities.  Started on anticoagulation, however, pt then experienced a bloody BM with Hgb dropping to 7.4.  Repeat Hgb stable after 2 units pRBCs.    No further confusion.  Denies bleeding.  No chest pain / sob / palpitations.  Tolerates PO, + DALTON, no BMs.    Vital Signs Last 24 Hrs  T(C): 36.6 (20 Jul 2017 11:46), Max: 36.9 (19 Jul 2017 20:29)  T(F): 97.8 (20 Jul 2017 11:46), Max: 98.5 (20 Jul 2017 05:52)  HR: 76 (20 Jul 2017 11:46) (64 - 76)  BP: 102/58 (20 Jul 2017 11:46) (98/52 - 112/54)  RR: 17 (20 Jul 2017 07:15) (16 - 17)  SpO2: 100% (20 Jul 2017 07:15) (97% - 100%)                        9.0    3.8   )-----------( 172      ( 20 Jul 2017 06:04 )             29.4     20 Jul 2017 06:04    142    |  94     |  14.0   ----------------------------<  83     3.4     |  37.0   |  1.08     Ca    8.8        20 Jul 2017 06:04    MEDICATIONS reviewed    Radiology: personally visualized    PHYSICAL EXAM:  General: obese eldely female n no acute distress  Eyes: PERRLA, EOMI; conjunctiva and sclera clear  Head: Normocephalic; atraumatic  ENMT: No nasal discharge; airway clear  Neck: Supple; non tender; no masses  Respiratory: No wheezes, rales or rhonchi on deep inspiration, + bibasilar crackles  Cardiovascular: irregular rate and rhythm. S1 and S2, II/VI OSWALDO  Gastrointestinal: Soft non-tender non-distended; Normal bowel sounds  Genitourinary: No costovertebral angle tenderness  Extremities: decreased range of motion, No clubbing, cyanosis or edema  Vascular: Peripheral pulses palpable 2+ bilaterally  Neurological: Alert and oriented x4  Skin: Warm and dry.   Musculoskeletal: Normal tone, without deformities  Psychiatric: Cooperative and appropriate    Assessment and Plan:   · Assessment	   83 y/o Female with diastolic/systolic CHF, severe MR (s/p mitral valve clip ~2 weeks ago), CAD, HTN, obesity with persistent hypercapnic respiratory failure, contraction alkalosis.    > Acute blood loss anemia due to GI Bleed - H/H stable. Off anticoagulation.  Tolerates PO.  PPI.  Cont PO iron, f/u with cardiology in 1 week and if stable HH resume plavix  > STEMI - conservative management per cardiology.  Continue BBlocker.  Off antiplatelets and anticoagulation due to bleeding.    > Delirium - resolved.  Observation.   > Acute on chronic systolic and diastolic CHF with severe MR and mod to severe TR - no further surgical plans per CT Surgery.  Continue diuretics + ACEI.    > Chronic Afib - rate control with Digoxin + toprol.  Not on anticoagulation due to bleeding risk.   > Hypernatremia - resolved.    > Acute on chronic respiratory failure with hypercapnia - continue Nocturnal BIPAP, O2, f/u with pulmonary.    > Metabolic Acidosis - renal followup appreciated.  careful diuresis,cont torsemide as tolerated.  > CHRISTOPHER - resolved.  Avoid nephrotoxic agents.   > CAD - off Plavix, cont BBlocker.  > HLD - continue Statin  > Glaucoma - continue eyedrops.  > Hypokalemia due to diuresis - repleted  > DVT Prophylaxis - Lower extremity intermittent compression devices.    DC to rehab  Discussed with Dr. Patricio  Time spent 65 min

## 2017-07-18 NOTE — PROGRESS NOTE ADULT - PROBLEM SELECTOR PLAN 1
Hgb stable. No further bleeding. Ct showed 2.5 cm polyp in right colon. Not candidate for colonoscopy secondary to comorbidities.   Ideally best if pt stay off anticoaugulation if possible. No further GI workup. may be d/joey from GI standpoint

## 2017-07-18 NOTE — DISCHARGE NOTE ADULT - MEDICATION SUMMARY - MEDICATIONS TO TAKE
I will START or STAY ON the medications listed below when I get home from the hospital:    budesonide 0.5 mg/2 mL inhalation suspension  -- 1 dose(s) inhaled 2 times a day  -- Indication: For CoPD    lisinopril 5 mg oral tablet  -- 1 tab(s) by mouth once a day  -- Indication: For CMP    aluminum hydroxide-magnesium hydroxide 200 mg-200 mg/5 mL oral suspension  -- 30 milliliter(s) by mouth every 6 hours, As needed, Dyspepsia  -- Indication: For Dyspepsia    digoxin 125 mcg (0.125 mg) oral tablet  -- 1 tab(s) by mouth once a day  -- Indication: For Afib    Zocor 40 mg oral tablet  -- 1 tab(s) by mouth once a day (at bedtime)  -- Indication: For HLD    metoprolol succinate 50 mg oral tablet, extended release  -- 1 tab(s) by mouth once a day  -- Indication: For HTN    albuterol-ipratropium 2.5 mg-0.5 mg/3 mL inhalation solution  -- 3 milliliter(s) inhaled every 6 hours, As needed, Shortness of Breath and/or Wheezing  -- Indication: For CoPD    torsemide 20 mg oral tablet  -- 1 tab(s) by mouth 2 times a day  -- Indication: For ChF    Slow Fe (as elemental iron) 45 mg oral tablet, extended release  -- 1 tab(s) by mouth once a day  -- Indication: For Anemia due to blood loss    docusate sodium 100 mg oral capsule  -- 1 cap(s) by mouth 3 times a day  -- Indication: For bowel regimen    latanoprost 0.005% ophthalmic solution  -- 2 drop(s) to each affected eye once a day (in the evening)  -- Indication: For Eye drops    Protonix 40 mg oral delayed release tablet  -- 1 tab(s) by mouth once a day  -- Indication: For GIB    multivitamin  -- 1 tab(s) by mouth once a day  -- Indication: For Supplement    folic acid 1 mg oral tablet  -- 1 tab(s) by mouth once a day  -- Indication: For Anemia due to blood loss    ascorbic acid 250 mg oral tablet  -- 1 tab(s) by mouth once a day  -- Indication: For Supplement

## 2017-07-18 NOTE — PROGRESS NOTE ADULT - SUBJECTIVE AND OBJECTIVE BOX
PULMONARY PROGRESS NOTE      ANN FRANCESLEE [id number]    Patient is a 84y old  Female who presents with a chief complaint of Abdominal pain, insomnia, and increased shortness of breath (10 Murphy 2017 16:40)      INTERVAL HPI/OVERNIGHT EVENTS:  Doing well  Awake and alert    MEDICATIONS  (STANDING):  sodium chloride 0.9% lock flush 3 milliLiter(s) IV Push every 8 hours  latanoprost 0.005% Ophthalmic Solution 1 Drop(s) Both EYES at bedtime  multivitamin 1 Tablet(s) Oral daily  metoprolol succinate ER 50 milliGRAM(s) Oral daily  buDESOnide   0.5 milliGRAM(s) Respule 0.5 milliGRAM(s) Inhalation two times a day  folic acid 1 milliGRAM(s) Oral daily  ascorbic acid 250 milliGRAM(s) Oral daily  simvastatin 20 milliGRAM(s) Oral at bedtime  lisinopril 5 milliGRAM(s) Oral daily  sodium chloride 0.45%. 1000 milliLiter(s) (50 mL/Hr) IV Continuous <Continuous>  digoxin     Tablet 0.125 milliGRAM(s) Oral daily  torsemide 20 milliGRAM(s) Oral two times a day  pantoprazole    Tablet 40 milliGRAM(s) Oral two times a day before meals  ferrous    sulfate 325 milliGRAM(s) Oral daily      MEDICATIONS  (PRN):  aluminum hydroxide/magnesium hydroxide/simethicone Suspension 30 milliLiter(s) Oral every 6 hours PRN Dyspepsia  hydrALAZINE Injectable 10 milliGRAM(s) IV Push every 3 hours PRN sbp > 150  ALBUTerol/ipratropium for Nebulization 3 milliLiter(s) Nebulizer every 6 hours PRN Shortness of Breath and/or Wheezing      Allergies    aspirin (Anaphylaxis)  NSAIDs (Other)    Intolerances    Lasix (Other)  [facility] PT (Unknown)      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   r/o  out  stenosis  Cardiomyopathy  Osteoarthritis  Iron deficiency anemia  MI, old: 2008  Dyslipidemia  Acid reflux  Hx of CAB  CAD (coronary artery disease)  Heart failure  HTN (hypertension)  H/O tracheostomy: 2013 may  Cataract: b/l  2013  S/P CABG x 3:   [place]      SOCIAL HISTORY  Smoking History:       REVIEW OF SYSTEMS:    CONSTITUTIONAL:  No distress    HEENT:  Eyes:  No diplopia or blurred vision. ENT:  No earache, sore throat or runny nose.    CARDIOVASCULAR:  No pressure, squeezing, tightness, heaviness or aching about the chest; no palpitations.    RESPIRATORY:  No cough, shortness of breath, PND or orthopnea. Mild SOBOE    GASTROINTESTINAL:  No nausea, vomiting or diarrhea.    GENITOURINARY:  No dysuria, frequency or urgency.    MUSCULOSKELETAL:  No joint pain    SKIN:  No new lesions.    NEUROLOGIC:  No paresthesias, fasciculations, seizures or weakness.    PSYCHIATRIC:  No disorder of thought or mood.    ENDOCRINE:  No heat or cold intolerance, polyuria or polydipsia.    HEMATOLOGICAL:  No easy bruising or bleeding.     Vital Signs Last 24 Hrs  T(C): 37.2 (2017 09:00), Max: 37.2 (2017 09:00)  T(F): 98.9 (2017 09:00), Max: 98.9 (2017 09:00)  HR: 70 (2017 09:00) (69 - 95)  BP: 130/60 (2017 09:00) (130/60 - 132/64)  BP(mean): --  RR: 18 (2017 09:00) (18 - 18)  SpO2: 97% (2017 05:10) (96% - 99%)    PHYSICAL EXAMINATION:    GENERAL: The patient is awake and alert in no apparent distress.     HEENT: Head is normocephalic and atraumatic. Extraocular muscles are intact. Mucous membranes are moist.    NECK: Supple.    LUNGS: Clear to auscultation without wheezing, rales or rhonchi; respirations unlabored    HEART: Regular rate and rhythm without murmur.    ABDOMEN: Soft, nontender, and nondistended.      EXTREMITIES: Without any cyanosis, clubbing, rash, lesions or edema.    NEUROLOGIC: Grossly intact.    SKIN: No ulceration or induration present.      LABS:                        10.2   4.3   )-----------( 159      ( 2017 05:26 )             32.0     07-18    142  |  94<L>  |  19.0  ----------------------------<  89  3.4<L>   |  38.0<H>  |  1.10    Ca    8.9      2017 05:26                          MICROBIOLOGY:    RADIOLOGY & ADDITIONAL STUDIES:

## 2017-07-18 NOTE — PROGRESS NOTE ADULT - ASSESSMENT
History of chronic on acute respiratory failure on nocturnal BIPAP doing very well at this time.    Plan:  Continue nocturnal BIPAP  Titrate O2  Apparently for LISSY soon

## 2017-07-18 NOTE — DISCHARGE NOTE ADULT - SECONDARY DIAGNOSIS.
Acute on chronic systolic heart failure Atrial fibrillation with RVR Coronary artery disease involving native coronary artery of native heart without angina pectoris Anemia due to blood loss Gastrointestinal hemorrhage, unspecified gastrointestinal hemorrhage type Chronic obstructive pulmonary disease, unspecified COPD type

## 2017-07-18 NOTE — PROGRESS NOTE ADULT - SUBJECTIVE AND OBJECTIVE BOX
Patient: ANN FRANCES [id number] 84y Female                 Internal Medicine Hospitalist Progress Note - Dr. Errol Lovett    Chief Complaint: Patient is a 84y old  Female who presents with a chief complaint of Abdominal pain, insomnia, and increased shortness of breath (10 Murphy 2017 16:40)    BRIEF HOSPITAL COURSE:  83 y/o F with CHF, severe MR s/p mitral clip 2 weeks prior to admission, CAD, HTN, obesity, admitted to CT Surgery service due to altered mental status, found to have persistent hypercapnic respiratory failure with inability to wean from NIV, developed compensated respiratory acidosis and contraction alkalosis.  PCO2 87.  Transferred to the MICU service 7/3 for management of her NIV and metabolic disturbances.  In ICU, she was successfully weaned from NIV, requiring BIPAP at night.  TTE:  1. Mildly decreased global left ventricular systolic function.  2. Left ventricular ejection fraction, by visual estimation, is 45 to 50%.  3. Mild to moderate right ventricular systolic dysfunction.  4. Severely enlarged left atrium.  5. Moderately dilated right atrium.  6. Patient is s/p Mitral Clip with severe regurgitation.  7. Moderate-severe tricuspid regurgitation.  8. Mild aortic regurgitation.  9. Estimated pulmonary artery systolic pressure is 49.7 mmHg assuming a  right atrial pressure of 8 mmHg, which is consistent with mild pulmonary  hypertension. 10. There is no evidence of pericardial effusion.  On , patient developed chest pain. EKG showed inferior ST elevations. Seen by cardiology and after discussion with family, opted for medical management due to patient's comorbidities.  Started on anticoagulation, however, pt then experienced a bloody BM with Hgb dropping to 7.4.  Repeat Hgb stable after 2 units pRBCs.    No further confusion.  Denies bleeding.  No chest pain / sob / palpitations.      ____________________PHYSICAL EXAM:  Vitals reviewed as indicated below  GENERAL:  NAD Alert and Oriented x 3   HEENT: NCAT  CARDIOVASCULAR:  S1, S2  LUNGS: basilar crackles, R>L  ABDOMEN:  soft, (-) tenderness, (-) distension, (+) bowel sounds, (-) guarding, (-) rebound (-) rigidity  EXTREMITIES:  no cyanosis / clubbing / edema.   ____________________      MEDICATIONS:  sodium chloride 0.9% lock flush 3 milliLiter(s) IV Push every 8 hours  latanoprost 0.005% Ophthalmic Solution 1 Drop(s) Both EYES at bedtime  multivitamin 1 Tablet(s) Oral daily  metoprolol succinate ER 50 milliGRAM(s) Oral daily  buDESOnide   0.5 milliGRAM(s) Respule 0.5 milliGRAM(s) Inhalation two times a day  folic acid 1 milliGRAM(s) Oral daily  ascorbic acid 250 milliGRAM(s) Oral daily  simvastatin 20 milliGRAM(s) Oral at bedtime  aluminum hydroxide/magnesium hydroxide/simethicone Suspension 30 milliLiter(s) Oral every 6 hours PRN  hydrALAZINE Injectable 10 milliGRAM(s) IV Push every 3 hours PRN  lisinopril 5 milliGRAM(s) Oral daily  ALBUTerol/ipratropium for Nebulization 3 milliLiter(s) Nebulizer every 6 hours PRN  sodium chloride 0.45%. 1000 milliLiter(s) IV Continuous <Continuous>  digoxin     Tablet 0.125 milliGRAM(s) Oral daily  torsemide 20 milliGRAM(s) Oral two times a day  pantoprazole    Tablet 40 milliGRAM(s) Oral two times a day before meals  ferrous    sulfate 325 milliGRAM(s) Oral daily      VITALS:  Vital Signs Last 24 Hrs  T(C): 37.2 (2017 09:00), Max: 37.2 (2017 09:00)  T(F): 98.9 (2017 09:00), Max: 98.9 (2017 09:00)  HR: 70 (2017 09:00) (69 - 95)  BP: 130/60 (2017 09:00) (130/60 - 132/64)  BP(mean): --  RR: 18 (2017 09:00) (18 - 18)  SpO2: 97% (2017 05:10) (96% - 99%) Daily     Daily Weight in k.3 (2017 05:00)  CAPILLARY BLOOD GLUCOSE        I&O's Summary    2017 07:01  -  2017 07:00  --------------------------------------------------------  IN: 1200 mL / OUT: 225 mL / NET: 975 mL        LABS:                        10.2   4.3   )-----------( 159      ( 2017 05:26 )             32.0     07-18    142  |  94<L>  |  19.0  ----------------------------<  89  3.4<L>   |  38.0<H>  |  1.10    Ca    8.9      2017 05:26        RECENT CULTURES:

## 2017-07-18 NOTE — DISCHARGE NOTE ADULT - CARE PROVIDERS DIRECT ADDRESSES
[email or web address]

## 2017-07-18 NOTE — PROGRESS NOTE ADULT - SUBJECTIVE AND OBJECTIVE BOX
Patient is a 84y old  Female who presents with a chief complaint of Abdominal pain, insomnia, and increased shortness of breath (10 Murphy 2017 16:40)      HPI:  84 year old with multiple medical problems- chf. patient states she had brown BM today. Hgb stable. No abodminal pain.  Tolerating solid diet.          Patient was discharged on  s/p mitral clip. (10 Murphy 2017 16:40)      REVIEW OF SYSTEMS:  Constitutional: No fever, weight loss or fatigue  ENMT:  No difficulty hearing, tinnitus, vertigo; No sinus or throat pain  Respiratory: No cough, wheezing, chills or hemoptysis  Cardiovascular: No chest pain, palpitations, dizziness or leg swelling  Gastrointestinal: No abdominal or epigastric pain. No nausea, vomiting or hematemesis; No diarrhea or constipation. No melena or hematochezia.  Skin: No itching, burning, rashes or lesions   Musculoskeletal: No joint pain or swelling; No muscle, back or extremity pain    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   r/o  out  stenosis  Cardiomyopathy  Osteoarthritis  Iron deficiency anemia  MI, old: 2008  Dyslipidemia  Acid reflux  Hx of CAB  CAD (coronary artery disease)  Heart failure  HTN (hypertension)  H/O tracheostomy: 2013 may  Cataract: b/l  2013  S/P CABG x 3:   [place]      FAMILY HISTORY:  Family history of hypertension      SOCIAL HISTORY:  Smoking Status: [ ] Current, [ ] Former, [ ] Never  Pack Years:  [  ] EtOH  [  ] IVDA    MEDICATIONS:  MEDICATIONS  (STANDING):  sodium chloride 0.9% lock flush 3 milliLiter(s) IV Push every 8 hours  latanoprost 0.005% Ophthalmic Solution 1 Drop(s) Both EYES at bedtime  multivitamin 1 Tablet(s) Oral daily  metoprolol succinate ER 50 milliGRAM(s) Oral daily  buDESOnide   0.5 milliGRAM(s) Respule 0.5 milliGRAM(s) Inhalation two times a day  folic acid 1 milliGRAM(s) Oral daily  ascorbic acid 250 milliGRAM(s) Oral daily  simvastatin 20 milliGRAM(s) Oral at bedtime  lisinopril 5 milliGRAM(s) Oral daily  sodium chloride 0.45%. 1000 milliLiter(s) (50 mL/Hr) IV Continuous <Continuous>  digoxin     Tablet 0.125 milliGRAM(s) Oral daily  torsemide 20 milliGRAM(s) Oral two times a day  pantoprazole    Tablet 40 milliGRAM(s) Oral two times a day before meals  ferrous    sulfate 325 milliGRAM(s) Oral daily    MEDICATIONS  (PRN):  aluminum hydroxide/magnesium hydroxide/simethicone Suspension 30 milliLiter(s) Oral every 6 hours PRN Dyspepsia  hydrALAZINE Injectable 10 milliGRAM(s) IV Push every 3 hours PRN sbp > 150  ALBUTerol/ipratropium for Nebulization 3 milliLiter(s) Nebulizer every 6 hours PRN Shortness of Breath and/or Wheezing      Allergies    aspirin (Anaphylaxis)  NSAIDs (Other)    Intolerances    Lasix (Other)  OHS PT (Unknown)      Vital Signs Last 24 Hrs  T(C): 37.2 (2017 09:00), Max: 37.2 (2017 09:00)  T(F): 98.9 (2017 09:00), Max: 98.9 (2017 09:00)  HR: 70 (2017 09:00) (69 - 95)  BP: 130/60 (2017 09:00) (130/60 - 132/64)  BP(mean): --  RR: 18 (2017 09:00) (18 - 18)  SpO2: 97% (2017 05:10) (96% - 99%)    07-17 @ 07:01  -  07-18 @ 07:00  --------------------------------------------------------  IN: 1200 mL / OUT: 225 mL / NET: 975 mL          PHYSICAL EXAM:    General: Well developed; well nourished; in no acute distress  HEENT: MMM, conjunctiva and sclera clear  Gastrointestinal: Soft, non-tender non-distended; Normal bowel sounds; No rebound or guarding  Extremities: Normal range of motion, No clubbing, cyanosis or edema  Neurological: Alert and oriented x3  Skin: Warm and dry. No obvious rash      LABS:                        10.2   4.3   )-----------( 159      ( 2017 05:26 )             32.0     2017 05:26    142    |  94     |  19.0   ----------------------------<  89     3.4     |  38.0   |  1.10     Ca    8.9        2017 05:26                RADIOLOGY & ADDITIONAL STUDIES:

## 2017-07-18 NOTE — PROGRESS NOTE ADULT - ASSESSMENT
83 y/o F with CHF, severe MR (s/p mitral valve clip ~2 weeks ago), CAD, HTN, obesity with persistent hypercapnic respiratory failure, contraction alkalosis.    > Acute blood loss anemia due to GI Bleed - H/H stable.  Monitor CBC.  Off anticoagulation.  Encourage po.  PPI.    > STEMI - conservative management per cardiology.  Continue BBlocker.  Stopped antiplatelets and anticoagulation due to bleeding.  Will d/w Dr. Valles regarding antiplatelets.    > Delirium - improved.  Observation.   > Acute on chronic systolic and diastolic CHF with severe MR and mod to severe TR - no further surgical plans per CT Surgery.  Continue diuretics.    > Chronic Afib - rate control with Digoxin.  Not on anticoagulation due to bleeding risk.   > Hypernatremia - resolved.    > Acute on chronic respiratory failure with hypercapnia - continue Nocturnal BIPAP.    > Metabolic Acidosis - renal followup appreciated.  careful diuresis, Diamox.  > CHRISTOPHER - Cr stable.  Avoid nephrotoxic agents.   > CAD - continue Plavix, BBlocker.  > HLD - continue Statin  > Glaucoma - continue eyedrops.  > DVT Prophylaxis - Lower extremity intermittent compression devices.

## 2017-07-18 NOTE — DISCHARGE NOTE ADULT - MEDICATION SUMMARY - MEDICATIONS TO CHANGE
I will SWITCH the dose or number of times a day I take the medications listed below when I get home from the hospital:    torsemide 20 mg oral tablet  -- 1 tab(s) by mouth once a day

## 2017-07-18 NOTE — DISCHARGE NOTE ADULT - PATIENT PORTAL LINK FT
“You can access the [facility] Patient Portal, offered by [facility], by registering with the following website: [email or web address]”

## 2017-07-18 NOTE — DISCHARGE NOTE ADULT - MEDICATION SUMMARY - MEDICATIONS TO STOP TAKING
I will STOP taking the medications listed below when I get home from the hospital:    hydrALAZINE 25 mg oral tablet  -- 1 tab(s) by mouth 2 times a day    Plavix 75 mg oral tablet  -- 1 tab(s) by mouth once a day    nitroglycerin 0.4 mg sublingual tablet  -- 1 tab(s) under tongue every 5 minutes  -- As Needed for Ischemic Chest Pain

## 2017-07-18 NOTE — DISCHARGE NOTE ADULT - PLAN OF CARE
stable for discharge It is important to see your primary physician as well as the physicians noted below within the next week to perform a comprehensive medical review.  Call their offices for an appointment as soon as you leave the hospital.  If you do not have a primary physician, contact the [facility] at [place] [phone] located on [address] [place] [address].  Your medical issues appear to be stable at this time, but if your symptoms recur or worsen, contact your physicians and/or return to the hospital if necessary.  If you encounter any issues or questions with your medication, call your physicians before stopping the medication.  Do not drive.  Limit your diet to 2 grams of sodium daily. cont lisinopril and torsemide on digoxin and toprol, no AC f/u with cardiology in 1 week and decide on resuming plavix iron supplementation conservative management, monitor HH continue BIPAP at current settings at night, cont home O2 at 2L NC, f/u with pulmonary continue BIPAP at current settings at night, cont home O2 at 2L NC, f/u with pulmonary  BIPAP: FiO2 30 IPAP, 20 Expiratory Pressure CPAP, SpO2 92, Back up rate 12

## 2017-07-18 NOTE — DISCHARGE NOTE ADULT - CARE PLAN
Principal Discharge DX:	Acute on chronic respiratory failure with hypercapnia  Goal:	stable for discharge  Instructions for follow-up, activity and diet:	It is important to see your primary physician as well as the physicians noted below within the next week to perform a comprehensive medical review.  Call their offices for an appointment as soon as you leave the hospital.  If you do not have a primary physician, contact the [facility] at [place] [phone] located on [address] [place] [address], [place] [address].  Your medical issues appear to be stable at this time, but if your symptoms recur or worsen, contact your physicians and/or return to the hospital if necessary.  If you encounter any issues or questions with your medication, call your physicians before stopping the medication.  Do not drive.  Limit your diet to 2 grams of sodium daily.  Secondary Diagnosis:	Acute on chronic systolic heart failure  Secondary Diagnosis:	Atrial fibrillation with RVR  Secondary Diagnosis:	Coronary artery disease involving native coronary artery of native heart without angina pectoris  Secondary Diagnosis:	Anemia due to blood loss  Secondary Diagnosis:	Gastrointestinal hemorrhage, unspecified gastrointestinal hemorrhage type Principal Discharge DX:	Acute on chronic respiratory failure with hypercapnia  Goal:	stable for discharge  Instructions for follow-up, activity and diet:	It is important to see your primary physician as well as the physicians noted below within the next week to perform a comprehensive medical review.  Call their offices for an appointment as soon as you leave the hospital.  If you do not have a primary physician, contact the [facility] at [place] [phone] located on [address] [place] [address], [place] [address].  Your medical issues appear to be stable at this time, but if your symptoms recur or worsen, contact your physicians and/or return to the hospital if necessary.  If you encounter any issues or questions with your medication, call your physicians before stopping the medication.  Do not drive.  Limit your diet to 2 grams of sodium daily.  Secondary Diagnosis:	Acute on chronic systolic heart failure  Instructions for follow-up, activity and diet:	cont lisinopril and torsemide  Secondary Diagnosis:	Atrial fibrillation with RVR  Instructions for follow-up, activity and diet:	on digoxin and toprol, no AC  Secondary Diagnosis:	Coronary artery disease involving native coronary artery of native heart without angina pectoris  Instructions for follow-up, activity and diet:	f/u with cardiology in 1 week and decide on resuming plavix  Secondary Diagnosis:	Anemia due to blood loss  Instructions for follow-up, activity and diet:	iron supplementation  Secondary Diagnosis:	Gastrointestinal hemorrhage, unspecified gastrointestinal hemorrhage type  Instructions for follow-up, activity and diet:	conservative management, monitor HH Principal Discharge DX:	Acute on chronic respiratory failure with hypercapnia  Goal:	stable for discharge  Instructions for follow-up, activity and diet:	It is important to see your primary physician as well as the physicians noted below within the next week to perform a comprehensive medical review.  Call their offices for an appointment as soon as you leave the hospital.  If you do not have a primary physician, contact the [facility] at [place] [phone] located on [address] [place] [address], [place] [address].  Your medical issues appear to be stable at this time, but if your symptoms recur or worsen, contact your physicians and/or return to the hospital if necessary.  If you encounter any issues or questions with your medication, call your physicians before stopping the medication.  Do not drive.  Limit your diet to 2 grams of sodium daily.  Secondary Diagnosis:	Acute on chronic systolic heart failure  Instructions for follow-up, activity and diet:	cont lisinopril and torsemide  Secondary Diagnosis:	Atrial fibrillation with RVR  Instructions for follow-up, activity and diet:	on digoxin and toprol, no AC  Secondary Diagnosis:	Coronary artery disease involving native coronary artery of native heart without angina pectoris  Instructions for follow-up, activity and diet:	f/u with cardiology in 1 week and decide on resuming plavix  Secondary Diagnosis:	Anemia due to blood loss  Instructions for follow-up, activity and diet:	iron supplementation  Secondary Diagnosis:	Gastrointestinal hemorrhage, unspecified gastrointestinal hemorrhage type  Instructions for follow-up, activity and diet:	conservative management, monitor HH Principal Discharge DX:	Acute on chronic respiratory failure with hypercapnia  Goal:	stable for discharge  Instructions for follow-up, activity and diet:	It is important to see your primary physician as well as the physicians noted below within the next week to perform a comprehensive medical review.  Call their offices for an appointment as soon as you leave the hospital.  If you do not have a primary physician, contact the [facility] at [place] [phone] located on [address] [place] [address], [place] [address].  Your medical issues appear to be stable at this time, but if your symptoms recur or worsen, contact your physicians and/or return to the hospital if necessary.  If you encounter any issues or questions with your medication, call your physicians before stopping the medication.  Do not drive.  Limit your diet to 2 grams of sodium daily.  Secondary Diagnosis:	Acute on chronic systolic heart failure  Instructions for follow-up, activity and diet:	cont lisinopril and torsemide  Secondary Diagnosis:	Atrial fibrillation with RVR  Instructions for follow-up, activity and diet:	on digoxin and toprol, no AC  Secondary Diagnosis:	Coronary artery disease involving native coronary artery of native heart without angina pectoris  Instructions for follow-up, activity and diet:	f/u with cardiology in 1 week and decide on resuming plavix  Secondary Diagnosis:	Anemia due to blood loss  Instructions for follow-up, activity and diet:	iron supplementation  Secondary Diagnosis:	Gastrointestinal hemorrhage, unspecified gastrointestinal hemorrhage type  Instructions for follow-up, activity and diet:	conservative management, monitor HH  Secondary Diagnosis:	Chronic obstructive pulmonary disease, unspecified COPD type  Instructions for follow-up, activity and diet:	continue BIPAP at current settings at night, cont home O2 at 2L NC, f/u with pulmonary Principal Discharge DX:	Acute on chronic respiratory failure with hypercapnia  Goal:	stable for discharge  Instructions for follow-up, activity and diet:	It is important to see your primary physician as well as the physicians noted below within the next week to perform a comprehensive medical review.  Call their offices for an appointment as soon as you leave the hospital.  If you do not have a primary physician, contact the [facility] at [place] [phone] located on [address] [place] [address], [place] [address].  Your medical issues appear to be stable at this time, but if your symptoms recur or worsen, contact your physicians and/or return to the hospital if necessary.  If you encounter any issues or questions with your medication, call your physicians before stopping the medication.  Do not drive.  Limit your diet to 2 grams of sodium daily.  Secondary Diagnosis:	Acute on chronic systolic heart failure  Instructions for follow-up, activity and diet:	cont lisinopril and torsemide  Secondary Diagnosis:	Atrial fibrillation with RVR  Instructions for follow-up, activity and diet:	on digoxin and toprol, no AC  Secondary Diagnosis:	Coronary artery disease involving native coronary artery of native heart without angina pectoris  Instructions for follow-up, activity and diet:	f/u with cardiology in 1 week and decide on resuming plavix  Secondary Diagnosis:	Anemia due to blood loss  Instructions for follow-up, activity and diet:	iron supplementation  Secondary Diagnosis:	Gastrointestinal hemorrhage, unspecified gastrointestinal hemorrhage type  Instructions for follow-up, activity and diet:	conservative management, monitor HH  Secondary Diagnosis:	Chronic obstructive pulmonary disease, unspecified COPD type  Instructions for follow-up, activity and diet:	continue BIPAP at current settings at night, cont home O2 at 2L NC, f/u with pulmonary  BIPAP: FiO2 30 IPAP, 20 Expiratory Pressure CPAP, SpO2 92, Back up rate 12

## 2017-07-19 LAB
ANION GAP SERPL CALC-SCNC: 10 MMOL/L — SIGNIFICANT CHANGE UP (ref 5–17)
BUN SERPL-MCNC: 19 MG/DL — SIGNIFICANT CHANGE UP (ref 8–20)
CALCIUM SERPL-MCNC: 8.7 MG/DL — SIGNIFICANT CHANGE UP (ref 8.6–10.2)
CHLORIDE SERPL-SCNC: 92 MMOL/L — LOW (ref 98–107)
CO2 SERPL-SCNC: 36 MMOL/L — HIGH (ref 22–29)
CREAT SERPL-MCNC: 1.13 MG/DL — SIGNIFICANT CHANGE UP (ref 0.5–1.3)
GLUCOSE SERPL-MCNC: 84 MG/DL — SIGNIFICANT CHANGE UP (ref 70–115)
HCT VFR BLD CALC: 28.6 % — LOW (ref 37–47)
HGB BLD-MCNC: 8.7 G/DL — LOW (ref 12–16)
MCHC RBC-ENTMCNC: 23.3 PG — LOW (ref 27–31)
MCHC RBC-ENTMCNC: 30.4 G/DL — LOW (ref 32–36)
MCV RBC AUTO: 76.7 FL — LOW (ref 81–99)
PLATELET # BLD AUTO: 179 K/UL — SIGNIFICANT CHANGE UP (ref 150–400)
POTASSIUM SERPL-MCNC: 3.6 MMOL/L — SIGNIFICANT CHANGE UP (ref 3.5–5.3)
POTASSIUM SERPL-SCNC: 3.6 MMOL/L — SIGNIFICANT CHANGE UP (ref 3.5–5.3)
RBC # BLD: 3.73 M/UL — LOW (ref 4.4–5.2)
RBC # FLD: 24.6 % — HIGH (ref 11–15.6)
SODIUM SERPL-SCNC: 138 MMOL/L — SIGNIFICANT CHANGE UP (ref 135–145)
WBC # BLD: 4 K/UL — LOW (ref 4.8–10.8)
WBC # FLD AUTO: 4 K/UL — LOW (ref 4.8–10.8)

## 2017-07-19 PROCEDURE — 99233 SBSQ HOSP IP/OBS HIGH 50: CPT

## 2017-07-19 RX ADMIN — PANTOPRAZOLE SODIUM 40 MILLIGRAM(S): 20 TABLET, DELAYED RELEASE ORAL at 17:25

## 2017-07-19 RX ADMIN — PANTOPRAZOLE SODIUM 40 MILLIGRAM(S): 20 TABLET, DELAYED RELEASE ORAL at 05:25

## 2017-07-19 RX ADMIN — Medication 0.5 MILLIGRAM(S): at 20:05

## 2017-07-19 RX ADMIN — LISINOPRIL 5 MILLIGRAM(S): 2.5 TABLET ORAL at 05:25

## 2017-07-19 RX ADMIN — LATANOPROST 1 DROP(S): 0.05 SOLUTION/ DROPS OPHTHALMIC; TOPICAL at 21:59

## 2017-07-19 RX ADMIN — Medication 50 MILLIGRAM(S): at 05:26

## 2017-07-19 RX ADMIN — Medication 1 TABLET(S): at 11:12

## 2017-07-19 RX ADMIN — SODIUM CHLORIDE 3 MILLILITER(S): 9 INJECTION INTRAMUSCULAR; INTRAVENOUS; SUBCUTANEOUS at 05:25

## 2017-07-19 RX ADMIN — SIMVASTATIN 20 MILLIGRAM(S): 20 TABLET, FILM COATED ORAL at 21:58

## 2017-07-19 RX ADMIN — Medication 250 MILLIGRAM(S): at 11:12

## 2017-07-19 RX ADMIN — Medication 3 MILLILITER(S): at 08:31

## 2017-07-19 RX ADMIN — Medication 3 MILLILITER(S): at 20:06

## 2017-07-19 RX ADMIN — Medication 325 MILLIGRAM(S): at 11:12

## 2017-07-19 RX ADMIN — Medication 20 MILLIGRAM(S): at 17:25

## 2017-07-19 RX ADMIN — Medication 1 MILLIGRAM(S): at 11:11

## 2017-07-19 RX ADMIN — Medication 0.12 MILLIGRAM(S): at 05:25

## 2017-07-19 RX ADMIN — SODIUM CHLORIDE 3 MILLILITER(S): 9 INJECTION INTRAMUSCULAR; INTRAVENOUS; SUBCUTANEOUS at 21:58

## 2017-07-19 RX ADMIN — SODIUM CHLORIDE 3 MILLILITER(S): 9 INJECTION INTRAMUSCULAR; INTRAVENOUS; SUBCUTANEOUS at 10:23

## 2017-07-19 RX ADMIN — Medication 20 MILLIGRAM(S): at 05:25

## 2017-07-19 RX ADMIN — Medication 0.5 MILLIGRAM(S): at 08:31

## 2017-07-19 NOTE — PHYSICAL THERAPY INITIAL EVALUATION ADULT - PATIENT/FAMILY/SIGNIFICANT OTHER GOALS STATEMENT, PT EVAL
Pt and daughter would like rehab placement.
Pt wishes to feel better. Daughter is thinking about rehab placement.
[facility]

## 2017-07-19 NOTE — PROGRESS NOTE ADULT - NSHPATTENDINGPLANDISCUSS_GEN_ALL_CORE
ct icu team
daughter at bedside, RN and CM
Dr Garcia
CT ICU Team
CT ICU team
ICU team
Dr. Lovett
ICU team
CT surg team
Dr Garcia
daughter
daughter
Dr. Lovett

## 2017-07-19 NOTE — PROGRESS NOTE ADULT - SUBJECTIVE AND OBJECTIVE BOX
Patient: ANN FRANCES [id number] 84y Female    Chief Complaint: Patient is a 84y old  Female who presents with a chief complaint of Abdominal pain, insomnia, and increased shortness of breath (10 Murphy 2017 16:40)    BRIEF HOSPITAL COURSE:  85 y/o F with CHF, severe MR s/p mitral clip 2 weeks prior to admission, CAD, HTN, obesity, admitted to CT Surgery service due to altered mental status, found to have persistent hypercapnic respiratory failure with inability to wean from NIV, developed compensated respiratory acidosis and contraction alkalosis.  PCO2 87.  Transferred to the MICU service 7/3 for management of her NIV and metabolic disturbances.  In ICU, she was successfully weaned from NIV, requiring BIPAP at night.  TTE:  1. Mildly decreased global left ventricular systolic function.  2. Left ventricular ejection fraction, by visual estimation, is 45 to 50%.  3. Mild to moderate right ventricular systolic dysfunction.  4. Severely enlarged left atrium.  5. Moderately dilated right atrium.  6. Patient is s/p Mitral Clip with severe regurgitation.  7. Moderate-severe tricuspid regurgitation.  8. Mild aortic regurgitation.  9. Estimated pulmonary artery systolic pressure is 49.7 mmHg assuming a  right atrial pressure of 8 mmHg, which is consistent with mild pulmonary  hypertension. 10. There is no evidence of pericardial effusion.  On 7/13, patient developed chest pain. EKG showed inferior ST elevations. Seen by cardiology and after discussion with family, opted for medical management due to patient's comorbidities.  Started on anticoagulation, however, pt then experienced a bloody BM with Hgb dropping to 7.4.  Repeat Hgb stable after 2 units pRBCs.    No further confusion.  Denies bleeding.  No chest pain / sob / palpitations.  Tolerates PO, + DALTON, no BMs.    Vital Signs Last 24 Hrs  T(C): 37.2 (19 Jul 2017 08:43), Max: 37.2 (19 Jul 2017 08:43)  T(F): 99 (19 Jul 2017 08:43), Max: 99 (19 Jul 2017 08:43)  HR: 73 (19 Jul 2017 08:43) (67 - 90)  BP: 103/54 (19 Jul 2017 08:43) (103/54 - 126/74)  RR: 16 (19 Jul 2017 08:43) (16 - 18)  SpO2: 100% (19 Jul 2017 08:43) (97% - 100%)                        8.7    4.0   )-----------( 179      ( 19 Jul 2017 05:26 )             28.6     19 Jul 2017 05:26    138    |  92     |  19.0   ----------------------------<  84     3.6     |  36.0   |  1.13     Ca    8.7        19 Jul 2017 05:26    MEDICATIONS  (STANDING):  sodium chloride 0.9% lock flush 3 milliLiter(s) IV Push every 8 hours  latanoprost 0.005% Ophthalmic Solution 1 Drop(s) Both EYES at bedtime  multivitamin 1 Tablet(s) Oral daily  metoprolol succinate ER 50 milliGRAM(s) Oral daily  buDESOnide   0.5 milliGRAM(s) Respule 0.5 milliGRAM(s) Inhalation two times a day  folic acid 1 milliGRAM(s) Oral daily  ascorbic acid 250 milliGRAM(s) Oral daily  simvastatin 20 milliGRAM(s) Oral at bedtime  lisinopril 5 milliGRAM(s) Oral daily  sodium chloride 0.45%. 1000 milliLiter(s) (50 mL/Hr) IV Continuous <Continuous>  digoxin     Tablet 0.125 milliGRAM(s) Oral daily  torsemide 20 milliGRAM(s) Oral two times a day  pantoprazole    Tablet 40 milliGRAM(s) Oral two times a day before meals  ferrous    sulfate 325 milliGRAM(s) Oral daily    MEDICATIONS  (PRN):  aluminum hydroxide/magnesium hydroxide/simethicone Suspension 30 milliLiter(s) Oral every 6 hours PRN Dyspepsia  hydrALAZINE Injectable 10 milliGRAM(s) IV Push every 3 hours PRN sbp > 150  ALBUTerol/ipratropium for Nebulization 3 milliLiter(s) Nebulizer every 6 hours PRN Shortness of Breath and/or Wheezing    Radiology: personally visualized    PHYSICAL EXAM:    General: obese eldely female n no acute distress  Eyes: PERRLA, EOMI; conjunctiva and sclera clear  Head: Normocephalic; atraumatic  ENMT: No nasal discharge; airway clear  Neck: Supple; non tender; no masses  Respiratory: No wheezes, rales or rhonchi on deep inspiration  Cardiovascular: irregular rate and rhythm. S1 and S2, II/VI OSWALDO  Gastrointestinal: Soft non-tender non-distended; Normal bowel sounds  Genitourinary: No costovertebral angle tenderness  Extremities: decreased range of motion, No clubbing, cyanosis or edema  Vascular: Peripheral pulses palpable 2+ bilaterally  Neurological: Alert and oriented x4  Skin: Warm and dry.   Musculoskeletal: Normal tone, without deformities  Psychiatric: Cooperative and appropriate

## 2017-07-19 NOTE — PHYSICAL THERAPY INITIAL EVALUATION ADULT - ADDITIONAL COMMENTS
Pt lives in a house with daughter and her family. There are 3 steps to enter with a HR. Per daughter; someone is always home with her. Pt prefers a cane.
Pt lives in a house with daughter and her family. There are 3 steps to enter with a HR. Per daughter; someone is always home with her. Pt prefers a cane.
Pt. lives with her daughter in a private home with 3 steps to enter with handrail.

## 2017-07-19 NOTE — PHYSICAL THERAPY INITIAL EVALUATION ADULT - GENERAL OBSERVATIONS, REHAB EVAL
Pt received OOB in chair, + telemetry, 2LNC O2. Pt denies pain.
Pt received OOB in chair, + telemetry, BiPAP, spO2 monitor. Pt denies pain.
Pt. OOB; +monitor, O2 nasal canula; alert and cooperative

## 2017-07-19 NOTE — PROGRESS NOTE ADULT - ASSESSMENT
83 y/o Female with diastolic/systolic CHF, severe MR (s/p mitral valve clip ~2 weeks ago), CAD, HTN, obesity with persistent hypercapnic respiratory failure, contraction alkalosis.    > Acute blood loss anemia due to GI Bleed - H/H mildly dropped with decreased BP, but no prerenal azotemia/tachycardia - monitor for next 24h. Off anticoagulation.  Tolerates PO.  PPI.  Cont PO iron  > STEMI - conservative management per cardiology.  Continue BBlocker.  Off antiplatelets and anticoagulation due to bleeding.  Will d/w Dr. Valles regarding antiplatelets - would not restart for another week.    > Delirium - resolved.  Observation.   > Acute on chronic systolic and diastolic CHF with severe MR and mod to severe TR - no further surgical plans per CT Surgery.  Continue diuretics.    > Chronic Afib - rate control with Digoxin.  Not on anticoagulation due to bleeding risk.   > Hypernatremia - resolved.    > Acute on chronic respiratory failure with hypercapnia - continue Nocturnal BIPAP.    > Metabolic Acidosis - renal followup appreciated.  careful diuresis, Diamox.  > CHRISTOPHER - resolved.  Avoid nephrotoxic agents.   > CAD - off Plavix, cont BBlocker.  > HLD - continue Statin  > Glaucoma - continue eyedrops.  > DVT Prophylaxis - Lower extremity intermittent compression devices.    DC planning in am if remains stable.

## 2017-07-19 NOTE — PHYSICAL THERAPY INITIAL EVALUATION ADULT - PLANNED THERAPY INTERVENTIONS, PT EVAL
gait training/bed mobility training/ROM/]/balance training/strengthening/transfer training
gait training/transfer training
stairs/bed mobility training/gait training/transfer training/strengthening/balance training

## 2017-07-19 NOTE — PROGRESS NOTE ADULT - PROBLEM SELECTOR PLAN 1
Plan for conservative management.  Holding antiplatelet. No more chest pain, H/H stable, GI following. no EGD done. conservative management.   beta-blocker (AV conduction is normal limits).  can switch to lasix 40 mg Po twice daily starting from tomorrow  , overall patient has poor prognosis due to multiple other comorbidities and valvular dysfunction and recent respiratory failure.  off bipap. Palliative and hospice care.    treatment of heart failure . Out of Bed to Chair . PT and OT . rehab.   Restart plavix once hemoglobin stable. no anticoagulation

## 2017-07-19 NOTE — PROGRESS NOTE ADULT - SUBJECTIVE AND OBJECTIVE BOX
PULMONARY PROGRESS NOTE      ANN FRANCESLEE [id number]    Patient is a 84y old  Female who presents with a chief complaint of Abdominal pain, insomnia, and increased shortness of breath (10 Murphy 2017 16:40)      INTERVAL HPI/OVERNIGHT EVENTS:  Resting comfortably without c/o cough or SOB  MEDICATIONS  (STANDING):  sodium chloride 0.9% lock flush 3 milliLiter(s) IV Push every 8 hours  latanoprost 0.005% Ophthalmic Solution 1 Drop(s) Both EYES at bedtime  multivitamin 1 Tablet(s) Oral daily  metoprolol succinate ER 50 milliGRAM(s) Oral daily  buDESOnide   0.5 milliGRAM(s) Respule 0.5 milliGRAM(s) Inhalation two times a day  folic acid 1 milliGRAM(s) Oral daily  ascorbic acid 250 milliGRAM(s) Oral daily  simvastatin 20 milliGRAM(s) Oral at bedtime  lisinopril 5 milliGRAM(s) Oral daily  sodium chloride 0.45%. 1000 milliLiter(s) (50 mL/Hr) IV Continuous <Continuous>  digoxin     Tablet 0.125 milliGRAM(s) Oral daily  torsemide 20 milliGRAM(s) Oral two times a day  pantoprazole    Tablet 40 milliGRAM(s) Oral two times a day before meals  ferrous    sulfate 325 milliGRAM(s) Oral daily      MEDICATIONS  (PRN):  aluminum hydroxide/magnesium hydroxide/simethicone Suspension 30 milliLiter(s) Oral every 6 hours PRN Dyspepsia  hydrALAZINE Injectable 10 milliGRAM(s) IV Push every 3 hours PRN sbp > 150  ALBUTerol/ipratropium for Nebulization 3 milliLiter(s) Nebulizer every 6 hours PRN Shortness of Breath and/or Wheezing      Allergies    aspirin (Anaphylaxis)  NSAIDs (Other)    Intolerances    Lasix (Other)  OHS PT (Unknown)      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   r/o  out  stenosis  Cardiomyopathy  Osteoarthritis  Iron deficiency anemia  MI, old: 2008  Dyslipidemia  Acid reflux  Hx of CAB  CAD (coronary artery disease)  Heart failure  HTN (hypertension)  H/O tracheostomy: 2013 may  Cataract: b/l  2013  S/P CABG x 3:   [place]      SOCIAL HISTORY  Smoking History:       REVIEW OF SYSTEMS:    CONSTITUTIONAL:  No distress    HEENT:  Eyes:  No diplopia or blurred vision. ENT:  No earache, sore throat or runny nose.    CARDIOVASCULAR:  No pressure, squeezing, tightness, heaviness or aching about the chest; no palpitations.    RESPIRATORY:  above    GASTROINTESTINAL:  No nausea, vomiting or diarrhea.    GENITOURINARY:  No dysuria, frequency or urgency.    NEUROLOGIC:  No paresthesias, fasciculations, seizures or weakness.    Extremities: No cyanosis, clubbing or edema    PSYCHIATRIC:  No disorder of thought or mood.    Vital Signs Last 24 Hrs  T(C): 37.2 (2017 08:43), Max: 37.2 (2017 08:43)  T(F): 99 (2017 08:43), Max: 99 (2017 08:43)  HR: 73 (2017 08:43) (67 - 90)  BP: 103/54 (2017 08:43) (103/54 - 126/74)  BP(mean): --  RR: 16 (2017 08:43) (16 - 18)  SpO2: 100% (2017 08:43) (97% - 100%)    PHYSICAL EXAMINATION:    GENERAL: The patient is awake and alert in no apparent distress.     HEENT: Head is normocephalic and atraumatic. Extraocular muscles are intact. Mucous membranes are moist.    NECK: Supple.    LUNGS: Clear to auscultation without wheezing, rales or rhonchi; respirations unlabored    HEART: Regular rate and rhythm without murmur.    ABDOMEN: Soft, nontender, and nondistended.      EXTREMITIES: Without any cyanosis, clubbing, rash, lesions or edema.    NEUROLOGIC: Grossly intact.    LABS:                        8.7    4.0   )-----------( 179      ( 2017 05:26 )             28.6     07-19    138  |  92<L>  |  19.0  ----------------------------<  84  3.6   |  36.0<H>  |  1.13    Ca    8.7      2017 05:26                          MICROBIOLOGY:    RADIOLOGY & ADDITIONAL STUDIES:

## 2017-07-19 NOTE — PROGRESS NOTE ADULT - ASSESSMENT
Complicated course of  MR s/p clipOHS with chronic hypercapnea and recent couse complicated by STEMI and GI Bleed  Resp status stable on present BIPAP, O2 and nebs  will need f/u abg to evaluate need for trilogy

## 2017-07-19 NOTE — PHYSICAL THERAPY INITIAL EVALUATION ADULT - PERTINENT HX OF CURRENT PROBLEM, REHAB EVAL
Pt is an 83 y/o female who presented from home with LLQ abdominal pain. Pt is s/p Mitral clip x 2 on 6/7/17 and discharge home on 6/10/17. Pt has had prolonged hospitalization 2/2 respiratory distress with recent transfer to MICU.
Pt is an 85 y/o female who presented from home with LLQ abdominal pain. Pt is s/p Mitral clip x 2 on 6/7/17 and discharge home on 6/10/17.
End Stage CHF, Resp. Failure with hypercapnia

## 2017-07-19 NOTE — PHYSICAL THERAPY INITIAL EVALUATION ADULT - LEVEL OF INDEPENDENCE: GAIT, REHAB EVAL
Pt with orthostatic hypotension. BP 83/57 in standing. RN present and aware./unable to perform
minimum assist (75% patients effort)
minimum assist (75% patients effort)

## 2017-07-19 NOTE — PROGRESS NOTE ADULT - PROBLEM SELECTOR PLAN 2
Biventricular failure ,. Moderate RV dysfunction.   on po torsemide, i/o, weight daily  ct beta-blocker . ct Angiotensin converting enzyme inhibitor   ct dose Dig 0.125 mg daily. nasal canula. d/c IV fluids.

## 2017-07-19 NOTE — PROGRESS NOTE ADULT - SUBJECTIVE AND OBJECTIVE BOX
[place] [facility]                                                        Office: [address] [address]                                                       Telephone: [phone]. Fax:[phone]                                                                             PROGRESS NOTE   Reason for follow up: STEMI, Biventricular CHF                            Overnight: no new events   Update: getting rehab adn PT /OT.     Subjective: " i feel good"   Complains of: no melena.   Review of symptoms: Cardiac:  No chest pain. + dyspnea. No palpitations.  Respiratory: no cough. + dyspnea  on exertion.  diuresed well.   Gastrointestinal: No diarrhea. No abdominal pain. No bleeding.     Past medical history: No updates.   Chronic conditions:  Hypertension: controlled. CHF: improved.. Angina: Improved.   	  Vitals:  Vital Signs Last 24 Hrs  T(C): 36.7 (07-19-17 @ 17:24), Max: 37.2 (07-19-17 @ 08:43)  T(F): 98 (07-19-17 @ 17:24), Max: 99 (07-19-17 @ 08:43)  HR: 70 (07-19-17 @ 17:24) (67 - 77)  BP: 112/54 (07-19-17 @ 17:24) (103/54 - 112/54)  BP(mean): --  RR: 16 (07-19-17 @ 17:24) (16 - 16)  SpO2: 100% (07-19-17 @ 17:24) (97% - 100%)    Weight (kg): 75.4 (07-11 @ 08:10)    PHYSICAL EXAM:  Appearance: Comfortable. No acute distress  HEENT:  Head and neck: Atraumatic. Normocephalic.  Normal oral mucosa,, Neck is supple. +  JVD,    Neurologic: not oriented , , , Cranial nerves not evaluated.  Lymphatic: No cervical lymphadenopathy  Cardiovascular: Normal S1 S2, 2/6 systolic murmur, rubs/gallopws. + JVD,1 +  edema  Respiratory: decrease breath douns in the bases. no rales rhonchi anteriorly  Gastrointestinal:  Soft, Non-tender, Obese.   Lower Extremities: 1+ bilateral  edema  Psychiatry: Patient is calm. No agitation.   Skin: No rashes, No ecchymoses, No cyanosis      CURRENT MEDICATIONS:  MEDICATIONS  (STANDING):  metoprolol succinate ER 50 milliGRAM(s) Oral daily  hydrALAZINE Injectable 10 milliGRAM(s) IV Push every 3 hours PRN  lisinopril 5 milliGRAM(s) Oral daily  digoxin     Tablet 0.125 milliGRAM(s) Oral daily  torsemide 20 milliGRAM(s) Oral two times a day    buDESOnide   0.5 milliGRAM(s) Respule  pantoprazole    Tablet  sodium chloride 0.9% lock flush  latanoprost 0.005% Ophthalmic Solution  multivitamin  folic acid  ascorbic acid  simvastatin  sodium chloride 0.45%.  ferrous    sulfate    PRN: aluminum hydroxide/magnesium hydroxide/simethicone Suspension PRN  hydrALAZINE Injectable PRN  ALBUTerol/ipratropium for Nebulization PRN            LABS:	 	                        8.7    4.0   )-----------( 179      ( 19 Jul 2017 05:26 )             28.6   N=x    ; L=x        19 Jul 2017 05:26    138    |  92     |  19.0   ----------------------------<  84     3.6     |  36.0   |  1.13     Ca    8.7        19 Jul 2017 05:26              TELEMETRY: Reviewed  Sinus Rhythm. 1st degree Av block,. Multiform PVCs.   EKG 7/14 reviewed by me NSR, inferior MI, lateral ischemia    ECHO: LVEF 50%. S/p Mitral clip. Severe MR. Moderate to severe TR. Mild AI> Mild PAH.   Mild to moderate RV dysfunction.     RADIOLOGY:   X-ray: CHF improved from last time.

## 2017-07-19 NOTE — PHYSICAL THERAPY INITIAL EVALUATION ADULT - PRECAUTIONS/LIMITATIONS, REHAB EVAL
cardiac precautions/oxygen therapy device and L/min
oxygen therapy device and L/min/cardiac precautions
cardiac precautions

## 2017-07-19 NOTE — PHYSICAL THERAPY INITIAL EVALUATION ADULT - IMPAIRMENTS FOUND, PT EVAL
muscle strength/aerobic capacity/endurance/gait, locomotion, and balance
muscle strength/aerobic capacity/endurance/gait, locomotion, and balance
gait, locomotion, and balance/aerobic capacity/endurance

## 2017-07-19 NOTE — PHYSICAL THERAPY INITIAL EVALUATION ADULT - ACTIVE RANGE OF MOTION EXAMINATION, REHAB EVAL
bilateral  lower extremity Active ROM was WFL (within functional limits)/bilateral upper extremity Active ROM was WFL (within functional limits)
bilateral upper extremity Active ROM was WFL (within functional limits)/bilateral  lower extremity Active ROM was WFL (within functional limits)
no Active ROM deficits were identified

## 2017-07-19 NOTE — PHYSICAL THERAPY INITIAL EVALUATION ADULT - CRITERIA FOR SKILLED THERAPEUTIC INTERVENTIONS
impairments found
impairments found
rehab potential/functional limitations in following categories/impairments found/anticipated discharge recommendation

## 2017-07-20 VITALS
SYSTOLIC BLOOD PRESSURE: 104 MMHG | TEMPERATURE: 98 F | OXYGEN SATURATION: 98 % | DIASTOLIC BLOOD PRESSURE: 68 MMHG | HEART RATE: 72 BPM | RESPIRATION RATE: 18 BRPM

## 2017-07-20 LAB
ANION GAP SERPL CALC-SCNC: 11 MMOL/L — SIGNIFICANT CHANGE UP (ref 5–17)
BUN SERPL-MCNC: 14 MG/DL — SIGNIFICANT CHANGE UP (ref 8–20)
CALCIUM SERPL-MCNC: 8.8 MG/DL — SIGNIFICANT CHANGE UP (ref 8.6–10.2)
CHLORIDE SERPL-SCNC: 94 MMOL/L — LOW (ref 98–107)
CO2 SERPL-SCNC: 37 MMOL/L — HIGH (ref 22–29)
CREAT SERPL-MCNC: 1.08 MG/DL — SIGNIFICANT CHANGE UP (ref 0.5–1.3)
GLUCOSE SERPL-MCNC: 83 MG/DL — SIGNIFICANT CHANGE UP (ref 70–115)
HCT VFR BLD CALC: 29.4 % — LOW (ref 37–47)
HGB BLD-MCNC: 9 G/DL — LOW (ref 12–16)
MCHC RBC-ENTMCNC: 23.4 PG — LOW (ref 27–31)
MCHC RBC-ENTMCNC: 30.6 G/DL — LOW (ref 32–36)
MCV RBC AUTO: 76.4 FL — LOW (ref 81–99)
PLATELET # BLD AUTO: 172 K/UL — SIGNIFICANT CHANGE UP (ref 150–400)
POTASSIUM SERPL-MCNC: 3.4 MMOL/L — LOW (ref 3.5–5.3)
POTASSIUM SERPL-SCNC: 3.4 MMOL/L — LOW (ref 3.5–5.3)
RBC # BLD: 3.85 M/UL — LOW (ref 4.4–5.2)
RBC # FLD: 24.6 % — HIGH (ref 11–15.6)
SODIUM SERPL-SCNC: 142 MMOL/L — SIGNIFICANT CHANGE UP (ref 135–145)
WBC # BLD: 3.8 K/UL — LOW (ref 4.8–10.8)
WBC # FLD AUTO: 3.8 K/UL — LOW (ref 4.8–10.8)

## 2017-07-20 PROCEDURE — 99233 SBSQ HOSP IP/OBS HIGH 50: CPT

## 2017-07-20 PROCEDURE — 99239 HOSP IP/OBS DSCHRG MGMT >30: CPT

## 2017-07-20 RX ORDER — POTASSIUM CHLORIDE 20 MEQ
40 PACKET (EA) ORAL ONCE
Qty: 0 | Refills: 0 | Status: COMPLETED | OUTPATIENT
Start: 2017-07-20 | End: 2017-07-20

## 2017-07-20 RX ORDER — HYDRALAZINE HCL 50 MG
1 TABLET ORAL
Qty: 0 | Refills: 0 | COMMUNITY

## 2017-07-20 RX ADMIN — Medication 250 MILLIGRAM(S): at 11:40

## 2017-07-20 RX ADMIN — Medication 0.5 MILLIGRAM(S): at 08:46

## 2017-07-20 RX ADMIN — Medication 1 MILLIGRAM(S): at 11:40

## 2017-07-20 RX ADMIN — SODIUM CHLORIDE 3 MILLILITER(S): 9 INJECTION INTRAMUSCULAR; INTRAVENOUS; SUBCUTANEOUS at 14:47

## 2017-07-20 RX ADMIN — SODIUM CHLORIDE 3 MILLILITER(S): 9 INJECTION INTRAMUSCULAR; INTRAVENOUS; SUBCUTANEOUS at 06:00

## 2017-07-20 RX ADMIN — Medication 325 MILLIGRAM(S): at 11:40

## 2017-07-20 RX ADMIN — LISINOPRIL 5 MILLIGRAM(S): 2.5 TABLET ORAL at 06:00

## 2017-07-20 RX ADMIN — Medication 0.12 MILLIGRAM(S): at 06:00

## 2017-07-20 RX ADMIN — Medication 40 MILLIEQUIVALENT(S): at 09:26

## 2017-07-20 RX ADMIN — Medication 1 TABLET(S): at 11:40

## 2017-07-20 RX ADMIN — Medication 3 MILLILITER(S): at 08:46

## 2017-07-20 RX ADMIN — PANTOPRAZOLE SODIUM 40 MILLIGRAM(S): 20 TABLET, DELAYED RELEASE ORAL at 06:00

## 2017-07-20 NOTE — PROGRESS NOTE ADULT - ATTENDING COMMENTS
IV fluid, K, follow labs
Patient seen and examined.  On BiPAP, comfortable.  abdominal exam is benign with no Loya sign  Continue antibiotics for pressured acalculous cholecystitis, now clinically improved.
We will sign off today . Call if needed.
Patient much improved and has been weaned from ventilator.  Currently, comfortable and reports no abdominal pain.  Hemodynamics are well w/o need for pressors.  WBC: 4.  LFTs: normal.  Abdomen: S/NT/ND.  US reviewed and shows a thick walled gallbladder w/o stones.  Concern was for acalculous cholecystitis.  Patient is a poor surgical candidate given her cardiac history.  Plan was for perc maricel but interventional radiologist was concerned about the lack of GB distention, the thick wall and the use of antiplatelets (Plavix).  I personally spoke w/ Dr. Hilton of IR.  Since the patient is now asymptomatic (no subjective or objective abdominal pain), extubated, hemodynamics improved all on abx, I would defer perc maricel for now.  Should her clinical status change, then we will re-address need for perc maricel.  I communicated this to the CTICU PA and I also spoke to the patient's daughter Mini Cruz at [phone].  Will continue to monitor.
Agree with palliative and hospice care.   DNR/DNI  Thank you for allowing me to participate in care of your patient.   Please call as needed.
Agree with palliative and hospice care.   DNR/DNI  Thank you for allowing me to participate in care of your patient.   Please call as needed.
The patient is in critical condition and requires ICU care and monitoring. Total Critical Care time spent by attending physician was ____60 minutes, excluding procedure time.     Palliative/Ethics: palliative following; no changes made in GOC decisions. we attempted an NGT by rn however pt did not tolerate it as she rapidly became hypoxic (80% on 5L NC), will attempt again later this afternoon.     RASS 0  CAM-ICU neg  Sedation:   VENT Bundle Reviewed:   Glycemic Control: prn  DVT PPX: enoxaparin  Nutrition: PO as tolerated  PT/OT: ordered oob    Invasive Lines/Montero:
Thank you for the opportunity to assist with the care of this patient.   [facility] [phone].
patient overall poor prognosis. NO ICU. ICU care will not change overall outcome of the patient. Agree with palliative and hospice care.   Thank you for allowing me to participate in care of your patient.   Please call as needed.
I saw and examined Mr.s Bowman. Spoke with Mrs. Meredihtman and CT-ICU PAs. Spoke to Mini her daughter.  Mrs Jonel is short of breath, RR of 40 while off BIPAP and desats. She is retaining CO2 and will need mechanical support. Pt and family do not want trach. She has hypernatremia, due to lack of po intake.   Agree with gentle hydration. Cont with CPAP. I think she suffers from central sleep apnea and CHF. She has moderate to severe MR and is third spacing as well. She has poor prognosis. I spoke to Dr. Newman.   We will follow her with you.
Thank you for the opportunity to assist with the care of this patient.   [facility] [phone].
The patient is in critical condition and requires ICU care and monitoring. Total Critical Care time spent by attending physician was ____65 minutes, excluding procedure time.     Palliative/Ethics: asked for palliative to follow. d/w daughter, family may opt for peg placement (supplemental nutrition) and intubation if required. I advised that if she is intubated a trach would be the next step, where about she noted "we'll have to just see".     RASS 0  CAM-ICU neg  Sedation:   VENT Bundle Reviewed:   Glycemic Control: prn  DVT PPX: enoxaparin  Nutrition: PO as tolerated  PT/OT: ordered oob    Invasive Lines/Montero:
I have seen and evaluated the patient and agree with the assessment and plan    - Improved respiratory status  - contionue oral duirectis, diamox as needed  - monitor AoCRF  - continued discussions of goals of care with family
patient overall poor prognosis. NO ICU. ICU care will not change overall outcome of the patient. Agree with palliative and hospice care.   Thank you for allowing me to participate in care of your patient.   Please call as needed.

## 2017-07-20 NOTE — PROGRESS NOTE ADULT - ASSESSMENT
Complicated course of severe MR s/p clipOHS with chronic hypercapnea and recent couse complicated by STEMI and GI Bleed  Resp status stable on present BIPAP, O2 and nebs  Evaluation for trilogy may be complicated by lack of use last PM

## 2017-07-20 NOTE — PROGRESS NOTE ADULT - PROVIDER SPECIALTY LIST ADULT
CT Surgery
Cardiology
Critical Care
Electrophysiology
Gastroenterology
Hospitalist
Infectious Disease
Intervent Radiology
Nephrology
Palliative Care
Palliative Care
Pulmonology
Rehab Medicine
Surgery
Trauma Surgery
Trauma Surgery
Cardiology
Hospitalist
Pulmonology
Surgery
Critical Care
Nephrology
Pulmonology
CT Surgery

## 2017-07-20 NOTE — PROGRESS NOTE ADULT - SUBJECTIVE AND OBJECTIVE BOX
[place] [facility]                                                        Office: [address] [address]                                                       Telephone: [phone]. Fax:[phone]                                                                             PROGRESS NOTE   Reason for follow up: STEMI, Biventricular CHF                            Overnight: no new events   Update: getting rehab adn PT /OT.     Subjective: " i feel good"   Complains of: no melena.  responds with a smile.   Review of symptoms: Cardiac:  No chest pain. + dyspnea on exertion. . No palpitations.  Respiratory: no cough. + dyspnea  on exertion.  diuresed well.   Gastrointestinal: No diarrhea. No abdominal pain. No bleeding.     Past medical history: No updates.   Chronic conditions:  Hypertension: controlled. CHF: improved.. Angina: Improved.   	  Vitals:  Vital Signs Last 24 Hrs  T(C): 36.6 (07-20-17 @ 11:46), Max: 36.9 (07-19-17 @ 20:29)  T(F): 97.8 (07-20-17 @ 11:46), Max: 98.5 (07-20-17 @ 05:52)  HR: 76 (07-20-17 @ 11:46) (64 - 76)  BP: 102/58 (07-20-17 @ 11:46) (98/52 - 112/54)  BP(mean): --  RR: 17 (07-20-17 @ 07:15) (16 - 17)  SpO2: 100% (07-20-17 @ 07:15) (97% - 100%)    PHYSICAL EXAM:  Appearance: Comfortable. No acute distress  HEENT:  Head and neck: Atraumatic. Normocephalic.  Normal oral mucosa,, Neck is supple. No JVD,    Neurologic: not oriented ,Cranial nerves not evaluated.  Lymphatic: No cervical lymphadenopathy  Cardiovascular: Normal S1 S2, 2/6 systolic murmur,No rubs/gallops. No JVD, trivial edema  Respiratory: decrease breath douns in the bases. no rales rhonchi anteriorly  Gastrointestinal:  Soft, Non-tender, Obese.   Lower Extremities: 1+ bilateral  edema  Psychiatry: Patient is calm. No agitation.   Skin: No rashes, No ecchymoses, No cyanosis      CURRENT MEDICATIONS:  MEDICATIONS  (STANDING):  metoprolol succinate ER 50 milliGRAM(s) Oral daily  hydrALAZINE Injectable 10 milliGRAM(s) IV Push every 3 hours PRN  lisinopril 5 milliGRAM(s) Oral daily  digoxin     Tablet 0.125 milliGRAM(s) Oral daily  torsemide 20 milliGRAM(s) Oral two times a day    buDESOnide   0.5 milliGRAM(s) Respule  pantoprazole    Tablet  sodium chloride 0.9% lock flush  latanoprost 0.005% Ophthalmic Solution  multivitamin  folic acid  ascorbic acid  simvastatin  ferrous    sulfate    PRN: aluminum hydroxide/magnesium hydroxide/simethicone Suspension PRN  hydrALAZINE Injectable PRN  ALBUTerol/ipratropium for Nebulization PRN              LABS:	 	                                   9.0    3.8   )-----------( 172      ( 20 Jul 2017 06:04 )             29.4   N=x    ; L=x        20 Jul 2017 06:04    142    |  94     |  14.0   ----------------------------<  83     3.4     |  37.0   |  1.08     Ca    8.8        20 Jul 2017 06:04                TELEMETRY: Reviewed  Sinus Rhythm. 1st degree Av block,. Multiform PVCs.   EKG 7/14 reviewed by me NSR, inferior MI, lateral ischemia    ECHO: LVEF 50%. S/p Mitral clip. Severe MR. Moderate to severe TR. Mild AI> Mild PAH.   Mild to moderate RV dysfunction.     RADIOLOGY:   X-ray: CHF improved from last time. [place] [facility]                                                        Office: [address] [address]                                                       Telephone: [phone]. Fax:[phone]                                                                             PROGRESS NOTE   Reason for follow up: STEMI, Biventricular CHF                            Overnight: no new events   Update: getting rehab adn PT /OT.     Subjective: " i feel good"   Complains of: no melena.  responds with a smile.   Review of symptoms: Cardiac:  No chest pain. + dyspnea on exertion. . No palpitations.  Respiratory: no cough. + dyspnea  on exertion.  diuresed well.   Gastrointestinal: No diarrhea. No abdominal pain. No bleeding.     Past medical history: No updates.   Chronic conditions:  Hypertension: controlled. CHF: improved.. Angina: Improved.   	  Vitals:  Vital Signs Last 24 Hrs  T(C): 36.6 (07-20-17 @ 11:46), Max: 36.9 (07-19-17 @ 20:29)  T(F): 97.8 (07-20-17 @ 11:46), Max: 98.5 (07-20-17 @ 05:52)  HR: 76 (07-20-17 @ 11:46) (64 - 76)  BP: 102/58 (07-20-17 @ 11:46) (98/52 - 112/54)  BP(mean): --  RR: 17 (07-20-17 @ 07:15) (16 - 17)  SpO2: 100% (07-20-17 @ 07:15) (97% - 100%)    PHYSICAL EXAM:  Appearance: Comfortable. No acute distress  HEENT:  Head and neck: Atraumatic. Normocephalic.  Normal oral mucosa,, Neck is supple. No JVD,    Neurologic: not oriented ,Cranial nerves not evaluated.  Lymphatic: No cervical lymphadenopathy  Cardiovascular: Normal S1 S2, 2/6 systolic murmur,No rubs/gallops. No JVD, trivial edema  Respiratory: decrease breath douns in the bases. Bilateral basale crepts. Decrease air entry on the right lung,   Gastrointestinal:  Soft, Non-tender, Obese.   Lower Extremities: trivial bilateral  edema  Psychiatry: Patient is calm. No agitation.   Skin: No rashes, No ecchymoses, No cyanosis      CURRENT MEDICATIONS:  MEDICATIONS  (STANDING):  metoprolol succinate ER 50 milliGRAM(s) Oral daily  hydrALAZINE Injectable 10 milliGRAM(s) IV Push every 3 hours PRN  lisinopril 5 milliGRAM(s) Oral daily  digoxin     Tablet 0.125 milliGRAM(s) Oral daily  torsemide 20 milliGRAM(s) Oral two times a day    buDESOnide   0.5 milliGRAM(s) Respule  pantoprazole    Tablet  sodium chloride 0.9% lock flush  latanoprost 0.005% Ophthalmic Solution  multivitamin  folic acid  ascorbic acid  simvastatin  ferrous    sulfate    PRN: aluminum hydroxide/magnesium hydroxide/simethicone Suspension PRN  hydrALAZINE Injectable PRN  ALBUTerol/ipratropium for Nebulization PRN            LABS:	 	                                   9.0    3.8   )-----------( 172      ( 20 Jul 2017 06:04 )             29.4   N=x    ; L=x        20 Jul 2017 06:04    142    |  94     |  14.0   ----------------------------<  83     3.4     |  37.0   |  1.08     Ca    8.8        20 Jul 2017 06:04                TELEMETRY: Reviewed  Sinus Rhythm. 1st degree Av block,. Multiform PVCs.   EKG 7/14 reviewed by me NSR, inferior MI, lateral ischemia    ECHO: LVEF 50%. S/p Mitral clip. Severe MR. Moderate to severe TR. Mild AI> Mild PAH.   Mild to moderate RV dysfunction.     RADIOLOGY:   X-ray: CHF improved from last time.

## 2017-07-20 NOTE — PROGRESS NOTE ADULT - PROBLEM SELECTOR PLAN 2
Biventricular failure ,. Moderate RV dysfunction.   on po torsemide, i/o, weight daily  ct beta-blocker . ct Angiotensin converting enzyme inhibitor   ct dose Dig 0.125 mg daily. nasal canula. d/c IV fluids. Biventricular failure ,. Moderate RV dysfunction.   on po torsemide  ct beta-blocker . ct Angiotensin converting enzyme inhibitor    ct dose Dig 0.125 mg daily. Hold for Low BP. D/c hydralazine.

## 2017-07-20 NOTE — PROGRESS NOTE ADULT - PROBLEM SELECTOR PROBLEM 1
Hypercarbia
Acute on chronic respiratory failure with hypercapnia
Acute on chronic systolic congestive heart failure
Anemia due to blood loss
Chronic systolic (congestive) heart failure
Chronic systolic (congestive) heart failure
Hypercapnic respiratory failure, chronic
Non-rheumatic mitral regurgitation
ST elevation myocardial infarction involving right coronary artery
Acute on chronic systolic congestive heart failure
Gastrointestinal hemorrhage, unspecified gastrointestinal hemorrhage type
Hypercarbia
Acalculous cholecystitis
Acalculous cholecystitis
Acute on chronic congestive heart failure, unspecified congestive heart failure type
Acute on chronic congestive heart failure, unspecified congestive heart failure type
Acute on chronic respiratory failure with hypercapnia
Hypercapnic respiratory failure, chronic
Hypercapnic respiratory failure, chronic
Metabolic alkalosis
Non-rheumatic mitral regurgitation
Chronic systolic (congestive) heart failure
Obesity, unspecified obesity severity, unspecified obesity type
ST elevation myocardial infarction involving right coronary artery

## 2017-07-20 NOTE — PROGRESS NOTE ADULT - PROBLEM SELECTOR PROBLEM 2
Valvular heart disease
Acute on chronic respiratory failure with hypercapnia
Acute on chronic respiratory failure with hypercapnia
Hypoxia
Obesity, unspecified obesity severity, unspecified obesity type
Obesity, unspecified obesity severity, unspecified obesity type
Acute on chronic congestive heart failure, unspecified congestive heart failure type
Acute on chronic systolic heart failure
Atrial fibrillation with RVR
CKD (chronic kidney disease) stage 3, GFR 30-59 ml/min
Dyslipidemia
Hypercapnic respiratory failure, chronic
Obesity
Valvular heart disease
Chronic obstructive pulmonary disease, unspecified COPD type
Hypoxia
Acute on chronic respiratory failure with hypercapnia
Acute on chronic systolic heart failure
CKD (chronic kidney disease) stage 3, GFR 30-59 ml/min
Hypercapnic respiratory failure, chronic
Hypercapnic respiratory failure, chronic
Obesity
Obesity, unspecified obesity severity, unspecified obesity type
Obesity, unspecified obesity severity, unspecified obesity type
Renal insufficiency
Valvular heart disease
Acute on chronic respiratory failure with hypercapnia
Hypercapnic respiratory failure, chronic
Acute on chronic congestive heart failure, unspecified congestive heart failure type

## 2017-07-20 NOTE — PROGRESS NOTE ADULT - SUBJECTIVE AND OBJECTIVE BOX
PULMONARY PROGRESS NOTE      ANN FRANCESLEE ANN-[id number]    Patient is a 84y old  Female who presents with a chief complaint of Abdominal pain, insomnia, and increased shortness of breath (10 Murphy 2017 16:40)      INTERVAL HPI/OVERNIGHT EVENTS:    Awake alert sitting in a chair  No SOB or chest pain  Did not use BIPAP last night    MEDICATIONS  (STANDING):  sodium chloride 0.9% lock flush 3 milliLiter(s) IV Push every 8 hours  latanoprost 0.005% Ophthalmic Solution 1 Drop(s) Both EYES at bedtime  multivitamin 1 Tablet(s) Oral daily  metoprolol succinate ER 50 milliGRAM(s) Oral daily  buDESOnide   0.5 milliGRAM(s) Respule 0.5 milliGRAM(s) Inhalation two times a day  folic acid 1 milliGRAM(s) Oral daily  ascorbic acid 250 milliGRAM(s) Oral daily  simvastatin 20 milliGRAM(s) Oral at bedtime  lisinopril 5 milliGRAM(s) Oral daily  digoxin     Tablet 0.125 milliGRAM(s) Oral daily  torsemide 20 milliGRAM(s) Oral two times a day  pantoprazole    Tablet 40 milliGRAM(s) Oral two times a day before meals  ferrous    sulfate 325 milliGRAM(s) Oral daily      MEDICATIONS  (PRN):  aluminum hydroxide/magnesium hydroxide/simethicone Suspension 30 milliLiter(s) Oral every 6 hours PRN Dyspepsia  hydrALAZINE Injectable 10 milliGRAM(s) IV Push every 3 hours PRN sbp > 150  ALBUTerol/ipratropium for Nebulization 3 milliLiter(s) Nebulizer every 6 hours PRN Shortness of Breath and/or Wheezing      Allergies    aspirin (Anaphylaxis)  NSAIDs (Other)    Intolerances    Lasix (Other)  [facility] PT (Unknown)      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   r/o  out  stenosis  Cardiomyopathy  Osteoarthritis  Iron deficiency anemia  MI, old: 2008  Dyslipidemia  Acid reflux  Hx of CAB  CAD (coronary artery disease)  Heart failure  HTN (hypertension)  H/O tracheostomy: 2013 may  Cataract: b/l  2013  S/P CABG x 3:   [place]      SOCIAL HISTORY  Smoking History:       REVIEW OF SYSTEMS:    CONSTITUTIONAL:  No distress    HEENT:  Eyes:  No diplopia or blurred vision. ENT:  No earache, sore throat or runny nose.    CARDIOVASCULAR:  No pressure, squeezing, tightness, heaviness or aching about the chest; no palpitations.    RESPIRATORY:  above    GASTROINTESTINAL:  No nausea, vomiting or diarrhea.    GENITOURINARY:  No dysuria, frequency or urgency.    NEUROLOGIC:  No paresthesias, fasciculations, seizures or weakness.    Extremities: No cyanosis, clubbing or edema    PSYCHIATRIC:  No disorder of thought or mood.    Vital Signs Last 24 Hrs  T(C): 36.9 (2017 07:15), Max: 36.9 (2017 20:29)  T(F): 98.5 (2017 07:15), Max: 98.5 (2017 05:52)  HR: 76 (2017 07:15) (64 - 76)  BP: 108/62 (2017 07:15) (98/52 - 112/54)  BP(mean): --  RR: 17 (2017 07:15) (16 - 17)  SpO2: 100% (2017 07:15) (97% - 100%)    PHYSICAL EXAMINATION:    GENERAL: The patient is awake and alert in no apparent distress.     HEENT: Head is normocephalic and atraumatic. Extraocular muscles are intact. Mucous membranes are moist.    NECK: Supple.    LUNGS: Clear to auscultation without wheezing, rales or rhonchi; respirations unlabored    HEART: Regular rate and rhythm without murmur.    ABDOMEN: Soft, nontender, and nondistended.      EXTREMITIES: Without any cyanosis, clubbing, rash, lesions or edema.    NEUROLOGIC: Grossly intact.    LABS:                        9.0    3.8   )-----------( 172      ( 2017 06:04 )             29.4     07-20    142  |  94<L>  |  14.0  ----------------------------<  83  3.4<L>   |  37.0<H>  |  1.08    Ca    8.8      2017 06:04          ABG: Pending                MICROBIOLOGY:    RADIOLOGY & ADDITIONAL STUDIES:

## 2017-07-20 NOTE — PROGRESS NOTE ADULT - PROBLEM SELECTOR PROBLEM 3
Acute on chronic congestive heart failure, unspecified congestive heart failure type
Obesity, unspecified obesity severity, unspecified obesity type
Obesity, unspecified obesity severity, unspecified obesity type
Non-ST elevation (NSTEMI) myocardial infarction
Respiratory failure
Atrial fibrillation with RVR
CKD (chronic kidney disease) stage 3, GFR 30-59 ml/min
Chronic atrial fibrillation
Hypercapnic respiratory failure, chronic
Morbid obesity due to excess calories
Non-ST elevation (NSTEMI) myocardial infarction
Left lower quadrant pain
Respiratory failure
Acute on chronic systolic heart failure
Acute on chronic systolic heart failure
CHRISTOPHER (acute kidney injury)
Hypercapnic respiratory failure, chronic
Metabolic alkalosis
Morbid obesity due to excess calories
Valvular heart disease
Acute on chronic systolic congestive heart failure
Obesity, unspecified obesity severity, unspecified obesity type
Chronic atrial fibrillation

## 2017-07-20 NOTE — PROGRESS NOTE ADULT - PROBLEM SELECTOR PLAN 1
Plan for conservative management.  Holding antiplatelet. No more chest pain, H/H stable, GI following. no EGD done. conservative management.   beta-blocker (AV conduction is normal limits).  can switch to lasix 40 mg Po twice daily starting from tomorrow  , overall patient has poor prognosis due to multiple other comorbidities and valvular dysfunction and recent respiratory failure.  off bipap. Palliative and hospice care.    treatment of heart failure . Out of Bed to Chair . PT and OT . rehab.   Restart plavix once hemoglobin stable. no anticoagulation Plan for conservative management.  Holding antiplatelet. No more chest pain, H/H stable, GI following. no EGD done. conservative management.    Looking at the troponin peak. it is possible it was CHF instead of an MI.   beta-blocker (AV conduction is normal limits).  Palliative and hospice care.    treatment of heart failure . Out of Bed to Chair . PT and OT . rehab.   Restart plavix once hemoglobin stable. no anticoagulation

## 2017-07-20 NOTE — PROGRESS NOTE ADULT - PROBLEM SELECTOR PLAN 3
Nocturnal and prn BiPAP  Drug nebs
Nocturnal and prn BiPAP  Drug nebs  Overall improved  Consider steroid therapy if wheeze worsens  Patient is DNR
Nocturnal and prn BiPAP  Drug nebs  Overall improved  For tx to floor
Nocturnal and prn BiPAP  Drug nebs  Overall improved  For tx to floor
currently on amio load, monitor QtC and liver enzymes
- obstructive component affecting respiratory status  - keep HOB > 30degress
Continue Amiodarone  Monitor QTc and LFTs
Last day of amio load, monitor QtC (446 yest, pending today) and liver enzymes (ok)   Continue daily amiodarone 200 mg at L[facility]
Nocturnal and prn BiPAP  Drug nebs
Strict nocturnal (and PRN) BIPAP.   Pt with chronic CO2 retention baseline CO2 in the 70s.  Chest PT.  Encourage CDBE/IS and increased mobilization.
currently on amio load, monitor QtC and liver enzymes
likely d/t demand ischemia from acute systolic heart failure exacerbation  continue plavix and statin, no aspirin d/t allergy  beta-blockers if/when off dobutamine
not on anticoagulation due to bleeding risk. Now in sinus rhythm with frequent PVCs
stable  meds adjusted for CrCl  diuresis as above
stable  meds adjusted for CrCl  hold diuresis as above
stable  renal following  torsemide resumed > cont to trend Bun/Cr
stable  renal following  torsemide resumed > cont to trend Bun/Cr
stable  renal following  torsemide resumed > cont to trend Bun/Cr  atkinson d/c'd to reduce risk of infection, follow resp status, lung exam and CXR  per renal, will allow for some degree of azotemia as pt requires diuresis for chronic systolic heart failure
stable  renal following  torsemide resumed > cont to trend Bun/Cr  atkinson d/c'd today to reduce risk of infection, follow resp status, lung exam and CXR  per renal, will allow for some degree of azotemia as pt requires diuresis for chronic systolic heart failure
stable  renal following  torsemide resumed > cont to trend Bun/Cr  maintain atkinson
stable  renal following  torsemide resumed and increased to BID> cont to trend Bun/Cr  atkinson d/c'd to reduce risk of infection, follow resp status, lung exam and CXR  per renal, will allow for some degree of azotemia as pt requires diuresis for chronic systolic heart failure  Renal note appreciated
stable  renal following  torsemide resumed> cont to trend Bun/Cr
stable  renal following  torsemide resumed> cont to trend Bun/Cr
- obstructive component affecting respiratory status  - keep HOB > 30degress
Abdominal exam stable.  Discussed polyp with patient and her daughter at bedside, daughter (who is an RN at [employer] [facility] [employer]) agreeable to follow up as an outpatient for further evaluation
Improving. Careful diuresis. Diamox PRN to help offload bicarb.
above
continue bipap   monitor labs
monitor over diuresis
sensitive to fluid overload, monitor fluid restriction, toresamide as needed, diamox as needed
toprol XL   torsemide daily> may need to increased to BID
-improved with medical management.
difficult to medically manage right now given acute hemorrhagic shock/hypotension  -unable to give AC, daughter does not want to pursue aggressive things like cardiac cath at this time..
not on anticoagulation due to bleeding risk. Now in sinus rhythm.

## 2017-08-04 ENCOUNTER — APPOINTMENT (OUTPATIENT)
Dept: CARDIOLOGY | Facility: CLINIC | Age: 82
End: 2017-08-04
Payer: MEDICARE

## 2017-08-04 ENCOUNTER — NON-APPOINTMENT (OUTPATIENT)
Age: 82
End: 2017-08-04

## 2017-08-04 VITALS — DIASTOLIC BLOOD PRESSURE: 73 MMHG | HEART RATE: 73 BPM | SYSTOLIC BLOOD PRESSURE: 122 MMHG | OXYGEN SATURATION: 86 %

## 2017-08-04 VITALS — OXYGEN SATURATION: 92 %

## 2017-08-04 PROCEDURE — 93000 ELECTROCARDIOGRAM COMPLETE: CPT

## 2017-08-04 PROCEDURE — 99215 OFFICE O/P EST HI 40 MIN: CPT | Mod: 25

## 2017-08-10 ENCOUNTER — APPOINTMENT (OUTPATIENT)
Dept: PULMONOLOGY | Facility: CLINIC | Age: 82
End: 2017-08-10
Payer: MEDICARE

## 2017-08-10 VITALS — OXYGEN SATURATION: 88 %

## 2017-08-10 VITALS
OXYGEN SATURATION: 88 % | HEIGHT: 59 IN | DIASTOLIC BLOOD PRESSURE: 78 MMHG | HEART RATE: 69 BPM | BODY MASS INDEX: 32.05 KG/M2 | WEIGHT: 159 LBS | SYSTOLIC BLOOD PRESSURE: 120 MMHG

## 2017-08-10 VITALS — OXYGEN SATURATION: 90 %

## 2017-08-10 DIAGNOSIS — J39.8 OTHER SPECIFIED DISEASES OF UPPER RESPIRATORY TRACT: ICD-10-CM

## 2017-08-10 PROCEDURE — 99215 OFFICE O/P EST HI 40 MIN: CPT

## 2017-08-10 NOTE — PATIENT PROFILE ADULT. - AS SC BRADEN MOISTURE
Detail Level: Generalized Detail Level: Zone Detail Level: Detailed Quality 337: Tuberculosis Prevention For Psoriasis And Psoriatic Arthritis Patients On A Biological Immune Response Modifier: Patient has a documented negative annual TB screening. (3) occasionally moist

## 2017-09-09 ENCOUNTER — TRANSCRIPTION ENCOUNTER (OUTPATIENT)
Age: 82
End: 2017-09-09

## 2017-10-11 PROCEDURE — 82607 VITAMIN B-12: CPT

## 2017-10-11 PROCEDURE — 97163 PT EVAL HIGH COMPLEX 45 MIN: CPT

## 2017-10-11 PROCEDURE — 82947 ASSAY GLUCOSE BLOOD QUANT: CPT

## 2017-10-11 PROCEDURE — 71045 X-RAY EXAM CHEST 1 VIEW: CPT

## 2017-10-11 PROCEDURE — 85610 PROTHROMBIN TIME: CPT

## 2017-10-11 PROCEDURE — C1889: CPT

## 2017-10-11 PROCEDURE — 97116 GAIT TRAINING THERAPY: CPT

## 2017-10-11 PROCEDURE — 36415 COLL VENOUS BLD VENIPUNCTURE: CPT

## 2017-10-11 PROCEDURE — 84132 ASSAY OF SERUM POTASSIUM: CPT

## 2017-10-11 PROCEDURE — 82803 BLOOD GASES ANY COMBINATION: CPT

## 2017-10-11 PROCEDURE — 93005 ELECTROCARDIOGRAM TRACING: CPT

## 2017-10-11 PROCEDURE — 94660 CPAP INITIATION&MGMT: CPT

## 2017-10-11 PROCEDURE — 84145 PROCALCITONIN (PCT): CPT

## 2017-10-11 PROCEDURE — 82330 ASSAY OF CALCIUM: CPT

## 2017-10-11 PROCEDURE — 82272 OCCULT BLD FECES 1-3 TESTS: CPT

## 2017-10-11 PROCEDURE — 86920 COMPATIBILITY TEST SPIN: CPT

## 2017-10-11 PROCEDURE — 85014 HEMATOCRIT: CPT

## 2017-10-11 PROCEDURE — 97530 THERAPEUTIC ACTIVITIES: CPT

## 2017-10-11 PROCEDURE — 83735 ASSAY OF MAGNESIUM: CPT

## 2017-10-11 PROCEDURE — 82140 ASSAY OF AMMONIA: CPT

## 2017-10-11 PROCEDURE — P9016: CPT

## 2017-10-11 PROCEDURE — 86850 RBC ANTIBODY SCREEN: CPT

## 2017-10-11 PROCEDURE — 76000 FLUOROSCOPY <1 HR PHYS/QHP: CPT

## 2017-10-11 PROCEDURE — 93325 DOPPLER ECHO COLOR FLOW MAPG: CPT

## 2017-10-11 PROCEDURE — 83690 ASSAY OF LIPASE: CPT

## 2017-10-11 PROCEDURE — 36600 WITHDRAWAL OF ARTERIAL BLOOD: CPT

## 2017-10-11 PROCEDURE — 86900 BLOOD TYPING SEROLOGIC ABO: CPT

## 2017-10-11 PROCEDURE — 80048 BASIC METABOLIC PNL TOTAL CA: CPT

## 2017-10-11 PROCEDURE — 82746 ASSAY OF FOLIC ACID SERUM: CPT

## 2017-10-11 PROCEDURE — 86901 BLOOD TYPING SEROLOGIC RH(D): CPT

## 2017-10-11 PROCEDURE — 94770: CPT

## 2017-10-11 PROCEDURE — 82150 ASSAY OF AMYLASE: CPT

## 2017-10-11 PROCEDURE — 94760 N-INVAS EAR/PLS OXIMETRY 1: CPT

## 2017-10-11 PROCEDURE — 80053 COMPREHEN METABOLIC PANEL: CPT

## 2017-10-11 PROCEDURE — 85045 AUTOMATED RETICULOCYTE COUNT: CPT

## 2017-10-11 PROCEDURE — 85018 HEMOGLOBIN: CPT

## 2017-10-11 PROCEDURE — 84100 ASSAY OF PHOSPHORUS: CPT

## 2017-10-11 PROCEDURE — 82550 ASSAY OF CK (CPK): CPT

## 2017-10-11 PROCEDURE — 93312 ECHO TRANSESOPHAGEAL: CPT

## 2017-10-11 PROCEDURE — 76700 US EXAM ABDOM COMPLETE: CPT

## 2017-10-11 PROCEDURE — 70450 CT HEAD/BRAIN W/O DYE: CPT

## 2017-10-11 PROCEDURE — 82435 ASSAY OF BLOOD CHLORIDE: CPT

## 2017-10-11 PROCEDURE — 84484 ASSAY OF TROPONIN QUANT: CPT

## 2017-10-11 PROCEDURE — 87086 URINE CULTURE/COLONY COUNT: CPT

## 2017-10-11 PROCEDURE — 80076 HEPATIC FUNCTION PANEL: CPT

## 2017-10-11 PROCEDURE — 87493 C DIFF AMPLIFIED PROBE: CPT

## 2017-10-11 PROCEDURE — 84133 ASSAY OF URINE POTASSIUM: CPT

## 2017-10-11 PROCEDURE — 93306 TTE W/DOPPLER COMPLETE: CPT

## 2017-10-11 PROCEDURE — 83935 ASSAY OF URINE OSMOLALITY: CPT

## 2017-10-11 PROCEDURE — 82728 ASSAY OF FERRITIN: CPT

## 2017-10-11 PROCEDURE — 97110 THERAPEUTIC EXERCISES: CPT

## 2017-10-11 PROCEDURE — 94640 AIRWAY INHALATION TREATMENT: CPT

## 2017-10-11 PROCEDURE — 83605 ASSAY OF LACTIC ACID: CPT

## 2017-10-11 PROCEDURE — 97164 PT RE-EVAL EST PLAN CARE: CPT

## 2017-10-11 PROCEDURE — 84134 ASSAY OF PREALBUMIN: CPT

## 2017-10-11 PROCEDURE — 83880 ASSAY OF NATRIURETIC PEPTIDE: CPT

## 2017-10-11 PROCEDURE — 84300 ASSAY OF URINE SODIUM: CPT

## 2017-10-11 PROCEDURE — 82977 ASSAY OF GGT: CPT

## 2017-10-11 PROCEDURE — 84443 ASSAY THYROID STIM HORMONE: CPT

## 2017-10-11 PROCEDURE — 74177 CT ABD & PELVIS W/CONTRAST: CPT

## 2017-10-11 PROCEDURE — 82436 ASSAY OF URINE CHLORIDE: CPT

## 2017-10-11 PROCEDURE — 84466 ASSAY OF TRANSFERRIN: CPT

## 2017-10-11 PROCEDURE — 87040 BLOOD CULTURE FOR BACTERIA: CPT

## 2017-10-11 PROCEDURE — 99231 SBSQ HOSP IP/OBS SF/LOW 25: CPT

## 2017-10-11 PROCEDURE — 85730 THROMBOPLASTIN TIME PARTIAL: CPT

## 2017-10-11 PROCEDURE — 76705 ECHO EXAM OF ABDOMEN: CPT

## 2017-10-11 PROCEDURE — 36430 TRANSFUSION BLD/BLD COMPNT: CPT

## 2017-10-11 PROCEDURE — 82533 TOTAL CORTISOL: CPT

## 2017-10-11 PROCEDURE — 84480 ASSAY TRIIODOTHYRONINE (T3): CPT

## 2017-10-11 PROCEDURE — 94002 VENT MGMT INPAT INIT DAY: CPT

## 2017-10-11 PROCEDURE — 84295 ASSAY OF SERUM SODIUM: CPT

## 2017-10-11 PROCEDURE — 84436 ASSAY OF TOTAL THYROXINE: CPT

## 2017-10-11 PROCEDURE — 85027 COMPLETE CBC AUTOMATED: CPT

## 2017-10-11 PROCEDURE — 83550 IRON BINDING TEST: CPT

## 2017-10-11 PROCEDURE — 81001 URINALYSIS AUTO W/SCOPE: CPT

## 2017-10-11 PROCEDURE — 93451 RIGHT HEART CATH: CPT

## 2017-10-11 PROCEDURE — 99285 EMERGENCY DEPT VISIT HI MDM: CPT | Mod: 25

## 2017-10-11 PROCEDURE — 93320 DOPPLER ECHO COMPLETE: CPT

## 2017-10-11 PROCEDURE — 82248 BILIRUBIN DIRECT: CPT

## 2017-10-27 ENCOUNTER — APPOINTMENT (OUTPATIENT)
Dept: CARDIOLOGY | Facility: CLINIC | Age: 82
End: 2017-10-27

## 2017-11-14 ENCOUNTER — NON-APPOINTMENT (OUTPATIENT)
Age: 82
End: 2017-11-14

## 2017-11-14 ENCOUNTER — APPOINTMENT (OUTPATIENT)
Dept: CARDIOLOGY | Facility: CLINIC | Age: 82
End: 2017-11-14
Payer: MEDICARE

## 2017-11-14 VITALS
DIASTOLIC BLOOD PRESSURE: 77 MMHG | HEART RATE: 89 BPM | BODY MASS INDEX: 30.64 KG/M2 | WEIGHT: 152 LBS | SYSTOLIC BLOOD PRESSURE: 153 MMHG | HEIGHT: 59 IN | OXYGEN SATURATION: 93 %

## 2017-11-14 DIAGNOSIS — I25.2 OLD MYOCARDIAL INFARCTION: ICD-10-CM

## 2017-11-14 DIAGNOSIS — Z87.19 PERSONAL HISTORY OF OTHER DISEASES OF THE DIGESTIVE SYSTEM: ICD-10-CM

## 2017-11-14 DIAGNOSIS — N28.9 DISORDER OF KIDNEY AND URETER, UNSPECIFIED: ICD-10-CM

## 2017-11-14 PROCEDURE — 99215 OFFICE O/P EST HI 40 MIN: CPT

## 2017-11-14 PROCEDURE — 93000 ELECTROCARDIOGRAM COMPLETE: CPT

## 2017-11-14 RX ORDER — FAMOTIDINE 10 MG/1
10 TABLET, FILM COATED ORAL
Refills: 0 | Status: DISCONTINUED | COMMUNITY
End: 2017-11-14

## 2017-11-14 RX ORDER — PANTOPRAZOLE 40 MG/1
40 TABLET, DELAYED RELEASE ORAL
Qty: 90 | Refills: 3 | Status: ACTIVE | COMMUNITY
Start: 2017-11-14 | End: 1900-01-01

## 2017-11-14 RX ORDER — OMEPRAZOLE 20 MG/1
20 CAPSULE, DELAYED RELEASE ORAL
Refills: 0 | Status: DISCONTINUED | COMMUNITY
End: 2017-11-14

## 2017-11-14 RX ORDER — LISINOPRIL 5 MG/1
5 TABLET ORAL DAILY
Qty: 90 | Refills: 3 | Status: DISCONTINUED | COMMUNITY
End: 2017-11-14

## 2017-12-21 ENCOUNTER — APPOINTMENT (OUTPATIENT)
Dept: CARDIOLOGY | Facility: CLINIC | Age: 82
End: 2017-12-21

## 2017-12-22 ENCOUNTER — APPOINTMENT (OUTPATIENT)
Dept: CARDIOLOGY | Facility: CLINIC | Age: 82
End: 2017-12-22
Payer: MEDICARE

## 2017-12-22 PROCEDURE — 93306 TTE W/DOPPLER COMPLETE: CPT

## 2018-02-07 ENCOUNTER — MEDICATION RENEWAL (OUTPATIENT)
Age: 83
End: 2018-02-07

## 2018-02-07 ENCOUNTER — TRANSCRIPTION ENCOUNTER (OUTPATIENT)
Age: 83
End: 2018-02-07

## 2018-03-09 ENCOUNTER — RX RENEWAL (OUTPATIENT)
Age: 83
End: 2018-03-09

## 2018-05-11 ENCOUNTER — NON-APPOINTMENT (OUTPATIENT)
Age: 83
End: 2018-05-11

## 2018-05-11 ENCOUNTER — APPOINTMENT (OUTPATIENT)
Dept: CARDIOLOGY | Facility: CLINIC | Age: 83
End: 2018-05-11
Payer: MEDICARE

## 2018-05-11 VITALS — BODY MASS INDEX: 31.04 KG/M2 | WEIGHT: 154 LBS | HEIGHT: 59 IN

## 2018-05-11 VITALS — SYSTOLIC BLOOD PRESSURE: 135 MMHG | OXYGEN SATURATION: 100 % | HEART RATE: 70 BPM | DIASTOLIC BLOOD PRESSURE: 64 MMHG

## 2018-05-11 PROCEDURE — 93000 ELECTROCARDIOGRAM COMPLETE: CPT

## 2018-05-11 PROCEDURE — 99214 OFFICE O/P EST MOD 30 MIN: CPT

## 2018-05-11 RX ORDER — TORSEMIDE 20 MG/1
20 TABLET ORAL
Qty: 180 | Refills: 3 | Status: ACTIVE | COMMUNITY
Start: 2017-11-14 | End: 1900-01-01

## 2018-05-13 ENCOUNTER — MEDICATION RENEWAL (OUTPATIENT)
Age: 83
End: 2018-05-13

## 2018-05-13 RX ORDER — IPRATROPIUM BROMIDE AND ALBUTEROL SULFATE 2.5; .5 MG/3ML; MG/3ML
0.5-2.5 (3) SOLUTION RESPIRATORY (INHALATION) 4 TIMES DAILY
Qty: 180 | Refills: 3 | Status: DISCONTINUED | COMMUNITY
End: 2018-05-13

## 2018-05-15 NOTE — DISCHARGE NOTE ADULT - NS AS ACTIVITY OBS
Script brought to [facility].     Do not drive or operate machinery/No Heavy lifting/straining/Showering allowed/Do not make important decisions

## 2018-05-18 ENCOUNTER — MEDICATION RENEWAL (OUTPATIENT)
Age: 83
End: 2018-05-18

## 2018-05-22 ENCOUNTER — MEDICATION RENEWAL (OUTPATIENT)
Age: 83
End: 2018-05-22

## 2018-06-12 ENCOUNTER — MEDICATION RENEWAL (OUTPATIENT)
Age: 83
End: 2018-06-12

## 2018-06-18 ENCOUNTER — MEDICATION RENEWAL (OUTPATIENT)
Age: 83
End: 2018-06-18

## 2018-07-25 NOTE — ASU PATIENT PROFILE, ADULT - PRO ARRIVE FROM
home H/O prostatectomy    S/P coronary angiogram  cardiac stents X2  year 2007 H/O prostatectomy    History of total left knee replacement (TKR)    S/P coronary angiogram  cardiac stents X2  year 2007

## 2018-07-26 PROBLEM — I34.0 NONRHEUMATIC MITRAL (VALVE) INSUFFICIENCY: Chronic | Status: ACTIVE | Noted: 2017-03-30

## 2018-07-26 PROBLEM — N28.9 DISORDER OF KIDNEY AND URETER, UNSPECIFIED: Chronic | Status: ACTIVE | Noted: 2017-05-24

## 2018-08-01 ENCOUNTER — RX RENEWAL (OUTPATIENT)
Age: 83
End: 2018-08-01

## 2018-08-01 RX ORDER — FOLIC ACID 1 MG/1
1 TABLET ORAL DAILY
Qty: 90 | Refills: 3 | Status: ACTIVE | COMMUNITY
Start: 1900-01-01 | End: 1900-01-01

## 2018-08-01 RX ORDER — DIGOXIN 125 UG/1
125 TABLET ORAL DAILY
Qty: 90 | Refills: 3 | Status: ACTIVE | COMMUNITY
Start: 1900-01-01 | End: 1900-01-01

## 2018-08-13 ENCOUNTER — APPOINTMENT (OUTPATIENT)
Dept: PULMONOLOGY | Facility: CLINIC | Age: 83
End: 2018-08-13
Payer: MEDICARE

## 2018-08-13 VITALS — OXYGEN SATURATION: 95 % | DIASTOLIC BLOOD PRESSURE: 60 MMHG | SYSTOLIC BLOOD PRESSURE: 120 MMHG | HEART RATE: 83 BPM

## 2018-08-13 DIAGNOSIS — J98.4 OTHER DISORDERS OF LUNG: ICD-10-CM

## 2018-08-13 PROCEDURE — 99214 OFFICE O/P EST MOD 30 MIN: CPT

## 2018-10-29 ENCOUNTER — TRANSCRIPTION ENCOUNTER (OUTPATIENT)
Age: 83
End: 2018-10-29

## 2018-10-29 ENCOUNTER — MEDICATION RENEWAL (OUTPATIENT)
Age: 83
End: 2018-10-29

## 2018-10-29 RX ORDER — LOSARTAN POTASSIUM 25 MG/1
25 TABLET, FILM COATED ORAL DAILY
Qty: 90 | Refills: 3 | Status: ACTIVE | COMMUNITY
Start: 2017-11-14 | End: 1900-01-01

## 2018-11-16 ENCOUNTER — NON-APPOINTMENT (OUTPATIENT)
Age: 83
End: 2018-11-16

## 2018-11-16 ENCOUNTER — APPOINTMENT (OUTPATIENT)
Dept: CARDIOLOGY | Facility: CLINIC | Age: 83
End: 2018-11-16
Payer: MEDICARE

## 2018-11-16 VITALS
DIASTOLIC BLOOD PRESSURE: 72 MMHG | HEART RATE: 80 BPM | HEIGHT: 59 IN | WEIGHT: 139 LBS | BODY MASS INDEX: 28.02 KG/M2 | RESPIRATION RATE: 16 BRPM | OXYGEN SATURATION: 91 % | SYSTOLIC BLOOD PRESSURE: 116 MMHG

## 2018-11-16 DIAGNOSIS — I34.0 NONRHEUMATIC MITRAL (VALVE) INSUFFICIENCY: ICD-10-CM

## 2018-11-16 DIAGNOSIS — I25.5 ISCHEMIC CARDIOMYOPATHY: ICD-10-CM

## 2018-11-16 DIAGNOSIS — R06.09 OTHER FORMS OF DYSPNEA: ICD-10-CM

## 2018-11-16 DIAGNOSIS — I48.0 PAROXYSMAL ATRIAL FIBRILLATION: ICD-10-CM

## 2018-11-16 DIAGNOSIS — I10 ESSENTIAL (PRIMARY) HYPERTENSION: ICD-10-CM

## 2018-11-16 PROCEDURE — 99215 OFFICE O/P EST HI 40 MIN: CPT

## 2018-11-16 PROCEDURE — 93000 ELECTROCARDIOGRAM COMPLETE: CPT

## 2018-11-16 PROCEDURE — 93306 TTE W/DOPPLER COMPLETE: CPT

## 2018-11-16 RX ORDER — ASCORBIC ACID 500 MG/5ML
500 LIQUID (ML) ORAL DAILY
Refills: 0 | Status: ACTIVE | COMMUNITY

## 2018-11-16 RX ORDER — DOCUSATE SODIUM 100 MG/1
100 CAPSULE ORAL
Refills: 0 | Status: ACTIVE | COMMUNITY

## 2018-11-16 RX ORDER — POTASSIUM CHLORIDE 750 MG/1
10 TABLET, EXTENDED RELEASE ORAL
Refills: 0 | Status: ACTIVE | COMMUNITY

## 2018-12-09 NOTE — DISCUSSION/SUMMARY
[Patient] : the patient [Risks] : risks [Benefits] : benefits [FreeTextEntry1] : 85 year old female presents for routine follow up. She has a history of  coronary disease S/P CABG, inferolateral MI, chronic systolic heart failure, severe MR s/p Mitraclip, PAFib, and chronic respiratory failure. She has intermittent angina relieved with SL NTG (last taken over 1 month ago). No palpitations, lightheadedness, near syncope or syncope. She does have chronic DALTON with mild activity. She is not very active secondary to lung disease. She is on O2 at home prn, refused BiPAP in the past. She is a DNR/DNI. Echocardiogram done today- LVEF biplane 63%, moderate to severe MR. Discussed with Dr. Barney.\par \par Plan\par 1. CHF/pHTN- no LE edema, echo today- LVEF biplane 63%, moderate to severe MR, continue current meds\par 2. CAD- s/p CABG, stable, continue current meds, SL NTG prn \par 3. HTN- at goal\par 4. PAfib- SR today, continue digoxin and metoprolol\par 5. f/u in 6 months with Dr. Barney\par \par DEVYN Goldberg\par \par Agree with above assessment and plan.  I personally evaluated and examined Blanca and discussed her cardiac issues with her and her daughter.  She is doing well from the cardiac perspective with an improvement in LV function although the degree of MR post clip is not much improved.  However, she is managing her chronic combined heart failure well with her medical regimen.  Thus, she will continue her current medication including Lasix and monitor renal function and digoxin levels as well.  She will follow up in 6 months.

## 2018-12-09 NOTE — HISTORY OF PRESENT ILLNESS
[FreeTextEntry1] : 85 year old woman with a history of coronary disease S/P CABG, inferolateral MI, and history of an episode of mental status changes with subsequent intubation, airway stricture requiring tracheostomy now off trach who now continues to do well except for intermittent episodes of mild acute on chronic systolic heart failure usually related to dietary indiscretion.  She was hospitalized in January 25, 2015 for a heart failure exacerbation and discharged to a rehabilitation facility. A nuclear stress test while hospitalized demonstrated apical ischemia which is a smaller territory than her prior stress in 2011.  She recently underwent a Mitraclip and post procedure course complicated by a post procedure episode of atrial fibrillation treated transiently with amiodarone, heart failure and RCA infarct and respiratory failure.  She was managed conservatively for the MI because she was DNR/DNI. And then she had a GI bleed that required 2 units PRBC.  She is now recovering at [facility] and doing reasonably well but she is week today.  She is off O2 and is refusing nocturnal Bipap. She has not taken any anti-platelet therapy due to the GI bleed.   She denies orthopnea or PND.  A repeat echo in December with improved LV function with moderte MR but severe pulmonary hypertension which was present at the time of right heart cath prior to the mitral clip.  She describes orthopnea and PND as well as exertional dyspnea.  She has O2 at home and occasionally gets hypercarbic when using it. \par \par 11/16/2018\par 85 year old female presents for routine follow up. She has a history of  coronary disease S/P CABG, inferolateral MI, chronic systolic heart failure, severe MR s/p Mitraclip, PAFib, and chronic respiratory failure. She has intermittent angina relieved with SL NTG (last taken over 1 month ago). No palpitations, lightheadedness, near syncope or syncope. She does have chronic DALTON with mild activity. She is not very active secondary to lung disease. She is on O2 at home prn, refused BiPAP in the past. She is a DNR/DNI. Echocardiogram done today- LVEF biplane 63%, moderate to severe MR.

## 2018-12-09 NOTE — PHYSICAL EXAM
[General Appearance - Well Developed] : well developed [Normal Appearance] : normal appearance [Well Groomed] : well groomed [General Appearance - Well Nourished] : well nourished [General Appearance - In No Acute Distress] : no acute distress [Normal Conjunctiva] : the conjunctiva exhibited no abnormalities [No Oral Pallor] : no oral pallor [No Oral Cyanosis] : no oral cyanosis [Normal Jugular Venous V Waves Present] : normal jugular venous V waves present [No Jugular Venous Dimas A Waves] : no jugular venous dimas A waves [Respiration, Rhythm And Depth] : normal respiratory rhythm and effort [Exaggerated Use Of Accessory Muscles For Inspiration] : no accessory muscle use [Heart Rate And Rhythm] : heart rate and rhythm were normal [Heart Sounds] : normal S1 and S2 [Edema] : no peripheral edema present [Abdomen Soft] : soft [Abdomen Tenderness] : non-tender [Nail Clubbing] : no clubbing of the fingernails [Cyanosis, Localized] : no localized cyanosis [Skin Turgor] : normal skin turgor [] : no rash [Oriented To Time, Place, And Person] : oriented to person, place, and time [Impaired Insight] : insight and judgment were intact [Affect] : the affect was normal [Memory Recent] : recent memory was not impaired [Systolic grade ___/6] : A grade [unfilled]/6 systolic murmur was heard. [FreeTextEntry1] : limited gait

## 2018-12-09 NOTE — REVIEW OF SYSTEMS
[Recent Weight Loss (___ Lbs)] : recent [unfilled] ~Ulb weight loss [see HPI] : see HPI [Shortness Of Breath] : shortness of breath [Dyspnea on exertion] : dyspnea during exertion [Chest Pain] : chest pain [Negative] : Musculoskeletal [Lower Ext Edema] : no extremity edema [Palpitations] : no palpitations

## 2018-12-18 NOTE — DISCHARGE NOTE ADULT - PATIENT PORTAL LINK FT
Orders entered for upcoming appointment.  
“You can access the Follow[facility] Patient Portal, offered by [facility], by registering with the following website: [email or web address]”

## 2019-01-22 ENCOUNTER — MEDICATION RENEWAL (OUTPATIENT)
Age: 84
End: 2019-01-22

## 2019-01-22 RX ORDER — CLOPIDOGREL BISULFATE 75 MG/1
75 TABLET, FILM COATED ORAL DAILY
Qty: 90 | Refills: 2 | Status: ACTIVE | COMMUNITY
Start: 2019-01-22 | End: 1900-01-01

## 2019-03-11 ENCOUNTER — TRANSCRIPTION ENCOUNTER (OUTPATIENT)
Age: 84
End: 2019-03-11

## 2019-03-14 ENCOUNTER — APPOINTMENT (OUTPATIENT)
Dept: PULMONOLOGY | Facility: CLINIC | Age: 84
End: 2019-03-14
Payer: MEDICARE

## 2019-03-14 VITALS — BODY MASS INDEX: 25.85 KG/M2 | SYSTOLIC BLOOD PRESSURE: 115 MMHG | DIASTOLIC BLOOD PRESSURE: 60 MMHG | WEIGHT: 128 LBS

## 2019-03-14 VITALS — HEART RATE: 70 BPM | OXYGEN SATURATION: 90 %

## 2019-03-14 DIAGNOSIS — R09.02 HYPOXEMIA: ICD-10-CM

## 2019-03-14 DIAGNOSIS — J45.909 UNSPECIFIED ASTHMA, UNCOMPLICATED: ICD-10-CM

## 2019-03-14 DIAGNOSIS — J96.12 CHRONIC RESPIRATORY FAILURE WITH HYPERCAPNIA: ICD-10-CM

## 2019-03-14 PROCEDURE — 99213 OFFICE O/P EST LOW 20 MIN: CPT

## 2019-03-14 RX ORDER — ALBUTEROL SULFATE 2.5 MG/3ML
(2.5 MG/3ML) SOLUTION RESPIRATORY (INHALATION)
Qty: 1620 | Refills: 3 | Status: ACTIVE | COMMUNITY
Start: 2018-05-13 | End: 1900-01-01

## 2019-03-14 NOTE — PHYSICAL EXAM
[General Appearance - Well Developed] : well developed [Normal Appearance] : normal appearance [General Appearance - Well Nourished] : well nourished [Neck Appearance] : the appearance of the neck was normal [Heart Rate And Rhythm] : heart rate and rhythm were normal [Heart Sounds] : normal S1 and S2 [Respiration, Rhythm And Depth] : normal respiratory rhythm and effort [Exaggerated Use Of Accessory Muscles For Inspiration] : no accessory muscle use [FreeTextEntry1] : In wheelchair [1+ Pitting] : 1+  pitting [] : no rash [No Focal Deficits] : no focal deficits [Impaired Insight] : insight and judgment were intact [Affect] : the affect was normal [Memory Recent] : recent memory was not impaired

## 2019-03-14 NOTE — PROCEDURE
[FreeTextEntry1] : Previous spirometry in 2013 with restrictive physiologic impairment\par Last chest x-ray from [facility] improving CHF 7/17

## 2019-03-14 NOTE — HISTORY OF PRESENT ILLNESS
[FreeTextEntry1] : Severe mitral regurgitation status post Brigitte clip procedure\par She is now DNR/DNI\par Has been refusing nocturnal BiPAP\par Other issues included paroxysmal atrial fibrillation, CVA, GI bleed\par now back on plavix\par Currently offering no new complaints\par Wearing O2 2 L\par No fever chills or chest pain\par No purulent sputum reported \par Cardiology input noted

## 2019-03-14 NOTE — REASON FOR VISIT
[Follow-Up - From Hospitalization] : a hospitalization follow-up [FreeTextEntry2] : hypercapnia, chronic respiratory failure, restrictive lung disease, tracheal stenosis

## 2019-03-14 NOTE — CONSULT LETTER
[Dear  ___] : Dear  [unfilled], [Consult Letter:] : I had the pleasure of evaluating your patient, [unfilled]. [Please see my note below.] : Please see my note below. [Sincerely,] : Sincerely, [FreeTextEntry3] : [facility] DO [facility]\par Pulmonary Critical Care\par Director Pulmonary Division\par Medical Director Respiratory Therapy\par [facility]\par \par  [[facility]] : [facility], [facility] [Director, Respiratory Care] : Director, Respiratory Care [[facility]] : [facility]

## 2019-03-14 NOTE — DISCUSSION/SUMMARY
[FreeTextEntry1] : Chronic hypercapnic respiratory failure, complicated by dementia/memory loss\par Cardiomyopathy with severe mitral valve regurgitation s/p Brigitte clip,CVA, paroxysmal atrial fibrillation\par Tracheal stenosis Chronic\par Restrictive physiologic impairment\par Clinically near baseline\par refuses nocturnal BiPAP,\par could not perform PFts\par Prognosis  guarded given comorbidities, but appears to be doing quite well despite\par Patient is DNR/DNI\par 6 months or sooner if needed, discussed with daughter only prn interventions\par Discussed with daughter \par \par \par

## 2019-06-21 ENCOUNTER — APPOINTMENT (OUTPATIENT)
Dept: CARDIOLOGY | Facility: CLINIC | Age: 84
End: 2019-06-21

## 2019-09-12 ENCOUNTER — APPOINTMENT (OUTPATIENT)
Dept: PULMONOLOGY | Facility: CLINIC | Age: 84
End: 2019-09-12

## 2020-01-04 NOTE — CONSULT NOTE ADULT - PROBLEM SELECTOR PROBLEM 4
Acute on chronic respiratory failure with hypercapnia
CKD (chronic kidney disease) stage 3, GFR 30-59 ml/min
Obesity, unspecified obesity severity, unspecified obesity type
Pain
Medications/Imaging Studies

## 2020-02-10 NOTE — CONSULT NOTE ADULT - PROBLEM SELECTOR PROBLEM 1
Acalculous cholecystitis
Acute on chronic respiratory failure with hypercapnia
Gastrointestinal hemorrhage, unspecified gastrointestinal hemorrhage type
On home oxygen therapy
ST elevation myocardial infarction involving right coronary artery
Former smoker

## 2020-09-17 NOTE — ED PROVIDER NOTE - OBJECTIVE STATEMENT
[FreeTextEntry1] : continued f/u with hematology.  84 year old with multiple medical problems presents to the ED with complaints of LLQ pain since yesterday. She had 3 BMs today. No fever, vomiting or change in urination. She recently had mitral valve clipping done. On plavix. 84 year old with multiple medical problems presents to the ED with complaints of LLQ pain since yesterday. She had 3 BMs today. No fever, vomiting or change in urination. She recently had mitral valve clipping done and was discharged yesterday. On plavix. They stopped her IMDUR and Beta blocker. Daughter states she has stage III heart failure. She states her mother is always somewhat short of breath.

## 2020-12-23 NOTE — DISCHARGE NOTE ADULT - PRINCIPAL DIAGNOSIS
Pt notified that Xultophy was not approved. Advised pt that you would be sending a new insulin pen with new instructions, verbalized understanding.    MR (mitral regurgitation)

## 2022-04-25 NOTE — PATIENT PROFILE ADULT. - HEALTH/HEALTHCARE ANXIETIES, PROFILE
Patient: Kuldeep Sequeira    Procedure: * No procedures listed *  Electroconvulsive Therapy    Anesthesia Type:  General    Note:  Disposition: Outpatient   Postop Pain Control: Uneventful            Sign Out: Well controlled pain   PONV: No   Neuro/Psych: Uneventful            Sign Out: Acceptable/Baseline neuro status   Airway/Respiratory: Uneventful            Sign Out: Acceptable/Baseline resp. status   CV/Hemodynamics: Uneventful            Sign Out: Acceptable CV status; No obvious hypovolemia; No obvious fluid overload   Other NRE: NONE   DID A NON-ROUTINE EVENT OCCUR? No         /87 (BP Location: Left arm)   Pulse 80   Temp 36.9  C (98.4  F) (Temporal)   Resp 16   SpO2 95%       Last vitals:  Vitals Value Taken Time   BP     Temp     Pulse     Resp     SpO2         Electronically Signed By: Alena Armas MD  April 25, 2022  11:05 AM  
None

## 2022-10-05 NOTE — CONSULT NOTE ADULT - NSPROBSELRECBLANK_5_GEN
DISPLAY PLAN FREE TEXT Hydroxyzine Counseling: Patient advised that the medication is sedating and not to drive a car after taking this medication.  Patient informed of potential adverse effects including but not limited to dry mouth, urinary retention, and blurry vision.  The patient verbalized understanding of the proper use and possible adverse effects of hydroxyzine.  All of the patient's questions and concerns were addressed.

## 2023-01-01 NOTE — ED PROVIDER NOTE - MEDICAL DECISION MAKING DETAILS
Infant rooming in with mother this shift. Positive bonding noted. Mother up to date on plan of care. Infant feeding well on cue. Voiding and stooling appropriately. VSS. NAD noted. Will continue to monitor.    Pt with periumbilical and LLQ pain. Will get labs and ct to rule out diverticulitis vs constipation.

## 2023-10-11 NOTE — H&P PST ADULT - URINARY CATHETER
Patient Name: Dora Velazquez  : 1944    MRN: [id number]                              Today's Date: 10/11/2023       Admit Date: 10/10/2023    Visit Dx:     ICD-10-CM ICD-9-CM   1. Impaired mobility [Z74.09]  Z74.09 799.89     Patient Active Problem List   Diagnosis    Paroxysmal atrial fibrillation    (HFpEF) heart failure with preserved ejection fraction    Essential hypertension    Transient cerebral ischemia    Left bundle branch block    Hyperlipidemia    Encounter for colonoscopy due to history of colonic polyp    Recurrent deep vein thrombosis (DVT)    Anemia    Gastrointestinal hemorrhage    Chest pain, unspecified type    CKD (chronic kidney disease) stage 3, GFR 30-59 ml/min    Right rotator cuff tear arthropathy     Past Medical History:   Diagnosis Date    Arthritis     Backache     Bilateral pleural effusion     Blood in feces     Blood in feces symptom    Capsulitis     Cardiomyopathy      HFpEF, improved       Chest pain     CHF (congestive heart failure)     Chronic anemia     Chronic hepatitis C     Degenerative joint disease involving multiple joints     Diabetes mellitus     Dilated cardiomyopathy     Dyslipidemia     Dyspnea     Edema of leg     Epistaxis     Essential hypertension     Fibromyalgia, primary     GERD (gastroesophageal reflux disease)     H/O endoscopy 2014    Colon endoscopy 62167    Headache     Hemorrhoids     internal & external    History of liver recipient     S/P [facility] at [facility] 2003       History of transfusion     Hyperlipemia     Hyperlipidemia     Hypertension     Hypokalemia     Left bundle branch block     Metatarsalgia     Metatarsalgia - with fat pad atrophie       Pap smear for cervical cancer screening 1996    Parkinson's disease     Paroxysmal atrial fibrillation     Polyp of sigmoid colon     Salmonella arthritis     Transient cerebral ischemia     Unspecified     Past Surgical History:   Procedure Laterality Date    BACK SURGERY   1995    Back Surgery (2)       CARDIAC CATHETERIZATION  11/03/2005    Orders Not Yet Performed: 0        CARPAL TUNNEL RELEASE  02/12/2007    Carpal tunnel surgery (1)      CAUTERIZATION NASAL BLEEDERS  03/09/2000    Control nose/throat bleeding (1)       CHOLECYSTECTOMY  2000    COLONOSCOPY N/A 8/14/2019    Procedure: COLONOSCOPY;  Surgeon: Chaparro Mckeon MD;  Location: [facility] ENDOSCOPY;  Service: Gastroenterology    LAPAROSCOPIC LYSIS OF ADHESIONS  07/16/1992    Laparoscopy; lysis of adhesions (1)       LIVER BIOPSY  01/05/1998    Needle biopsy of liver 66089 (1)       LIVER TRANSPLANTATION  2003    Anesth, for liver transplant (1)       SALPINGO OOPHORECTOMY  1974    Salpingo-oophorectomy (1)       SHOULDER SURGERY  08/29/2007    Shoulder surgery procedure (1)       TOTAL ABDOMINAL HYSTERECTOMY  1968    Total abd hysterectomy (1)       TOTAL SHOULDER ARTHROPLASTY W/ DISTAL CLAVICLE EXCISION Right 10/10/2023    Procedure: RIGHT REVERSE TOTAL SHOULDER ARTHROPLASTY;  Surgeon: Frantz Rosa MD;  Location: [facility] OR;  Service: Orthopedics;  Laterality: Right;      General Information       Row Name 10/11/23 1410          Physical Therapy Time and Intention    Document Type evaluation  Admitted s/p R rTSA. NWB RUE.  -MS (r) JS (t) MS (c)     Mode of Treatment physical therapy  -MS (r) JS (t) MS (c)       Row Name 10/11/23 1410          General Information    Patient Profile Reviewed yes  -MS (r) JS (t) MS (c)     Prior Level of Function independent:;all household mobility;mod assist:;dressing;bathing  Uses rollator at home and FWW in community  -MS (r) JS (t) MS (c)     Existing Precautions/Restrictions fall;non-weight bearing;right;shoulder  -MS (r) JS (t) MS (c)     Barriers to Rehab previous functional deficit  -MS (r) JS (t) MS (c)       Row Name 10/11/23 1410          Living Environment    People in Home alone;other (see comments)  Sitter M-F 8 hours a day  -MS (r) JS (t) MS (c)       Row Name 10/11/23  1410          Home Main Entrance    Number of Stairs, Main Entrance none;other (see comments)  Ramp  -MS (r) JS (t) MS (c)     Stair Railings, Main Entrance railings on both sides of stairs  -MS (r) JS (t) MS (c)       Row Name 10/11/23 1410          Stairs Within Home, Primary    Number of Stairs, Within Home, Primary none  -MS (r) JS (t) MS (c)       Row Name 10/11/23 1410          Cognition    Orientation Status (Cognition) oriented x 4  -MS (r) JS (t) MS (c)       Row Name 10/11/23 1410          Safety Issues, Functional Mobility    Safety Issues Affecting Function (Mobility) sequencing abilities;positioning of assistive device  -MS (r) JS (t) MS (c)     Impairments Affecting Function (Mobility) balance;endurance/activity tolerance;grasp;motor control;motor planning;postural/trunk control;range of motion (ROM);strength  -MS (r) JS (t) MS (c)     Comment, Safety Issues/Impairments (Mobility) Step over tub with grab bar and shower chair. Grab bar and raised toilet seat. Plans to sleep in lift chair at home.  -MS (r) JS (t) MS (c)               User Key  (r) = Recorded By, (t) = Taken By, (c) = Cosigned By      Initials Name Provider Type    Meredith Garza, PT, DPT, NCS Physical Therapist    Saurav Chacon, STONEY Student PT Student                   Mobility       Row Name 10/11/23 1410          Sit-Stand Transfer    Sit-Stand [place] (Transfers) verbal cues;minimum assist (75% patient effort)  -MS (r) JS (t) MS (c)     Assistive Device (Sit-Stand Transfers) cane, quad  -MS (r) JS (t) MS (c)       Row Name 10/11/23 1410          Gait/Stairs (Locomotion)    [place] Level (Gait) minimum assist (75% patient effort)  -MS (r) JS (t) MS (c)     Assistive Device (Gait) cane, quad  -MS (r) JS (t) MS (c)     Distance in Feet (Gait) 3  -MS (r) JS (t) MS (c)     Deviations/Abnormal Patterns (Gait) bilateral deviations;bonnie decreased;festinating/shuffling;gait speed decreased;stride length decreased  -MS (r)  JS (t) MS (c)     Bilateral Gait Deviations heel strike decreased;forward flexed posture  -MS (r) JS (t) MS (c)       Row Name 10/11/23 1410          Mobility    Extremity Weight-bearing Status right upper extremity  -MS (r) JS (t) MS (c)     Right Upper Extremity (Weight-bearing Status) non weight-bearing (NWB)  -MS (r) JS (t) MS (c)               User Key  (r) = Recorded By, (t) = Taken By, (c) = Cosigned By      Initials Name Provider Type    MS Meredith Nance JORDI, PT, DPT, NCS Physical Therapist    Saurav Chacon, PT Student PT Student                   Obj/Interventions       Row Name 10/11/23 1410          Range of Motion Comprehensive    Comment, General Range of Motion BLE WFL  -MS (r) JS (t) MS (c)       Row Name 10/11/23 1410          Strength Comprehensive (MMT)    Comment, General Manual Muscle Testing (MMT) Assessment LUE: L elbow flexion/extension 4/5, wrist extension 4/5,  3+/5. BLE: Grossly 4/5  -MS (r) JS (t) MS (c)       Row Name 10/11/23 1410          Motor Skills    Motor Skills neuro-muscular function  -MS (r) JS (t) MS (c)     Neuromuscular Function tremor, resting;left;upper extremity;other (see comments)  Pt has Parkinson's.  -MS (r) JS (t) MS (c)       Row Name 10/11/23 1410          Balance    Balance Assessment sitting static balance;sitting dynamic balance;standing static balance;standing dynamic balance  -MS (r) JS (t) MS (c)     Static Sitting Balance supervision;verbal cues  -MS (r) JS (t) MS (c)     Dynamic Sitting Balance standby assist;verbal cues  -MS (r) JS (t) MS (c)     Position, Sitting Balance sitting in chair  -MS (r) JS (t) MS (c)     Static Standing Balance contact guard;verbal cues  -MS (r) JS (t) MS (c)     Dynamic Standing Balance minimal assist;verbal cues  -MS (r) JS (t) MS (c)     Position/Device Used, Standing Balance supported  -MS (r) JS (t) MS (c)     Comment, Balance Patient demonstrates severe kyphotic posture and is able to correct somewhat upon cueing in  seated. In standing she required min A to stand with midline posture. Her COM is  largely anterior to her TORRI making her a fall risk.  -MS (r) JS (t) MS (c)       Row Name 10/11/23 1410          Sensory Assessment (Somatosensory)    Sensory Assessment (Somatosensory) bilateral LE;right UE  -MS (r) JS (t) MS (c)     Right UE Sensory Assessment other (see comments);absent  Unable to feel thumb and fingers d/t nerve block.  -MS (r) JS (t) MS (c)     Bilateral LE Sensory Assessment intact  -MS (r) JS (t) MS (c)               User Key  (r) = Recorded By, (t) = Taken By, (c) = Cosigned By      Initials Name Provider Type    Meredith Garza, PT, DPT, NCS Physical Therapist    Saurav Chacon, PT Student PT Student                   Goals/Plan       Row Name 10/11/23 1410          Bed Mobility Goal 1 (PT)    Activity/Assistive Device (Bed Mobility Goal 1, PT) sit to supine/supine to sit  -MS (r) JS (t) MS (c)     [place] Level/Cues Needed (Bed Mobility Goal 1, PT) contact guard required  -MS (r) JS (t) MS (c)     Time Frame (Bed Mobility Goal 1, PT) long term goal (LTG);by discharge  -MS (r) JS (t) MS (c)       Row Name 10/11/23 1410          Transfer Goal 1 (PT)    Activity/Assistive Device (Transfer Goal 1, PT) sit-to-stand/stand-to-sit  -MS (r) JS (t) MS (c)     [place] Level/Cues Needed (Transfer Goal 1, PT) standby assist  -MS (r) JS (t) MS (c)     Time Frame (Transfer Goal 1, PT) long term goal (LTG);by discharge  -MS (r) JS (t) MS (c)     Progress/Outcome (Transfer Goal 1, PT) new goal  -MS (r) JS (t) MS (c)       Row Name 10/11/23 1410          Gait Training Goal 1 (PT)    Activity/Assistive Device (Gait Training Goal 1, PT) gait (walking locomotion);assistive device use;decrease fall risk;improve balance and speed;increase endurance/gait distance;cane, quad  -MS (r) JS (t) MS (c)     [place] Level (Gait Training Goal 1, PT) contact guard required  -MS (r) JS (t) MS (c)     Distance (Gait  Training Goal 1, PT) 20  -MS (r) JS (t) MS (c)     Time Frame (Gait Training Goal 1, PT) long term goal (LTG);by discharge  -MS (r) JS (t) MS (c)     Progress/Outcome (Gait Training Goal 1, PT) new goal  -MS (r) JS (t) MS (c)       Row Name 10/11/23 1410          Therapy Assessment/Plan (PT)    Planned Therapy Interventions (PT) balance training;bed mobility training;gait training;postural re-education;patient/family education;orthotic fitting/training;strengthening;transfer training  -MS (r) JS (t) MS (c)               User Key  (r) = Recorded By, (t) = Taken By, (c) = Cosigned By      Initials Name Provider Type    MS Nance Meredith JORDI, PT, DPT, NCS Physical Therapist    Saurav Chacon, PT Student PT Student                   Clinical Impression       Row Name 10/11/23 1410          Pain    Pretreatment Pain Rating 0/10 - no pain  -MS (r) JS (t) MS (c)     Posttreatment Pain Rating 0/10 - no pain  -MS (r) JS (t) MS (c)     Pain Intervention(s) Repositioned  -MS (r) JS (t) MS (c)       Row Name 10/11/23 1410          Plan of Care Review    Plan of Care Reviewed With patient  -MS (r) JS (t) MS (c)     Progress no change  -MS (r) JS (t) MS (c)     Outcome Evaluation PT eval complete. Pt is A&Ox4 with no complaints of pain this visit. She is seated with sling on RUE on arrival. Pt educated on RTSA protocol and sling donning/doffing. and wear schedule. She presents with severe kyphosis in seated and requires min A for sit>stand and for standing balance at times. Patient has intermittent tremors of LUE this visit. She requires consistent cueing to correct kyphotic posture. She requires increased time for ambulation and verbal cueing for sequencing of movements with ambulation. She ambulated 3 feet with min A at times to keep trunk as extended as possible and to keep midline posture. Pt typically ambulates with rollator, and is transitioning to quad cane usage status post R RTSA. She has some tremor in LUE when using  quad cane. Her anterior center of mass decreases her balance and makes her a fall risk, along with use of new assistive device that is less stable. PT services necessary for assistive device training and to decrease fall risk. Patient wishes to d/c to swing bed. Anticipate discharge to subacute rehab.  -MS (r) JS (t) MS (c)       Row Name 10/11/23 1410          Therapy Assessment/Plan (PT)    Patient/Family Therapy Goals Statement (PT) Go to swing bed.  -MS (r) JS (t) MS (c)     Rehab Potential (PT) good, to achieve stated therapy goals  -MS (r) JS (t) MS (c)     Criteria for Skilled Interventions Met (PT) yes;meets criteria;skilled treatment is necessary  -MS (r) JS (t) MS (c)     Therapy Frequency (PT) 2 times/day  -MS (r) JS (t) MS (c)     Predicted Duration of Therapy Intervention (PT) Until discharge  -MS (r) JS (t) MS (c)       Row Name 10/11/23 1410          Vital Signs    O2 Delivery Pre Treatment room air  -MS (r) JS (t) MS (c)     O2 Delivery Intra Treatment room air  -MS (r) JS (t) MS (c)     O2 Delivery Post Treatment room air  -MS (r) JS (t) MS (c)     Pre Patient Position Sitting  -MS (r) JS (t) MS (c)     Intra Patient Position Standing  -MS (r) JS (t) MS (c)     Post Patient Position Sitting  -MS (r) JS (t) MS (c)       Row Name 10/11/23 1410          Positioning and Restraints    Pre-Treatment Position sitting in chair/recliner  -MS (r) JS (t) MS (c)     Post Treatment Position chair  -MS (r) JS (t) MS (c)     In Chair sitting;reclined;call light within reach;encouraged to call for assist;with brace;legs elevated  -MS (r) JS (t) MS (c)               User Key  (r) = Recorded By, (t) = Taken By, (c) = Cosigned By      Initials Name Provider Type    Meredith Garza, PT, DPT, NCS Physical Therapist    Saurav Chacon, PT Student PT Student                   Outcome Measures       Row Name 10/11/23 4736 10/11/23 6424       How much help from another person do you currently need...    Turning from  your back to your side while in flat bed without using bedrails? 3  -MS (r) JS (t) MS (c) 3  -SC    Moving from lying on back to sitting on the side of a flat bed without bedrails? 2  -MS (r) JS (t) MS (c) 2  -SC    Moving to and from a bed to a chair (including a wheelchair)? 3  -MS (r) JS (t) MS (c) 2  -SC    Standing up from a chair using your arms (e.g., wheelchair, bedside chair)? 3  -MS (r) JS (t) MS (c) 2  -SC    Climbing 3-5 steps with a railing? 2  -MS (r) JS (t) MS (c) 2  -SC    To walk in hospital room? 3  -MS (r) JS (t) MS (c) 2  -SC    AM-PAC 6 Clicks Score (PT) 16  -MS (r) JS (t) 13  -SC    Highest level of mobility 5 --> Static standing  -MS (r) JS (t) 4 --> Transferred to chair/commode  -SC      Row Name 10/11/23 1545 10/11/23 0736       Functional Assessment    Outcome Measure Options AM-PAC 6 Clicks Basic Mobility (PT)  -MS (r) JS (t) MS (c) AM-PAC 6 Clicks Daily Activity (OT)  -LS              User Key  (r) = Recorded By, (t) = Taken By, (c) = Cosigned By      Initials Name Provider Type    Meredith Garza R, PT, DPT, NCS Physical Therapist    Natasha Stovall, RN Registered Nurse    Anuradha Anderson, OTR/L Occupational Therapist    Saurav Chacon, PT Student PT Student                                 Physical Therapy Education       Title: PT OT SLP Therapies (In Progress)       Topic: Physical Therapy (Done)       Point: Mobility training (Done)       Learning Progress Summary             Patient Acceptance, EMANUEL DU by LIAM at 10/11/2023 1410    Comment: Verbal cueing for transfers with quad cane. Verbal cueing for sequencing with gait and for posture. Brace donning/doffing/wear schedule. WB precautions RUE.                         Point: Body mechanics (Done)       Learning Progress Summary             Patient Acceptance, E, MANUEL,MARIANO by LIAM at 10/11/2023 1410    Comment: Verbal cueing for transfers with quad cane. Verbal cueing for sequencing with gait and for posture. Brace  donning/doffing/wear schedule. WB precautions RUE.                         Point: Precautions (Done)       Learning Progress Summary             Patient Acceptance, E, VU,DU by LIAM at 10/11/2023 1410    Comment: Verbal cueing for transfers with quad cane. Verbal cueing for sequencing with gait and for posture. Brace donning/doffing/wear schedule. WB precautions RUE.                                         User Key       Initials Effective Dates Name Provider Type Discipline    LIAM 07/13/23 -  Saurav Moise, PT Student PT Student PT                  PT Recommendation and Plan  Planned Therapy Interventions (PT): balance training, bed mobility training, gait training, postural re-education, patient/family education, orthotic fitting/training, strengthening, transfer training  Plan of Care Reviewed With: patient  Progress: no change  Outcome Evaluation: PT eval complete. Pt is A&Ox4 with no complaints of pain this visit. She is seated with sling on RUE on arrival. Pt educated on RTSA protocol and sling donning/doffing. and wear schedule. She presents with severe kyphosis in seated and requires min A for sit>stand and for standing balance at times. Patient has intermittent tremors of LUE this visit. She requires consistent cueing to correct kyphotic posture. She requires increased time for ambulation and verbal cueing for sequencing of movements with ambulation. She ambulated 3 feet with min A at times to keep trunk as extended as possible and to keep midline posture. Pt typically ambulates with rollator, and is transitioning to quad cane usage status post R RTSA. She has some tremor in LUE when using quad cane. Her anterior center of mass decreases her balance and makes her a fall risk, along with use of new assistive device that is less stable. PT services necessary for assistive device training and to decrease fall risk. Patient wishes to d/c to swing bed. Anticipate discharge to subacute rehab.     Time Calculation:          PT Charges       Row Name 10/11/23 1410             Time Calculation    Start Time 1410  9 mins spent in chart  -MS (r) JS (t) MS (c)      Stop Time 1450  -MS (r) JS (t) MS (c)      Time Calculation (min) 40 min  -MS (r) JS (t)      PT Received On 10/11/23  -MS (r) JS (t) MS (c)      PT Goal Re-Cert Due Date 10/21/23  -MS (r) JS (t) MS (c)         Untimed Charges    PT Eval/Re-eval Minutes 49  -MS (r) JS (t) MS (c)         Total Minutes    Untimed Charges Total Minutes 49  -MS (r) JS (t)       Total Minutes 49  -MS (r) JS (t)                User Key  (r) = Recorded By, (t) = Taken By, (c) = Cosigned By      Initials Name Provider Type    Meredith Garza R, PT, DPT, NCS Physical Therapist    Saurav Chacon, PT Student PT Student                      PT G-Codes  Outcome Measure Options: AM-PAC 6 Clicks Basic Mobility (PT)  AM-PAC 6 Clicks Score (PT): 16  AM-PAC 6 Clicks Score (OT): 14  PT Discharge Summary  Anticipated Discharge Disposition (PT): sub acute care setting    Saurav Moise PT Student  10/11/2023     no

## 2023-11-10 NOTE — PROGRESS NOTE ADULT - SUBJECTIVE AND OBJECTIVE BOX
NEPHROLOGY INTERVAL HPI/OVERNIGHT EVENTS:    Feels the same   No new events or complaints   Remains on diuretics       MEDICATIONS  (STANDING):  sodium chloride 0.9% lock flush 3 milliLiter(s) IV Push every 8 hours  latanoprost 0.005% Ophthalmic Solution 1 Drop(s) Both EYES at bedtime  pantoprazole    Tablet 40 milliGRAM(s) Oral before breakfast  multivitamin 1 Tablet(s) Oral daily  metoprolol succinate ER 50 milliGRAM(s) Oral daily  buDESOnide   0.5 milliGRAM(s) Respule 0.5 milliGRAM(s) Inhalation two times a day  clopidogrel Tablet 75 milliGRAM(s) Oral daily  folic acid 1 milliGRAM(s) Oral daily  ascorbic acid 250 milliGRAM(s) Oral daily  simvastatin 20 milliGRAM(s) Oral at bedtime  lisinopril 5 milliGRAM(s) Oral daily  ferrous    sulfate 325 milliGRAM(s) Oral daily  sodium chloride 0.45%. 1000 milliLiter(s) (50 mL/Hr) IV Continuous <Continuous>  torsemide 20 milliGRAM(s) Oral daily    MEDICATIONS  (PRN):  aluminum hydroxide/magnesium hydroxide/simethicone Suspension 30 milliLiter(s) Oral every 6 hours PRN Dyspepsia  hydrALAZINE Injectable 10 milliGRAM(s) IV Push every 3 hours PRN sbp > 150  ALBUTerol/ipratropium for Nebulization 3 milliLiter(s) Nebulizer every 6 hours PRN Shortness of Breath and/or Wheezing      Allergies    aspirin (Anaphylaxis)  NSAIDs (Other)    Intolerances    Lasix (Other)  [facility] PT (Unknown)        Vital Signs Last 24 Hrs  T(C): 36.6 (12 Jul 2017 08:00), Max: 36.6 (12 Jul 2017 08:00)  T(F): 97.8 (12 Jul 2017 08:00), Max: 97.8 (12 Jul 2017 08:00)  HR: 67 (12 Jul 2017 08:00) (67 - 82)  BP: 104/55 (12 Jul 2017 08:00) (97/52 - 104/55)  BP(mean): --  RR: 18 (12 Jul 2017 08:00) (18 - 18)  SpO2: 99% (12 Jul 2017 08:00) (98% - 99%)  Daily     Daily   I&O's Detail    I&O's Summary      PHYSICAL EXAM:  GENERAL: NAD,   EYES:  conjunctiva and sclera clear  NECK: Supple, Veins full upright  NERVOUS SYSTEM:  A/O x3,   CHEST: poor air movement bilaterally  HEART:  sternal scar, no rub  ABDOMEN: Soft, NT/ND BS+  EXTREMITIES: less edema     LABS:                        10.3   4.8   )-----------( 140      ( 12 Jul 2017 11:14 )             34.5     07-12    146<H>  |  104  |  39.0<H>  ----------------------------<  91  4.3   |  32.0<H>  |  1.19    Ca    8.8      12 Jul 2017 05:51                  RADIOLOGY & ADDITIONAL TESTS: Yes

## 2023-11-19 NOTE — PROGRESS NOTE ADULT - SUBJECTIVE AND OBJECTIVE BOX
Patient seen with attending on rounds, now with no abdominal pain, on zosyn. Ct Scan and ultrasound reviewed. D/w interventional radiologist and they think that the gall bladder would difficult to treat with percutaneous drainage due to lack of distension. Will monitor on IV abx for now as symptoms have stopped. If pain persists or if the patient begins to decline clinically we would recommend reengaging interventional radiology for percutaneous cholecystostomy as patient is not a surgical candidate at this time. Patient seen with attending on rounds, now with no abdominal pain, on zosyn. Ct Scan and ultrasound reviewed. D/w interventional radiologist and they think that the gall bladder would be difficult to treat with percutaneous drainage due to lack of distension. Will monitor on IV abx for now as symptoms have stopped. If pain persists or if the patient begins to decline clinically we would recommend reengaging interventional radiology for percutaneous cholecystostomy as patient is not a surgical candidate at this time. You can access the FollowMyHealth Patient Portal offered by [facility] by registering at the following website: [email or web address]. By joining [employer] by [employer]’s FollowMyHealth portal, you will also be able to view your health information using other applications (apps) compatible with our system.

## 2024-03-08 NOTE — DISCHARGE NOTE ADULT - NS AS DC FU CFH EJECTION FRACTION >40 %
Subjective   Patient ID: Nain is a 61 year old male.    Chief Complaint   Patient presents with    Consultation     New pt Chronic ischemic heart disease, Benign essential HTN,        ========================================================================================================    HPI:   ====  61-year-old gentleman with history of coronary artery disease status post CABG and hypertension was sent to me for evaluation and treatment by his PCP  EKG was done showed anterior wall ischemia    Patient reports  -----------------  No Dyspnea on exertion  No PND and no orthopnea    No Chest pain and no angina     No Loss of consciousness  No Dizziness     No Edema   No Palpitation       Patient is reporting ,no side effects .      Past Medical History:   Diagnosis Date    Anxiety     Essential (primary) hypertension     High cholesterol        ALLERGIES:  No Known Allergies     Past Surgical History:   Procedure Laterality Date    Anes atrial septect/septost; open heart         Family History   Problem Relation Age of Onset    Cancer Mother     Heart disease Father        Social History     Tobacco Use    Smoking status: Some Days     Types: Cigarettes    Smokeless tobacco: Former   Vaping Use    Vaping Use: never used   Substance Use Topics    Alcohol use: Yes     Comment: occ    Drug use: Never        Recent hospitalization:  ====================   None for the last year      Review of Systems:   ================     Constitutional:  No chills, and no malaise  Ears, nose, mouth, throat, and face:  No pain, no swelling  Eyes:  No change in eyesight, no pain  Cardiovascular:  No Loss of consciousness  Respiratory:  No hemoptysis  Gastrointestinal:  No melena  Genitourinary:  No hematuria  Neurological:  No change in speech  Musculoskeletal:  No muscle pain  Behavioral/Psych:  No mood changes  Skin : no itching     =============================================================================    Objective  =========  Vitals:    03/08/24 1519   BP: (!) 160/93   BP Location: LUE - Left upper extremity   Patient Position: Sitting   Cuff Size: Large Adult   Pulse: 65   Temp: 98.2 °F (36.8 °C)   TempSrc: Oral   SpO2: 100%   Weight: 117.7 kg (259 lb 5.9 oz)   Height: 5' 9\" (1.753 m)   PainSc:  0        Physical Exam:  =============  General: Alert, cooperative, no distress.overweight  Head: No obvious abnormality  Eyes: Normal, no jaundice.  Throat: Lips, mucosa, moist and normal.  Neck: Supple.  Back: Symmetric.  Lungs: Clear, no wheezing, no rhonchi and no rales.  Heart: Regular rate and rhythm, S1, S2 normal.  Abdomen: Soft, non-tender.  Extremities: No edema.  Skin: Normal texture  Neurologic: No clear deficit.    =============================================================================    Data  =====    Recent Labs   Lab 02/26/24  1447   Cholesterol 231*   HDL 51   Triglycerides 84   CALCLDL 163*   Non-HDL Cholesterol 180       Recent Labs   Lab 02/26/24  1447   Sodium 139   Chloride 107   BUN 13   Potassium 4.1   Glucose 88   Creatinine 1.21*   Calcium 9.2   TSH 5.402*          Hemoglobin A1C (%)   Date Value   02/26/2024 6.2 (H)   .    Recent Labs   Lab 02/26/24  1447 03/11/23  0954   WBC 5.4 5.1   RBC 5.34 5.51   HGB 15.4 15.6   HCT 47.4 49.9   MCV 88.8 90.6   MCHC 32.5 31.3*   RDW-CV 13.5 13.5    231   Lymphocytes, Percent 33 23       LAST ECHO  ==========    Results for orders placed during the hospital encounter of 02/03/24      1. Left ventricle: The cavity size is normal. Wall thickness is normal.  Systolic function is normal. The ejection fraction was measured by visual  estimation. Wall motion is normal; there are no regional wall motion  abnormalities. Doppler parameters are consistent with abnormal left  ventricular relaxation (grade 1 diastolic dysfunction). The ejection  fraction is 55%.  2. Mitral  valve: There is mild regurgitation.  3. Left atrium: The atrium is mildly dilated.  4. Right ventricle: The cavity size is normal. Wall thickness is normal.  Systolic function is normal.           LAST EK2024  ============   Sinus   Anterior ischemia  Old inferior MI      ==============================================================================     Assessment :  ============    Chronic ischemic heart disease  (primary encounter diagnosis)  Mixed hyperlipidemia  S/P CABG (coronary artery bypass graft)  Benign essential HTN  Class 2 severe obesity due to excess calories with serious comorbidity and body mass index (BMI) of 37.0 to 37.9 in adult (CMD)  Abnormal electrocardiogram (ECG) (EKG)    Plan:  =====  Coronary artery disease status post CABG  ========================================  Incomplete revascularization stress echo  Aspirin  Statin      Hypertension  =============  Low-salt diet  Uncontrolled, patient not compliant with medicine  Amlodipine 10 mg  Metoprolol 50 mg  Losartan hydrochlorothiazide       Hyperlipidemia  ===========  Low-cholesterol diet  Statin  Statin last A1c was markedly elevated patient was not taking medicine properly         Current Outpatient Medications   Medication Sig Dispense Refill    aspirin (Aspir-Low) 81 MG EC tablet Take 1 tablet by mouth daily. 90 tablet 3    metoPROLOL succinate (TOPROL-XL) 50 MG 24 hr tablet TAKE 1 TABLET BY MOUTH DAILY 90 tablet 1    amLODIPine (NORVASC) 10 MG tablet Take 1 tablet by mouth daily. 90 tablet 1    valsartan-hydrochlorothiazide (DIOVAN-HCT) 320-25 MG per tablet Take 1 tablet by mouth daily. 90 tablet 1    atorvastatin (LIPITOR) 80 MG tablet Take 1 tablet by mouth daily. 90 tablet 1     No current facility-administered medications for this visit.          Electronically signed by:  Marisol Jauregui MD, 3/8/2024    no

## 2024-08-06 NOTE — DIETITIAN INITIAL EVALUATION ADULT. - NUTRITIONGOAL OUTCOME1
[facility]  [facility] (EP)  [address] [address]  Dept: [phone]  ELECTROPHYSIOLOGY PROCEDURE NOTE  Patient's Demographics:  Date:   8/6/2024  Patient name:              Tenisha Solis  YOB: 1954  Sex: female   MRN:   [id number]    Primary Care Physician:    Axel Evans APRN     Cardiologist:  Logan Potts MD    Procedure Planned:  Leadless pacemaker (MICRA VR) implantation.    Indication of The Procedure:    Post TAVR intermittent complete AV block.     Brief Clinical Summary:  69/F with symptomatic severe AS underwent ANTHONY om 7/29/24. Postoperatively noted to have alternative BBB. Presented a week later with syncope due to 13 seconds asystole dur to complete AV block. Patient having ESRD, LUE fistula, preserved LVEF and low expected RV pacing, Micra leadless pacing system was recommended. Medcial Hx: AS/ANTHONY (7/29/24), post TAVR alternating bundle branch block, nonobstructive CAD, hypertension, hypothyroidism, ESRD/maintenance hemodialysis, left upper extremity AV fistula and gout in remission.      Procedure Performed:  Leadless pacemaker (MICRA VR) implantation     Description of The Procedure:  The patient was brought to the cardiac catheterization lab in a fasting and nonsedated state.  She was prepped and draped in the usual fashion. MAC was administered by anesthesiology service.  Right femoral vein access was obtained under ultrasound guidance using 21G micropuncture needle.  The micro guidewire was exchanged a 0.035\" J-tip guide wire over which a 8F hemostatic short sheath was advanced into the vein.  A 180 cm long wire (Amplatz Super Stiff) was introduced through the sheath and advanced into the superior vena cava under fluoroscopic guidance. The femoral vein was progressively pre-dilated using 16F and 20F dilators. A 27 Syriac MICRA introducer sheath was then advanced over the guidewire under the fluoro guidance and its distal 
PROCEDURE NOTE  Date: 8/5/2024   Name: Tenisha Solis  YOB: 1954    Procedures EKG completed, given to RN          
PROCEDURE NOTE  Date: 8/5/2024   Name: Tenisha Solis  YOB: 1954    Procedures EKG completed, given to RN          
PROCEDURE NOTE  Date: 8/6/2024   Name: Tenisha Solis  YOB: 1954    ProceduresEKG completed, given to RN          
Improve nutrition related lab values

## 2024-09-21 NOTE — PROGRESS NOTE ADULT - PROBLEM/PLAN-2
DISPLAY PLAN FREE TEXT
complains of pain/discomfort
DISPLAY PLAN FREE TEXT
DISPLAY PLAN FREE TEXT

## 2024-11-19 NOTE — PATIENT PROFILE ADULT. - CAREGIVER ADDRESS
9/17/2024        Future Appointments   Date Time Provider Department Center   11/20/2024  1:00 PM ANANDA GUERIN [facility] [place]   12/2/2024  8:30 AM ANANDA GUERIN [facility] [place]   12/16/2024  1:00 PM Linda Schafer PA-C [facility] [place]   3/17/2025  8:00 AM Kerri Lew DO [place] [facility]   11/14/2025  7:30 AM Maynor Hamilton MD [facility]        .

## 2025-01-13 NOTE — H&P PST ADULT - NSANTHOSAYNRD_GEN_A_CORE
English No. RAÚL screening performed.  STOP BANG Legend: 0-2 = LOW Risk; 3-4 = INTERMEDIATE Risk; 5-8 = HIGH Risk

## 2025-01-31 NOTE — DISCHARGE NOTE ADULT - PRINCIPAL DIAGNOSIS
Patient attended session 16 of outpatient Phase 2 pulmonary rehab. See scanned in exercise session report in media tab.    
Non-rheumatic mitral regurgitation
